# Patient Record
Sex: FEMALE | Race: BLACK OR AFRICAN AMERICAN | NOT HISPANIC OR LATINO | Employment: STUDENT | URBAN - METROPOLITAN AREA
[De-identification: names, ages, dates, MRNs, and addresses within clinical notes are randomized per-mention and may not be internally consistent; named-entity substitution may affect disease eponyms.]

---

## 2022-09-06 ENCOUNTER — OFFICE VISIT (OUTPATIENT)
Dept: OTOLARYNGOLOGY | Facility: CLINIC | Age: 27
End: 2022-09-06
Payer: COMMERCIAL

## 2022-09-06 ENCOUNTER — TELEPHONE (OUTPATIENT)
Dept: OTOLARYNGOLOGY | Facility: CLINIC | Age: 27
End: 2022-09-06
Payer: COMMERCIAL

## 2022-09-06 VITALS — TEMPERATURE: 98 F | BODY MASS INDEX: 51.76 KG/M2 | WEIGHT: 293 LBS

## 2022-09-06 DIAGNOSIS — R09.A2 SENSATION OF FOREIGN BODY IN THROAT: ICD-10-CM

## 2022-09-06 DIAGNOSIS — J02.9 SORE THROAT: Primary | ICD-10-CM

## 2022-09-06 PROCEDURE — 1159F MED LIST DOCD IN RCRD: CPT | Mod: CPTII,S$GLB,, | Performed by: PHYSICIAN ASSISTANT

## 2022-09-06 PROCEDURE — 99203 OFFICE O/P NEW LOW 30 MIN: CPT | Mod: S$GLB,,, | Performed by: PHYSICIAN ASSISTANT

## 2022-09-06 PROCEDURE — 99999 PR PBB SHADOW E&M-NEW PATIENT-LVL III: CPT | Mod: PBBFAC,,, | Performed by: PHYSICIAN ASSISTANT

## 2022-09-06 PROCEDURE — 99203 PR OFFICE/OUTPT VISIT, NEW, LEVL III, 30-44 MIN: ICD-10-PCS | Mod: S$GLB,,, | Performed by: PHYSICIAN ASSISTANT

## 2022-09-06 PROCEDURE — 3008F PR BODY MASS INDEX (BMI) DOCUMENTED: ICD-10-PCS | Mod: CPTII,S$GLB,, | Performed by: PHYSICIAN ASSISTANT

## 2022-09-06 PROCEDURE — 1159F PR MEDICATION LIST DOCUMENTED IN MEDICAL RECORD: ICD-10-PCS | Mod: CPTII,S$GLB,, | Performed by: PHYSICIAN ASSISTANT

## 2022-09-06 PROCEDURE — 3008F BODY MASS INDEX DOCD: CPT | Mod: CPTII,S$GLB,, | Performed by: PHYSICIAN ASSISTANT

## 2022-09-06 PROCEDURE — 99999 PR PBB SHADOW E&M-NEW PATIENT-LVL III: ICD-10-PCS | Mod: PBBFAC,,, | Performed by: PHYSICIAN ASSISTANT

## 2022-09-06 NOTE — TELEPHONE ENCOUNTER
Attempt to gather more information before today's appointment. Pt may need to see dr if needs scope.

## 2022-09-06 NOTE — PROGRESS NOTES
"Subjective:   Patient ID: Christina Leal is a 26 y.o. female.    Chief Complaint: Sore Throat (Patient complains of throat pain, onset 2 weeks. Patient states she went to urgent care yesterday, on 09/05/22. Patient states she believes she is just over using her vocal chords because she tested negative for strep at urgent care. Patient denies any fever, congestion and any other pertinent symptoms. )    Ms. Leal is a 27 yo female here to see me today with complaints of throat pain, onset 2 weeks. Patient states she went to urgent care yesterday, on 09/05/22. Patient states she believes she is just over using her vocal chords because she tested negative for strep at urgent care. Patient denies any fever, congestion and any other pertinent symptoms. She is worried that doing her daily job requirements is "hurting my throat." She states she "fells like something is back there." Denies h/o GERD.       Review of patient's allergies indicates:  No Known Allergies        Review of Systems   Constitutional:  Negative for chills, fatigue, fever and unexpected weight change.   HENT:  Positive for sore throat. Negative for congestion, dental problem, ear discharge, ear pain, facial swelling, hearing loss, nosebleeds, postnasal drip, rhinorrhea, sinus pressure, sneezing, tinnitus, trouble swallowing and voice change.    Eyes:  Negative for redness, itching and visual disturbance.   Respiratory:  Negative for cough, choking, shortness of breath and wheezing.    Cardiovascular:  Negative for chest pain and palpitations.   Gastrointestinal:  Negative for abdominal pain.        No reflux.   Musculoskeletal:  Negative for gait problem.   Skin:  Negative for rash.   Neurological:  Negative for dizziness, light-headedness and headaches.       Objective:   Temp 97.9 °F (36.6 °C) (Temporal)   Wt (!) 154.4 kg (340 lb 6.2 oz)   BMI 51.76 kg/m²     Physical Exam  Constitutional:       General: She is not in acute distress.     " Appearance: She is well-developed.   HENT:      Head: Normocephalic and atraumatic.      Right Ear: Tympanic membrane, ear canal and external ear normal.      Left Ear: Tympanic membrane, ear canal and external ear normal.      Nose: Nose normal. No nasal deformity, septal deviation, mucosal edema or rhinorrhea.      Right Sinus: No maxillary sinus tenderness or frontal sinus tenderness.      Left Sinus: No maxillary sinus tenderness or frontal sinus tenderness.      Mouth/Throat:      Mouth: Mucous membranes are not pale and not dry.      Dentition: No dental caries.      Pharynx: Uvula midline. No oropharyngeal exudate or posterior oropharyngeal erythema.   Eyes:      General: Lids are normal. No scleral icterus.     Extraocular Movements:      Right eye: Normal extraocular motion and no nystagmus.      Left eye: Normal extraocular motion and no nystagmus.      Conjunctiva/sclera: Conjunctivae normal.      Right eye: Right conjunctiva is not injected. No chemosis.     Left eye: Left conjunctiva is not injected. No chemosis.     Pupils: Pupils are equal, round, and reactive to light.   Neck:      Thyroid: No thyroid mass or thyromegaly.      Trachea: Trachea and phonation normal. No tracheal tenderness or tracheal deviation.   Pulmonary:      Effort: Pulmonary effort is normal. No respiratory distress.      Breath sounds: No stridor.   Abdominal:      General: There is no distension.   Lymphadenopathy:      Head:      Right side of head: No submental, submandibular, preauricular, posterior auricular or occipital adenopathy.      Left side of head: No submental, submandibular, preauricular, posterior auricular or occipital adenopathy.      Cervical: No cervical adenopathy.   Skin:     General: Skin is warm and dry.      Findings: No erythema or rash.   Neurological:      Mental Status: She is alert and oriented to person, place, and time.      Cranial Nerves: No cranial nerve deficit.   Psychiatric:          Behavior: Behavior normal.          Assessment:     1. Sore throat    2. Sensation of foreign body in throat        Plan:     Sore throat    Sensation of foreign body in throat    I have scheduled her a return visit with one of our Mds for a scope evaluation. Exam normal today.

## 2022-09-23 ENCOUNTER — OFFICE VISIT (OUTPATIENT)
Dept: OBSTETRICS AND GYNECOLOGY | Facility: CLINIC | Age: 27
End: 2022-09-23
Payer: COMMERCIAL

## 2022-09-23 VITALS — SYSTOLIC BLOOD PRESSURE: 130 MMHG | DIASTOLIC BLOOD PRESSURE: 84 MMHG | BODY MASS INDEX: 52.09 KG/M2 | WEIGHT: 293 LBS

## 2022-09-23 DIAGNOSIS — Z01.419 WELL WOMAN EXAM WITH ROUTINE GYNECOLOGICAL EXAM: Primary | ICD-10-CM

## 2022-09-23 DIAGNOSIS — Z11.3 SCREENING EXAMINATION FOR STD (SEXUALLY TRANSMITTED DISEASE): ICD-10-CM

## 2022-09-23 DIAGNOSIS — Z30.011 BCP (BIRTH CONTROL PILLS) INITIATION: ICD-10-CM

## 2022-09-23 PROCEDURE — 88141 CYTOPATH C/V INTERPRET: CPT | Mod: ,,, | Performed by: PATHOLOGY

## 2022-09-23 PROCEDURE — 3079F PR MOST RECENT DIASTOLIC BLOOD PRESSURE 80-89 MM HG: ICD-10-PCS | Mod: CPTII,S$GLB,,

## 2022-09-23 PROCEDURE — 3008F PR BODY MASS INDEX (BMI) DOCUMENTED: ICD-10-PCS | Mod: CPTII,S$GLB,,

## 2022-09-23 PROCEDURE — 3079F DIAST BP 80-89 MM HG: CPT | Mod: CPTII,S$GLB,,

## 2022-09-23 PROCEDURE — 99385 PREV VISIT NEW AGE 18-39: CPT | Mod: S$GLB,,,

## 2022-09-23 PROCEDURE — 88141 PR  CYTOPATH CERV/VAG INTERPRET: ICD-10-PCS | Mod: ,,, | Performed by: PATHOLOGY

## 2022-09-23 PROCEDURE — 87591 N.GONORRHOEAE DNA AMP PROB: CPT

## 2022-09-23 PROCEDURE — 99999 PR PBB SHADOW E&M-EST. PATIENT-LVL III: ICD-10-PCS | Mod: PBBFAC,,,

## 2022-09-23 PROCEDURE — 3008F BODY MASS INDEX DOCD: CPT | Mod: CPTII,S$GLB,,

## 2022-09-23 PROCEDURE — 1159F MED LIST DOCD IN RCRD: CPT | Mod: CPTII,S$GLB,,

## 2022-09-23 PROCEDURE — 87491 CHLMYD TRACH DNA AMP PROBE: CPT

## 2022-09-23 PROCEDURE — 88175 CYTOPATH C/V AUTO FLUID REDO: CPT | Performed by: PATHOLOGY

## 2022-09-23 PROCEDURE — 87624 HPV HI-RISK TYP POOLED RSLT: CPT

## 2022-09-23 PROCEDURE — 99999 PR PBB SHADOW E&M-EST. PATIENT-LVL III: CPT | Mod: PBBFAC,,,

## 2022-09-23 PROCEDURE — 1159F PR MEDICATION LIST DOCUMENTED IN MEDICAL RECORD: ICD-10-PCS | Mod: CPTII,S$GLB,,

## 2022-09-23 PROCEDURE — 3075F SYST BP GE 130 - 139MM HG: CPT | Mod: CPTII,S$GLB,,

## 2022-09-23 PROCEDURE — 3075F PR MOST RECENT SYSTOLIC BLOOD PRESS GE 130-139MM HG: ICD-10-PCS | Mod: CPTII,S$GLB,,

## 2022-09-23 PROCEDURE — 99385 PR PREVENTIVE VISIT,NEW,18-39: ICD-10-PCS | Mod: S$GLB,,,

## 2022-09-23 RX ORDER — NORGESTIMATE AND ETHINYL ESTRADIOL 7DAYSX3 28
1 KIT ORAL DAILY
Qty: 28 TABLET | Refills: 11 | Status: SHIPPED | OUTPATIENT
Start: 2022-09-23 | End: 2023-09-07

## 2022-09-23 NOTE — PROGRESS NOTES
Subjective:       Patient ID: Christina Leal is a 26 y.o. female.    Chief Complaint:  Annual Exam      History of Present Illness  Gynecologic Exam  The patient's pertinent negatives include no genital itching, genital lesions, genital odor, genital rash, missed menses, pelvic pain, vaginal bleeding or vaginal discharge. She is not pregnant. Pertinent negatives include no abdominal pain, anorexia, back pain, chills, constipation, diarrhea, discolored urine, dysuria, fever, flank pain, frequency, headaches, hematuria, nausea, painful intercourse, rash, urgency or vomiting. She has not been passing clots. She has not been passing tissue. She is sexually active. No, her partner does not have an STD. She uses condoms for contraception. Her menstrual history has been irregular. There is no history of an abdominal surgery, a  section, an ectopic pregnancy, endometriosis, a gynecological surgery, herpes simplex, menorrhagia, metrorrhagia, miscarriage, ovarian cysts, perineal abscess, PID, an STD, a terminated pregnancy or vaginosis.   Annual Exam-Premenopausal  Patient presents for annual exam. The patient has no complaints today. The patient is sexually active MM male, uses condoms sometimes. GYN screening history: no prior history of gyn screening tests. The patient wears seatbelts: yes. The patient participates in regular exercise: no. Has the patient ever been transfused or tattooed?: no. The patient reports that there is not domestic violence in her life.    Monthly menses skipping 1 month sometimes. Usually last 4-6 days, average flow. Heavier on months after a missed menses. Cramping ranging from moderate to light, tolerable without meds, uses heat to soothe.     Reports hair on chin and cheeks with occasional acne. Was told 10 or so years ago that she may have PCOS.         GYN & OB History  Patient's last menstrual period was 2022.   Date of Last Pap: No result found    OB History     Para Term  AB Living   0 0 0 0 0 0   SAB IAB Ectopic Multiple Live Births   0 0 0 0 0       Review of Systems  Review of Systems   Constitutional:  Negative for chills and fever.   Gastrointestinal:  Negative for abdominal pain, anorexia, constipation, diarrhea, nausea and vomiting.   Genitourinary:  Negative for dysuria, flank pain, frequency, hematuria, menorrhagia, missed menses, pelvic pain, urgency and vaginal discharge.   Musculoskeletal:  Negative for back pain.   Integumentary:  Negative for rash.   Neurological:  Negative for headaches.         Objective:    Physical Exam:   Constitutional: She is oriented to person, place, and time. She appears well-developed and well-nourished. No distress.    HENT:   Head: Normocephalic and atraumatic.   Nose: Nose normal.    Eyes: Pupils are equal, round, and reactive to light. Conjunctivae and EOM are normal. Right eye exhibits no discharge. Left eye exhibits no discharge.     Cardiovascular:  Normal rate, regular rhythm and normal heart sounds.      Exam reveals no clubbing, no cyanosis and no edema.        Pulmonary/Chest: Effort normal and breath sounds normal. No respiratory distress. She has no decreased breath sounds. She has no wheezes. She has no rhonchi. Right breast exhibits no inverted nipple, no mass, no nipple discharge, no skin change, no tenderness, no bleeding and no swelling. Left breast exhibits no inverted nipple, no mass, no nipple discharge, no skin change, no tenderness, no bleeding and no swelling. Breasts are symmetrical.        Abdominal: Soft. Bowel sounds are normal. She exhibits no distension. There is no abdominal tenderness. There is no rebound and no guarding. Hernia confirmed negative in the right inguinal area and confirmed negative in the left inguinal area.     Genitourinary:    Inguinal canal, vagina, uterus, right adnexa and left adnexa normal.   Rectum:      No external hemorrhoid.      Pelvic exam was performed with patient  supine.   The external female genitalia was normal.   No external genitalia lesions identified,Genitalia hair distrobution normal .   Labial bartholins normal.There is no rash, tenderness, lesion (small cyst) or injury on the right labia. There is no rash, tenderness, lesion or injury on the left labia. Cervix is normal. Right adnexum displays no mass, no tenderness and no fullness. Left adnexum displays no mass, no tenderness and no fullness. No erythema,  no vaginal discharge, tenderness or bleeding in the vagina.    No foreign body in the vagina.      No signs of injury in the vagina.   Vagina was moist.Cervix exhibits no motion tenderness, no lesion, no discharge, no friability, no lesion, no tenderness and no polyp.    pap smear completedUerus contour normal  Uterus is not enlarged and not tender. Normal urethral meatus.Urethral Meatus exhibits: urethral lesion and prolapsedUrethra findings: no urethral mass, no tenderness and no urethral scarringBladder findings: no bladder distention and no bladder tenderness          Musculoskeletal: Normal range of motion and moves all extremeties.      Lymphadenopathy: No inguinal adenopathy noted on the right or left side.    Neurological: She is alert and oriented to person, place, and time.    Skin: Skin is warm and dry. No rash noted. She is not diaphoretic. No cyanosis or erythema. No pallor. Nails show no clubbing.    Psychiatric: She has a normal mood and affect. Her speech is normal and behavior is normal. Judgment and thought content normal.        Assessment:        1. Well woman exam with routine gynecological exam    2. Screening examination for STD (sexually transmitted disease)    3. BCP (birth control pills) initiation               Plan:      Well woman exam with routine gynecological exam  -     Liquid-Based Pap Smear, Screening  -     HPV High Risk Genotypes, PCR    Screening examination for STD (sexually transmitted disease)  -     C. trachomatis/N.  gonorrhoeae by AMP DNA [facility]; Cervicovaginal    BCP (birth control pills) initiation  -     norgestimate-ethinyl estradioL (ORTHO TRI-CYCLEN,TRI-SPRINTEC) 0.18/0.215/0.25 mg-35 mcg (28) tablet; Take 1 tablet by mouth once daily.  Dispense: 28 tablet; Refill: 11  - Discussed contraception options with patient including pills, patch, ring, Depo Provera, Implanon, Mirena, and Paragard.  Discussed risks of DVT development with birth control use.  Pt denies history of blood clots, DVT, cardiac issues, HTN, CVA, gallbladder disease, liver disease, smoking, migraines with an aura, or adrenal disease.  Pt denies family hx of DVT.  Pt chose OCPs              Follow up in about 3 months (around 12/23/2022) for F/u on birth control.

## 2022-09-24 LAB
C TRACH DNA SPEC QL NAA+PROBE: NOT DETECTED
N GONORRHOEA DNA SPEC QL NAA+PROBE: NOT DETECTED

## 2022-09-28 LAB
HPV HR 12 DNA SPEC QL NAA+PROBE: NEGATIVE
HPV16 AG SPEC QL: NEGATIVE
HPV18 DNA SPEC QL NAA+PROBE: NEGATIVE

## 2022-09-30 ENCOUNTER — TELEPHONE (OUTPATIENT)
Dept: OBSTETRICS AND GYNECOLOGY | Facility: CLINIC | Age: 27
End: 2022-09-30
Payer: COMMERCIAL

## 2022-09-30 LAB
FINAL PATHOLOGIC DIAGNOSIS: ABNORMAL
Lab: ABNORMAL

## 2022-09-30 NOTE — PROGRESS NOTES
Attempted to contact patient to inform patient of results. Patient did not answer. LVM to rtc to clinic

## 2022-09-30 NOTE — TELEPHONE ENCOUNTER
----- Message from Doreen Garrison NP sent at 9/30/2022  1:19 PM CDT -----  Please relay to patient:    Your pap smear is slightly abnormal.  The pap smear detects abnormal cells on the surface of your cervix.  Just to be safe, we'll have you back in 1 year for a repeat pap to keep an eye on it.     Thanks!    Doreen

## 2022-10-03 ENCOUNTER — TELEPHONE (OUTPATIENT)
Dept: OBSTETRICS AND GYNECOLOGY | Facility: CLINIC | Age: 27
End: 2022-10-03
Payer: COMMERCIAL

## 2022-10-03 ENCOUNTER — PATIENT MESSAGE (OUTPATIENT)
Dept: OBSTETRICS AND GYNECOLOGY | Facility: CLINIC | Age: 27
End: 2022-10-03
Payer: COMMERCIAL

## 2022-10-18 ENCOUNTER — OFFICE VISIT (OUTPATIENT)
Dept: INTERNAL MEDICINE | Facility: CLINIC | Age: 27
End: 2022-10-18
Payer: COMMERCIAL

## 2022-10-18 VITALS
OXYGEN SATURATION: 95 % | WEIGHT: 293 LBS | SYSTOLIC BLOOD PRESSURE: 138 MMHG | HEART RATE: 128 BPM | BODY MASS INDEX: 44.41 KG/M2 | HEIGHT: 68 IN | DIASTOLIC BLOOD PRESSURE: 88 MMHG | TEMPERATURE: 101 F

## 2022-10-18 DIAGNOSIS — S29.012A STRAIN OF THORACIC PARASPINAL MUSCLES EXCLUDING T1 AND T2 LEVELS, INITIAL ENCOUNTER: Primary | ICD-10-CM

## 2022-10-18 PROCEDURE — 3075F SYST BP GE 130 - 139MM HG: CPT | Mod: CPTII,S$GLB,, | Performed by: NURSE PRACTITIONER

## 2022-10-18 PROCEDURE — 1159F PR MEDICATION LIST DOCUMENTED IN MEDICAL RECORD: ICD-10-PCS | Mod: CPTII,S$GLB,, | Performed by: NURSE PRACTITIONER

## 2022-10-18 PROCEDURE — 3079F DIAST BP 80-89 MM HG: CPT | Mod: CPTII,S$GLB,, | Performed by: NURSE PRACTITIONER

## 2022-10-18 PROCEDURE — 1159F MED LIST DOCD IN RCRD: CPT | Mod: CPTII,S$GLB,, | Performed by: NURSE PRACTITIONER

## 2022-10-18 PROCEDURE — 99214 OFFICE O/P EST MOD 30 MIN: CPT | Mod: S$GLB,,, | Performed by: NURSE PRACTITIONER

## 2022-10-18 PROCEDURE — 99214 PR OFFICE/OUTPT VISIT, EST, LEVL IV, 30-39 MIN: ICD-10-PCS | Mod: S$GLB,,, | Performed by: NURSE PRACTITIONER

## 2022-10-18 PROCEDURE — 3075F PR MOST RECENT SYSTOLIC BLOOD PRESS GE 130-139MM HG: ICD-10-PCS | Mod: CPTII,S$GLB,, | Performed by: NURSE PRACTITIONER

## 2022-10-18 PROCEDURE — 3079F PR MOST RECENT DIASTOLIC BLOOD PRESSURE 80-89 MM HG: ICD-10-PCS | Mod: CPTII,S$GLB,, | Performed by: NURSE PRACTITIONER

## 2022-10-18 PROCEDURE — 99999 PR PBB SHADOW E&M-EST. PATIENT-LVL IV: ICD-10-PCS | Mod: PBBFAC,,, | Performed by: NURSE PRACTITIONER

## 2022-10-18 PROCEDURE — 99999 PR PBB SHADOW E&M-EST. PATIENT-LVL IV: CPT | Mod: PBBFAC,,, | Performed by: NURSE PRACTITIONER

## 2022-10-18 RX ORDER — METHOCARBAMOL 500 MG/1
500 TABLET, FILM COATED ORAL 2 TIMES DAILY PRN
Qty: 40 TABLET | Refills: 0 | Status: SHIPPED | OUTPATIENT
Start: 2022-10-18 | End: 2022-10-28

## 2022-10-18 NOTE — PROGRESS NOTES
Chief Complaint  Chief Complaint   Patient presents with    Muscle Pain     Left side 5 days          HPI     HPI  Christina Leal is a 26 y.o. female with medical diagnoses as listed in the medical history and problem list that presents for Thoracic Muscle Strain. This patient is new to me.     Thoracic Muscle Strain: Pain waxes and wanes that is sharp in nature. Reports this pain started some weeks ago however, went away on its on. Pain returned 5 days ago. \She attributes pain to how she sleeps explaining she twist and turns in her sleep. She rates pain 6-7/10. Denies falls or traumas. LMP was 09/12-16/2022. LMP was 09/12-16/2022. Reports recently starting oral contraceptives. Use Ibuprofen and Tylenol and a heating pads with moderate relief. Reports recently being dehydrated due to decreased water intake.       History     PAST MEDICAL HISTORY:  History reviewed. No pertinent past medical history.    PAST SURGICAL HISTORY:  History reviewed. No pertinent surgical history.    SOCIAL HISTORY:  Social History     Socioeconomic History    Marital status: Single   Tobacco Use    Smoking status: Former     Types: Cigarettes    Smokeless tobacco: Never   Substance and Sexual Activity    Alcohol use: Yes     Comment: social    Drug use: No    Sexual activity: Yes     Partners: Male     Birth control/protection: Condom   Social History Narrative    She is a graduate of [employer] and will be attending [employer] in the fall. She is a non-smoker and doesn't drink alcohol. 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        FAMILY HISTORY:  Family History   Problem Relation Age of Onset    Esophageal cancer Father     Heart disease Neg Hx     Cancer Neg Hx        ALLERGIES AND MEDICATIONS: updated and reviewed.  Review of patient's allergies indicates:  No Known Allergies  Current Outpatient Medications   Medication Sig Dispense Refill    methocarbamoL (ROBAXIN) 500 MG Tab Take 1 tablet (500 mg total) by mouth 2 (two)  "times daily as needed. 40 tablet 0    norgestimate-ethinyl estradioL (ORTHO TRI-CYCLEN,TRI-SPRINTEC) 0.18/0.215/0.25 mg-35 mcg (28) tablet Take 1 tablet by mouth once daily. 28 tablet 11     No current facility-administered medications for this visit.           Exam     ROS  Review of Systems   Constitutional:  Negative for appetite change, chills, fatigue and fever.   HENT:  Negative for congestion, ear pain, postnasal drip, rhinorrhea, sinus pressure, sneezing and sore throat.    Respiratory:  Negative for shortness of breath.    Cardiovascular:  Negative for chest pain and palpitations.   Gastrointestinal:  Negative for abdominal pain, constipation, diarrhea, nausea and vomiting.   Genitourinary:  Negative for dysuria.   Musculoskeletal:  Positive for back pain and myalgias. Negative for arthralgias.   Neurological:  Negative for headaches.   Psychiatric/Behavioral:  Negative for sleep disturbance.          Physical Exam  Vitals:    10/18/22 1326   BP: 138/88   BP Location: Right arm   Patient Position: Sitting   BP Method: Large (Manual)   Pulse: (!) 128   Temp: (!) 100.6 °F (38.1 °C)   TempSrc: Oral   SpO2: 95%   Weight: (!) 154.2 kg (339 lb 15.2 oz)   Height: 5' 8" (1.727 m)    Body mass index is 51.69 kg/m².  Weight: (!) 154.2 kg (339 lb 15.2 oz)   Height: 5' 8" (172.7 cm)   Physical Exam  Constitutional:       General: She is not in acute distress.     Appearance: Normal appearance. She is obese.   HENT:      Head: Normocephalic and atraumatic.      Right Ear: External ear normal.      Left Ear: External ear normal.      Nose: Nose normal.   Eyes:      Pupils: Pupils are equal, round, and reactive to light.   Cardiovascular:      Rate and Rhythm: Normal rate and regular rhythm.      Pulses: Normal pulses.   Pulmonary:      Effort: Pulmonary effort is normal. No respiratory distress.      Breath sounds: Normal breath sounds. No wheezing.   Abdominal:      General: Bowel sounds are normal.      Palpations: " Abdomen is soft.   Musculoskeletal:      Cervical back: Neck supple.      Thoracic back: Spasms and tenderness present.   Lymphadenopathy:      Cervical: No cervical adenopathy.   Skin:     General: Skin is warm and dry.      Capillary Refill: Capillary refill takes less than 2 seconds.   Neurological:      General: No focal deficit present.      Mental Status: She is alert and oriented to person, place, and time.   Psychiatric:         Mood and Affect: Mood normal.           Health Maintenance         Date Due Completion Date    Hepatitis C Screening Never done ---    Lipid Panel Never done ---    HIV Screening Never done ---    TETANUS VACCINE 09/23/2019 9/23/2009    COVID-19 Vaccine (2 - Booster for Drake series) 05/08/2021 3/13/2021    Influenza Vaccine (1) 09/01/2022 11/22/2011    Pap Smear 09/23/2025 9/23/2022              Assessment & Plan     Assessment & Plan  Problem List Items Addressed This Visit    None  Visit Diagnoses       Strain of thoracic paraspinal muscles excluding T1 and T2 levels, initial encounter    -  Primary  - Labs to rule out renal involvement or Pregnancy  -We discussed effective administration of Robaxin, adverse effects and side effects with education given for reinforcement    Relevant Medications    methocarbamoL (ROBAXIN) 500 MG Tab, BID PRN    Other Relevant Orders    CBC Auto Differential (Completed)    Basic Metabolic Panel (Completed)    Urinalysis, Reflex to Urine Culture Urine, Clean Catch    Ambulatory referral/consult to Physical/Occupational Therapy    Pregnancy, urine rapid (Completed)              Health Maintenance reviewed: Deferred per patient    Follow-up: If symptoms worsen or fail to improve     30+ minutes of total time spent on the encounter, which includes face to face time and non-face to face time preparing to see the patient (eg, review of tests), Obtaining and/or reviewing separately obtained history, documenting clinical information in the electronic or  other health record, independently interpreting results (not separately reported) and communicating results to the patient/family/caregiver, or Care coordination (not separately reported).

## 2022-10-19 ENCOUNTER — HOSPITAL ENCOUNTER (OUTPATIENT)
Dept: RADIOLOGY | Facility: HOSPITAL | Age: 27
Discharge: HOME | DRG: 163 | End: 2022-10-19
Attending: NURSE PRACTITIONER
Payer: COMMERCIAL

## 2022-10-19 ENCOUNTER — PATIENT MESSAGE (OUTPATIENT)
Dept: INTERNAL MEDICINE | Facility: CLINIC | Age: 27
End: 2022-10-19
Payer: COMMERCIAL

## 2022-10-19 ENCOUNTER — HOSPITAL ENCOUNTER (INPATIENT)
Facility: HOSPITAL | Age: 27
LOS: 9 days | Discharge: HOME HEALTH | DRG: 163 | End: 2022-10-28
Attending: EMERGENCY MEDICINE | Admitting: INTERNAL MEDICINE
Payer: COMMERCIAL

## 2022-10-19 DIAGNOSIS — D72.829 LEUKOCYTOSIS, UNSPECIFIED TYPE: Primary | ICD-10-CM

## 2022-10-19 DIAGNOSIS — Z93.8 S/P CHEST TUBE PLACEMENT: ICD-10-CM

## 2022-10-19 DIAGNOSIS — J90 PLEURAL EFFUSION: ICD-10-CM

## 2022-10-19 DIAGNOSIS — J91.8 PARAPNEUMONIC EFFUSION: ICD-10-CM

## 2022-10-19 DIAGNOSIS — R10.9 ABDOMINAL PAIN, UNSPECIFIED ABDOMINAL LOCATION: ICD-10-CM

## 2022-10-19 DIAGNOSIS — B34.9 VIRAL SYNDROME: ICD-10-CM

## 2022-10-19 DIAGNOSIS — J18.9 PARAPNEUMONIC EFFUSION: ICD-10-CM

## 2022-10-19 DIAGNOSIS — R06.03 ACUTE RESPIRATORY DISTRESS: ICD-10-CM

## 2022-10-19 DIAGNOSIS — J86.9 EMPYEMA OF LEFT PLEURAL SPACE: Primary | ICD-10-CM

## 2022-10-19 DIAGNOSIS — J90 LOCULATED PLEURAL EFFUSION: ICD-10-CM

## 2022-10-19 LAB
ALBUMIN SERPL BCP-MCNC: 2.3 G/DL (ref 3.5–5.2)
ALP SERPL-CCNC: 85 U/L (ref 55–135)
ALT SERPL W/O P-5'-P-CCNC: 19 U/L (ref 10–44)
ANION GAP SERPL CALC-SCNC: 13 MMOL/L (ref 8–16)
ANISOCYTOSIS BLD QL SMEAR: SLIGHT
APPEARANCE FLD: NORMAL
AST SERPL-CCNC: 16 U/L (ref 10–40)
BASOPHILS # BLD AUTO: 0.11 K/UL (ref 0–0.2)
BASOPHILS # BLD AUTO: 0.13 K/UL (ref 0–0.2)
BASOPHILS NFR BLD: 0.4 % (ref 0–1.9)
BASOPHILS NFR BLD: 0.4 % (ref 0–1.9)
BILIRUB SERPL-MCNC: 2.1 MG/DL (ref 0.1–1)
BODY FLD TYPE: NORMAL
BUN SERPL-MCNC: 10 MG/DL (ref 6–20)
CALCIUM SERPL-MCNC: 9 MG/DL (ref 8.7–10.5)
CHLORIDE SERPL-SCNC: 97 MMOL/L (ref 95–110)
CO2 SERPL-SCNC: 22 MMOL/L (ref 23–29)
COLOR FLD: YELLOW
CREAT SERPL-MCNC: 0.8 MG/DL (ref 0.5–1.4)
CTP QC/QA: YES
CTP QC/QA: YES
DIFFERENTIAL METHOD: ABNORMAL
DIFFERENTIAL METHOD: ABNORMAL
EOSINOPHIL # BLD AUTO: 0 K/UL (ref 0–0.5)
EOSINOPHIL # BLD AUTO: 0 K/UL (ref 0–0.5)
EOSINOPHIL NFR BLD: 0 % (ref 0–8)
EOSINOPHIL NFR BLD: 0.1 % (ref 0–8)
ERYTHROCYTE [DISTWIDTH] IN BLOOD BY AUTOMATED COUNT: 13.8 % (ref 11.5–14.5)
ERYTHROCYTE [DISTWIDTH] IN BLOOD BY AUTOMATED COUNT: 13.9 % (ref 11.5–14.5)
EST. GFR  (NO RACE VARIABLE): >60 ML/MIN/1.73 M^2
GLUCOSE SERPL-MCNC: 119 MG/DL (ref 70–110)
HCT VFR BLD AUTO: 30.9 % (ref 37–48.5)
HCT VFR BLD AUTO: 32.9 % (ref 37–48.5)
HCV AB SERPL QL IA: NEGATIVE
HEP C VIRUS HOLD SPECIMEN: NORMAL
HGB BLD-MCNC: 10.7 G/DL (ref 12–16)
HGB BLD-MCNC: 9.9 G/DL (ref 12–16)
HIV 1+2 AB+HIV1 P24 AG SERPL QL IA: NEGATIVE
HYPOCHROMIA BLD QL SMEAR: ABNORMAL
IMM GRANULOCYTES # BLD AUTO: 0.9 K/UL (ref 0–0.04)
IMM GRANULOCYTES # BLD AUTO: 1.18 K/UL (ref 0–0.04)
IMM GRANULOCYTES NFR BLD AUTO: 3 % (ref 0–0.5)
IMM GRANULOCYTES NFR BLD AUTO: 4.3 % (ref 0–0.5)
LACTATE SERPL-SCNC: 1.4 MMOL/L (ref 0.5–2.2)
LDH SERPL L TO P-CCNC: 167 U/L (ref 110–260)
LYMPHOCYTES # BLD AUTO: 2.8 K/UL (ref 1–4.8)
LYMPHOCYTES # BLD AUTO: 2.9 K/UL (ref 1–4.8)
LYMPHOCYTES NFR BLD: 10.3 % (ref 18–48)
LYMPHOCYTES NFR BLD: 9.8 % (ref 18–48)
MCH RBC QN AUTO: 26.4 PG (ref 27–31)
MCH RBC QN AUTO: 26.6 PG (ref 27–31)
MCHC RBC AUTO-ENTMCNC: 32 G/DL (ref 32–36)
MCHC RBC AUTO-ENTMCNC: 32.5 G/DL (ref 32–36)
MCV RBC AUTO: 81 FL (ref 82–98)
MCV RBC AUTO: 83 FL (ref 82–98)
MONOCYTES # BLD AUTO: 3.5 K/UL (ref 0.3–1)
MONOCYTES # BLD AUTO: 3.6 K/UL (ref 0.3–1)
MONOCYTES NFR BLD: 12.3 % (ref 4–15)
MONOCYTES NFR BLD: 12.8 % (ref 4–15)
MONOS+MACROS NFR FLD MANUAL: 2 %
NEUTROPHILS # BLD AUTO: 19.8 K/UL (ref 1.8–7.7)
NEUTROPHILS # BLD AUTO: 22 K/UL (ref 1.8–7.7)
NEUTROPHILS NFR BLD: 72.1 % (ref 38–73)
NEUTROPHILS NFR BLD: 74.5 % (ref 38–73)
NEUTROPHILS NFR FLD MANUAL: 98 %
NRBC BLD-RTO: 0 /100 WBC
NRBC BLD-RTO: 0 /100 WBC
PLATELET # BLD AUTO: 394 K/UL (ref 150–450)
PLATELET # BLD AUTO: 410 K/UL (ref 150–450)
PLATELET BLD QL SMEAR: ABNORMAL
PMV BLD AUTO: 8.8 FL (ref 9.2–12.9)
PMV BLD AUTO: 9.4 FL (ref 9.2–12.9)
POC MOLECULAR INFLUENZA A AGN: NEGATIVE
POC MOLECULAR INFLUENZA B AGN: NEGATIVE
POLYCHROMASIA BLD QL SMEAR: ABNORMAL
POTASSIUM SERPL-SCNC: 4.2 MMOL/L (ref 3.5–5.1)
PROT SERPL-MCNC: 6.7 G/DL (ref 6–8.4)
PROT SERPL-MCNC: 7.6 G/DL (ref 6–8.4)
RBC # BLD AUTO: 3.72 M/UL (ref 4–5.4)
RBC # BLD AUTO: 4.06 M/UL (ref 4–5.4)
SARS-COV-2 RDRP RESP QL NAA+PROBE: NEGATIVE
SODIUM SERPL-SCNC: 132 MMOL/L (ref 136–145)
STOMATOCYTES BLD QL SMEAR: PRESENT
WBC # BLD AUTO: 27.39 K/UL (ref 3.9–12.7)
WBC # BLD AUTO: 29.56 K/UL (ref 3.9–12.7)
WBC # FLD: NORMAL /CU MM

## 2022-10-19 PROCEDURE — 99223 PR INITIAL HOSPITAL CARE,LEVL III: ICD-10-PCS | Mod: 25,,, | Performed by: INTERNAL MEDICINE

## 2022-10-19 PROCEDURE — 25000242 PHARM REV CODE 250 ALT 637 W/ HCPCS: Performed by: EMERGENCY MEDICINE

## 2022-10-19 PROCEDURE — 82150 ASSAY OF AMYLASE: CPT | Performed by: INTERNAL MEDICINE

## 2022-10-19 PROCEDURE — 25000003 PHARM REV CODE 250: Performed by: NURSE PRACTITIONER

## 2022-10-19 PROCEDURE — 87081 CULTURE SCREEN ONLY: CPT | Performed by: NURSE PRACTITIONER

## 2022-10-19 PROCEDURE — 63600175 PHARM REV CODE 636 W HCPCS: Performed by: INTERNAL MEDICINE

## 2022-10-19 PROCEDURE — 74176 CT ABD & PELVIS W/O CONTRAST: CPT | Mod: TC

## 2022-10-19 PROCEDURE — 63600175 PHARM REV CODE 636 W HCPCS: Performed by: EMERGENCY MEDICINE

## 2022-10-19 PROCEDURE — 11000001 HC ACUTE MED/SURG PRIVATE ROOM

## 2022-10-19 PROCEDURE — 99223 1ST HOSP IP/OBS HIGH 75: CPT | Mod: 25,,, | Performed by: INTERNAL MEDICINE

## 2022-10-19 PROCEDURE — 74176 CT RENAL STONE STUDY ABD PELVIS WO: ICD-10-PCS | Mod: 26,,, | Performed by: RADIOLOGY

## 2022-10-19 PROCEDURE — 82945 GLUCOSE OTHER FLUID: CPT | Performed by: INTERNAL MEDICINE

## 2022-10-19 PROCEDURE — 85025 COMPLETE CBC W/AUTO DIFF WBC: CPT | Performed by: EMERGENCY MEDICINE

## 2022-10-19 PROCEDURE — 83605 ASSAY OF LACTIC ACID: CPT | Performed by: EMERGENCY MEDICINE

## 2022-10-19 PROCEDURE — 82042 OTHER SOURCE ALBUMIN QUAN EA: CPT | Performed by: INTERNAL MEDICINE

## 2022-10-19 PROCEDURE — 25000003 PHARM REV CODE 250: Performed by: INTERNAL MEDICINE

## 2022-10-19 PROCEDURE — 84155 ASSAY OF PROTEIN SERUM: CPT | Performed by: INTERNAL MEDICINE

## 2022-10-19 PROCEDURE — 87389 HIV-1 AG W/HIV-1&-2 AB AG IA: CPT | Performed by: EMERGENCY MEDICINE

## 2022-10-19 PROCEDURE — 84157 ASSAY OF PROTEIN OTHER: CPT | Performed by: INTERNAL MEDICINE

## 2022-10-19 PROCEDURE — 83615 LACTATE (LD) (LDH) ENZYME: CPT | Performed by: INTERNAL MEDICINE

## 2022-10-19 PROCEDURE — 32555 ASPIRATE PLEURA W/ IMAGING: CPT | Mod: LT,,, | Performed by: INTERNAL MEDICINE

## 2022-10-19 PROCEDURE — 86803 HEPATITIS C AB TEST: CPT | Mod: 91 | Performed by: EMERGENCY MEDICINE

## 2022-10-19 PROCEDURE — 25000003 PHARM REV CODE 250: Performed by: EMERGENCY MEDICINE

## 2022-10-19 PROCEDURE — 84311 SPECTROPHOTOMETRY: CPT | Performed by: INTERNAL MEDICINE

## 2022-10-19 PROCEDURE — 96365 THER/PROPH/DIAG IV INF INIT: CPT

## 2022-10-19 PROCEDURE — 94640 AIRWAY INHALATION TREATMENT: CPT | Mod: XB

## 2022-10-19 PROCEDURE — 99900035 HC TECH TIME PER 15 MIN (STAT)

## 2022-10-19 PROCEDURE — 80053 COMPREHEN METABOLIC PANEL: CPT | Performed by: EMERGENCY MEDICINE

## 2022-10-19 PROCEDURE — 83615 LACTATE (LD) (LDH) ENZYME: CPT | Mod: 91 | Performed by: INTERNAL MEDICINE

## 2022-10-19 PROCEDURE — 94761 N-INVAS EAR/PLS OXIMETRY MLT: CPT

## 2022-10-19 PROCEDURE — 32555 PR THORACEN W/IMAG GUIDANCE: ICD-10-PCS | Mod: LT,,, | Performed by: INTERNAL MEDICINE

## 2022-10-19 PROCEDURE — 89051 BODY FLUID CELL COUNT: CPT | Performed by: INTERNAL MEDICINE

## 2022-10-19 PROCEDURE — 87040 BLOOD CULTURE FOR BACTERIA: CPT | Performed by: EMERGENCY MEDICINE

## 2022-10-19 PROCEDURE — 74176 CT ABD & PELVIS W/O CONTRAST: CPT | Mod: 26,,, | Performed by: RADIOLOGY

## 2022-10-19 PROCEDURE — 27000221 HC OXYGEN, UP TO 24 HOURS

## 2022-10-19 PROCEDURE — 99291 CRITICAL CARE FIRST HOUR: CPT | Mod: 25

## 2022-10-19 PROCEDURE — 63600175 PHARM REV CODE 636 W HCPCS: Performed by: NURSE PRACTITIONER

## 2022-10-19 PROCEDURE — 85025 COMPLETE CBC W/AUTO DIFF WBC: CPT | Mod: 91 | Performed by: NURSE PRACTITIONER

## 2022-10-19 PROCEDURE — 87635 SARS-COV-2 COVID-19 AMP PRB: CPT | Performed by: EMERGENCY MEDICINE

## 2022-10-19 RX ORDER — MORPHINE SULFATE 4 MG/ML
4 INJECTION, SOLUTION INTRAMUSCULAR; INTRAVENOUS EVERY 6 HOURS PRN
Status: DISCONTINUED | OUTPATIENT
Start: 2022-10-19 | End: 2022-10-20

## 2022-10-19 RX ORDER — ONDANSETRON 2 MG/ML
4 INJECTION INTRAMUSCULAR; INTRAVENOUS EVERY 8 HOURS PRN
Status: DISCONTINUED | OUTPATIENT
Start: 2022-10-19 | End: 2022-10-28 | Stop reason: HOSPADM

## 2022-10-19 RX ORDER — IPRATROPIUM BROMIDE AND ALBUTEROL SULFATE 2.5; .5 MG/3ML; MG/3ML
3 SOLUTION RESPIRATORY (INHALATION)
Status: DISPENSED | OUTPATIENT
Start: 2022-10-19 | End: 2022-10-19

## 2022-10-19 RX ORDER — ACETAMINOPHEN 500 MG
500-1000 TABLET ORAL EVERY 6 HOURS PRN
COMMUNITY
End: 2023-05-30

## 2022-10-19 RX ORDER — CEFEPIME HYDROCHLORIDE 1 G/50ML
1 INJECTION, SOLUTION INTRAVENOUS
Status: COMPLETED | OUTPATIENT
Start: 2022-10-19 | End: 2022-10-19

## 2022-10-19 RX ORDER — ACETAMINOPHEN 325 MG/1
650 TABLET ORAL EVERY 4 HOURS PRN
Status: DISCONTINUED | OUTPATIENT
Start: 2022-10-19 | End: 2022-10-21

## 2022-10-19 RX ORDER — IBUPROFEN 200 MG
200-400 TABLET ORAL EVERY 6 HOURS PRN
COMMUNITY
End: 2023-05-30

## 2022-10-19 RX ORDER — CEFEPIME HYDROCHLORIDE 1 G/50ML
2 INJECTION, SOLUTION INTRAVENOUS
Status: DISCONTINUED | OUTPATIENT
Start: 2022-10-19 | End: 2022-10-21

## 2022-10-19 RX ORDER — SODIUM CHLORIDE 0.9 % (FLUSH) 0.9 %
10 SYRINGE (ML) INJECTION
Status: DISCONTINUED | OUTPATIENT
Start: 2022-10-19 | End: 2022-10-28 | Stop reason: HOSPADM

## 2022-10-19 RX ORDER — FAMOTIDINE 10 MG/ML
20 INJECTION INTRAVENOUS EVERY 12 HOURS
Status: DISCONTINUED | OUTPATIENT
Start: 2022-10-19 | End: 2022-10-20

## 2022-10-19 RX ORDER — SODIUM CHLORIDE 9 MG/ML
30 INJECTION, SOLUTION INTRAVENOUS
Status: COMPLETED | OUTPATIENT
Start: 2022-10-19 | End: 2022-10-19

## 2022-10-19 RX ORDER — SODIUM CHLORIDE 0.9 % (FLUSH) 0.9 %
10 SYRINGE (ML) INJECTION
Status: DISCONTINUED | OUTPATIENT
Start: 2022-10-19 | End: 2022-10-21

## 2022-10-19 RX ORDER — TRAZODONE HYDROCHLORIDE 100 MG/1
100 TABLET ORAL NIGHTLY PRN
Status: DISCONTINUED | OUTPATIENT
Start: 2022-10-19 | End: 2022-10-28 | Stop reason: HOSPADM

## 2022-10-19 RX ADMIN — CEFEPIME HYDROCHLORIDE 1 G: 1 INJECTION, SOLUTION INTRAVENOUS at 03:10

## 2022-10-19 RX ADMIN — IPRATROPIUM BROMIDE AND ALBUTEROL SULFATE 3 ML: 2.5; .5 SOLUTION RESPIRATORY (INHALATION) at 04:10

## 2022-10-19 RX ADMIN — VANCOMYCIN HYDROCHLORIDE 2500 MG: 10 INJECTION, POWDER, LYOPHILIZED, FOR SOLUTION INTRAVENOUS at 06:10

## 2022-10-19 RX ADMIN — MORPHINE SULFATE 4 MG: 4 INJECTION INTRAVENOUS at 06:10

## 2022-10-19 RX ADMIN — CEFEPIME 2 G: 2 INJECTION, POWDER, FOR SOLUTION INTRAVENOUS at 10:10

## 2022-10-19 RX ADMIN — ACETAMINOPHEN 650 MG: 325 TABLET ORAL at 07:10

## 2022-10-19 RX ADMIN — SODIUM CHLORIDE 4500 ML: 0.9 INJECTION, SOLUTION INTRAVENOUS at 04:10

## 2022-10-19 RX ADMIN — FAMOTIDINE 20 MG: 10 INJECTION, SOLUTION INTRAVENOUS at 09:10

## 2022-10-19 RX ADMIN — TRAZODONE HYDROCHLORIDE 100 MG: 100 TABLET ORAL at 10:10

## 2022-10-19 RX ADMIN — IPRATROPIUM BROMIDE AND ALBUTEROL SULFATE 3 ML: 2.5; .5 SOLUTION RESPIRATORY (INHALATION) at 03:10

## 2022-10-19 NOTE — TELEPHONE ENCOUNTER
I spoke with patient and advised her to report to nearest ER for treatment given findings on CT of large multiloculated left sided effusion with atelectasis/consolidation. I explained that PO medication may not be aggressive enough for the extend of the effusion.   Patient's HR had been elevated, WBCs increased, temp elevated. Exertion SOB and while lying down.   Patient verbalized understanding of plan discussed during phone encounter.    Jimmy Castellanos NP

## 2022-10-19 NOTE — ASSESSMENT & PLAN NOTE
Continue empiric vancomycin and cefepime   Pulmonology following  Thoracentesis, follow fluid analysis   Continue supplemental O2  Check TTE  Follow blood cultures

## 2022-10-19 NOTE — HOSPITAL COURSE
Seen and examined:  Procedure Thoracentesis described    10/20: seen and examined: SP Tony, T max 100.7F, feels better, CXR reviewed    10/21: seen and examined: T max 99.5F, awaiting VATS,      10/22: seen and examined : T max 99.6F, Using IS, CXR reveiwed, pleurovac has 550 mls, tidaling, no air leak , on 2 LPM    10/23: seen and examined : T max 99.6F, Using IS on and off, CXR reveiwed, pleurovac has 900 mls, tidaling, no air leak , on 2 LPM PICC replaced, MARICHUY atelectasis    10/24; seen and examined T max: 99.9F Chest Ct reviewed: pleural loculation left apex. chest tube tidal, has 1050 mls total, using IS    10/25: seen and examined: T max 98.4F, Await IR procedure,Pleural chamber has 1110mls. , wbcc trending down, reduced CT output    10/26: seen and examined: T max 98.4F,  IR chest tube drained 50 mls  chamber has 1250mls. , wbcc trending down, reduced CT output    10/27: seen and examined: T max 99F,  IR chest tube drained 50 mls  . , wbcc trending down, reduced CT output     10/28 seen and examined.  Afebrile.  Chest tube removed.  WBC 14 K.  Afebrile.  On room air.

## 2022-10-19 NOTE — PHARMACY MED REC
"Admission Medication History     The home medication history was taken by David Jacobo.    You may go to "Admission" then "Reconcile Home Medications" tabs to review and/or act upon these items.     The home medication list has been updated by the Pharmacy department.   Please read ALL comments highlighted in yellow.   Please address this information as you see fit.    Feel free to contact us if you have any questions or require assistance.      Medications listed below were obtained from: Patient/family, [employer], and Patient's pharmacy  (Not in a hospital admission)      Potential issues to be addressed PRIOR TO DISCHARGE: NONE    David Jacobo, Rhys-Adv  Pharmacy Technician Specialist-Medication History  [facility] [phone]  Secure chat preferred     Current Outpatient Medications on File Prior to Encounter   Medication Sig Dispense Refill Last Dose    acetaminophen (TYLENOL) 500 MG tablet Take 500-1,000 mg by mouth every 6 (six) hours as needed for Pain.   10/18/2022    ibuprofen (ADVIL,MOTRIN) 200 MG tablet Take 200-400 mg by mouth every 6 (six) hours as needed for Pain.   10/18/2022    methocarbamoL (ROBAXIN) 500 MG Tab Take 1 tablet (500 mg total) by mouth 2 (two) times daily as needed. 40 tablet 0 10/18/2022    norgestimate-ethinyl estradioL (ORTHO TRI-CYCLEN,TRI-SPRINTEC) 0.18/0.215/0.25 mg-35 mcg (28) tablet Take 1 tablet by mouth once daily. 28 tablet 11 10/18/2022                           .        "

## 2022-10-19 NOTE — ED PROVIDER NOTES
SCRIBE #1 NOTE: I, Abad Valencia/Nancy Noe, am scribing for, and in the presence of, Henri Gill Jr., MD. I have scribed the HPI, ROS, and PEx.     SCRIBE #2 NOTE: I, Katie Villa, am scribing for, and in the presence of,  Nirali Hernandez MD. I have scribed the remaining portions of the note not scribed by Scribe #1.    History     Chief Complaint   Patient presents with    Shortness of Breath     Pt here with c/o SOB and L sided rib pain.  Pt was sent here from the [place] with possible pnuemonia and fluid in the lungs     Review of patient's allergies indicates:  No Known Allergies      History of Present Illness     HPI    10/19/2022, 2:52 PM  History obtained from the patient      History of Present Illness: Christina Leal is a 26 y.o. female patient who presents to the Emergency Department for evaluation of SOB which onset 6 days PTA. Symptoms are constant and moderate in severity. No mitigating or exacerbating factors reported. Associated sxs include fever (Tnow 100.6), cough, intermittent wheezing. Patient denies any CP, n/v/d, lightheadedness, weakness, numbness, and all other sxs at this time. No prior Tx reported. No further complaints or concerns at this time.       Arrival mode: Personal vehicle    PCP: Primary Doctor No        Past Medical History:  History reviewed. No pertinent past medical history.    Past Surgical History:  History reviewed. No pertinent surgical history.      Family History:  Family History   Problem Relation Age of Onset    Esophageal cancer Father     Heart disease Neg Hx     Cancer Neg Hx        Social History:  Social History     Tobacco Use    Smoking status: Former     Types: Cigarettes    Smokeless tobacco: Never   Substance and Sexual Activity    Alcohol use: Yes     Comment: social    Drug use: No    Sexual activity: Yes     Partners: Male     Birth control/protection: Condom        Review of Systems     Review of Systems   Constitutional:  Positive for fever  (100.6). Negative for chills.   HENT:  Negative for sore throat.    Respiratory:  Positive for cough, shortness of breath and wheezing (intermittent).    Cardiovascular:  Negative for chest pain.   Gastrointestinal:  Negative for diarrhea, nausea and vomiting.   Genitourinary:  Negative for dysuria.   Musculoskeletal:  Negative for back pain.   Skin:  Negative for rash.   Neurological:  Negative for weakness, light-headedness, numbness and headaches.   Hematological:  Does not bruise/bleed easily.   All other systems reviewed and are negative.     Physical Exam     Initial Vitals [10/19/22 1441]   BP Pulse Resp Temp SpO2   103/85 (!) 136 (!) 30 (!) 100.6 °F (38.1 °C) (!) 93 %      MAP       --          Physical Exam  Nursing Notes and Vital Signs Reviewed.  Constitutional: Patient is in no acute distress. Well-developed and well-nourished.  Head: Atraumatic. Normocephalic.  Eyes:  EOM intact.  No scleral icterus.  ENT: Mucous membranes are moist.  Nares clear   Neck:  Full ROM. No JVD.  Cardiovascular: Regular rate. Regular rhythm No murmurs, rubs, or gallops. Distal pulses are 2+ and symmetric  Pulmonary/Chest: No respiratory distress. Clear to auscultation bilaterally. No wheezing or rales.  Equal chest wall rise bilaterally  Abdominal: Soft and non-distended.  There is no tenderness.  No rebound, guarding, or rigidity. Good bowel sounds.  Genitourinary: No CVA tenderness.  No suprapubic tenderness  Musculoskeletal: Moves all extremities. No obvious deformities.  5 x 5 strength in all extremities   Skin: Warm and dry.  Neurological:  Alert, awake, and appropriate.  Normal speech.  No acute focal neurological deficits are appreciated.  Two through 12 intact bilaterally.  Psychiatric: Normal affect. Good eye contact. Appropriate in content.     ED Course   Critical Care    Date/Time: 10/19/2022 4:32 PM  Performed by: Nirali Hernandez MD  Authorized by: Nirali Hernandez MD   Direct patient critical care time: 12  minutes  Additional history critical care time: 7 minutes  Ordering / reviewing critical care time: 6 minutes  Documentation critical care time: 4 minutes  Consulting other physicians critical care time: 3 minutes  Total critical care time (exclusive of procedural time) : 32 minutes  Critical care time was exclusive of separately billable procedures and treating other patients and teaching time.  Critical care was necessary to treat or prevent imminent or life-threatening deterioration of the following conditions: pleural effusion.  Critical care was time spent personally by me on the following activities: blood draw for specimens, development of treatment plan with patient or surrogate, discussions with consultants, interpretation of cardiac output measurements, evaluation of patient's response to treatment, examination of patient, obtaining history from patient or surrogate, ordering and performing treatments and interventions, ordering and review of laboratory studies, ordering and review of radiographic studies, pulse oximetry, re-evaluation of patient's condition and review of old charts.      ED Vital Signs:  Vitals:    10/21/22 1155 10/21/22 1210 10/21/22 1215 10/21/22 1220   BP: 135/63 135/63     Pulse: 100 100     Resp: 20 (!) 29 (!) 30 (!) 33   Temp:       TempSrc:       SpO2: 98% 99%     Weight:       Height:        10/21/22 1225 10/21/22 1240 10/21/22 1250 10/21/22 1255   BP: 133/62 129/60  (!) 129/59   Pulse: 95 94  94   Resp: (!) 24 (!) 32 (!) 27 (!) 29   Temp:       TempSrc:       SpO2: 98% 99%  99%   Weight:       Height:        10/21/22 1300 10/21/22 1320 10/21/22 1325 10/21/22 1335   BP: 130/61  125/66 129/65   Pulse: 92 88 90 88   Resp: (!) 26 (!) 24 (!) 23 (!) 22   Temp: 98.4 °F (36.9 °C)      TempSrc: Axillary      SpO2: 99% 99% 97% 97%   Weight:       Height:        10/21/22 1355 10/21/22 1417 10/21/22 1609   BP: 126/71 128/70 119/71   Pulse: 90 90 94   Resp: 20 16 18   Temp:   97.6 °F (36.4  °C)   TempSrc:   Oral   SpO2: 96% 95% 98%   Weight:      Height:          Abnormal Lab Results:  Labs Reviewed   CBC W/ AUTO DIFFERENTIAL - Abnormal; Notable for the following components:       Result Value    WBC 29.56 (*)     Hemoglobin 10.7 (*)     Hematocrit 32.9 (*)     MCV 81 (*)     MCH 26.4 (*)     MPV 8.8 (*)     Immature Granulocytes 3.0 (*)     Gran # (ANC) 22.0 (*)     Immature Grans (Abs) 0.90 (*)     Mono # 3.6 (*)     Gran % 74.5 (*)     Lymph % 9.8 (*)     All other components within normal limits    Narrative:     Release to patient->Immediate   COMPREHENSIVE METABOLIC PANEL - Abnormal; Notable for the following components:    Sodium 132 (*)     CO2 22 (*)     Glucose 119 (*)     Albumin 2.3 (*)     Total Bilirubin 2.1 (*)     All other components within normal limits    Narrative:     Release to patient->Immediate   CBC W/ AUTO DIFFERENTIAL - Abnormal; Notable for the following components:    WBC 27.39 (*)     RBC 3.72 (*)     Hemoglobin 9.9 (*)     Hematocrit 30.9 (*)     MCH 26.6 (*)     Immature Granulocytes 4.3 (*)     Gran # (ANC) 19.8 (*)     Immature Grans (Abs) 1.18 (*)     Mono # 3.5 (*)     Lymph % 10.3 (*)     All other components within normal limits   GRAM STAIN   AFB CULTURE & SMEAR   CULTURE, BODY FLUID - BACTEC   HIV 1 / 2 ANTIBODY    Narrative:     Release to patient->Immediate   HEPATITIS C ANTIBODY    Narrative:     Release to patient->Immediate   HEP C VIRUS HOLD SPECIMEN    Narrative:     Release to patient->Immediate   LACTIC ACID, PLASMA    Narrative:     Release to patient->Immediate   PROTEIN, TOTAL   WBC & DIFF, BODY FLUID   SARS-COV-2 RDRP GENE    Narrative:     This test utilizes isothermal nucleic acid amplification   technology to detect the SARS-CoV-2 RdRp nucleic acid segment.   The analytical sensitivity (limit of detection) is 125 genome   equivalents/mL.   A POSITIVE result implies infection with the SARS-CoV-2 virus;   the patient is presumed to be contagious.      A NEGATIVE result means that SARS-CoV-2 nucleic acids are not   present above the limit of detection. A NEGATIVE result should be   treated as presumptive. It does not rule out the possibility of   COVID-19 and should not be the sole basis for treatment decisions.   If COVID-19 is strongly suspected based on clinical and exposure   history, re-testing using an alternate molecular assay should be   considered.   This test is only for use under the Food and Drug   Administration s Emergency Use Authorization (EUA).   Commercial kits are provided by [employer].   Performance characteristics of the EUA have been independently   verified by [facility] Department of   Pathology and Laboratory Medicine.   _________________________________________________________________   The authorized Fact Sheet for Healthcare Providers and the authorized Fact   Sheet for Patients of the ID NOW COVID-19 are available on the FDA   website:     https://www.fda.gov/media/300310/download  https://www.fda.gov/media/396562/download          POCT INFLUENZA A/B MOLECULAR   CYTOLOGY SPECIMEN- PULMONARY MEDICAL CYTOLOGY        All Lab Results:  Results for orders placed or performed during the hospital encounter of 10/19/22   Blood culture #1    Specimen: Peripheral, Antecubital, Right; Blood   Result Value Ref Range    Blood Culture, Routine No Growth to date     Blood Culture, Routine No Growth to date    Blood culture #2    Specimen: Peripheral, Antecubital, Left; Blood   Result Value Ref Range    Blood Culture, Routine No Growth to date     Blood Culture, Routine No Growth to date    Culture, MRSA    Specimen: Nares, Left; MRSA source   Result Value Ref Range    MRSA Surveillance Screen No MRSA isolated    HIV 1/2 Ag/Ab (4th Gen)   Result Value Ref Range    HIV 1/2 Ag/Ab Negative Negative   Hepatitis C Antibody   Result Value Ref Range    Hepatitis C Ab Negative Negative   HCV Virus Hold Specimen   Result Value Ref Range     HEP C Virus Hold Specimen Hold for HCV sendout    CBC auto differential   Result Value Ref Range    WBC 29.56 (H) 3.90 - 12.70 K/uL    RBC 4.06 4.00 - 5.40 M/uL    Hemoglobin 10.7 (L) 12.0 - 16.0 g/dL    Hematocrit 32.9 (L) 37.0 - 48.5 %    MCV 81 (L) 82 - 98 fL    MCH 26.4 (L) 27.0 - 31.0 pg    MCHC 32.5 32.0 - 36.0 g/dL    RDW 13.8 11.5 - 14.5 %    Platelets 410 150 - 450 K/uL    MPV 8.8 (L) 9.2 - 12.9 fL    Immature Granulocytes 3.0 (H) 0.0 - 0.5 %    Gran # (ANC) 22.0 (H) 1.8 - 7.7 K/uL    Immature Grans (Abs) 0.90 (H) 0.00 - 0.04 K/uL    Lymph # 2.9 1.0 - 4.8 K/uL    Mono # 3.6 (H) 0.3 - 1.0 K/uL    Eos # 0.0 0.0 - 0.5 K/uL    Baso # 0.13 0.00 - 0.20 K/uL    nRBC 0 0 /100 WBC    Gran % 74.5 (H) 38.0 - 73.0 %    Lymph % 9.8 (L) 18.0 - 48.0 %    Mono % 12.3 4.0 - 15.0 %    Eosinophil % 0.0 0.0 - 8.0 %    Basophil % 0.4 0.0 - 1.9 %    Platelet Estimate Appears normal     Aniso Slight     Poly Occasional     Hypo Occasional     Stomatocytes Present     Differential Method Automated    Comprehensive metabolic panel   Result Value Ref Range    Sodium 132 (L) 136 - 145 mmol/L    Potassium 4.2 3.5 - 5.1 mmol/L    Chloride 97 95 - 110 mmol/L    CO2 22 (L) 23 - 29 mmol/L    Glucose 119 (H) 70 - 110 mg/dL    BUN 10 6 - 20 mg/dL    Creatinine 0.8 0.5 - 1.4 mg/dL    Calcium 9.0 8.7 - 10.5 mg/dL    Total Protein 7.6 6.0 - 8.4 g/dL    Albumin 2.3 (L) 3.5 - 5.2 g/dL    Total Bilirubin 2.1 (H) 0.1 - 1.0 mg/dL    Alkaline Phosphatase 85 55 - 135 U/L    AST 16 10 - 40 U/L    ALT 19 10 - 44 U/L    Anion Gap 13 8 - 16 mmol/L    eGFR >60 >60 mL/min/1.73 m^2   Lactic acid, plasma   Result Value Ref Range    Lactate (Lactic Acid) 1.4 0.5 - 2.2 mmol/L   CBC auto differential   Result Value Ref Range    WBC 27.39 (H) 3.90 - 12.70 K/uL    RBC 3.72 (L) 4.00 - 5.40 M/uL    Hemoglobin 9.9 (L) 12.0 - 16.0 g/dL    Hematocrit 30.9 (L) 37.0 - 48.5 %    MCV 83 82 - 98 fL    MCH 26.6 (L) 27.0 - 31.0 pg    MCHC 32.0 32.0 - 36.0 g/dL    RDW 13.9  11.5 - 14.5 %    Platelets 394 150 - 450 K/uL    MPV 9.4 9.2 - 12.9 fL    Immature Granulocytes 4.3 (H) 0.0 - 0.5 %    Gran # (ANC) 19.8 (H) 1.8 - 7.7 K/uL    Immature Grans (Abs) 1.18 (H) 0.00 - 0.04 K/uL    Lymph # 2.8 1.0 - 4.8 K/uL    Mono # 3.5 (H) 0.3 - 1.0 K/uL    Eos # 0.0 0.0 - 0.5 K/uL    Baso # 0.11 0.00 - 0.20 K/uL    nRBC 0 0 /100 WBC    Gran % 72.1 38.0 - 73.0 %    Lymph % 10.3 (L) 18.0 - 48.0 %    Mono % 12.8 4.0 - 15.0 %    Eosinophil % 0.1 0.0 - 8.0 %    Basophil % 0.4 0.0 - 1.9 %    Differential Method Automated    Lactate dehydrogenase   Result Value Ref Range     110 - 260 U/L   Protein, total   Result Value Ref Range    Total Protein 6.7 6.0 - 8.4 g/dL   WBC & Diff,Body Fluid Thoracentesis Fluid   Result Value Ref Range    Body Fluid Type Thoracentesis Fluid     Fluid Appearance Cloudy     Fluid Color Yellow     WBC, Body Fluid 57235 /cu mm    Segs, Fluid 98 %    Monocytes/Macrophages, Fluid 2 %   Amylase, Peritoneal, Pleural Fluid or KALIE Drainage, In-House Thoracentesis Fluid   Result Value Ref Range    Body Fluid Source Amylase Thoracentesis Fluid     Amylase, Fluid 25 Not established U/L   Glucose, Peritoneal, Pleural Fluid or KALIE Drainage, In-House Thoracentesis Fluid   Result Value Ref Range    Body Fluid Source, Glucose Thoracentesis Fluid     Glucose, Fluid <5 Not established mg/dL   LDH, Peritoneal, Pleural Fluid or KALIE Drainage, In-House Thoracentesis Fluid   Result Value Ref Range    Body Fluid Source, LDH Thoracentesis Fluid     LD, Fluid 2399 Not established U/L   Albumin, Peritoneal, Pleural Fluid or KALIE Drainage, In-House Thoracentesis Fluid   Result Value Ref Range    Body Fluid Source, Albumin Thoracentesis Fluid     Body Fluid, Albumin 2.4 See text g/dL   Protein, Peritoneal, Pleural Fluid or KALIE Drainage, In-House Thoracentesis Fluid   Result Value Ref Range    Body Fluid Source, Total Protein Thoracentesis Fluid     Body Fluid, Protein 5.8 Not established g/dL   Cholesterol,  Body Fluid (Reference Lab) Pleural Fluid, Left   Result Value Ref Range    Cholesterol, Body Fluid 94 See Comment mg/dL   Comprehensive metabolic panel   Result Value Ref Range    Sodium 134 (L) 136 - 145 mmol/L    Potassium 4.3 3.5 - 5.1 mmol/L    Chloride 102 95 - 110 mmol/L    CO2 23 23 - 29 mmol/L    Glucose 119 (H) 70 - 110 mg/dL    BUN 7 6 - 20 mg/dL    Creatinine 0.7 0.5 - 1.4 mg/dL    Calcium 8.3 (L) 8.7 - 10.5 mg/dL    Total Protein 6.6 6.0 - 8.4 g/dL    Albumin 1.9 (L) 3.5 - 5.2 g/dL    Total Bilirubin 1.4 (H) 0.1 - 1.0 mg/dL    Alkaline Phosphatase 99 55 - 135 U/L    AST 16 10 - 40 U/L    ALT 15 10 - 44 U/L    Anion Gap 9 8 - 16 mmol/L    eGFR >60 >60 mL/min/1.73 m^2   Sedimentation rate   Result Value Ref Range    Sed Rate 113 (H) 0 - 20 mm/Hr   C-reactive protein   Result Value Ref Range    .6 (H) 0.0 - 8.2 mg/L   CBC auto differential   Result Value Ref Range    WBC 22.84 (H) 3.90 - 12.70 K/uL    RBC 3.83 (L) 4.00 - 5.40 M/uL    Hemoglobin 10.1 (L) 12.0 - 16.0 g/dL    Hematocrit 32.2 (L) 37.0 - 48.5 %    MCV 84 82 - 98 fL    MCH 26.4 (L) 27.0 - 31.0 pg    MCHC 31.4 (L) 32.0 - 36.0 g/dL    RDW 14.0 11.5 - 14.5 %    Platelets 411 150 - 450 K/uL    MPV 9.2 9.2 - 12.9 fL    Immature Granulocytes Test Not Performed 0.0 - 0.5 %    Immature Grans (Abs) Test Not Performed 0.00 - 0.04 K/uL    nRBC 0 0 /100 WBC    Gran % 76.0 (H) 38.0 - 73.0 %    Lymph % 17.0 (L) 18.0 - 48.0 %    Mono % 7.0 4.0 - 15.0 %    Eosinophil % 0.0 0.0 - 8.0 %    Basophil % 0.0 0.0 - 1.9 %    Differential Method Manual    Pathologist Review of Laboratory Test   Result Value Ref Range    Pathologist Review, Body Fluid REVIEWED    Brain natriuretic peptide   Result Value Ref Range    BNP 17 0 - 99 pg/mL   Urinalysis, Reflex to Urine Culture Urine, Clean Catch    Specimen: Urine   Result Value Ref Range    Specimen UA Urine, Clean Catch     Color, UA Yellow Yellow, Straw, Fariba    Appearance, UA Clear Clear    pH, UA 6.0 5.0 - 8.0     Specific Gravity, UA 1.010 1.005 - 1.030    Protein, UA Trace (A) Negative    Glucose, UA Negative Negative    Ketones, UA Negative Negative    Bilirubin (UA) Negative Negative    Occult Blood UA Negative Negative    Nitrite, UA Negative Negative    Urobilinogen, UA 2.0-3.0 (A) <2.0 EU/dL    Leukocytes, UA Negative Negative   Comprehensive metabolic panel   Result Value Ref Range    Sodium 135 (L) 136 - 145 mmol/L    Potassium 4.2 3.5 - 5.1 mmol/L    Chloride 100 95 - 110 mmol/L    CO2 23 23 - 29 mmol/L    Glucose 110 70 - 110 mg/dL    BUN 6 6 - 20 mg/dL    Creatinine 0.7 0.5 - 1.4 mg/dL    Calcium 8.7 8.7 - 10.5 mg/dL    Total Protein 7.4 6.0 - 8.4 g/dL    Albumin 2.2 (L) 3.5 - 5.2 g/dL    Total Bilirubin 1.1 (H) 0.1 - 1.0 mg/dL    Alkaline Phosphatase 83 55 - 135 U/L    AST 24 10 - 40 U/L    ALT 20 10 - 44 U/L    Anion Gap 12 8 - 16 mmol/L    eGFR >60 >60 mL/min/1.73 m^2   Prealbumin   Result Value Ref Range    Prealbumin 5 (L) 20 - 43 mg/dL   POCT COVID-19 Rapid Screening   Result Value Ref Range    POC Rapid COVID Negative Negative     Acceptable Yes    POCT Influenza A/B Molecular   Result Value Ref Range    POC Molecular Influenza A Ag Negative Negative, Not Reported    POC Molecular Influenza B Ag Negative Negative, Not Reported     Acceptable Yes    Echo   Result Value Ref Range    BSA 2.69 m2    TDI SEPTAL 0.13 m/s    LV LATERAL E/E' RATIO 6.06 m/s    LV SEPTAL E/E' RATIO 7.46 m/s    LA WIDTH 3.70 cm    IVC diameter 2.06 cm    Left Ventricular Outflow Tract Mean Velocity 1.16 cm/s    Left Ventricular Outflow Tract Mean Gradient 5.43 mmHg    TDI LATERAL 0.16 m/s    LVIDd 3.74 3.5 - 6.0 cm    IVS 1.31 (A) 0.6 - 1.1 cm    Posterior Wall 1.34 (A) 0.6 - 1.1 cm    Ao root annulus 3.24 cm    LVIDs 2.10 2.1 - 4.0 cm    FS 44 28 - 44 %    LA volume 72.96 cm3    STJ 2.98 cm    Ascending aorta 2.95 cm    LV mass 174.16 g    LA size 4.25 cm    Left Ventricle Relative Wall Thickness 0.72 cm     AV mean gradient 8 mmHg    AV valve area 3.25 cm2    AV Velocity Ratio 0.79     AV index (prosthetic) 1.05     MV valve area p 1/2 method 4.28 cm2    E/A ratio 1.39     Mean e' 0.15 m/s    E wave deceleration time 177.15 msec    IVRT 53.28 msec    LVOT diameter 1.99 cm    LVOT area 3.1 cm2    LVOT peak sekou 1.29 m/s    LVOT peak VTI 27.30 cm    Ao peak sekou 1.63 m/s    Ao VTI 26.1 cm    RVOT peak sekou 1.03 m/s    RVOT peak VTI 17.4 cm    LVOT stroke volume 84.87 cm3    AV peak gradient 11 mmHg    PV mean gradient 2.78 mmHg    E/E' ratio 6.69 m/s    MV Peak E Sekou 0.97 m/s    MV stenosis pressure 1/2 time 51.37 ms    MV Peak A Sekou 0.70 m/s    LV Systolic Volume 14.39 mL    LV Systolic Volume Index 5.7 mL/m2    LV Diastolic Volume 59.74 mL    LV Diastolic Volume Index 23.52 mL/m2    LA Volume Index 28.7 mL/m2    LV Mass Index 69 g/m2    RA Major Axis 4.56 cm    Left Atrium Minor Axis 5.56 cm    Left Atrium Major Axis 5.36 cm    RA Width 3.10 cm    Right Atrial Pressure (from IVC) 8 mmHg    EF 60 %   Type & Screen   Result Value Ref Range    Group & Rh A POS     Indirect George NEG    POCT glucose   Result Value Ref Range    POCT Glucose 134 (H) 70 - 110 mg/dL   ISTAT PROCEDURE   Result Value Ref Range    POC PH 7.377 7.35 - 7.45    POC PCO2 49.8 (H) 35 - 45 mmHg    POC PO2 124 (H) 80 - 100 mmHg    POC HCO3 29.3 (H) 24 - 28 mmol/L    POC BE 4 -2 to 2 mmol/L    POC SATURATED O2 99 95 - 100 %    Rate 18     Sample ARTERIAL     Site Sherlyn/UAC     Allens Test N/A     DelSys CPAP/BiPAP     Mode BiPAP     FiO2 45     IP 10     EP 5          Imaging Results:  Imaging Results              X-Ray Chest 1 View (Final result)  Result time 10/19/22 18:25:13      Final result by Emanuel Oden MD (10/19/22 18:25:13)                   Impression:      As above.      Electronically signed by: Emanuel Oden  Date:    10/19/2022  Time:    18:25               Narrative:    EXAMINATION:  XR CHEST 1 VIEW    CLINICAL HISTORY:  left  thora;    TECHNIQUE:  Single frontal view of the chest was performed.    COMPARISON:  None    FINDINGS:  Significantly improved aeration of the left lung.  Small residual pleural thickening along the superior lung possibly small loculated pleural effusion.    No definite pneumothorax.    Right lung is unchanged.                                       US Chest Mediastinum (Final result)  Result time 10/19/22 17:36:28      Final result by Emanuel Oden MD (10/19/22 17:36:28)                   Impression:      Large left pleural effusion.      Electronically signed by: Emanuel Oden  Date:    10/19/2022  Time:    17:36               Narrative:    EXAMINATION:  US CHEST MEDIASTINUM    CLINICAL HISTORY:  pleural effusion, preop thoracentesis;    TECHNIQUE:  Grayscale ultrasound of the chest and mediastinum.    COMPARISON:  None    FINDINGS:  Large left pleural effusion.                                       CT Chest Without Contrast (Final result)  Result time 10/19/22 16:00:59      Final result by Eric Wang MD (10/19/22 16:00:59)                   Impression:      Large loculated appearing left pleural effusion with associated lower lobe atelectasis as well as mild left upper lobe atelectasis.  Bandlike opacities with ground-glass attenuation in the posterior right lung base likely represent additional subsegmental atelectasis.    Mild rightward shift of the mediastinal contents.  No significant mediastinal or hilar lymphadenopathy.      Electronically signed by: Eric Wang  Date:    10/19/2022  Time:    16:00               Narrative:    EXAMINATION:  CT CHEST WITHOUT CONTRAST    CLINICAL HISTORY:  Respiratory illness, nondiagnostic xray;    TECHNIQUE:  Low dose axial images, sagittal and coronal reformations were obtained from the thoracic inlet to the lung bases. Contrast was not administered.    COMPARISON:  CT abdomen and pelvis and chest x-ray from the same date    FINDINGS:  There is a large  loculated left pleural effusion with associated atelectasis of the left lower lobe.  Additional mild atelectasis noted in the lingular segment of the left upper lobe.  Mild bandlike opacities with ground-glass attenuation noted in the right posterior lung base.  Right lung is otherwise clear.  No discrete nodule or mass seen.    There is mild rightward shift of the mediastinal contents.  No significant mediastinal or hilar lymphadenopathy seen.    Heart is normal in size.  No significant coronary calcification or pericardial effusion.    Soft tissues of the visualized lower neck, chest wall, and axilla are unremarkable.    Visualized upper abdominal contents are unremarkable.    No acute bony abnormality.  No aggressive lytic or blastic lesion seen.                                       X-Ray Chest AP Portable (SOB) (Final result)  Result time 10/19/22 15:27:15      Final result by Eric Wang MD (10/19/22 15:27:15)                   Impression:      Large left pleural effusion with associated lower lung atelectasis and mild rightward mediastinal shift.  Findings likely unchanged compared to CT abdomen pelvis from the same date.      Electronically signed by: Eric Wang  Date:    10/19/2022  Time:    15:27               Narrative:    EXAMINATION:  XR CHEST AP PORTABLE    CLINICAL HISTORY:  SOB;    TECHNIQUE:  Single frontal view of the chest was performed.    COMPARISON:  CT abdomen pelvis from the same date    FINDINGS:  Large left pleural effusion noted with associated lower lung atelectasis and mild rightward mediastinal shift.  Right lung is clear.  Left mediastinal border is obscured.  Right mediastinal border is unremarkable.  No acute bony abnormality seen.                                            The Emergency Provider reviewed the vital signs and test results, which are outlined above.     ED Discussion       4:00 PM: Dr. Gill transfers care of patient to Dr. Hernandez pending imaging  results.    4:33 PM: Discussed case with Carmen Recinos NP ([facility]). Dr. Sam agrees with current care and management of pt and accepts admission.   Admitting Service: Hospital Medicine  Admitting Physician: Dr. Sam  Admit to: ICU    4:34 PM: Re-evaluated pt. I have discussed test results, shared treatment plan, and the need for admission with patient and family at bedside. Pt and family express understanding at this time and agree with all information. All questions answered. Pt and family have no further questions or concerns at this time. Pt is ready for admit.           Medical Decision Making:   Clinical Tests:   Lab Tests: Ordered and Reviewed  Radiological Study: Ordered and Reviewed         ED Medication(s):  Medications   albuterol-ipratropium 2.5 mg-0.5 mg/3 mL nebulizer solution 3 mL (3 mLs Nebulization Given 10/19/22 1600)   sodium chloride 0.9% flush 10 mL (has no administration in time range)   ondansetron injection 4 mg (has no administration in time range)   traZODone tablet 100 mg (100 mg Oral Not Given 10/19/22 2222)   famotidine tablet 20 mg (20 mg Oral Not Given 10/21/22 0900)   enoxaparin injection 40 mg (40 mg Subcutaneous Not Given 10/21/22 0900)   albuterol nebulizer solution 2.5 mg (2.5 mg Nebulization Given 10/21/22 0815)   mupirocin 2 % ointment ( Nasal Not Given 10/21/22 0930)   dextrose 5 % and 0.45 % NaCl with KCl 20 mEq infusion ( Intravenous New Bag 10/21/22 1433)   acetaminophen tablet 650 mg (650 mg Oral Given 10/21/22 1425)   oxyCODONE immediate release tablet 5 mg (has no administration in time range)   morphine injection 3 mg (has no administration in time range)   metoclopramide HCl injection 5 mg (has no administration in time range)   docusate sodium capsule 100 mg (has no administration in time range)   polyethylene glycol packet 17 g (17 g Oral Given 10/21/22 1426)   albuterol-ipratropium 2.5 mg-0.5 mg/3 mL nebulizer solution 3 mL (3 mLs Nebulization  Not Given 10/21/22 1430)   ketorolac injection 30 mg (30 mg Intravenous Given 10/21/22 1427)   ampicillin-sulbactam 3 g in sodium chloride 0.9 % 100 mL IVPB (ready to mix system) (has no administration in time range)   cefepime in dextrose 5 % 1 gram/50 mL IVPB 1 g (0 g Intravenous Stopped 10/19/22 1626)   0.9%  NaCl infusion (4,500 mLs Intravenous New Bag 10/19/22 1623)   vancomycin (VANCOCIN) 2,500 mg in dextrose 5 % 500 mL IVPB (0 mg Intravenous Stopped 10/19/22 2032)   perflutren protein-A microsphr 0.22 mg/mL IV susp (0.11 mg Intravenous Given 10/20/22 1000)   HYDROmorphone (PF) injection 0.2 mg (0.2 mg Intravenous Given 10/21/22 1300)       Current Discharge Medication List                  Scribe Attestation:   Scribe #1: I performed the above scribed service and the documentation accurately describes the services I performed. I attest to the accuracy of the note.     Attending:   Physician Attestation Statement for Scribe #1: I, Henri Gill Jr., MD, personally performed the services described in this documentation, as scribed by Abad Valencia/Nancy Noe, in my presence, and it is both accurate and complete.       Scribe Attestation:   Scribe #2: I performed the above scribed service and the documentation accurately describes the services I performed. I attest to the accuracy of the note.    Attending Attestation:           Physician Attestation for Scribe:    Physician Attestation Statement for Scribe #2: I, Nirali Hernandez MD, reviewed documentation, as scribed by Katie Villa in my presence, and it is both accurate and complete. I also acknowledge and confirm the content of the note done by Sharoniblinda #1.         Clinical Impression       ICD-10-CM ICD-9-CM   1. Loculated pleural effusion  J90 511.9   2. Acute respiratory distress  R06.03 518.82   3. Pleural effusion  J90 511.9   4. Parapneumonic effusion  J18.9 511.89    J91.8        Disposition:   Disposition: Admitted  Condition: Serious        Nirali Hernandez MD  10/21/22 8429

## 2022-10-19 NOTE — H&P
[facility] - Emergency Dept.  [facility]  History & Physical    Patient Name: Christina Leal  MRN: [id number]  Patient Class: IP- Inpatient  Admission Date: 10/19/2022  Attending Physician: Krystian Alexander, *   Primary Care Provider: Primary Doctor No         Patient information was obtained from patient, past medical records and ER records.     Subjective:     Principal Problem:Parapneumonic effusion    Chief Complaint:   Chief Complaint   Patient presents with    Shortness of Breath     Pt here with c/o SOB and L sided rib pain.  Pt was sent here from the [facility] with possible pnuemonia and fluid in the lungs        HPI: Christina Leal is a 25 yo female with history of obesity, former tobacco abuse on oral contraception who was referred to ED by primary care for abnormal CXR. Patient reports 1 week history of left sided rib cage pain associated with chest tightness, SOB and PAIZ. She also reports upper abdominal cramping and diarrhea. She denies n/v. OP imaging remarkable for pna with large left pleural effusion. CT chest shows large loculated appearing left pleural effusion with associated lower lobe atelectasis as well as mild left upper lobe atelectasis.  Bandlike opacities with ground-glass attenuation in the posterior right lung base likely represent additional subsegmental atelectasis. Mild rightward shift of the mediastinal contents noted. Patient febrile (Tmax 100.6) tachycardic and tachypenic, O2 sats 98% 2 L O2. Influenza, COVID, HIV, Hep C negative. Labs remarkable for wbc 29 with left shift, Tbili 2.1. She received cefepime. Pulmonology consulted. Patient admitted to hospital medicine.             No past medical history on file.    No past surgical history on file.    Review of patient's allergies indicates:  No Known Allergies    No current facility-administered medications on file prior to encounter.     Current Outpatient Medications on File Prior to Encounter   Medication Sig    acetaminophen  (TYLENOL) 500 MG tablet Take 500-1,000 mg by mouth every 6 (six) hours as needed for Pain.    ibuprofen (ADVIL,MOTRIN) 200 MG tablet Take 200-400 mg by mouth every 6 (six) hours as needed for Pain.    methocarbamoL (ROBAXIN) 500 MG Tab Take 1 tablet (500 mg total) by mouth 2 (two) times daily as needed.    norgestimate-ethinyl estradioL (ORTHO TRI-CYCLEN,TRI-SPRINTEC) 0.18/0.215/0.25 mg-35 mcg (28) tablet Take 1 tablet by mouth once daily.     Family History       Problem Relation (Age of Onset)    Esophageal cancer Father          Tobacco Use    Smoking status: Former     Types: Cigarettes    Smokeless tobacco: Never   Substance and Sexual Activity    Alcohol use: Yes     Comment: social    Drug use: No    Sexual activity: Yes     Partners: Male     Birth control/protection: Condom     Review of Systems   Constitutional:  Positive for fatigue. Negative for chills, diaphoresis and fever.   Eyes:  Negative for photophobia.   Respiratory:  Positive for chest tightness and shortness of breath. Negative for cough and wheezing.    Cardiovascular:  Negative for chest pain, palpitations and leg swelling.   Gastrointestinal:  Positive for diarrhea and nausea. Negative for abdominal pain and vomiting.   Genitourinary:  Negative for dysuria, flank pain, frequency and hematuria.   Musculoskeletal:  Positive for arthralgias. Negative for back pain and myalgias.   Neurological:  Positive for weakness. Negative for dizziness, syncope, light-headedness and headaches.   Psychiatric/Behavioral:  Negative for confusion.    Objective:     Vital Signs (Most Recent):  Temp: (!) 100.6 °F (38.1 °C) (10/19/22 1441)  Pulse: (!) 138 (10/19/22 1706)  Resp: (!) 30 (10/19/22 1706)  BP: (!) 162/107 (10/19/22 1706)  SpO2: 98 % (10/19/22 1706)   Vital Signs (24h Range):  Temp:  [100.6 °F (38.1 °C)] 100.6 °F (38.1 °C)  Pulse:  [130-138] 138  Resp:  [20-37] 30  SpO2:  [91 %-100 %] 98 %  BP: (103-163)/() 162/107     Weight: (!) 152.9 kg  (337 lb 1.3 oz)  Body mass index is 51.25 kg/m².    Physical Exam  Constitutional:       Appearance: She is well-developed. She is obese.   HENT:      Head: Normocephalic and atraumatic.   Eyes:      Conjunctiva/sclera: Conjunctivae normal.      Pupils: Pupils are equal, round, and reactive to light.   Neck:      Vascular: No JVD.   Cardiovascular:      Rate and Rhythm: Regular rhythm. Tachycardia present.      Heart sounds: Normal heart sounds.   Pulmonary:      Effort: No respiratory distress.      Breath sounds: No wheezing.      Comments: Patient tachypneic, speaking in short sentences, breath sounds to left upper and lower lobe diminished   Abdominal:      General: Bowel sounds are normal. There is no distension.      Palpations: Abdomen is soft.      Tenderness: There is no abdominal tenderness. There is no guarding.   Musculoskeletal:         General: No tenderness. Normal range of motion.      Cervical back: Normal range of motion.   Skin:     General: Skin is warm and dry.      Capillary Refill: Capillary refill takes less than 2 seconds.   Neurological:      Mental Status: She is alert and oriented to person, place, and time.   Psychiatric:         Behavior: Behavior normal.         CRANIAL NERVES     CN III, IV, VI   Pupils are equal, round, and reactive to light.     Significant Labs: All pertinent labs within the past 24 hours have been reviewed.  CBC:   Recent Labs   Lab 10/18/22  1356 10/19/22  1508   WBC 28.45* 29.56*   HGB 11.3* 10.7*   HCT 37.1 32.9*   * 410     CMP:   Recent Labs   Lab 10/18/22  1356 10/19/22  1508   * 132*   K 4.4 4.2   CL 95 97   CO2 27 22*   * 119*   BUN 10 10   CREATININE 0.8 0.8   CALCIUM 9.3 9.0   PROT  --  7.6   ALBUMIN  --  2.3*   BILITOT  --  2.1*   ALKPHOS  --  85   AST  --  16   ALT  --  19   ANIONGAP 10 13     Lactic Acid:   Recent Labs   Lab 10/19/22  1508   LACTATE 1.4       Significant Imaging: I have reviewed all pertinent imaging  results/findings within the past 24 hours.    Assessment/Plan:     * Parapneumonic effusion  Continue empiric vancomycin and cefepime   Pulmonology following  Thoracentesis, follow fluid analysis   Continue supplemental O2  Check TTE  Follow blood cultures       VTE Risk Mitigation (From admission, onward)         Ordered     IP VTE HIGH RISK PATIENT  Once         10/19/22 1646     Place sequential compression device  Until discontinued         10/19/22 1646                   Carmen Recinos NP  Department of Hospital Medicine   [facility] - Emergency Dept.

## 2022-10-19 NOTE — CONSULTS
[facility] - Emergency Dept.  Pulmonology  Consult Note    Patient Name: Christina Leal  MRN: [id number]  Admission Date: 10/19/2022  Hospital Length of Stay: 0 days  Code Status: Full Code  Attending Physician: Krystian Alexander, *  Primary Care Provider: Primary Doctor No   Principal Problem: <principal problem not specified>    [unfilled]  Subjective:     HPI:  Christina Leal is 26 y.o.  Asked to see by ER Provider  Hung, SOB, large left pleural effusion with midline shift  Seen [place] yesterday  Labs wbcc elevated    Chest pain since last thursday      No past medical history on file.    No past surgical history on file.    Review of patient's allergies indicates:  No Known Allergies    Family History       Problem Relation (Age of Onset)    Esophageal cancer Father          Tobacco Use    Smoking status: Former     Types: Cigarettes    Smokeless tobacco: Never   Substance and Sexual Activity    Alcohol use: Yes     Comment: social    Drug use: No    Sexual activity: Yes     Partners: Male     Birth control/protection: Condom         Review of Systems   Constitutional:  Positive for fatigue and fever.   Respiratory:  Positive for cough, chest tightness and shortness of breath.    Objective:     Vital Signs (Most Recent):  Temp: (!) 100.6 °F (38.1 °C) (10/19/22 1441)  Pulse: (!) 138 (10/19/22 1706)  Resp: (!) 30 (10/19/22 1706)  BP: (!) 162/107 (10/19/22 1706)  SpO2: 98 % (10/19/22 1706)   Vital Signs (24h Range):  Temp:  [100.6 °F (38.1 °C)] 100.6 °F (38.1 °C)  Pulse:  [130-138] 138  Resp:  [20-37] 30  SpO2:  [91 %-100 %] 98 %  BP: (103-163)/() 162/107     Weight: (!) 152.9 kg (337 lb 1.3 oz)  Body mass index is 51.25 kg/m².      Intake/Output Summary (Last 24 hours) at 10/19/2022 1715  Last data filed at 10/19/2022 1626  Gross per 24 hour   Intake 50 ml   Output --   Net 50 ml       Physical Exam  Vitals and nursing note reviewed.   Constitutional:       Appearance: She is well-developed.    HENT:      Head: Normocephalic and atraumatic.      Nose: Nose normal.      Mouth/Throat:      Pharynx: No oropharyngeal exudate.   Eyes:      General: No scleral icterus.     Conjunctiva/sclera: Conjunctivae normal.      Pupils: Pupils are equal, round, and reactive to light.   Neck:      Thyroid: No thyromegaly.      Vascular: No JVD.      Trachea: No tracheal deviation.   Cardiovascular:      Rate and Rhythm: Normal rate and regular rhythm.      Heart sounds: Normal heart sounds, S1 normal and S2 normal. No murmur heard.  Pulmonary:      Effort: Pulmonary effort is normal. No tachypnea, accessory muscle usage or respiratory distress.      Breath sounds: Decreased air movement present. No stridor. Examination of the left-upper field reveals decreased breath sounds. Examination of the left-middle field reveals decreased breath sounds. Decreased breath sounds present.       Abdominal:      General: Bowel sounds are normal. There is no distension.      Palpations: Abdomen is soft. There is no hepatomegaly, splenomegaly or mass.      Tenderness: There is no abdominal tenderness. There is no guarding or rebound.   Musculoskeletal:         General: No tenderness. Normal range of motion.      Cervical back: Normal range of motion and neck supple.   Lymphadenopathy:      Upper Body:      Right upper body: No supraclavicular adenopathy.      Left upper body: No supraclavicular adenopathy.   Skin:     General: Skin is warm and dry.      Findings: No rash.      Nails: There is no clubbing.   Neurological:      General: No focal deficit present.      Mental Status: She is alert and oriented to person, place, and time.      Coordination: Coordination normal.      Gait: Gait normal.      Deep Tendon Reflexes: Reflexes are normal and symmetric.   Psychiatric:         Mood and Affect: Mood normal.       Vents:  Oxygen Concentration (%): 28 (10/19/22 1555)    Lines/Drains/Airways       Peripheral Intravenous Line  Duration                   Peripheral IV - Single Lumen 10/19/22 1536 18 G;1 3/4 in Anterior;Left;Proximal Upper Arm <1 day         Peripheral IV - Single Lumen 10/19/22 1536 20 G Right Antecubital <1 day                    Significant Labs:    CBC/Anemia Profile:  Recent Labs   Lab 10/18/22  1356 10/19/22  1508   WBC 28.45* 29.56*   HGB 11.3* 10.7*   HCT 37.1 32.9*   * 410   MCV 88 81*   RDW 14.0 13.8        Chemistries:  Recent Labs   Lab 10/18/22  1356 10/19/22  1508   * 132*   K 4.4 4.2   CL 95 97   CO2 27 22*   BUN 10 10   CREATININE 0.8 0.8   CALCIUM 9.3 9.0   ALBUMIN  --  2.3*   PROT  --  7.6   BILITOT  --  2.1*   ALKPHOS  --  85   ALT  --  19   AST  --  16       Recent Lab Results         10/19/22  1635   10/19/22  1635   10/19/22  1508        POC Molecular Influenza A Ag   Negative         POC Molecular Influenza B Ag   Negative         Albumin     2.3       Alkaline Phosphatase     85       ALT     19       Anion Gap     13       Aniso     Slight       AST     16       Baso #     0.13       Basophil %     0.4       BILIRUBIN TOTAL     2.1  Comment: For infants and newborns, interpretation of results should be based  on gestational age, weight and in agreement with clinical  observations.    Premature Infant recommended reference ranges:  Up to 24 hours.............<8.0 mg/dL  Up to 48 hours............<12.0 mg/dL  3-5 days..................<15.0 mg/dL  6-29 days.................<15.0 mg/dL         BUN     10       Calcium     9.0       Chloride     97       CO2     22       Creatinine     0.8       Differential Method     Automated       eGFR     >60       Eos #     0.0       Eosinophil %     0.0       Glucose     119       Gran # (ANC)     22.0       Gran %     74.5       Hematocrit     32.9       Hemoglobin     10.7       Hepatitis C Ab     Negative       HEP C Virus Hold Specimen     Hold for HCV sendout       HIV 1/2 Ag/Ab     Negative       Hypo     Occasional       Immature Grans (Abs)      0.90  Comment: Mild elevation in immature granulocytes is non specific and   can be seen in a variety of conditions including stress response,   acute inflammation, trauma and pregnancy. Correlation with other   laboratory and clinical findings is essential.         Immature Granulocytes     3.0       Lactate, Elias     1.4  Comment: Falsely low lactic acid results can be found in samples   containing >=13.0 mg/dL total bilirubin and/or >=3.5 mg/dL   direct bilirubin.         Lymph #     2.9       Lymph %     9.8       MCH     26.4       MCHC     32.5       MCV     81  Comment: Results confirmed, test repeated       Mono #     3.6       Mono %     12.3       MPV     8.8       nRBC     0       Platelet Estimate     Appears normal       Platelets     410       Poly     Occasional       Potassium     4.2       PROTEIN TOTAL     7.6        Acceptable Yes   Yes         RBC     4.06       RDW     13.8       SARS-CoV-2 RNA, Amplification, Qual Negative           Sodium     132       Stomatocytes     Present       WBC     29.56             All pertinent labs within the past 24 hours have been reviewed.    Significant Imaging:   I have reviewed all pertinent imaging results/findings within the past 24 hours.    CT Chest Without Contrast  Narrative: EXAMINATION:  CT CHEST WITHOUT CONTRAST    CLINICAL HISTORY:  Respiratory illness, nondiagnostic xray;    TECHNIQUE:  Low dose axial images, sagittal and coronal reformations were obtained from the thoracic inlet to the lung bases. Contrast was not administered.    COMPARISON:  CT abdomen and pelvis and chest x-ray from the same date    FINDINGS:  There is a large loculated left pleural effusion with associated atelectasis of the left lower lobe.  Additional mild atelectasis noted in the lingular segment of the left upper lobe.  Mild bandlike opacities with ground-glass attenuation noted in the right posterior lung base.  Right lung is otherwise clear.  No discrete  nodule or mass seen.    There is mild rightward shift of the mediastinal contents.  No significant mediastinal or hilar lymphadenopathy seen.    Heart is normal in size.  No significant coronary calcification or pericardial effusion.    Soft tissues of the visualized lower neck, chest wall, and axilla are unremarkable.    Visualized upper abdominal contents are unremarkable.    No acute bony abnormality.  No aggressive lytic or blastic lesion seen.  Impression: Large loculated appearing left pleural effusion with associated lower lobe atelectasis as well as mild left upper lobe atelectasis.  Bandlike opacities with ground-glass attenuation in the posterior right lung base likely represent additional subsegmental atelectasis.    Mild rightward shift of the mediastinal contents.  No significant mediastinal or hilar lymphadenopathy.    Electronically signed by: Eric Wang  Date:    10/19/2022  Time:    16:00  X-Ray Chest AP Portable (SOB)  Narrative: EXAMINATION:  XR CHEST AP PORTABLE    CLINICAL HISTORY:  SOB;    TECHNIQUE:  Single frontal view of the chest was performed.    COMPARISON:  CT abdomen pelvis from the same date    FINDINGS:  Large left pleural effusion noted with associated lower lung atelectasis and mild rightward mediastinal shift.  Right lung is clear.  Left mediastinal border is obscured.  Right mediastinal border is unremarkable.  No acute bony abnormality seen.  Impression: Large left pleural effusion with associated lower lung atelectasis and mild rightward mediastinal shift.  Findings likely unchanged compared to CT abdomen pelvis from the same date.    Electronically signed by: Eric Wang  Date:    10/19/2022  Time:    15:27  CT Renal Stone Study ABD Pelvis WO  Narrative: EXAMINATION:  CT RENAL STONE STUDY ABD PELVIS WO    CLINICAL HISTORY:  Flank pain, kidney stone suspected; Unspecified abdominal pain    TECHNIQUE:  Low dose axial images, sagittal and coronal reformations were  obtained from the lung bases to the pubic symphysis.  Contrast was not administered.    COMPARISON:  None    FINDINGS:  ABDOMEN    Lung bases: There is a large loculated left-sided pleural effusion with associated compressive atelectasis/consolidation of the left lower lobe.    Liver/gallbladder/biliary: The liver demonstrates a negative noncontrast appearance. The gallbladder is present and unremarkable.  No biliary ductal dilation.    Pancreas: The pancreas demonstrates a negative noncontrast appearance.    Spleen: The spleen is not enlarged.    Adrenals: Unremarkable    Kidneys: The kidneys are symmetric in size and demonstrate no evidence for nephrolithiasis or obstructive uropathy.    Bowel/Mesentery: There is no evidence of bowel obstruction.  Normal appendix is noted.  No mesenteric stranding or adenopathy.    Retroperitoneum: No adenopathy.  The aorta demonstrates a normal caliber.    PELVIS    Genitourinary/Reproductive organs: Unremarkable    Adenopathy: None    Free Fluid: No free fluid    [place] Structures/Soft tissues: Well corticated benign appearing lucent lesion within the iliac bone on the left measuring up to 4.8 cm in the transverse dimension by approximately 11 mm in thickness and up to 4 cm in the craniocaudal dimension.  Possibilities include solitary bone cyst  Impression: 1. No evidence for nephrolithiasis or obstructive uropathy.  2. Large multiloculated left-sided effusion with associated compressive atelectasis/consolidation of the left lower lobe.  3. Remaining findings as discussed above.    Electronically signed by: Morgan Sweeney DO  Date:    10/19/2022  Time:    09:34       ABG  No results for input(s): PH, PO2, PCO2, HCO3, BE in the last 168 hours.  Assessment/Plan:     Parapneumonic effusion  Empiric abx      Loculated pleural effusion  Likely empyema        Complications of the procedure discussed in detail with patient. Complications including but not limited to infection that may  require hospital admission, bleeding that may require blood transfusion and or hospital admission, perforation of the lung which may require surgery. Patient expressed and verbalized understanding. Alternate treatments and material risks associated with such alternatives were discussed with pateint. These include radiologic surveillance with minimal risk and sugery with an indeterminate risk. The material risks of refusing the procedure was discussed in detail. This includes no diagnosis or confirmation of diagnisis and rendering of appropriate treatment the risk of which depends on the nature of the diagnosed illness. Patient expressed and verbalized understanding. Pleural fluid will be sent for chenistry, microbiology and cytology.       Thank you for your consult. I will follow-up with patient. Please contact us if you have any additional questions.     Varun Wood MD  Pulmonology  [facility] - Emergency Dept.

## 2022-10-19 NOTE — PROCEDURES
"Christina Leal is a 26 y.o. female patient.    Temp: (!) 100.6 °F (38.1 °C) (10/19/22 1441)  Pulse: (!) 138 (10/19/22 1706)  Resp: (!) 30 (10/19/22 1706)  BP: (!) 162/107 (10/19/22 1706)  SpO2: 98 % (10/19/22 1706)  Weight: (!) 152.9 kg (337 lb 1.3 oz) (10/19/22 1441)  Height: 5' 8" (172.7 cm) (10/19/22 1441)       Thoracentesis    Date/Time: 10/19/2022 5:49 PM  Location procedure was performed: [facility] PULMONARY MEDICINE  Performed by: Varun Wood MD  Authorized by: Varun Wood MD   Pre-operative diagnosis: Left Pleural effusion  Post-operative diagnosis: same  Consent Done: Yes  Consent: Verbal consent obtained. Written consent obtained.  Risks and benefits: risks, benefits and alternatives were discussed  Consent given by: patient  Patient understanding: patient states understanding of the procedure being performed  Patient consent: the patient's understanding of the procedure matches consent given  Procedure consent: procedure consent matches procedure scheduled  Relevant documents: relevant documents present and verified  Test results: test results available and properly labeled  Site marked: the operative site was marked  Imaging studies: imaging studies available  Required items: required blood products, implants, devices, and special equipment available  Patient identity confirmed: MRN, , name, verbally with patient and provided demographic data  Time out: Immediately prior to procedure a "time out" was called to verify the correct patient, procedure, equipment, support staff and site/side marked as required.  Procedure purpose: diagnostic and therapeutic  Indications: pleural effusion  Preparation: Patient was prepped and draped in the usual sterile fashion.  Local anesthesia used: yes  Anesthesia: local infiltration    Anesthesia:  Local anesthesia used: yes  Local Anesthetic: lidocaine 1% without epinephrine  Anesthetic total: 10 mL    Patient sedated: no  Preparation: skin prepped " with ChloraPrep  Patient position: sitting  Ultrasound guidance: yes  Location: left posterior  Intercostal space: 6th  Puncture method: over-the-needle catheter  Needle size: 18  Catheter size: 8 Swiss  Number of attempts: 1  Drainage amount: 1500 ml  Drainage characteristics: purulent  Patient tolerance: Patient tolerated the procedure well with no immediate complications  Chest x-ray performed: yes  Chest x-ray interpreted by me.  Chest x-ray findings: pleural effusion  Complications: No  Estimated blood loss (mL): 0  Specimens: Yes  Implants: No  Comments: Tube #1: Gram stain, culture, KOH, AFB, Tube #2: Chemistry: Alb, Shannon, Chol, LDH, Prot, Tube #3: Cytology, Lavender for wbcc, Urine specimen cup for cytology     Drainage Tube: removed    Post procedure CXR:resolution off effusion    10/19/2022

## 2022-10-19 NOTE — Clinical Note
Diagnosis: Loculated pleural effusion [[id number]]   Admitting Provider:: NICHOL WYNNE [[id number]]   Future Attending Provider: NICHOL WYNNE [[id number]]   Reason for IP Medical Treatment  (Clinical interventions that can only be accomplished in the IP setting? ) :: loculated pleural effusion   Estimated Length of Stay:: 2 midnights   I certify that Inpatient services for greater than or equal to 2 midnights are medically necessary:: Yes   Plans for Post-Acute care--if anticipated (pick the single best option):: A. No post acute care anticipated at this time

## 2022-10-19 NOTE — HPI
Christina Leal is a 27 yo female with history of obesity, former tobacco abuse on oral contraception who was referred to ED by primary care for abnormal CXR. Patient reports 1 week history of left sided rib cage pain associated with chest tightness, SOB and PAIZ. She also reports upper abdominal cramping and diarrhea. She denies n/v. OP imaging remarkable for pna with large left pleural effusion. CT chest shows large loculated appearing left pleural effusion with associated lower lobe atelectasis as well as mild left upper lobe atelectasis.  Bandlike opacities with ground-glass attenuation in the posterior right lung base likely represent additional subsegmental atelectasis. Mild rightward shift of the mediastinal contents noted. Patient febrile (Tmax 100.6) tachycardic and tachypenic, O2 sats 98% 2 L O2. Influenza, COVID, HIV, Hep C negative. Labs remarkable for wbc 29 with left shift, Tbili 2.1. She received cefepime. Pulmonology consulted. Patient admitted to hospital medicine.

## 2022-10-19 NOTE — SUBJECTIVE & OBJECTIVE
No past medical history on file.    No past surgical history on file.    Review of patient's allergies indicates:  No Known Allergies    Family History       Problem Relation (Age of Onset)    Esophageal cancer Father          Tobacco Use    Smoking status: Former     Types: Cigarettes    Smokeless tobacco: Never   Substance and Sexual Activity    Alcohol use: Yes     Comment: social    Drug use: No    Sexual activity: Yes     Partners: Male     Birth control/protection: Condom         Review of Systems   Constitutional:  Positive for fatigue and fever.   Respiratory:  Positive for cough, chest tightness and shortness of breath.    Objective:     Vital Signs (Most Recent):  Temp: (!) 100.6 °F (38.1 °C) (10/19/22 1441)  Pulse: (!) 138 (10/19/22 1706)  Resp: (!) 30 (10/19/22 1706)  BP: (!) 162/107 (10/19/22 1706)  SpO2: 98 % (10/19/22 1706)   Vital Signs (24h Range):  Temp:  [100.6 °F (38.1 °C)] 100.6 °F (38.1 °C)  Pulse:  [130-138] 138  Resp:  [20-37] 30  SpO2:  [91 %-100 %] 98 %  BP: (103-163)/() 162/107     Weight: (!) 152.9 kg (337 lb 1.3 oz)  Body mass index is 51.25 kg/m².      Intake/Output Summary (Last 24 hours) at 10/19/2022 1715  Last data filed at 10/19/2022 1626  Gross per 24 hour   Intake 50 ml   Output --   Net 50 ml       Physical Exam  Vitals and nursing note reviewed.   Constitutional:       Appearance: She is well-developed.   HENT:      Head: Normocephalic and atraumatic.      Nose: Nose normal.      Mouth/Throat:      Pharynx: No oropharyngeal exudate.   Eyes:      General: No scleral icterus.     Conjunctiva/sclera: Conjunctivae normal.      Pupils: Pupils are equal, round, and reactive to light.   Neck:      Thyroid: No thyromegaly.      Vascular: No JVD.      Trachea: No tracheal deviation.   Cardiovascular:      Rate and Rhythm: Normal rate and regular rhythm.      Heart sounds: Normal heart sounds, S1 normal and S2 normal. No murmur heard.  Pulmonary:      Effort: Pulmonary effort is  normal. No tachypnea, accessory muscle usage or respiratory distress.      Breath sounds: Decreased air movement present. No stridor. Examination of the left-upper field reveals decreased breath sounds. Examination of the left-middle field reveals decreased breath sounds. Decreased breath sounds present.       Abdominal:      General: Bowel sounds are normal. There is no distension.      Palpations: Abdomen is soft. There is no hepatomegaly, splenomegaly or mass.      Tenderness: There is no abdominal tenderness. There is no guarding or rebound.   Musculoskeletal:         General: No tenderness. Normal range of motion.      Cervical back: Normal range of motion and neck supple.   Lymphadenopathy:      Upper Body:      Right upper body: No supraclavicular adenopathy.      Left upper body: No supraclavicular adenopathy.   Skin:     General: Skin is warm and dry.      Findings: No rash.      Nails: There is no clubbing.   Neurological:      General: No focal deficit present.      Mental Status: She is alert and oriented to person, place, and time.      Coordination: Coordination normal.      Gait: Gait normal.      Deep Tendon Reflexes: Reflexes are normal and symmetric.   Psychiatric:         Mood and Affect: Mood normal.       Vents:  Oxygen Concentration (%): 28 (10/19/22 1555)    Lines/Drains/Airways       Peripheral Intravenous Line  Duration                  Peripheral IV - Single Lumen 10/19/22 1536 18 G;1 3/4 in Anterior;Left;Proximal Upper Arm <1 day         Peripheral IV - Single Lumen 10/19/22 1536 20 G Right Antecubital <1 day                    Significant Labs:    CBC/Anemia Profile:  Recent Labs   Lab 10/18/22  1356 10/19/22  1508   WBC 28.45* 29.56*   HGB 11.3* 10.7*   HCT 37.1 32.9*   * 410   MCV 88 81*   RDW 14.0 13.8        Chemistries:  Recent Labs   Lab 10/18/22  1356 10/19/22  1508   * 132*   K 4.4 4.2   CL 95 97   CO2 27 22*   BUN 10 10   CREATININE 0.8 0.8   CALCIUM 9.3 9.0    ALBUMIN  --  2.3*   PROT  --  7.6   BILITOT  --  2.1*   ALKPHOS  --  85   ALT  --  19   AST  --  16       Recent Lab Results         10/19/22  1635   10/19/22  1635   10/19/22  1508        POC Molecular Influenza A Ag   Negative         POC Molecular Influenza B Ag   Negative         Albumin     2.3       Alkaline Phosphatase     85       ALT     19       Anion Gap     13       Aniso     Slight       AST     16       Baso #     0.13       Basophil %     0.4       BILIRUBIN TOTAL     2.1  Comment: For infants and newborns, interpretation of results should be based  on gestational age, weight and in agreement with clinical  observations.    Premature Infant recommended reference ranges:  Up to 24 hours.............<8.0 mg/dL  Up to 48 hours............<12.0 mg/dL  3-5 days..................<15.0 mg/dL  6-29 days.................<15.0 mg/dL         BUN     10       Calcium     9.0       Chloride     97       CO2     22       Creatinine     0.8       Differential Method     Automated       eGFR     >60       Eos #     0.0       Eosinophil %     0.0       Glucose     119       Gran # (ANC)     22.0       Gran %     74.5       Hematocrit     32.9       Hemoglobin     10.7       Hepatitis C Ab     Negative       HEP C Virus Hold Specimen     Hold for HCV sendout       HIV 1/2 Ag/Ab     Negative       Hypo     Occasional       Immature Grans (Abs)     0.90  Comment: Mild elevation in immature granulocytes is non specific and   can be seen in a variety of conditions including stress response,   acute inflammation, trauma and pregnancy. Correlation with other   laboratory and clinical findings is essential.         Immature Granulocytes     3.0       Lactate, Elias     1.4  Comment: Falsely low lactic acid results can be found in samples   containing >=13.0 mg/dL total bilirubin and/or >=3.5 mg/dL   direct bilirubin.         Lymph #     2.9       Lymph %     9.8       MCH     26.4       MCHC     32.5       MCV      81  Comment: Results confirmed, test repeated       Mono #     3.6       Mono %     12.3       MPV     8.8       nRBC     0       Platelet Estimate     Appears normal       Platelets     410       Poly     Occasional       Potassium     4.2       PROTEIN TOTAL     7.6        Acceptable Yes   Yes         RBC     4.06       RDW     13.8       SARS-CoV-2 RNA, Amplification, Qual Negative           Sodium     132       Stomatocytes     Present       WBC     29.56             All pertinent labs within the past 24 hours have been reviewed.    Significant Imaging:   I have reviewed all pertinent imaging results/findings within the past 24 hours.    CT Chest Without Contrast  Narrative: EXAMINATION:  CT CHEST WITHOUT CONTRAST    CLINICAL HISTORY:  Respiratory illness, nondiagnostic xray;    TECHNIQUE:  Low dose axial images, sagittal and coronal reformations were obtained from the thoracic inlet to the lung bases. Contrast was not administered.    COMPARISON:  CT abdomen and pelvis and chest x-ray from the same date    FINDINGS:  There is a large loculated left pleural effusion with associated atelectasis of the left lower lobe.  Additional mild atelectasis noted in the lingular segment of the left upper lobe.  Mild bandlike opacities with ground-glass attenuation noted in the right posterior lung base.  Right lung is otherwise clear.  No discrete nodule or mass seen.    There is mild rightward shift of the mediastinal contents.  No significant mediastinal or hilar lymphadenopathy seen.    Heart is normal in size.  No significant coronary calcification or pericardial effusion.    Soft tissues of the visualized lower neck, chest wall, and axilla are unremarkable.    Visualized upper abdominal contents are unremarkable.    No acute bony abnormality.  No aggressive lytic or blastic lesion seen.  Impression: Large loculated appearing left pleural effusion with associated lower lobe atelectasis as well as mild  left upper lobe atelectasis.  Bandlike opacities with ground-glass attenuation in the posterior right lung base likely represent additional subsegmental atelectasis.    Mild rightward shift of the mediastinal contents.  No significant mediastinal or hilar lymphadenopathy.    Electronically signed by: Eric Wang  Date:    10/19/2022  Time:    16:00  X-Ray Chest AP Portable (SOB)  Narrative: EXAMINATION:  XR CHEST AP PORTABLE    CLINICAL HISTORY:  SOB;    TECHNIQUE:  Single frontal view of the chest was performed.    COMPARISON:  CT abdomen pelvis from the same date    FINDINGS:  Large left pleural effusion noted with associated lower lung atelectasis and mild rightward mediastinal shift.  Right lung is clear.  Left mediastinal border is obscured.  Right mediastinal border is unremarkable.  No acute bony abnormality seen.  Impression: Large left pleural effusion with associated lower lung atelectasis and mild rightward mediastinal shift.  Findings likely unchanged compared to CT abdomen pelvis from the same date.    Electronically signed by: Eric Wang  Date:    10/19/2022  Time:    15:27  CT Renal Stone Study ABD Pelvis WO  Narrative: EXAMINATION:  CT RENAL STONE STUDY ABD PELVIS WO    CLINICAL HISTORY:  Flank pain, kidney stone suspected; Unspecified abdominal pain    TECHNIQUE:  Low dose axial images, sagittal and coronal reformations were obtained from the lung bases to the pubic symphysis.  Contrast was not administered.    COMPARISON:  None    FINDINGS:  ABDOMEN    Lung bases: There is a large loculated left-sided pleural effusion with associated compressive atelectasis/consolidation of the left lower lobe.    Liver/gallbladder/biliary: The liver demonstrates a negative noncontrast appearance. The gallbladder is present and unremarkable.  No biliary ductal dilation.    Pancreas: The pancreas demonstrates a negative noncontrast appearance.    Spleen: The spleen is not enlarged.    Adrenals:  Unremarkable    Kidneys: The kidneys are symmetric in size and demonstrate no evidence for nephrolithiasis or obstructive uropathy.    Bowel/Mesentery: There is no evidence of bowel obstruction.  Normal appendix is noted.  No mesenteric stranding or adenopathy.    Retroperitoneum: No adenopathy.  The aorta demonstrates a normal caliber.    PELVIS    Genitourinary/Reproductive organs: Unremarkable    Adenopathy: None    Free Fluid: No free fluid    [place] Structures/Soft tissues: Well corticated benign appearing lucent lesion within the iliac bone on the left measuring up to 4.8 cm in the transverse dimension by approximately 11 mm in thickness and up to 4 cm in the craniocaudal dimension.  Possibilities include solitary bone cyst  Impression: 1. No evidence for nephrolithiasis or obstructive uropathy.  2. Large multiloculated left-sided effusion with associated compressive atelectasis/consolidation of the left lower lobe.  3. Remaining findings as discussed above.    Electronically signed by: Morgan Sweeney DO  Date:    10/19/2022  Time:    09:34

## 2022-10-19 NOTE — SUBJECTIVE & OBJECTIVE
No past medical history on file.    No past surgical history on file.    Review of patient's allergies indicates:  No Known Allergies    No current facility-administered medications on file prior to encounter.     Current Outpatient Medications on File Prior to Encounter   Medication Sig    acetaminophen (TYLENOL) 500 MG tablet Take 500-1,000 mg by mouth every 6 (six) hours as needed for Pain.    ibuprofen (ADVIL,MOTRIN) 200 MG tablet Take 200-400 mg by mouth every 6 (six) hours as needed for Pain.    methocarbamoL (ROBAXIN) 500 MG Tab Take 1 tablet (500 mg total) by mouth 2 (two) times daily as needed.    norgestimate-ethinyl estradioL (ORTHO TRI-CYCLEN,TRI-SPRINTEC) 0.18/0.215/0.25 mg-35 mcg (28) tablet Take 1 tablet by mouth once daily.     Family History       Problem Relation (Age of Onset)    Esophageal cancer Father          Tobacco Use    Smoking status: Former     Types: Cigarettes    Smokeless tobacco: Never   Substance and Sexual Activity    Alcohol use: Yes     Comment: social    Drug use: No    Sexual activity: Yes     Partners: Male     Birth control/protection: Condom     Review of Systems   Constitutional:  Positive for fatigue. Negative for chills, diaphoresis and fever.   Eyes:  Negative for photophobia.   Respiratory:  Positive for chest tightness and shortness of breath. Negative for cough and wheezing.    Cardiovascular:  Negative for chest pain, palpitations and leg swelling.   Gastrointestinal:  Positive for diarrhea and nausea. Negative for abdominal pain and vomiting.   Genitourinary:  Negative for dysuria, flank pain, frequency and hematuria.   Musculoskeletal:  Positive for arthralgias. Negative for back pain and myalgias.   Neurological:  Positive for weakness. Negative for dizziness, syncope, light-headedness and headaches.   Psychiatric/Behavioral:  Negative for confusion.    Objective:     Vital Signs (Most Recent):  Temp: (!) 100.6 °F (38.1 °C) (10/19/22 1441)  Pulse: (!) 138  (10/19/22 1706)  Resp: (!) 30 (10/19/22 1706)  BP: (!) 162/107 (10/19/22 1706)  SpO2: 98 % (10/19/22 1706)   Vital Signs (24h Range):  Temp:  [100.6 °F (38.1 °C)] 100.6 °F (38.1 °C)  Pulse:  [130-138] 138  Resp:  [20-37] 30  SpO2:  [91 %-100 %] 98 %  BP: (103-163)/() 162/107     Weight: (!) 152.9 kg (337 lb 1.3 oz)  Body mass index is 51.25 kg/m².    Physical Exam  Constitutional:       Appearance: She is well-developed. She is obese.   HENT:      Head: Normocephalic and atraumatic.   Eyes:      Conjunctiva/sclera: Conjunctivae normal.      Pupils: Pupils are equal, round, and reactive to light.   Neck:      Vascular: No JVD.   Cardiovascular:      Rate and Rhythm: Regular rhythm. Tachycardia present.      Heart sounds: Normal heart sounds.   Pulmonary:      Effort: No respiratory distress.      Breath sounds: No wheezing.      Comments: Patient tachypneic, speaking in short sentences, breath sounds to left upper and lower lobe diminished   Abdominal:      General: Bowel sounds are normal. There is no distension.      Palpations: Abdomen is soft.      Tenderness: There is no abdominal tenderness. There is no guarding.   Musculoskeletal:         General: No tenderness. Normal range of motion.      Cervical back: Normal range of motion.   Skin:     General: Skin is warm and dry.      Capillary Refill: Capillary refill takes less than 2 seconds.   Neurological:      Mental Status: She is alert and oriented to person, place, and time.   Psychiatric:         Behavior: Behavior normal.         CRANIAL NERVES     CN III, IV, VI   Pupils are equal, round, and reactive to light.     Significant Labs: All pertinent labs within the past 24 hours have been reviewed.  CBC:   Recent Labs   Lab 10/18/22  1356 10/19/22  1508   WBC 28.45* 29.56*   HGB 11.3* 10.7*   HCT 37.1 32.9*   * 410     CMP:   Recent Labs   Lab 10/18/22  1356 10/19/22  1508   * 132*   K 4.4 4.2   CL 95 97   CO2 27 22*   * 119*   BUN 10  10   CREATININE 0.8 0.8   CALCIUM 9.3 9.0   PROT  --  7.6   ALBUMIN  --  2.3*   BILITOT  --  2.1*   ALKPHOS  --  85   AST  --  16   ALT  --  19   ANIONGAP 10 13     Lactic Acid:   Recent Labs   Lab 10/19/22  1508   LACTATE 1.4       Significant Imaging: I have reviewed all pertinent imaging results/findings within the past 24 hours.

## 2022-10-19 NOTE — TELEPHONE ENCOUNTER
I spoke with patient regarding plan  follow-up labs and imagine to screen for possible causes of bacterial or viral infection.   Patient verbalized understanding of plan discussed during phone conversation.    Jimmy Castellanos NP

## 2022-10-20 ENCOUNTER — ANESTHESIA EVENT (OUTPATIENT)
Dept: SURGERY | Facility: HOSPITAL | Age: 27
DRG: 163 | End: 2022-10-20
Payer: COMMERCIAL

## 2022-10-20 PROBLEM — J86.9 EMPYEMA OF LEFT PLEURAL SPACE: Status: ACTIVE | Noted: 2022-10-20

## 2022-10-20 LAB
ALBUMIN FLD-MCNC: 2.4 G/DL
ALBUMIN SERPL BCP-MCNC: 1.9 G/DL (ref 3.5–5.2)
ALBUMIN SERPL BCP-MCNC: 2.2 G/DL (ref 3.5–5.2)
ALP SERPL-CCNC: 83 U/L (ref 55–135)
ALP SERPL-CCNC: 99 U/L (ref 55–135)
ALT SERPL W/O P-5'-P-CCNC: 15 U/L (ref 10–44)
ALT SERPL W/O P-5'-P-CCNC: 20 U/L (ref 10–44)
AMYLASE, BODY FLUID: 25 U/L
ANION GAP SERPL CALC-SCNC: 12 MMOL/L (ref 8–16)
ANION GAP SERPL CALC-SCNC: 9 MMOL/L (ref 8–16)
AORTIC ROOT ANNULUS: 3.24 CM
ASCENDING AORTA: 2.95 CM
AST SERPL-CCNC: 16 U/L (ref 10–40)
AST SERPL-CCNC: 24 U/L (ref 10–40)
AV INDEX (PROSTH): 1.05
AV MEAN GRADIENT: 8 MMHG
AV PEAK GRADIENT: 11 MMHG
AV VALVE AREA: 3.25 CM2
AV VELOCITY RATIO: 0.79
BASOPHILS NFR BLD: 0 % (ref 0–1.9)
BILIRUB SERPL-MCNC: 1.1 MG/DL (ref 0.1–1)
BILIRUB SERPL-MCNC: 1.4 MG/DL (ref 0.1–1)
BILIRUB UR QL STRIP: NEGATIVE
BNP SERPL-MCNC: 17 PG/ML (ref 0–99)
BODY FLUID SOURCE AMYLASE: NORMAL
BODY FLUID SOURCE, LDH: NORMAL
BSA FOR ECHO PROCEDURE: 2.69 M2
BUN SERPL-MCNC: 6 MG/DL (ref 6–20)
BUN SERPL-MCNC: 7 MG/DL (ref 6–20)
CALCIUM SERPL-MCNC: 8.3 MG/DL (ref 8.7–10.5)
CALCIUM SERPL-MCNC: 8.7 MG/DL (ref 8.7–10.5)
CHLORIDE SERPL-SCNC: 100 MMOL/L (ref 95–110)
CHLORIDE SERPL-SCNC: 102 MMOL/L (ref 95–110)
CLARITY UR: CLEAR
CO2 SERPL-SCNC: 23 MMOL/L (ref 23–29)
CO2 SERPL-SCNC: 23 MMOL/L (ref 23–29)
COLOR UR: YELLOW
CREAT SERPL-MCNC: 0.7 MG/DL (ref 0.5–1.4)
CREAT SERPL-MCNC: 0.7 MG/DL (ref 0.5–1.4)
CRP SERPL-MCNC: 229.6 MG/L (ref 0–8.2)
CV ECHO LV RWT: 0.72 CM
DIFFERENTIAL METHOD: ABNORMAL
DOP CALC AO PEAK VEL: 1.63 M/S
DOP CALC AO VTI: 26.1 CM
DOP CALC LVOT AREA: 3.1 CM2
DOP CALC LVOT DIAMETER: 1.99 CM
DOP CALC LVOT PEAK VEL: 1.29 M/S
DOP CALC LVOT STROKE VOLUME: 84.87 CM3
DOP CALC RVOT PEAK VEL: 1.03 M/S
DOP CALC RVOT VTI: 17.4 CM
DOP CALCLVOT PEAK VEL VTI: 27.3 CM
E WAVE DECELERATION TIME: 177.15 MSEC
E/A RATIO: 1.39
E/E' RATIO: 6.69 M/S
ECHO LV POSTERIOR WALL: 1.34 CM (ref 0.6–1.1)
EJECTION FRACTION: 60 %
EOSINOPHIL NFR BLD: 0 % (ref 0–8)
ERYTHROCYTE [DISTWIDTH] IN BLOOD BY AUTOMATED COUNT: 14 % (ref 11.5–14.5)
ERYTHROCYTE [SEDIMENTATION RATE] IN BLOOD BY WESTERGREN METHOD: 113 MM/HR (ref 0–20)
EST. GFR  (NO RACE VARIABLE): >60 ML/MIN/1.73 M^2
EST. GFR  (NO RACE VARIABLE): >60 ML/MIN/1.73 M^2
FRACTIONAL SHORTENING: 44 % (ref 28–44)
GLUCOSE FLD-MCNC: <5 MG/DL
GLUCOSE SERPL-MCNC: 110 MG/DL (ref 70–110)
GLUCOSE SERPL-MCNC: 119 MG/DL (ref 70–110)
GLUCOSE UR QL STRIP: NEGATIVE
HCT VFR BLD AUTO: 32.2 % (ref 37–48.5)
HGB BLD-MCNC: 10.1 G/DL (ref 12–16)
HGB UR QL STRIP: NEGATIVE
IMM GRANULOCYTES # BLD AUTO: ABNORMAL K/UL (ref 0–0.04)
IMM GRANULOCYTES NFR BLD AUTO: ABNORMAL % (ref 0–0.5)
INTERVENTRICULAR SEPTUM: 1.31 CM (ref 0.6–1.1)
IVC DIAMETER: 2.06 CM
IVRT: 53.28 MSEC
KETONES UR QL STRIP: NEGATIVE
LA MAJOR: 5.36 CM
LA MINOR: 5.56 CM
LA WIDTH: 3.7 CM
LDH FLD L TO P-CCNC: 2399 U/L
LEFT ATRIUM SIZE: 4.25 CM
LEFT ATRIUM VOLUME INDEX: 28.7 ML/M2
LEFT ATRIUM VOLUME: 72.96 CM3
LEFT INTERNAL DIMENSION IN SYSTOLE: 2.1 CM (ref 2.1–4)
LEFT VENTRICLE DIASTOLIC VOLUME INDEX: 23.52 ML/M2
LEFT VENTRICLE DIASTOLIC VOLUME: 59.74 ML
LEFT VENTRICLE MASS INDEX: 69 G/M2
LEFT VENTRICLE SYSTOLIC VOLUME INDEX: 5.7 ML/M2
LEFT VENTRICLE SYSTOLIC VOLUME: 14.39 ML
LEFT VENTRICULAR INTERNAL DIMENSION IN DIASTOLE: 3.74 CM (ref 3.5–6)
LEFT VENTRICULAR MASS: 174.16 G
LEUKOCYTE ESTERASE UR QL STRIP: NEGATIVE
LV LATERAL E/E' RATIO: 6.06 M/S
LV SEPTAL E/E' RATIO: 7.46 M/S
LVOT MG: 5.43 MMHG
LVOT MV: 1.16 CM/S
LYMPHOCYTES NFR BLD: 17 % (ref 18–48)
MCH RBC QN AUTO: 26.4 PG (ref 27–31)
MCHC RBC AUTO-ENTMCNC: 31.4 G/DL (ref 32–36)
MCV RBC AUTO: 84 FL (ref 82–98)
MONOCYTES NFR BLD: 7 % (ref 4–15)
MV PEAK A VEL: 0.7 M/S
MV PEAK E VEL: 0.97 M/S
MV STENOSIS PRESSURE HALF TIME: 51.37 MS
MV VALVE AREA P 1/2 METHOD: 4.28 CM2
NEUTROPHILS NFR BLD: 76 % (ref 38–73)
NITRITE UR QL STRIP: NEGATIVE
NRBC BLD-RTO: 0 /100 WBC
PATH INTERP FLD-IMP: NORMAL
PH UR STRIP: 6 [PH] (ref 5–8)
PLATELET # BLD AUTO: 411 K/UL (ref 150–450)
PMV BLD AUTO: 9.2 FL (ref 9.2–12.9)
POTASSIUM SERPL-SCNC: 4.2 MMOL/L (ref 3.5–5.1)
POTASSIUM SERPL-SCNC: 4.3 MMOL/L (ref 3.5–5.1)
PROT FLD-MCNC: 5.8 G/DL
PROT SERPL-MCNC: 6.6 G/DL (ref 6–8.4)
PROT SERPL-MCNC: 7.4 G/DL (ref 6–8.4)
PROT UR QL STRIP: ABNORMAL
PV MEAN GRADIENT: 2.78 MMHG
RA MAJOR: 4.56 CM
RA PRESSURE: 8 MMHG
RA WIDTH: 3.1 CM
RBC # BLD AUTO: 3.83 M/UL (ref 4–5.4)
SODIUM SERPL-SCNC: 134 MMOL/L (ref 136–145)
SODIUM SERPL-SCNC: 135 MMOL/L (ref 136–145)
SP GR UR STRIP: 1.01 (ref 1–1.03)
SPECIMEN SOURCE: NORMAL
STJ: 2.98 CM
TDI LATERAL: 0.16 M/S
TDI SEPTAL: 0.13 M/S
TDI: 0.15 M/S
URN SPEC COLLECT METH UR: ABNORMAL
UROBILINOGEN UR STRIP-ACNC: ABNORMAL EU/DL
WBC # BLD AUTO: 22.84 K/UL (ref 3.9–12.7)

## 2022-10-20 PROCEDURE — 99232 SBSQ HOSP IP/OBS MODERATE 35: CPT | Mod: ,,, | Performed by: INTERNAL MEDICINE

## 2022-10-20 PROCEDURE — 63600175 PHARM REV CODE 636 W HCPCS: Performed by: INTERNAL MEDICINE

## 2022-10-20 PROCEDURE — 86901 BLOOD TYPING SEROLOGIC RH(D): CPT | Performed by: THORACIC SURGERY (CARDIOTHORACIC VASCULAR SURGERY)

## 2022-10-20 PROCEDURE — 80053 COMPREHEN METABOLIC PANEL: CPT | Mod: 91 | Performed by: THORACIC SURGERY (CARDIOTHORACIC VASCULAR SURGERY)

## 2022-10-20 PROCEDURE — 25000003 PHARM REV CODE 250: Performed by: NURSE PRACTITIONER

## 2022-10-20 PROCEDURE — 80053 COMPREHEN METABOLIC PANEL: CPT | Performed by: INTERNAL MEDICINE

## 2022-10-20 PROCEDURE — 85651 RBC SED RATE NONAUTOMATED: CPT | Performed by: INTERNAL MEDICINE

## 2022-10-20 PROCEDURE — 25000003 PHARM REV CODE 250: Performed by: INTERNAL MEDICINE

## 2022-10-20 PROCEDURE — 36415 COLL VENOUS BLD VENIPUNCTURE: CPT | Performed by: INTERNAL MEDICINE

## 2022-10-20 PROCEDURE — 21400001 HC TELEMETRY ROOM

## 2022-10-20 PROCEDURE — S0030 INJECTION, METRONIDAZOLE: HCPCS | Performed by: INTERNAL MEDICINE

## 2022-10-20 PROCEDURE — 25000242 PHARM REV CODE 250 ALT 637 W/ HCPCS: Performed by: INTERNAL MEDICINE

## 2022-10-20 PROCEDURE — 85027 COMPLETE CBC AUTOMATED: CPT | Performed by: NURSE PRACTITIONER

## 2022-10-20 PROCEDURE — 36415 COLL VENOUS BLD VENIPUNCTURE: CPT | Performed by: NURSE PRACTITIONER

## 2022-10-20 PROCEDURE — 81003 URINALYSIS AUTO W/O SCOPE: CPT | Performed by: THORACIC SURGERY (CARDIOTHORACIC VASCULAR SURGERY)

## 2022-10-20 PROCEDURE — 99232 PR SUBSEQUENT HOSPITAL CARE,LEVL II: ICD-10-PCS | Mod: ,,, | Performed by: INTERNAL MEDICINE

## 2022-10-20 PROCEDURE — 94640 AIRWAY INHALATION TREATMENT: CPT

## 2022-10-20 PROCEDURE — 86140 C-REACTIVE PROTEIN: CPT | Performed by: INTERNAL MEDICINE

## 2022-10-20 PROCEDURE — 85007 BL SMEAR W/DIFF WBC COUNT: CPT | Performed by: NURSE PRACTITIONER

## 2022-10-20 PROCEDURE — 84134 ASSAY OF PREALBUMIN: CPT | Performed by: THORACIC SURGERY (CARDIOTHORACIC VASCULAR SURGERY)

## 2022-10-20 PROCEDURE — 36415 COLL VENOUS BLD VENIPUNCTURE: CPT | Performed by: THORACIC SURGERY (CARDIOTHORACIC VASCULAR SURGERY)

## 2022-10-20 PROCEDURE — 63600175 PHARM REV CODE 636 W HCPCS: Performed by: NURSE PRACTITIONER

## 2022-10-20 PROCEDURE — 25500020 PHARM REV CODE 255: Performed by: INTERNAL MEDICINE

## 2022-10-20 PROCEDURE — 83880 ASSAY OF NATRIURETIC PEPTIDE: CPT | Performed by: NURSE PRACTITIONER

## 2022-10-20 PROCEDURE — 27000221 HC OXYGEN, UP TO 24 HOURS

## 2022-10-20 PROCEDURE — 99900035 HC TECH TIME PER 15 MIN (STAT)

## 2022-10-20 PROCEDURE — 94761 N-INVAS EAR/PLS OXIMETRY MLT: CPT

## 2022-10-20 PROCEDURE — 11000001 HC ACUTE MED/SURG PRIVATE ROOM

## 2022-10-20 RX ORDER — CHLORHEXIDINE GLUCONATE ORAL RINSE 1.2 MG/ML
10 SOLUTION DENTAL
Status: DISCONTINUED | OUTPATIENT
Start: 2022-10-21 | End: 2022-10-21 | Stop reason: HOSPADM

## 2022-10-20 RX ORDER — ENOXAPARIN SODIUM 100 MG/ML
40 INJECTION SUBCUTANEOUS 2 TIMES DAILY
Status: DISPENSED | OUTPATIENT
Start: 2022-10-20 | End: 2022-10-24

## 2022-10-20 RX ORDER — FAMOTIDINE 20 MG/1
20 TABLET, FILM COATED ORAL 2 TIMES DAILY
Status: DISCONTINUED | OUTPATIENT
Start: 2022-10-20 | End: 2022-10-28 | Stop reason: HOSPADM

## 2022-10-20 RX ORDER — ALBUTEROL SULFATE 0.83 MG/ML
2.5 SOLUTION RESPIRATORY (INHALATION)
Status: DISCONTINUED | OUTPATIENT
Start: 2022-10-20 | End: 2022-10-21

## 2022-10-20 RX ORDER — METRONIDAZOLE 500 MG/100ML
500 INJECTION, SOLUTION INTRAVENOUS
Status: DISCONTINUED | OUTPATIENT
Start: 2022-10-20 | End: 2022-10-21

## 2022-10-20 RX ORDER — HYDROCODONE BITARTRATE AND ACETAMINOPHEN 5; 325 MG/1; MG/1
1 TABLET ORAL EVERY 6 HOURS PRN
Status: DISCONTINUED | OUTPATIENT
Start: 2022-10-20 | End: 2022-10-21

## 2022-10-20 RX ADMIN — HYDROCODONE BITARTRATE AND ACETAMINOPHEN 1 TABLET: 5; 325 TABLET ORAL at 12:10

## 2022-10-20 RX ADMIN — METRONIDAZOLE 500 MG: 5 INJECTION, SOLUTION INTRAVENOUS at 05:10

## 2022-10-20 RX ADMIN — CEFEPIME 2 G: 2 INJECTION, POWDER, FOR SOLUTION INTRAVENOUS at 09:10

## 2022-10-20 RX ADMIN — ENOXAPARIN SODIUM 40 MG: 40 INJECTION SUBCUTANEOUS at 09:10

## 2022-10-20 RX ADMIN — MORPHINE SULFATE 4 MG: 4 INJECTION INTRAVENOUS at 12:10

## 2022-10-20 RX ADMIN — VANCOMYCIN HYDROCHLORIDE 2000 MG: 10 INJECTION, POWDER, LYOPHILIZED, FOR SOLUTION INTRAVENOUS at 06:10

## 2022-10-20 RX ADMIN — ALBUTEROL SULFATE 2.5 MG: 2.5 SOLUTION RESPIRATORY (INHALATION) at 01:10

## 2022-10-20 RX ADMIN — ALBUTEROL SULFATE 2.5 MG: 2.5 SOLUTION RESPIRATORY (INHALATION) at 07:10

## 2022-10-20 RX ADMIN — CEFEPIME 2 G: 2 INJECTION, POWDER, FOR SOLUTION INTRAVENOUS at 06:10

## 2022-10-20 RX ADMIN — ALBUTEROL SULFATE 2.5 MG: 2.5 SOLUTION RESPIRATORY (INHALATION) at 08:10

## 2022-10-20 RX ADMIN — FAMOTIDINE 20 MG: 20 TABLET ORAL at 09:10

## 2022-10-20 RX ADMIN — ENOXAPARIN SODIUM 40 MG: 40 INJECTION SUBCUTANEOUS at 02:10

## 2022-10-20 RX ADMIN — MORPHINE SULFATE 4 MG: 4 INJECTION INTRAVENOUS at 08:10

## 2022-10-20 RX ADMIN — HUMAN ALBUMIN MICROSPHERES AND PERFLUTREN 0.11 MG: 10; .22 INJECTION, SOLUTION INTRAVENOUS at 10:10

## 2022-10-20 RX ADMIN — VANCOMYCIN HYDROCHLORIDE 2000 MG: 10 INJECTION, POWDER, LYOPHILIZED, FOR SOLUTION INTRAVENOUS at 07:10

## 2022-10-20 RX ADMIN — HYDROCODONE BITARTRATE AND ACETAMINOPHEN 1 TABLET: 5; 325 TABLET ORAL at 10:10

## 2022-10-20 NOTE — H&P (VIEW-ONLY)
[facility]  Cardiothoracic Surgery  Consult Note    Patient Name: Christina Leal  MRN: [id number]  Admission Date: 10/19/2022  Attending Physician: Krystian Alexander, *  Referring Provider: Jimmy Castellanos NP    Patient information was obtained from patient, past medical records and ER records.     Consults  Subjective:     Principal Problem: Parapneumonic effusion    History of Present Illness: No notes on file    No current facility-administered medications on file prior to encounter.     Current Outpatient Medications on File Prior to Encounter   Medication Sig    acetaminophen (TYLENOL) 500 MG tablet Take 500-1,000 mg by mouth every 6 (six) hours as needed for Pain.    ibuprofen (ADVIL,MOTRIN) 200 MG tablet Take 200-400 mg by mouth every 6 (six) hours as needed for Pain.    methocarbamoL (ROBAXIN) 500 MG Tab Take 1 tablet (500 mg total) by mouth 2 (two) times daily as needed.    norgestimate-ethinyl estradioL (ORTHO TRI-CYCLEN,TRI-SPRINTEC) 0.18/0.215/0.25 mg-35 mcg (28) tablet Take 1 tablet by mouth once daily.       Review of patient's allergies indicates:  No Known Allergies    No past medical history on file.  No past surgical history on file.  Family History       Problem Relation (Age of Onset)    Esophageal cancer Father          Tobacco Use    Smoking status: Former     Types: Cigarettes    Smokeless tobacco: Never   Substance and Sexual Activity    Alcohol use: Yes     Comment: social    Drug use: No    Sexual activity: Yes     Partners: Male     Birth control/protection: Condom     Review of Systems   Constitutional:  Positive for activity change and fatigue. Negative for fever.   HENT:  Negative for congestion and sinus pain.    Eyes: Negative.    Respiratory:  Positive for cough and shortness of breath.    Cardiovascular:  Positive for chest pain.   Gastrointestinal:  Positive for abdominal distention.   Endocrine: Negative.    Genitourinary: Negative.    Musculoskeletal:   Positive for back pain.   Allergic/Immunologic: Negative.    Neurological: Negative.    Psychiatric/Behavioral:  The patient is nervous/anxious.    Objective:     Vital Signs (Most Recent):  Temp: 97.5 °F (36.4 °C) (10/20/22 0725)  Pulse: 107 (10/20/22 1121)  Resp: 19 (10/20/22 1217)  BP: 135/85 (10/20/22 1121)  SpO2: 98 % (10/20/22 1121) Vital Signs (24h Range):  Temp:  [97.5 °F (36.4 °C)-100.7 °F (38.2 °C)] 97.5 °F (36.4 °C)  Pulse:  [104-138] 107  Resp:  [16-37] 19  SpO2:  [91 %-100 %] 98 %  BP: (103-169)/() 135/85     Weight: (!) 150.6 kg (332 lb)  Body mass index is 50.48 kg/m².    SpO2: 98 %  O2 Device (Oxygen Therapy): nasal cannula     Intake/Output - Last 3 Shifts         10/18 0700  10/19 0659 10/19 0700  10/20 0659 10/20 0700  10/21 0659    P.O.  720     IV Piggyback  50     Total Intake(mL/kg)  770 (5.1)     Net  +770            Urine Occurrence  3 x              Lines/Drains/Airways       Peripheral Intravenous Line  Duration                  Peripheral IV - Single Lumen 10/19/22 1536 18 G;1 3/4 in Anterior;Left;Proximal Upper Arm <1 day         Peripheral IV - Single Lumen 10/19/22 1536 20 G Right Antecubital <1 day                     STS Risk Score: n/a    Physical Exam  Constitutional:       Appearance: She is obese.   HENT:      Head: Normocephalic and atraumatic.      Nose: Nose normal.      Mouth/Throat:      Mouth: Mucous membranes are moist.   Eyes:      Extraocular Movements: Extraocular movements intact.      Conjunctiva/sclera: Conjunctivae normal.      Pupils: Pupils are equal, round, and reactive to light.   Cardiovascular:      Rate and Rhythm: Regular rhythm. Tachycardia present.      Pulses: Normal pulses.      Heart sounds: Normal heart sounds.   Pulmonary:      Effort: Pulmonary effort is normal.      Breath sounds: Normal breath sounds.   Abdominal:      General: Abdomen is flat. Bowel sounds are normal.      Palpations: Abdomen is soft.   Skin:     General: Skin is warm and  dry.   Neurological:      General: No focal deficit present.      Mental Status: She is alert and oriented to person, place, and time.   Psychiatric:         Behavior: Behavior normal.       Significant Labs:  All pertinent labs from the last 24 hours have been reviewed.    Significant Diagnostics:  I have reviewed all pertinent imaging results/findings within the past 24 hours.      Assessment/Plan:     NYHA Score: NYHA III: marked limitation of physical activity, comfortable at rest    Empyema of left pleural space  The patient is a 26-year-old female with a history of severe obesity (BMI 50.48) who was admitted with complaints of left-sided chest pain over the past week.  Patient also noticed some shortness of breath and dyspnea on exertion in addition the patient has a cough.  The patient had a chest x-ray done which showed a left pleural effusion CT scan shows a loculated left pleural effusion.  Thoracentesis was performed with fluid analysis indicating that left pleural effusion is compatible with a diagnosis of an empyema.    The patient's left empyema requires drainage.  Patient will likely require a large bore chest tube for adequate drainage of empyema.  Bedside placement of chest tube in the setting of severe obesity will be difficult.  Will plan for placement of chest tube with VATS procedure in the operating room.  The risks and benefits of a VATS procedure were explained to the patient.  The patient understand the risks and benefits and agreed to proceed with VATS assisted drainage of empyema        Thank you for your consult. I will follow-up with patient. Please contact us if you have any additional questions.    Dusty Moyer MD  Cardiothoracic Surgery  [facility]

## 2022-10-20 NOTE — SUBJECTIVE & OBJECTIVE
Interval History:   10/20: seen and examined: DARIEN Tony, T max 100.7F, feels better, CXR reviewed    Respiratory ROS: positive for - shortness of breath and wheezing  negative for - cough, hemoptysis, pleuritic pain, or sputum changes     Oxygen Concentration (%):  [28] 28     Objective:     Vital Signs (Most Recent):  Temp: 99 °F (37.2 °C) (10/20/22 0439)  Pulse: (!) 117 (10/20/22 0439)  Resp: 20 (10/20/22 0439)  BP: 136/85 (10/20/22 0439)  SpO2: 95 % (10/20/22 0439)   Vital Signs (24h Range):  Temp:  [98.7 °F (37.1 °C)-100.7 °F (38.2 °C)] 99 °F (37.2 °C)  Pulse:  [104-138] 117  Resp:  [18-37] 20  SpO2:  [91 %-100 %] 95 %  BP: (103-169)/() 136/85     Weight: (!) 150.7 kg (332 lb 3.7 oz)  Body mass index is 50.52 kg/m².      Intake/Output Summary (Last 24 hours) at 10/20/2022 0642  Last data filed at 10/20/2022 0351  Gross per 24 hour   Intake 770 ml   Output --   Net 770 ml       Physical Exam  Vitals and nursing note reviewed.   Constitutional:       Appearance: She is well-developed.      Interventions: Nasal cannula in place.   HENT:      Head: Normocephalic and atraumatic.      Nose: Nose normal.      Mouth/Throat:      Pharynx: No oropharyngeal exudate.   Eyes:      General: No scleral icterus.     Conjunctiva/sclera: Conjunctivae normal.      Pupils: Pupils are equal, round, and reactive to light.   Neck:      Thyroid: No thyromegaly.      Vascular: No JVD.      Trachea: No tracheal deviation.   Cardiovascular:      Rate and Rhythm: Normal rate and regular rhythm.      Pulses: Normal pulses.      Heart sounds: Normal heart sounds, S1 normal and S2 normal. No murmur heard.  Pulmonary:      Effort: Pulmonary effort is normal. No tachypnea, accessory muscle usage or respiratory distress.      Breath sounds: Decreased air movement present. No stridor. Examination of the left-upper field reveals decreased breath sounds. Decreased breath sounds present.       Abdominal:      General: Bowel sounds are normal.  There is no distension.      Palpations: Abdomen is soft. There is no hepatomegaly, splenomegaly or mass.      Tenderness: There is no abdominal tenderness. There is no guarding or rebound.   Musculoskeletal:         General: No tenderness. Normal range of motion.      Cervical back: Normal range of motion and neck supple.   Lymphadenopathy:      Upper Body:      Right upper body: No supraclavicular adenopathy.      Left upper body: No supraclavicular adenopathy.   Skin:     General: Skin is warm and dry.      Findings: No rash.      Nails: There is no clubbing.   Neurological:      General: No focal deficit present.      Mental Status: She is alert and oriented to person, place, and time.      Coordination: Coordination normal.      Gait: Gait normal.      Deep Tendon Reflexes: Reflexes are normal and symmetric.   Psychiatric:         Mood and Affect: Mood normal.       Vents:  Oxygen Concentration (%): 28 (10/19/22 1555)    Lines/Drains/Airways       Peripheral Intravenous Line  Duration                  Peripheral IV - Single Lumen 10/19/22 1536 18 G;1 3/4 in Anterior;Left;Proximal Upper Arm <1 day         Peripheral IV - Single Lumen 10/19/22 1536 20 G Right Antecubital <1 day                    Significant Labs:    CBC/Anemia Profile:  Recent Labs   Lab 10/18/22  1356 10/19/22  1508 10/19/22  1828   WBC 28.45* 29.56* 27.39*   HGB 11.3* 10.7* 9.9*   HCT 37.1 32.9* 30.9*   * 410 394   MCV 88 81* 83   RDW 14.0 13.8 13.9        Chemistries:  Recent Labs   Lab 10/18/22  1356 10/19/22  0916 10/19/22  1508 10/19/22  1828   * 129* 132*  --    K 4.4 4.2 4.2  --    CL 95 91* 97  --    CO2 27 26 22*  --    BUN 10 10 10  --    CREATININE 0.8 0.8 0.8  --    CALCIUM 9.3 8.6* 9.0  --    ALBUMIN  --  2.5* 2.3*  --    PROT  --  7.0 7.6 6.7   BILITOT  --  2.2* 2.1*  --    ALKPHOS  --  87 85  --    ALT  --  15 19  --    AST  --  14 16  --        Blood Culture: No results for input(s): LABBLOO in the last 48  hours.  Lactic Acid:   Recent Labs   Lab 10/19/22  1508   LACTATE 1.4     Respiratory Culture: No results for input(s): GSRESP, RESPIRATORYC in the last 48 hours.  All pertinent labs within the past 24 hours have been reviewed.    Significant Imaging:  I have reviewed all pertinent imaging results/findings within the past 24 hours.    X-Ray Chest 1 View  Narrative: EXAMINATION:  XR CHEST 1 VIEW    CLINICAL HISTORY:  left thora;    TECHNIQUE:  Single frontal view of the chest was performed.    COMPARISON:  None    FINDINGS:  Significantly improved aeration of the left lung.  Small residual pleural thickening along the superior lung possibly small loculated pleural effusion.    No definite pneumothorax.    Right lung is unchanged.  Impression: As above.    Electronically signed by: Emanuel Oden  Date:    10/19/2022  Time:    18:25  US Chest Mediastinum  Narrative: EXAMINATION:  US CHEST MEDIASTINUM    CLINICAL HISTORY:  pleural effusion, preop thoracentesis;    TECHNIQUE:  Grayscale ultrasound of the chest and mediastinum.    COMPARISON:  None    FINDINGS:  Large left pleural effusion.  Impression: Large left pleural effusion.    Electronically signed by: Emanuel Oden  Date:    10/19/2022  Time:    17:36  CT Chest Without Contrast  Narrative: EXAMINATION:  CT CHEST WITHOUT CONTRAST    CLINICAL HISTORY:  Respiratory illness, nondiagnostic xray;    TECHNIQUE:  Low dose axial images, sagittal and coronal reformations were obtained from the thoracic inlet to the lung bases. Contrast was not administered.    COMPARISON:  CT abdomen and pelvis and chest x-ray from the same date    FINDINGS:  There is a large loculated left pleural effusion with associated atelectasis of the left lower lobe.  Additional mild atelectasis noted in the lingular segment of the left upper lobe.  Mild bandlike opacities with ground-glass attenuation noted in the right posterior lung base.  Right lung is otherwise clear.  No discrete nodule or  mass seen.    There is mild rightward shift of the mediastinal contents.  No significant mediastinal or hilar lymphadenopathy seen.    Heart is normal in size.  No significant coronary calcification or pericardial effusion.    Soft tissues of the visualized lower neck, chest wall, and axilla are unremarkable.    Visualized upper abdominal contents are unremarkable.    No acute bony abnormality.  No aggressive lytic or blastic lesion seen.  Impression: Large loculated appearing left pleural effusion with associated lower lobe atelectasis as well as mild left upper lobe atelectasis.  Bandlike opacities with ground-glass attenuation in the posterior right lung base likely represent additional subsegmental atelectasis.    Mild rightward shift of the mediastinal contents.  No significant mediastinal or hilar lymphadenopathy.    Electronically signed by: Eric Wang  Date:    10/19/2022  Time:    16:00  X-Ray Chest AP Portable (SOB)  Narrative: EXAMINATION:  XR CHEST AP PORTABLE    CLINICAL HISTORY:  SOB;    TECHNIQUE:  Single frontal view of the chest was performed.    COMPARISON:  CT abdomen pelvis from the same date    FINDINGS:  Large left pleural effusion noted with associated lower lung atelectasis and mild rightward mediastinal shift.  Right lung is clear.  Left mediastinal border is obscured.  Right mediastinal border is unremarkable.  No acute bony abnormality seen.  Impression: Large left pleural effusion with associated lower lung atelectasis and mild rightward mediastinal shift.  Findings likely unchanged compared to CT abdomen pelvis from the same date.    Electronically signed by: Eric Wang  Date:    10/19/2022  Time:    15:27  CT Renal Stone Study ABD Pelvis WO  Narrative: EXAMINATION:  CT RENAL STONE STUDY ABD PELVIS WO    CLINICAL HISTORY:  Flank pain, kidney stone suspected; Unspecified abdominal pain    TECHNIQUE:  Low dose axial images, sagittal and coronal reformations were obtained from  the lung bases to the pubic symphysis.  Contrast was not administered.    COMPARISON:  None    FINDINGS:  ABDOMEN    Lung bases: There is a large loculated left-sided pleural effusion with associated compressive atelectasis/consolidation of the left lower lobe.    Liver/gallbladder/biliary: The liver demonstrates a negative noncontrast appearance. The gallbladder is present and unremarkable.  No biliary ductal dilation.    Pancreas: The pancreas demonstrates a negative noncontrast appearance.    Spleen: The spleen is not enlarged.    Adrenals: Unremarkable    Kidneys: The kidneys are symmetric in size and demonstrate no evidence for nephrolithiasis or obstructive uropathy.    Bowel/Mesentery: There is no evidence of bowel obstruction.  Normal appendix is noted.  No mesenteric stranding or adenopathy.    Retroperitoneum: No adenopathy.  The aorta demonstrates a normal caliber.    PELVIS    Genitourinary/Reproductive organs: Unremarkable    Adenopathy: None    Free Fluid: No free fluid    [place] Structures/Soft tissues: Well corticated benign appearing lucent lesion within the iliac bone on the left measuring up to 4.8 cm in the transverse dimension by approximately 11 mm in thickness and up to 4 cm in the craniocaudal dimension.  Possibilities include solitary bone cyst  Impression: 1. No evidence for nephrolithiasis or obstructive uropathy.  2. Large multiloculated left-sided effusion with associated compressive atelectasis/consolidation of the left lower lobe.  3. Remaining findings as discussed above.    Electronically signed by: Morgan Sweeney DO  Date:    10/19/2022  Time:    09:34     Component      Latest Ref Rng & Units 10/19/2022   Body Fluid Type       Thoracentesis Fluid   Fluid Appearance       Cloudy   Fluid Color       Yellow   WBC, Body Fluid      /cu mm 53542   Segs, Fluid      % 98   Monocytes/Macrophages, Fluid      % 2   Body Fluid Source Amylase       Thoracentesis Fluid   Amylase, Fluid      Not  established U/L 25   Body Fluid Source, Glucose       Thoracentesis Fluid   Glucose, Fluid      Not established mg/dL <5   Body Fluid Source, LDH       Thoracentesis Fluid   LD, Fluid      Not established U/L 2399   Body Fluid Source, Albumin       Thoracentesis Fluid   Body Fluid, Albumin      See text g/dL 2.4   Body Fluid Source, Total Protein       Thoracentesis Fluid   Body Fluid, Protein      Not established g/dL 5.8   LD      110 - 260 U/L 167   PROTEIN TOTAL      6.0 - 8.4 g/dL 6.7

## 2022-10-20 NOTE — SUBJECTIVE & OBJECTIVE
Interval History: see hospital course     Review of Systems   Constitutional:  Positive for fever.   Respiratory:  Positive for shortness of breath (improved).    Cardiovascular:  Negative for chest pain.   Gastrointestinal:  Negative for abdominal pain, diarrhea, nausea and vomiting.   Objective:     Vital Signs (Most Recent):  Temp: 98 °F (36.7 °C) (10/20/22 1503)  Pulse: (!) 111 (10/20/22 1503)  Resp: 20 (10/20/22 1503)  BP: (!) 162/98 (10/20/22 1503)  SpO2: 95 % (10/20/22 1503)   Vital Signs (24h Range):  Temp:  [97.5 °F (36.4 °C)-100.7 °F (38.2 °C)] 98 °F (36.7 °C)  Pulse:  [104-138] 111  Resp:  [16-30] 20  SpO2:  [93 %-98 %] 95 %  BP: (126-169)/() 162/98     Weight: (!) 150.6 kg (332 lb)  Body mass index is 50.48 kg/m².    Intake/Output Summary (Last 24 hours) at 10/20/2022 1623  Last data filed at 10/20/2022 0351  Gross per 24 hour   Intake 770 ml   Output --   Net 770 ml      Physical Exam  Constitutional:       Appearance: She is obese.   Cardiovascular:      Rate and Rhythm: Tachycardia present.   Pulmonary:      Comments: Breath sounds diminished   Abdominal:      General: Bowel sounds are normal.      Palpations: Abdomen is soft.   Musculoskeletal:      Cervical back: Normal range of motion.   Skin:     General: Skin is dry.   Neurological:      General: No focal deficit present.      Mental Status: She is oriented to person, place, and time.   Psychiatric:         Mood and Affect: Mood normal.       Significant Labs: All pertinent labs within the past 24 hours have been reviewed.  CBC:   Recent Labs   Lab 10/19/22  1508 10/19/22  1828 10/20/22  0853   WBC 29.56* 27.39* 22.84*   HGB 10.7* 9.9* 10.1*   HCT 32.9* 30.9* 32.2*    394 411     CMP:   Recent Labs   Lab 10/19/22  0916 10/19/22  1508 10/19/22  1828 10/20/22  0624   * 132*  --  134*   K 4.2 4.2  --  4.3   CL 91* 97  --  102   CO2 26 22*  --  23   * 119*  --  119*   BUN 10 10  --  7   CREATININE 0.8 0.8  --  0.7   CALCIUM  8.6* 9.0  --  8.3*   PROT 7.0 7.6 6.7 6.6   ALBUMIN 2.5* 2.3*  --  1.9*   BILITOT 2.2* 2.1*  --  1.4*   ALKPHOS 87 85  --  99   AST 14 16  --  16   ALT 15 19  --  15   ANIONGAP 12 13  --  9         Significant Imaging: I have reviewed all pertinent imaging results/findings within the past 24 hours.

## 2022-10-20 NOTE — PROGRESS NOTES
Pharmacist Renal Dose Adjustment Note    Christina Leal is a 26 y.o. female being treated with the medication enoxaparin.     Patient Data:    Vital Signs (Most Recent):  Temp: 97.5 °F (36.4 °C) (10/20/22 0725)  Pulse: 110 (10/20/22 1311)  Resp: 18 (10/20/22 1311)  BP: 135/85 (10/20/22 1121)  SpO2: 96 % (10/20/22 1311) Vital Signs (72h Range):  Temp:  [97.5 °F (36.4 °C)-100.7 °F (38.2 °C)]   Pulse:  [104-138]   Resp:  [16-37]   BP: (103-169)/()   SpO2:  [91 %-100 %]      Recent Labs   Lab 10/19/22  0916 10/19/22  1508 10/20/22  0624   CREATININE 0.8 0.8 0.7     Serum creatinine: 0.7 mg/dL 10/20/22 0624  Estimated creatinine clearance: 187.9 mL/min  Patient overweight, BMI: 50.48    Enoxaparin 40 mg subq every 24 hours will be changed to enoxaparin 40 mg subq every 12 hours per renal dose protocol for BMI > 40.     Thank you,  Pharmacist's Name: Brian Amaro

## 2022-10-20 NOTE — PROGRESS NOTES
[facility]  Progress Note    Patient Name: Christina Leal  MRN: [id number]  Patient Class: IP- Inpatient   Admission Date: 10/19/2022  Length of Stay: 1 days  Attending Physician: Krystian Alexander, *  Primary Care Provider: Primary Doctor No        Subjective:     Principal Problem:Parapneumonic effusion        HPI:  Christina Leal is a 25 yo female with history of obesity, former tobacco abuse on oral contraception who was referred to ED by primary care for abnormal CXR. Patient reports 1 week history of left sided rib cage pain associated with chest tightness, SOB and PAIZ. She also reports upper abdominal cramping and diarrhea. She denies n/v. OP imaging remarkable for pna with large left pleural effusion. CT chest shows large loculated appearing left pleural effusion with associated lower lobe atelectasis as well as mild left upper lobe atelectasis.  Bandlike opacities with ground-glass attenuation in the posterior right lung base likely represent additional subsegmental atelectasis. Mild rightward shift of the mediastinal contents noted. Patient febrile (Tmax 100.6) tachycardic and tachypenic, O2 sats 98% 2 L O2. Influenza, COVID, HIV, Hep C negative. Labs remarkable for wbc 29 with left shift, Tbili 2.1. She received cefepime. Pulmonology consulted. Patient admitted to hospital medicine.             Overview/Hospital Course:  10/20/22 patient s/p thoracentesis 1500 cc removed. Fluid analysis indicating left pleural effusion compatible with a diagnosis of an empyema   Pt reports feeling better. Repeat CXR: fluid reaccumulation. CVT consulted, plan for chest tube placement with VATs procedure.       Interval History: see hospital course     Review of Systems   Constitutional:  Positive for fever.   Respiratory:  Positive for shortness of breath (improved).    Cardiovascular:  Negative for chest pain.   Gastrointestinal:  Negative for abdominal pain, diarrhea, nausea and vomiting.    Objective:     Vital Signs (Most Recent):  Temp: 98 °F (36.7 °C) (10/20/22 1503)  Pulse: (!) 111 (10/20/22 1503)  Resp: 20 (10/20/22 1503)  BP: (!) 162/98 (10/20/22 1503)  SpO2: 95 % (10/20/22 1503)   Vital Signs (24h Range):  Temp:  [97.5 °F (36.4 °C)-100.7 °F (38.2 °C)] 98 °F (36.7 °C)  Pulse:  [104-138] 111  Resp:  [16-30] 20  SpO2:  [93 %-98 %] 95 %  BP: (126-169)/() 162/98     Weight: (!) 150.6 kg (332 lb)  Body mass index is 50.48 kg/m².    Intake/Output Summary (Last 24 hours) at 10/20/2022 1623  Last data filed at 10/20/2022 0351  Gross per 24 hour   Intake 770 ml   Output --   Net 770 ml      Physical Exam  Constitutional:       Appearance: She is obese.   Cardiovascular:      Rate and Rhythm: Tachycardia present.   Pulmonary:      Comments: Breath sounds diminished   Abdominal:      General: Bowel sounds are normal.      Palpations: Abdomen is soft.   Musculoskeletal:      Cervical back: Normal range of motion.   Skin:     General: Skin is dry.   Neurological:      General: No focal deficit present.      Mental Status: She is oriented to person, place, and time.   Psychiatric:         Mood and Affect: Mood normal.       Significant Labs: All pertinent labs within the past 24 hours have been reviewed.  CBC:   Recent Labs   Lab 10/19/22  1508 10/19/22  1828 10/20/22  0853   WBC 29.56* 27.39* 22.84*   HGB 10.7* 9.9* 10.1*   HCT 32.9* 30.9* 32.2*    394 411     CMP:   Recent Labs   Lab 10/19/22  0916 10/19/22  1508 10/19/22  1828 10/20/22  0624   * 132*  --  134*   K 4.2 4.2  --  4.3   CL 91* 97  --  102   CO2 26 22*  --  23   * 119*  --  119*   BUN 10 10  --  7   CREATININE 0.8 0.8  --  0.7   CALCIUM 8.6* 9.0  --  8.3*   PROT 7.0 7.6 6.7 6.6   ALBUMIN 2.5* 2.3*  --  1.9*   BILITOT 2.2* 2.1*  --  1.4*   ALKPHOS 87 85  --  99   AST 14 16  --  16   ALT 15 19  --  15   ANIONGAP 12 13  --  9         Significant Imaging: I have reviewed all pertinent imaging results/findings within the  past 24 hours.      Assessment/Plan:      * Parapneumonic effusion  Continue empiric vancomycin and cefepime   Pulmonology following  Thoracentesis, follow fluid analysis   Continue supplemental O2  Check TTE  Follow blood cultures     10/20  S/p Thora 1500 cc removed   CVT following, plan for placement of chest tube per VATs   TTE reviewed   Continue empiric vanc, cefepime, add flagyl   dvt ppx       VTE Risk Mitigation (From admission, onward)         Ordered     enoxaparin injection 40 mg  2 times daily         10/20/22 1323     IP VTE HIGH RISK PATIENT  Once         10/19/22 1646     Place sequential compression device  Until discontinued         10/19/22 1646                Discharge Planning   DANNY:  10/27/22    Code Status: Full Code   Is the patient medically ready for discharge?:     Reason for patient still in hospital (select all that apply): Patient trending condition and Consult recommendations  Discharge Plan A: Home                  Carmen Recinos NP  Department of Hospital Medicine   [facility]

## 2022-10-20 NOTE — HOSPITAL COURSE
10/20/22 patient s/p thoracentesis 1500 cc removed. Fluid analysis indicating left pleural effusion compatible with a diagnosis of an empyema   Pt reports feeling better. Repeat CXR: fluid reaccumulation. CVT consulted, plan for chest tube placement with VATs procedure.     10/21- Pt is currently undergoing  VATS procedure with chest tube placement and drainage of empyema with CT surgery. AM labs are not available. Blood cultures - NGTD. Nasal MRSA - neg. Pleural Fluid culture - pending. Antibiotic de escalated to Unasyn.     10/22- S/P VATS, chest tube insertion x 2 and decortication. CT output 600 ml since surgery yesterday. Pleural fluid gram stain showed mod WBCs and rare GPC. Fluid cultures are in progress. WBC remains elevated 22K, CRP trending down. PCT is normal. Current antibiotic - Unasyn and Oral Zyvox.     10/23- Chest tube to suction continues. Output reported 150 ml in the last 24h. Follow up CXR showed persistent loculated left pleural effusion in the apex. CT Surgery will get CT chest for further eval. WBC trended up 28K today. Will consult ID to guide antibiotic selection. Hgb dropped to 8.6 from initial 11.3 on admit. No signs of active bleeding reported. Will monitor Hgb. Culture in progress.    10/24- CT chest with new fluid collection in the left lung apex. Pt will have IR chest tube placed in am. WBC trended down to 22K. Hgb stable at 8.2. pleural fluid aerobic culture - Normal respiratory berta.Anaerobic culture in progress . Current antibiotic - Unasyn and oral Zyvox.     10/25- Additional chest tube by IR today. WBC down to 19K, 3% bands . Antibiotic changed to Cefepime , Flagyl and Vancomycin per ID. Cultures are so far unrevealing.     10/26- S/P pigtail catheter placement into apical loculated collection anterior chest yesterday. Y chest tube in place in the post chest. WBC down to 16K.     10/27- POD #6  VATS procedure with drainage of empyema and decortication and Y chest tube  placement. POD #2  pigtail catheter placement to apical loculated collection left lung Ant chest. Chest tube OP is decreasing. Further chest tube management per CT surgery. All cultures are so far neg. AM labs are pending. WBC started to trend down. Last ID recs from 10/24-- Empiric out patient regime will be Vancomycin  ,Rocephin and Po Flagyl for 2 to 3 weeks or until radiologic resolution.     10/28/22  Chest tube removed today. Doing well on room air. Will plan for vancomycin, Rocephin, and PO Flagyl x 4 weeks. She was asked to follow up with Pulmonology and ID in 2 weeks. She has been educated on IV used. HH will assist with IV picc line care and labs. She is stable for discharge.

## 2022-10-20 NOTE — ASSESSMENT & PLAN NOTE
The patient is a 26-year-old female with a history of severe obesity (BMI 50.48) who was admitted with complaints of left-sided chest pain over the past week.  Patient also noticed some shortness of breath and dyspnea on exertion in addition the patient has a cough.  The patient had a chest x-ray done which showed a left pleural effusion CT scan shows a loculated left pleural effusion.  Thoracentesis was performed with fluid analysis indicating that left pleural effusion is compatible with a diagnosis of an empyema.    The patient's left empyema requires drainage.  Patient will likely require a large bore chest tube for adequate drainage of empyema.  Bedside placement of chest tube in the setting of severe obesity will be difficult.  Will plan for placement of chest tube with VATS procedure in the operating room.  The risks and benefits of a VATS procedure were explained to the patient.  The patient understand the risks and benefits and agreed to proceed with VATS assisted drainage of empyema

## 2022-10-20 NOTE — SUBJECTIVE & OBJECTIVE
No current facility-administered medications on file prior to encounter.     Current Outpatient Medications on File Prior to Encounter   Medication Sig    acetaminophen (TYLENOL) 500 MG tablet Take 500-1,000 mg by mouth every 6 (six) hours as needed for Pain.    ibuprofen (ADVIL,MOTRIN) 200 MG tablet Take 200-400 mg by mouth every 6 (six) hours as needed for Pain.    methocarbamoL (ROBAXIN) 500 MG Tab Take 1 tablet (500 mg total) by mouth 2 (two) times daily as needed.    norgestimate-ethinyl estradioL (ORTHO TRI-CYCLEN,TRI-SPRINTEC) 0.18/0.215/0.25 mg-35 mcg (28) tablet Take 1 tablet by mouth once daily.       Review of patient's allergies indicates:  No Known Allergies    No past medical history on file.  No past surgical history on file.  Family History       Problem Relation (Age of Onset)    Esophageal cancer Father          Tobacco Use    Smoking status: Former     Types: Cigarettes    Smokeless tobacco: Never   Substance and Sexual Activity    Alcohol use: Yes     Comment: social    Drug use: No    Sexual activity: Yes     Partners: Male     Birth control/protection: Condom     Review of Systems   Constitutional:  Positive for activity change and fatigue. Negative for fever.   HENT:  Negative for congestion and sinus pain.    Eyes: Negative.    Respiratory:  Positive for cough and shortness of breath.    Cardiovascular:  Positive for chest pain.   Gastrointestinal:  Positive for abdominal distention.   Endocrine: Negative.    Genitourinary: Negative.    Musculoskeletal:  Positive for back pain.   Allergic/Immunologic: Negative.    Neurological: Negative.    Psychiatric/Behavioral:  The patient is nervous/anxious.    Objective:     Vital Signs (Most Recent):  Temp: 97.5 °F (36.4 °C) (10/20/22 0725)  Pulse: 107 (10/20/22 1121)  Resp: 19 (10/20/22 1217)  BP: 135/85 (10/20/22 1121)  SpO2: 98 % (10/20/22 1121) Vital Signs (24h Range):  Temp:  [97.5 °F (36.4 °C)-100.7 °F (38.2 °C)] 97.5 °F (36.4 °C)  Pulse:   [104-138] 107  Resp:  [16-37] 19  SpO2:  [91 %-100 %] 98 %  BP: (103-169)/() 135/85     Weight: (!) 150.6 kg (332 lb)  Body mass index is 50.48 kg/m².    SpO2: 98 %  O2 Device (Oxygen Therapy): nasal cannula     Intake/Output - Last 3 Shifts         10/18 0700  10/19 0659 10/19 0700  10/20 0659 10/20 0700  10/21 0659    P.O.  720     IV Piggyback  50     Total Intake(mL/kg)  770 (5.1)     Net  +770            Urine Occurrence  3 x              Lines/Drains/Airways       Peripheral Intravenous Line  Duration                  Peripheral IV - Single Lumen 10/19/22 1536 18 G;1 3/4 in Anterior;Left;Proximal Upper Arm <1 day         Peripheral IV - Single Lumen 10/19/22 1536 20 G Right Antecubital <1 day                     STS Risk Score: n/a    Physical Exam  Constitutional:       Appearance: She is obese.   HENT:      Head: Normocephalic and atraumatic.      Nose: Nose normal.      Mouth/Throat:      Mouth: Mucous membranes are moist.   Eyes:      Extraocular Movements: Extraocular movements intact.      Conjunctiva/sclera: Conjunctivae normal.      Pupils: Pupils are equal, round, and reactive to light.   Cardiovascular:      Rate and Rhythm: Regular rhythm. Tachycardia present.      Pulses: Normal pulses.      Heart sounds: Normal heart sounds.   Pulmonary:      Effort: Pulmonary effort is normal.      Breath sounds: Normal breath sounds.   Abdominal:      General: Abdomen is flat. Bowel sounds are normal.      Palpations: Abdomen is soft.   Skin:     General: Skin is warm and dry.   Neurological:      General: No focal deficit present.      Mental Status: She is alert and oriented to person, place, and time.   Psychiatric:         Behavior: Behavior normal.       Significant Labs:  All pertinent labs from the last 24 hours have been reviewed.    Significant Diagnostics:  I have reviewed all pertinent imaging results/findings within the past 24 hours.

## 2022-10-20 NOTE — ASSESSMENT & PLAN NOTE
Continue empiric vancomycin and cefepime   Pulmonology following  Thoracentesis, follow fluid analysis   Continue supplemental O2  Check TTE  Follow blood cultures     10/20  S/p Thora 1500 cc removed   CVT following, plan for placement of chest tube per VATs   TTE reviewed   Continue empiric vanc, cefepime, add flagyl   dvt ppx

## 2022-10-20 NOTE — CONSULTS
[facility]  Cardiothoracic Surgery  Consult Note    Patient Name: Christina Leal  MRN: [id number]  Admission Date: 10/19/2022  Attending Physician: Krystian Alexander, *  Referring Provider: Jimmy Castellanos NP    Patient information was obtained from patient, past medical records and ER records.     Inpatient consult to Cardiothoracic Surgery  Consult performed by: Dusty Moyer MD  Consult ordered by: Carmen Recinos NP      Subjective:     Principal Problem: Parapneumonic effusion    History of Present Illness: No notes on fileEmpyema of left pleural space  The patient is a 26-year-old female with a history of severe obesity (BMI 50.48) who was admitted with complaints of left-sided chest pain over the past week.  Patient also noticed some shortness of breath and dyspnea on exertion in addition the patient has a cough.  The patient had a chest x-ray done which showed a left pleural effusion CT scan shows a loculated left pleural effusion.  Thoracentesis was performed with fluid analysis indicating that left pleural effusion is compatible with a diagnosis of an empyema.      No current facility-administered medications on file prior to encounter.     Current Outpatient Medications on File Prior to Encounter   Medication Sig    acetaminophen (TYLENOL) 500 MG tablet Take 500-1,000 mg by mouth every 6 (six) hours as needed for Pain.    ibuprofen (ADVIL,MOTRIN) 200 MG tablet Take 200-400 mg by mouth every 6 (six) hours as needed for Pain.    methocarbamoL (ROBAXIN) 500 MG Tab Take 1 tablet (500 mg total) by mouth 2 (two) times daily as needed.    norgestimate-ethinyl estradioL (ORTHO TRI-CYCLEN,TRI-SPRINTEC) 0.18/0.215/0.25 mg-35 mcg (28) tablet Take 1 tablet by mouth once daily.       Review of patient's allergies indicates:  No Known Allergies    No past medical history on file.  No past surgical history on file.  Family History       Problem Relation (Age of Onset)    Esophageal cancer Father           Tobacco Use    Smoking status: Former     Types: Cigarettes    Smokeless tobacco: Never   Substance and Sexual Activity    Alcohol use: Yes     Comment: social    Drug use: No    Sexual activity: Yes     Partners: Male     Birth control/protection: Condom     Review of Systems   Constitutional:  Positive for activity change and fatigue. Negative for fever.   HENT:  Negative for congestion and sinus pain.    Eyes: Negative.    Respiratory:  Positive for cough and shortness of breath.    Cardiovascular:  Positive for chest pain.   Gastrointestinal:  Positive for abdominal distention.   Endocrine: Negative.    Genitourinary: Negative.    Musculoskeletal:  Positive for back pain.   Allergic/Immunologic: Negative.    Neurological: Negative.    Psychiatric/Behavioral:  The patient is nervous/anxious.    Objective:     Vital Signs (Most Recent):  Temp: 97.5 °F (36.4 °C) (10/20/22 0725)  Pulse: 107 (10/20/22 1121)  Resp: 19 (10/20/22 1217)  BP: 135/85 (10/20/22 1121)  SpO2: 98 % (10/20/22 1121) Vital Signs (24h Range):  Temp:  [97.5 °F (36.4 °C)-100.7 °F (38.2 °C)] 97.5 °F (36.4 °C)  Pulse:  [104-138] 107  Resp:  [16-37] 19  SpO2:  [91 %-100 %] 98 %  BP: (103-169)/() 135/85     Weight: (!) 150.6 kg (332 lb)  Body mass index is 50.48 kg/m².    SpO2: 98 %  O2 Device (Oxygen Therapy): nasal cannula     Intake/Output - Last 3 Shifts         10/18 0700  10/19 0659 10/19 0700  10/20 0659 10/20 0700  10/21 0659    P.O.  720     IV Piggyback  50     Total Intake(mL/kg)  770 (5.1)     Net  +770            Urine Occurrence  3 x              Lines/Drains/Airways       Peripheral Intravenous Line  Duration                  Peripheral IV - Single Lumen 10/19/22 1536 18 G;1 3/4 in Anterior;Left;Proximal Upper Arm <1 day         Peripheral IV - Single Lumen 10/19/22 1536 20 G Right Antecubital <1 day                     STS Risk Score: n/a    Physical Exam  Constitutional:       Appearance: She is obese.   HENT:      Head:  Normocephalic and atraumatic.      Nose: Nose normal.      Mouth/Throat:      Mouth: Mucous membranes are moist.   Eyes:      Extraocular Movements: Extraocular movements intact.      Conjunctiva/sclera: Conjunctivae normal.      Pupils: Pupils are equal, round, and reactive to light.   Cardiovascular:      Rate and Rhythm: Regular rhythm. Tachycardia present.      Pulses: Normal pulses.      Heart sounds: Normal heart sounds.   Pulmonary:      Effort: Pulmonary effort is normal.      Breath sounds: Normal breath sounds.   Abdominal:      General: Abdomen is flat. Bowel sounds are normal.      Palpations: Abdomen is soft.   Skin:     General: Skin is warm and dry.   Neurological:      General: No focal deficit present.      Mental Status: She is alert and oriented to person, place, and time.   Psychiatric:         Behavior: Behavior normal.       Significant Labs:  All pertinent labs from the last 24 hours have been reviewed.    Significant Diagnostics:  I have reviewed all pertinent imaging results/findings within the past 24 hours.      Assessment/Plan:     NYHA Score: NYHA III: marked limitation of physical activity, comfortable at rest    Empyema of left pleural space  The patient is a 26-year-old female with a history of severe obesity (BMI 50.48) who was admitted with complaints of left-sided chest pain over the past week.  Patient also noticed some shortness of breath and dyspnea on exertion in addition the patient has a cough.  The patient had a chest x-ray done which showed a left pleural effusion CT scan shows a loculated left pleural effusion.  Thoracentesis was performed with fluid analysis indicating that left pleural effusion is compatible with a diagnosis of an empyema.    The patient's left empyema requires drainage.  Patient will likely require a large bore chest tube for adequate drainage of empyema.  Bedside placement of chest tube in the setting of severe obesity will be difficult.  Will plan for  placement of chest tube with VATS procedure in the operating room.  The risks and benefits of a VATS procedure were explained to the patient.  The patient understand the risks and benefits and agreed to proceed with VATS assisted drainage of empyema        Thank you for your consult. I will follow-up with patient. Please contact us if you have any additional questions.    Dusty Moyer MD  Cardiothoracic Surgery  [facility]

## 2022-10-20 NOTE — H&P (VIEW-ONLY)
[facility] Surg  Cardiothoracic Surgery  Consult Note    Patient Name: Christina Leal  MRN: [id number]  Admission Date: 10/19/2022  Attending Physician: Krystian Alexander, *  Referring Provider: Jimmy Castellanos NP    Patient information was obtained from patient, past medical records and ER records.     Inpatient consult to Cardiothoracic Surgery  Consult performed by: Dusty Moyer MD  Consult ordered by: Carmen Recinos NP      Subjective:     Principal Problem: Parapneumonic effusion    History of Present Illness: No notes on fileEmpyema of left pleural space  The patient is a 26-year-old female with a history of severe obesity (BMI 50.48) who was admitted with complaints of left-sided chest pain over the past week.  Patient also noticed some shortness of breath and dyspnea on exertion in addition the patient has a cough.  The patient had a chest x-ray done which showed a left pleural effusion CT scan shows a loculated left pleural effusion.  Thoracentesis was performed with fluid analysis indicating that left pleural effusion is compatible with a diagnosis of an empyema.      No current facility-administered medications on file prior to encounter.     Current Outpatient Medications on File Prior to Encounter   Medication Sig    acetaminophen (TYLENOL) 500 MG tablet Take 500-1,000 mg by mouth every 6 (six) hours as needed for Pain.    ibuprofen (ADVIL,MOTRIN) 200 MG tablet Take 200-400 mg by mouth every 6 (six) hours as needed for Pain.    methocarbamoL (ROBAXIN) 500 MG Tab Take 1 tablet (500 mg total) by mouth 2 (two) times daily as needed.    norgestimate-ethinyl estradioL (ORTHO TRI-CYCLEN,TRI-SPRINTEC) 0.18/0.215/0.25 mg-35 mcg (28) tablet Take 1 tablet by mouth once daily.       Review of patient's allergies indicates:  No Known Allergies    No past medical history on file.  No past surgical history on file.  Family History       Problem Relation (Age of Onset)    Esophageal cancer Father           Tobacco Use    Smoking status: Former     Types: Cigarettes    Smokeless tobacco: Never   Substance and Sexual Activity    Alcohol use: Yes     Comment: social    Drug use: No    Sexual activity: Yes     Partners: Male     Birth control/protection: Condom     Review of Systems   Constitutional:  Positive for activity change and fatigue. Negative for fever.   HENT:  Negative for congestion and sinus pain.    Eyes: Negative.    Respiratory:  Positive for cough and shortness of breath.    Cardiovascular:  Positive for chest pain.   Gastrointestinal:  Positive for abdominal distention.   Endocrine: Negative.    Genitourinary: Negative.    Musculoskeletal:  Positive for back pain.   Allergic/Immunologic: Negative.    Neurological: Negative.    Psychiatric/Behavioral:  The patient is nervous/anxious.    Objective:     Vital Signs (Most Recent):  Temp: 97.5 °F (36.4 °C) (10/20/22 0725)  Pulse: 107 (10/20/22 1121)  Resp: 19 (10/20/22 1217)  BP: 135/85 (10/20/22 1121)  SpO2: 98 % (10/20/22 1121) Vital Signs (24h Range):  Temp:  [97.5 °F (36.4 °C)-100.7 °F (38.2 °C)] 97.5 °F (36.4 °C)  Pulse:  [104-138] 107  Resp:  [16-37] 19  SpO2:  [91 %-100 %] 98 %  BP: (103-169)/() 135/85     Weight: (!) 150.6 kg (332 lb)  Body mass index is 50.48 kg/m².    SpO2: 98 %  O2 Device (Oxygen Therapy): nasal cannula     Intake/Output - Last 3 Shifts         10/18 0700  10/19 0659 10/19 0700  10/20 0659 10/20 0700  10/21 0659    P.O.  720     IV Piggyback  50     Total Intake(mL/kg)  770 (5.1)     Net  +770            Urine Occurrence  3 x              Lines/Drains/Airways       Peripheral Intravenous Line  Duration                  Peripheral IV - Single Lumen 10/19/22 1536 18 G;1 3/4 in Anterior;Left;Proximal Upper Arm <1 day         Peripheral IV - Single Lumen 10/19/22 1536 20 G Right Antecubital <1 day                     STS Risk Score: n/a    Physical Exam  Constitutional:       Appearance: She is obese.   HENT:      Head:  Normocephalic and atraumatic.      Nose: Nose normal.      Mouth/Throat:      Mouth: Mucous membranes are moist.   Eyes:      Extraocular Movements: Extraocular movements intact.      Conjunctiva/sclera: Conjunctivae normal.      Pupils: Pupils are equal, round, and reactive to light.   Cardiovascular:      Rate and Rhythm: Regular rhythm. Tachycardia present.      Pulses: Normal pulses.      Heart sounds: Normal heart sounds.   Pulmonary:      Effort: Pulmonary effort is normal.      Breath sounds: Normal breath sounds.   Abdominal:      General: Abdomen is flat. Bowel sounds are normal.      Palpations: Abdomen is soft.   Skin:     General: Skin is warm and dry.   Neurological:      General: No focal deficit present.      Mental Status: She is alert and oriented to person, place, and time.   Psychiatric:         Behavior: Behavior normal.       Significant Labs:  All pertinent labs from the last 24 hours have been reviewed.    Significant Diagnostics:  I have reviewed all pertinent imaging results/findings within the past 24 hours.      Assessment/Plan:     NYHA Score: NYHA III: marked limitation of physical activity, comfortable at rest    Empyema of left pleural space  The patient is a 26-year-old female with a history of severe obesity (BMI 50.48) who was admitted with complaints of left-sided chest pain over the past week.  Patient also noticed some shortness of breath and dyspnea on exertion in addition the patient has a cough.  The patient had a chest x-ray done which showed a left pleural effusion CT scan shows a loculated left pleural effusion.  Thoracentesis was performed with fluid analysis indicating that left pleural effusion is compatible with a diagnosis of an empyema.    The patient's left empyema requires drainage.  Patient will likely require a large bore chest tube for adequate drainage of empyema.  Bedside placement of chest tube in the setting of severe obesity will be difficult.  Will plan for  placement of chest tube with VATS procedure in the operating room.  The risks and benefits of a VATS procedure were explained to the patient.  The patient understand the risks and benefits and agreed to proceed with VATS assisted drainage of empyema        Thank you for your consult. I will follow-up with patient. Please contact us if you have any additional questions.    Dusty Moeyr MD  Cardiothoracic Surgery  [facility]

## 2022-10-20 NOTE — PROGRESS NOTES
Pharmacokinetic Initial Assessment: IV Vancomycin    Assessment/Plan:    Initiate intravenous vancomycin with loading dose of 2500 mg once followed by a maintenance dose of vancomycin 2000mg IV every 12 hours  Desired empiric serum trough concentration is 15 to 20 mcg/mL  Draw vancomycin trough level 60 min prior to fourth dose on 10/21 at approximately 0500  Pharmacy will continue to follow and monitor vancomycin.      Please contact pharmacy at extension [phone] with any questions regarding this assessment.     Thank you for the consult,   Juliana Rosa       Patient brief summary:  Christina Leal is a 26 y.o. female initiated on antimicrobial therapy with IV Vancomycin for treatment of suspected lower respiratory infection    Drug Allergies:   Review of patient's allergies indicates:  No Known Allergies    Actual Body Weight:   152.9kg    Renal Function:   Estimated Creatinine Clearance: 165.9 mL/min (based on SCr of 0.8 mg/dL).,     Dialysis Method (if applicable):  N/A    CBC (last 72 hours):  Recent Labs   Lab Result Units 10/18/22  1356 10/19/22  1508   WBC K/uL 28.45* 29.56*   Hemoglobin g/dL 11.3* 10.7*   Hematocrit % 37.1 32.9*   Platelets K/uL 553* 410   Gran % % 80.0* 74.5*   Lymph % % 8.0* 9.8*   Mono % % 9.0 12.3   Eosinophil % % 0.0 0.0   Basophil % % 0.0 0.4   Differential Method  Manual Automated       Metabolic Panel (last 72 hours):  Recent Labs   Lab Result Units 10/18/22  1356 10/19/22  0916 10/19/22  1508   Sodium mmol/L 132* 129* 132*   Potassium mmol/L 4.4 4.2 4.2   Chloride mmol/L 95 91* 97   CO2 mmol/L 27 26 22*   Glucose mg/dL 120* 123* 119*   BUN mg/dL 10 10 10   Creatinine mg/dL 0.8 0.8 0.8   Albumin g/dL  --  2.5* 2.3*   Total Bilirubin mg/dL  --  2.2* 2.1*   Alkaline Phosphatase U/L  --  87 85   AST U/L  --  14 16   ALT U/L  --  15 19       Drug levels (last 3 results):  No results for input(s): VANCOMYCINRA, VANCORANDOM, VANCOMYCINPE, VANCOPEAK, VANCOMYCINTR, VANCOTROUGH in  the last 72 hours.    Microbiologic Results:  Microbiology Results (last 7 days)       Procedure Component Value Units Date/Time    Culture, Body Fluid - Bactec [[id number]]     Order Status: No result Specimen: Body Fluid from Pleural Fluid     Gram stain [[id number]]     Order Status: No result Specimen: Body Fluid from Pleural Fluid     AFB culture (includes stain) [[id number]]     Order Status: No result Specimen: Body Fluid from Pleural Fluid     Culture, MRSA [[id number]] Collected: 10/19/22 1657    Order Status: Sent Specimen: MRSA source from Nares, Left Updated: 10/19/22 1701    Blood culture #2 [[id number]] Collected: 10/19/22 1538    Order Status: Sent Specimen: Blood from Peripheral, Antecubital, Left Updated: 10/19/22 1538    Blood culture #1 [[id number]] Collected: 10/19/22 1508    Order Status: Sent Specimen: Blood from Peripheral, Antecubital, Right Updated: 10/19/22 1508

## 2022-10-20 NOTE — PLAN OF CARE
Pt AAOx4. IV ABT therapy remains in progress. No adverse reactions noted, remains afebrile. 2L/NC. SOB @ times. Heart monitor 8560. Remains free from incident/injury. Call light in reach.

## 2022-10-20 NOTE — ANESTHESIA PREPROCEDURE EVALUATION
10/20/2022  Christina Leal is a 26 y.o., female with a history of severe obesity (BMI 50.48) who was admitted with complaints of left-sided chest pain over the past week.  Patient also noticed some shortness of breath and dyspnea on exertion in addition the patient has a cough.  The patient had a chest x-ray done which showed a left pleural effusion CT scan shows a loculated left pleural effusion. Thoracentesis was performed with fluid analysis indicating that left pleural effusion is compatible with a diagnosis of an empyema.    The patient's left empyema requires drainage.  Patient will likely require a large bore chest tube for adequate drainage of empyema.  Considering the patient's severe obesity, will plan for placement of chest tube with VATS procedure in the operating room.      Patient Active Problem List   Diagnosis    Pleural effusion    Parapneumonic effusion    Acute respiratory distress    Empyema of left pleural space     No past medical history on file.    No past surgical history on file.      Chemistry        Component Value Date/Time     (L) 10/20/2022 2150    K 4.2 10/20/2022 2150     10/20/2022 2150    CO2 23 10/20/2022 2150    BUN 6 10/20/2022 2150    CREATININE 0.7 10/20/2022 2150     10/20/2022 2150        Component Value Date/Time    CALCIUM 8.7 10/20/2022 2150    ALKPHOS 83 10/20/2022 2150    AST 24 10/20/2022 2150    ALT 20 10/20/2022 2150    BILITOT 1.1 (H) 10/20/2022 2150        Lab Results   Component Value Date    WBC 22.84 (H) 10/20/2022    HGB 10.1 (L) 10/20/2022    HCT 32.2 (L) 10/20/2022    MCV 84 10/20/2022     10/20/2022     Vitals:    10/21/22 0540   BP:    Pulse: 109   Resp:    Temp:          Pre-op Assessment    I have reviewed the Patient Summary Reports.     I have reviewed the Nursing Notes. I have reviewed the NPO Status.   I have  reviewed the Medications.     Review of Systems  Anesthesia Hx:  No problems with previous Anesthesia  Neg history of prior surgery. Denies Family Hx of Anesthesia complications.   Denies Personal Hx of Anesthesia complications.   Social:  Former Smoker    Hematology/Oncology:     Oncology Normal    -- Anemia:   Cardiovascular:  Cardiovascular Normal  ECHO (10/19/22):  ? The left ventricle is normal in size with concentric remodeling and normal systolic function.  ? The estimated ejection fraction is 60%.  ? Normal left ventricular diastolic function.  ? Normal right ventricular size with normal right ventricular systolic function.  ? Intermediate central venous pressure (8 mmHg).   Pulmonary:   Pneumonia Shortness of breath Empyema; on 2L NC O2 currently    Renal/:  Renal/ Normal     Musculoskeletal:  Musculoskeletal Normal    Neurological:  Neurology Normal    Endocrine:  Endocrine Normal        Physical Exam  General: Cooperative, Alert and Oriented  Morbid Obesity  Airway:  Mallampati: II   Mouth Opening: Normal  TM Distance: Normal  Tongue: Large  Neck ROM: Normal ROM    Dental:  Intact  Patient denies any currently loose or chipped teeth; Patient denies any removable dental appliances  Chest/Lungs:  Clear to auscultation, Normal Respiratory Rate    Heart:  Rate: Tachycardia        Anesthesia Plan  Type of Anesthesia, risks & benefits discussed:    Anesthesia Type: Gen ETT  Intra-op Monitoring Plan: Standard ASA Monitors and Art Line  Post Op Pain Control Plan: multimodal analgesia and IV/PO Opioids PRN  Induction:  IV  Airway Plan: Direct and Fiberoptic  Informed Consent: Informed consent signed with the Patient and all parties understand the risks and agree with anesthesia plan.  All questions answered.   ASA Score: 3    Ready For Surgery From Anesthesia Perspective.     .

## 2022-10-20 NOTE — PLAN OF CARE
[facility]  Initial Discharge Assessment       Primary Care Provider: Primary Doctor No    Admission Diagnosis: Acute respiratory distress [R06.03]  Parapneumonic effusion [J18.9, J91.8]  Pleural effusion [J90]  Loculated pleural effusion [J90]    Admission Date: 10/19/2022  Expected Discharge Date:     Discharge Barriers Identified: None    Payor: BLUE CROSS BLUE SHIELD / Plan: BCBS OF [employer] [place] / Product Type: Commercial /     Extended Emergency Contact Information  Primary Emergency Contact: David Leal  Mobile Phone: [phone]  Relation: Brother    Discharge Plan A: Home  Discharge Plan B: Home      [facility] [place] [address]  [address]  [address]  [place] [address]  Phone: [phone] Fax: [phone]    [id number]  [place] - [address]  [address] [place] [address]  Phone: [phone] Fax: [phone]      Initial Assessment (most recent)       Adult Discharge Assessment - 10/20/22 0941          Discharge Assessment    Assessment Type Discharge Planning Assessment     Confirmed/corrected address, phone number and insurance Yes     Confirmed Demographics Correct on Facesheet     Source of Information patient     When was your last doctors appointment? 10/18/22     Communicated DANNY with patient/caregiver Yes     Reason For Admission Effusion     Lives With other (see comments)   Room mate    Facility Arrived From: Home     Do you expect to return to your current living situation? Yes     Do you have help at home or someone to help you manage your care at home? No     Prior to hospitilization cognitive status: Alert/Oriented     Current cognitive status: Alert/Oriented     Walking or Climbing Stairs Difficulty none     Dressing/Bathing Difficulty none     Equipment Currently Used at Home none     Readmission within 30 days? No     Patient currently being followed by outpatient case management? No      Do you currently have service(s) that help you manage your care at home? No     Do you take prescription medications? Yes     Do you have prescription coverage? Yes     Coverage BC     Do you have any problems affording any of your prescribed medications? No     Is the patient taking medications as prescribed? yes     Who is going to help you get home at discharge? Patient is independent     How do you get to doctors appointments? car, drives self     Are you on dialysis? No     Do you take coumadin? No     Discharge Plan A Home     Discharge Plan B Home     DME Needed Upon Discharge  none     Discharge Plan discussed with: Patient     Discharge Barriers Identified None        Physical Activity    On average, how many days per week do you engage in moderate to strenuous exercise (like a brisk walk)? 2 days     On average, how many minutes do you engage in exercise at this level? 20 min        Financial Resource Strain    How hard is it for you to pay for the very basics like food, housing, medical care, and heating? Not hard at all        Housing Stability    In the last 12 months, was there a time when you were not able to pay the mortgage or rent on time? No     In the last 12 months, how many places have you lived? 1     In the last 12 months, was there a time when you did not have a steady place to sleep or slept in a shelter (including now)? No        Transportation Needs    In the past 12 months, has lack of transportation kept you from medical appointments or from getting medications? No     In the past 12 months, has lack of transportation kept you from meetings, work, or from getting things needed for daily living? No        Food Insecurity    Within the past 12 months, you worried that your food would run out before you got the money to buy more. Never true     Within the past 12 months, the food you bought just didn't last and you didn't have money to get more. Never true        Stress    Do you feel  stress - tense, restless, nervous, or anxious, or unable to sleep at night because your mind is troubled all the time - these days? Not at all        Social Connections    In a typical week, how many times do you talk on the phone with family, friends, or neighbors? More than three times a week     How often do you get together with friends or relatives? --   Monthly    How often do you attend Baptism or Orthodoxy services? Never     Do you belong to any clubs or organizations such as Baptism groups, unions, fraternal or athletic groups, or school groups? No     How often do you attend meetings of the clubs or organizations you belong to? Never     Are you , , , , never , or living with a partner? Never         Alcohol Use    Q1: How often do you have a drink containing alcohol? Monthly or less     Q2: How many drinks containing alcohol do you have on a typical day when you are drinking? 1 or 2     Q3: How often do you have six or more drinks on one occasion? Never                   CM met with patient at the bedside to assess for discharge needs.  Patient is independent with all needs and does not have any anticipated discharge needs at this time.  CM provided a transitional care folder, information on advanced directives, information on pharmacy bedside delivery, and discharge planning begins on admission with contact information for any needs/questions.

## 2022-10-20 NOTE — PROGRESS NOTES
[facility] S[facility]  Pulmonology  Progress Note    Patient Name: Christina Leal  MRN: [id number]  Admission Date: 10/19/2022  Hospital Length of Stay: 1 days  Code Status: Full Code  Attending Provider: Krystian Alexander, *  Primary Care Provider: Primary Doctor No   Principal Problem: Parapneumonic effusion    Subjective:     Interval History:   10/20: seen and examined: DARIEN Jimenez, T max 100.7F, feels better, CXR reviewed    Respiratory ROS: positive for - shortness of breath and wheezing  negative for - cough, hemoptysis, pleuritic pain, or sputum changes     Oxygen Concentration (%):  [28] 28     Objective:     Vital Signs (Most Recent):  Temp: 99 °F (37.2 °C) (10/20/22 0439)  Pulse: (!) 117 (10/20/22 0439)  Resp: 20 (10/20/22 0439)  BP: 136/85 (10/20/22 0439)  SpO2: 95 % (10/20/22 0439)   Vital Signs (24h Range):  Temp:  [98.7 °F (37.1 °C)-100.7 °F (38.2 °C)] 99 °F (37.2 °C)  Pulse:  [104-138] 117  Resp:  [18-37] 20  SpO2:  [91 %-100 %] 95 %  BP: (103-169)/() 136/85     Weight: (!) 150.7 kg (332 lb 3.7 oz)  Body mass index is 50.52 kg/m².      Intake/Output Summary (Last 24 hours) at 10/20/2022 0642  Last data filed at 10/20/2022 0351  Gross per 24 hour   Intake 770 ml   Output --   Net 770 ml       Physical Exam  Vitals and nursing note reviewed.   Constitutional:       Appearance: She is well-developed.      Interventions: Nasal cannula in place.   HENT:      Head: Normocephalic and atraumatic.      Nose: Nose normal.      Mouth/Throat:      Pharynx: No oropharyngeal exudate.   Eyes:      General: No scleral icterus.     Conjunctiva/sclera: Conjunctivae normal.      Pupils: Pupils are equal, round, and reactive to light.   Neck:      Thyroid: No thyromegaly.      Vascular: No JVD.      Trachea: No tracheal deviation.   Cardiovascular:      Rate and Rhythm: Normal rate and regular rhythm.      Pulses: Normal pulses.      Heart sounds: Normal heart sounds, S1 normal and S2 normal. No murmur  heard.  Pulmonary:      Effort: Pulmonary effort is normal. No tachypnea, accessory muscle usage or respiratory distress.      Breath sounds: Decreased air movement present. No stridor. Examination of the left-upper field reveals decreased breath sounds. Decreased breath sounds present.       Abdominal:      General: Bowel sounds are normal. There is no distension.      Palpations: Abdomen is soft. There is no hepatomegaly, splenomegaly or mass.      Tenderness: There is no abdominal tenderness. There is no guarding or rebound.   Musculoskeletal:         General: No tenderness. Normal range of motion.      Cervical back: Normal range of motion and neck supple.   Lymphadenopathy:      Upper Body:      Right upper body: No supraclavicular adenopathy.      Left upper body: No supraclavicular adenopathy.   Skin:     General: Skin is warm and dry.      Findings: No rash.      Nails: There is no clubbing.   Neurological:      General: No focal deficit present.      Mental Status: She is alert and oriented to person, place, and time.      Coordination: Coordination normal.      Gait: Gait normal.      Deep Tendon Reflexes: Reflexes are normal and symmetric.   Psychiatric:         Mood and Affect: Mood normal.       Vents:  Oxygen Concentration (%): 28 (10/19/22 1555)    Lines/Drains/Airways       Peripheral Intravenous Line  Duration                  Peripheral IV - Single Lumen 10/19/22 1536 18 G;1 3/4 in Anterior;Left;Proximal Upper Arm <1 day         Peripheral IV - Single Lumen 10/19/22 1536 20 G Right Antecubital <1 day                    Significant Labs:    CBC/Anemia Profile:  Recent Labs   Lab 10/18/22  1356 10/19/22  1508 10/19/22  1828   WBC 28.45* 29.56* 27.39*   HGB 11.3* 10.7* 9.9*   HCT 37.1 32.9* 30.9*   * 410 394   MCV 88 81* 83   RDW 14.0 13.8 13.9        Chemistries:  Recent Labs   Lab 10/18/22  1356 10/19/22  0916 10/19/22  1508 10/19/22  1828   * 129* 132*  --    K 4.4 4.2 4.2  --    CL 95  91* 97  --    CO2 27 26 22*  --    BUN 10 10 10  --    CREATININE 0.8 0.8 0.8  --    CALCIUM 9.3 8.6* 9.0  --    ALBUMIN  --  2.5* 2.3*  --    PROT  --  7.0 7.6 6.7   BILITOT  --  2.2* 2.1*  --    ALKPHOS  --  87 85  --    ALT  --  15 19  --    AST  --  14 16  --        Blood Culture: No results for input(s): LABBLOO in the last 48 hours.  Lactic Acid:   Recent Labs   Lab 10/19/22  1508   LACTATE 1.4     Respiratory Culture: No results for input(s): GSRESP, RESPIRATORYC in the last 48 hours.  All pertinent labs within the past 24 hours have been reviewed.    Significant Imaging:  I have reviewed all pertinent imaging results/findings within the past 24 hours.    X-Ray Chest 1 View  Narrative: EXAMINATION:  XR CHEST 1 VIEW    CLINICAL HISTORY:  left thora;    TECHNIQUE:  Single frontal view of the chest was performed.    COMPARISON:  None    FINDINGS:  Significantly improved aeration of the left lung.  Small residual pleural thickening along the superior lung possibly small loculated pleural effusion.    No definite pneumothorax.    Right lung is unchanged.  Impression: As above.    Electronically signed by: Emanuel Oden  Date:    10/19/2022  Time:    18:25  US Chest Mediastinum  Narrative: EXAMINATION:  US CHEST MEDIASTINUM    CLINICAL HISTORY:  pleural effusion, preop thoracentesis;    TECHNIQUE:  Grayscale ultrasound of the chest and mediastinum.    COMPARISON:  None    FINDINGS:  Large left pleural effusion.  Impression: Large left pleural effusion.    Electronically signed by: Emanuel Oden  Date:    10/19/2022  Time:    17:36  CT Chest Without Contrast  Narrative: EXAMINATION:  CT CHEST WITHOUT CONTRAST    CLINICAL HISTORY:  Respiratory illness, nondiagnostic xray;    TECHNIQUE:  Low dose axial images, sagittal and coronal reformations were obtained from the thoracic inlet to the lung bases. Contrast was not administered.    COMPARISON:  CT abdomen and pelvis and chest x-ray from the same  date    FINDINGS:  There is a large loculated left pleural effusion with associated atelectasis of the left lower lobe.  Additional mild atelectasis noted in the lingular segment of the left upper lobe.  Mild bandlike opacities with ground-glass attenuation noted in the right posterior lung base.  Right lung is otherwise clear.  No discrete nodule or mass seen.    There is mild rightward shift of the mediastinal contents.  No significant mediastinal or hilar lymphadenopathy seen.    Heart is normal in size.  No significant coronary calcification or pericardial effusion.    Soft tissues of the visualized lower neck, chest wall, and axilla are unremarkable.    Visualized upper abdominal contents are unremarkable.    No acute bony abnormality.  No aggressive lytic or blastic lesion seen.  Impression: Large loculated appearing left pleural effusion with associated lower lobe atelectasis as well as mild left upper lobe atelectasis.  Bandlike opacities with ground-glass attenuation in the posterior right lung base likely represent additional subsegmental atelectasis.    Mild rightward shift of the mediastinal contents.  No significant mediastinal or hilar lymphadenopathy.    Electronically signed by: Eric Wang  Date:    10/19/2022  Time:    16:00  X-Ray Chest AP Portable (SOB)  Narrative: EXAMINATION:  XR CHEST AP PORTABLE    CLINICAL HISTORY:  SOB;    TECHNIQUE:  Single frontal view of the chest was performed.    COMPARISON:  CT abdomen pelvis from the same date    FINDINGS:  Large left pleural effusion noted with associated lower lung atelectasis and mild rightward mediastinal shift.  Right lung is clear.  Left mediastinal border is obscured.  Right mediastinal border is unremarkable.  No acute bony abnormality seen.  Impression: Large left pleural effusion with associated lower lung atelectasis and mild rightward mediastinal shift.  Findings likely unchanged compared to CT abdomen pelvis from the same  date.    Electronically signed by: Eric Wang  Date:    10/19/2022  Time:    15:27  CT Renal Stone Study ABD Pelvis WO  Narrative: EXAMINATION:  CT RENAL STONE STUDY ABD PELVIS WO    CLINICAL HISTORY:  Flank pain, kidney stone suspected; Unspecified abdominal pain    TECHNIQUE:  Low dose axial images, sagittal and coronal reformations were obtained from the lung bases to the pubic symphysis.  Contrast was not administered.    COMPARISON:  None    FINDINGS:  ABDOMEN    Lung bases: There is a large loculated left-sided pleural effusion with associated compressive atelectasis/consolidation of the left lower lobe.    Liver/gallbladder/biliary: The liver demonstrates a negative noncontrast appearance. The gallbladder is present and unremarkable.  No biliary ductal dilation.    Pancreas: The pancreas demonstrates a negative noncontrast appearance.    Spleen: The spleen is not enlarged.    Adrenals: Unremarkable    Kidneys: The kidneys are symmetric in size and demonstrate no evidence for nephrolithiasis or obstructive uropathy.    Bowel/Mesentery: There is no evidence of bowel obstruction.  Normal appendix is noted.  No mesenteric stranding or adenopathy.    Retroperitoneum: No adenopathy.  The aorta demonstrates a normal caliber.    PELVIS    Genitourinary/Reproductive organs: Unremarkable    Adenopathy: None    Free Fluid: No free fluid    [place] Structures/Soft tissues: Well corticated benign appearing lucent lesion within the iliac bone on the left measuring up to 4.8 cm in the transverse dimension by approximately 11 mm in thickness and up to 4 cm in the craniocaudal dimension.  Possibilities include solitary bone cyst  Impression: 1. No evidence for nephrolithiasis or obstructive uropathy.  2. Large multiloculated left-sided effusion with associated compressive atelectasis/consolidation of the left lower lobe.  3. Remaining findings as discussed above.    Electronically signed by: Morgan Sweeney  DO  Date:    10/19/2022  Time:    09:34     Component      Latest Ref Rng & Units 10/19/2022   Body Fluid Type       Thoracentesis Fluid   Fluid Appearance       Cloudy   Fluid Color       Yellow   WBC, Body Fluid      /cu mm 63421   Segs, Fluid      % 98   Monocytes/Macrophages, Fluid      % 2   Body Fluid Source Amylase       Thoracentesis Fluid   Amylase, Fluid      Not established U/L 25   Body Fluid Source, Glucose       Thoracentesis Fluid   Glucose, Fluid      Not established mg/dL <5   Body Fluid Source, LDH       Thoracentesis Fluid   LD, Fluid      Not established U/L 2399   Body Fluid Source, Albumin       Thoracentesis Fluid   Body Fluid, Albumin      See text g/dL 2.4   Body Fluid Source, Total Protein       Thoracentesis Fluid   Body Fluid, Protein      Not established g/dL 5.8   LD      110 - 260 U/L 167   PROTEIN TOTAL      6.0 - 8.4 g/dL 6.7       ABG  No results for input(s): PH, PO2, PCO2, HCO3, BE in the last 168 hours.  Assessment/Plan:     * Parapneumonic effusion  Empiric abx VANC, CEFEPIME, FLAGYL      Empyema of left pleural space  For VATS chest tube, decortication    Pleural effusion  Likely empyema  High Wbcc, Low glucose  High inflamatory marks  Repeat CXR: fluid reaccumulation       I have reviewed all labs and imaging studies and compared to previous results. I have also discussed labs with all the teams in the medical care of the patient and my plan is outlined below       Varun Wood MD  Pulmonology  [facility] [facility] S[facility]

## 2022-10-21 ENCOUNTER — ANESTHESIA (OUTPATIENT)
Dept: SURGERY | Facility: HOSPITAL | Age: 27
DRG: 163 | End: 2022-10-21
Payer: COMMERCIAL

## 2022-10-21 VITALS — OXYGEN SATURATION: 96 % | RESPIRATION RATE: 28 BRPM | HEART RATE: 122 BPM

## 2022-10-21 PROBLEM — J90 PLEURAL EFFUSION: Status: RESOLVED | Noted: 2022-10-19 | Resolved: 2022-10-21

## 2022-10-21 LAB
ABO + RH BLD: NORMAL
ALLENS TEST: ABNORMAL
APTT BLDCRRT: 26.1 SEC (ref 21–32)
BLD GP AB SCN CELLS X3 SERPL QL: NORMAL
CHOLEST FLD-MCNC: 94 MG/DL
DELSYS: ABNORMAL
EP: 5
ERYTHROCYTE [SEDIMENTATION RATE] IN BLOOD BY WESTERGREN METHOD: 18 MM/H
FIO2: 45
HCO3 UR-SCNC: 29.3 MMOL/L (ref 24–28)
IP: 10
MODE: ABNORMAL
MRSA SPEC QL CULT: NORMAL
PCO2 BLDA: 49.8 MMHG (ref 35–45)
PH SMN: 7.38 [PH] (ref 7.35–7.45)
PO2 BLDA: 124 MMHG (ref 80–100)
POC BE: 4 MMOL/L
POC SATURATED O2: 99 % (ref 95–100)
POCT GLUCOSE: 134 MG/DL (ref 70–110)
PREALB SERPL-MCNC: 5 MG/DL (ref 20–43)
SAMPLE: ABNORMAL
SITE: ABNORMAL

## 2022-10-21 PROCEDURE — 71000039 HC RECOVERY, EACH ADD'L HOUR: Performed by: THORACIC SURGERY (CARDIOTHORACIC VASCULAR SURGERY)

## 2022-10-21 PROCEDURE — 37799 UNLISTED PX VASCULAR SURGERY: CPT

## 2022-10-21 PROCEDURE — 25000003 PHARM REV CODE 250: Performed by: INTERNAL MEDICINE

## 2022-10-21 PROCEDURE — 25000242 PHARM REV CODE 250 ALT 637 W/ HCPCS: Performed by: ANESTHESIOLOGY

## 2022-10-21 PROCEDURE — 63600175 PHARM REV CODE 636 W HCPCS: Performed by: THORACIC SURGERY (CARDIOTHORACIC VASCULAR SURGERY)

## 2022-10-21 PROCEDURE — 88305 TISSUE EXAM BY PATHOLOGIST: CPT | Performed by: PATHOLOGY

## 2022-10-21 PROCEDURE — 87205 SMEAR GRAM STAIN: CPT | Performed by: INTERNAL MEDICINE

## 2022-10-21 PROCEDURE — 25000003 PHARM REV CODE 250: Performed by: NURSE PRACTITIONER

## 2022-10-21 PROCEDURE — 82803 BLOOD GASES ANY COMBINATION: CPT

## 2022-10-21 PROCEDURE — 37000008 HC ANESTHESIA 1ST 15 MINUTES: Performed by: THORACIC SURGERY (CARDIOTHORACIC VASCULAR SURGERY)

## 2022-10-21 PROCEDURE — 36573 INSJ PICC RS&I 5 YR+: CPT

## 2022-10-21 PROCEDURE — 87070 CULTURE OTHR SPECIMN AEROBIC: CPT | Performed by: INTERNAL MEDICINE

## 2022-10-21 PROCEDURE — 32651 THORACOSCOPY REMOVE CORTEX: CPT | Mod: LT,,, | Performed by: THORACIC SURGERY (CARDIOTHORACIC VASCULAR SURGERY)

## 2022-10-21 PROCEDURE — 25000003 PHARM REV CODE 250: Performed by: NURSE ANESTHETIST, CERTIFIED REGISTERED

## 2022-10-21 PROCEDURE — 27000190 HC CPAP FULL FACE MASK W/VALVE

## 2022-10-21 PROCEDURE — 36000710: Performed by: THORACIC SURGERY (CARDIOTHORACIC VASCULAR SURGERY)

## 2022-10-21 PROCEDURE — 87116 MYCOBACTERIA CULTURE: CPT | Mod: 59 | Performed by: INTERNAL MEDICINE

## 2022-10-21 PROCEDURE — 37000009 HC ANESTHESIA EA ADD 15 MINS: Performed by: THORACIC SURGERY (CARDIOTHORACIC VASCULAR SURGERY)

## 2022-10-21 PROCEDURE — 87015 SPECIMEN INFECT AGNT CONCNTJ: CPT | Mod: 59 | Performed by: INTERNAL MEDICINE

## 2022-10-21 PROCEDURE — 27000221 HC OXYGEN, UP TO 24 HOURS

## 2022-10-21 PROCEDURE — 32651 PR THORACOSCOPY SURG PART PULM DECORT: ICD-10-PCS | Mod: LT,,, | Performed by: THORACIC SURGERY (CARDIOTHORACIC VASCULAR SURGERY)

## 2022-10-21 PROCEDURE — C1729 CATH, DRAINAGE: HCPCS | Performed by: THORACIC SURGERY (CARDIOTHORACIC VASCULAR SURGERY)

## 2022-10-21 PROCEDURE — S0030 INJECTION, METRONIDAZOLE: HCPCS | Performed by: INTERNAL MEDICINE

## 2022-10-21 PROCEDURE — 94640 AIRWAY INHALATION TREATMENT: CPT

## 2022-10-21 PROCEDURE — 87070 CULTURE OTHR SPECIMN AEROBIC: CPT | Mod: 59 | Performed by: INTERNAL MEDICINE

## 2022-10-21 PROCEDURE — 94799 UNLISTED PULMONARY SVC/PX: CPT

## 2022-10-21 PROCEDURE — 88305 TISSUE EXAM BY PATHOLOGIST: ICD-10-PCS | Mod: 26,,, | Performed by: PATHOLOGY

## 2022-10-21 PROCEDURE — 85730 THROMBOPLASTIN TIME PARTIAL: CPT | Performed by: THORACIC SURGERY (CARDIOTHORACIC VASCULAR SURGERY)

## 2022-10-21 PROCEDURE — 88305 TISSUE EXAM BY PATHOLOGIST: CPT | Mod: 26,,, | Performed by: PATHOLOGY

## 2022-10-21 PROCEDURE — 99900035 HC TECH TIME PER 15 MIN (STAT)

## 2022-10-21 PROCEDURE — 25000003 PHARM REV CODE 250: Performed by: THORACIC SURGERY (CARDIOTHORACIC VASCULAR SURGERY)

## 2022-10-21 PROCEDURE — 88112 PR  CYTOPATH, CELL ENHANCE TECH: ICD-10-PCS | Mod: 26,,, | Performed by: PATHOLOGY

## 2022-10-21 PROCEDURE — 87102 FUNGUS ISOLATION CULTURE: CPT | Performed by: INTERNAL MEDICINE

## 2022-10-21 PROCEDURE — 87075 CULTR BACTERIA EXCEPT BLOOD: CPT | Performed by: INTERNAL MEDICINE

## 2022-10-21 PROCEDURE — 88112 CYTOPATH CELL ENHANCE TECH: CPT | Performed by: PATHOLOGY

## 2022-10-21 PROCEDURE — 36000711: Performed by: THORACIC SURGERY (CARDIOTHORACIC VASCULAR SURGERY)

## 2022-10-21 PROCEDURE — 71000033 HC RECOVERY, INTIAL HOUR: Performed by: THORACIC SURGERY (CARDIOTHORACIC VASCULAR SURGERY)

## 2022-10-21 PROCEDURE — C1751 CATH, INF, PER/CENT/MIDLINE: HCPCS

## 2022-10-21 PROCEDURE — 87206 SMEAR FLUORESCENT/ACID STAI: CPT | Performed by: INTERNAL MEDICINE

## 2022-10-21 PROCEDURE — 99232 PR SUBSEQUENT HOSPITAL CARE,LEVL II: ICD-10-PCS | Mod: ,,, | Performed by: INTERNAL MEDICINE

## 2022-10-21 PROCEDURE — 36415 COLL VENOUS BLD VENIPUNCTURE: CPT | Performed by: INTERNAL MEDICINE

## 2022-10-21 PROCEDURE — 21400001 HC TELEMETRY ROOM

## 2022-10-21 PROCEDURE — 94660 CPAP INITIATION&MGMT: CPT

## 2022-10-21 PROCEDURE — 88305 TISSUE EXAM BY PATHOLOGIST: CPT | Mod: 59 | Performed by: PATHOLOGY

## 2022-10-21 PROCEDURE — 63600175 PHARM REV CODE 636 W HCPCS: Performed by: STUDENT IN AN ORGANIZED HEALTH CARE EDUCATION/TRAINING PROGRAM

## 2022-10-21 PROCEDURE — 87205 SMEAR GRAM STAIN: CPT | Mod: 59 | Performed by: INTERNAL MEDICINE

## 2022-10-21 PROCEDURE — 25000242 PHARM REV CODE 250 ALT 637 W/ HCPCS: Performed by: THORACIC SURGERY (CARDIOTHORACIC VASCULAR SURGERY)

## 2022-10-21 PROCEDURE — 63600175 PHARM REV CODE 636 W HCPCS: Performed by: INTERNAL MEDICINE

## 2022-10-21 PROCEDURE — 99232 SBSQ HOSP IP/OBS MODERATE 35: CPT | Mod: ,,, | Performed by: INTERNAL MEDICINE

## 2022-10-21 PROCEDURE — C9290 INJ, BUPIVACAINE LIPOSOME: HCPCS | Performed by: THORACIC SURGERY (CARDIOTHORACIC VASCULAR SURGERY)

## 2022-10-21 PROCEDURE — 63600175 PHARM REV CODE 636 W HCPCS: Performed by: NURSE PRACTITIONER

## 2022-10-21 PROCEDURE — 27201423 OPTIME MED/SURG SUP & DEVICES STERILE SUPPLY: Performed by: THORACIC SURGERY (CARDIOTHORACIC VASCULAR SURGERY)

## 2022-10-21 PROCEDURE — 63600175 PHARM REV CODE 636 W HCPCS: Performed by: NURSE ANESTHETIST, CERTIFIED REGISTERED

## 2022-10-21 PROCEDURE — 11000001 HC ACUTE MED/SURG PRIVATE ROOM

## 2022-10-21 PROCEDURE — 88112 CYTOPATH CELL ENHANCE TECH: CPT | Mod: 26,,, | Performed by: PATHOLOGY

## 2022-10-21 RX ORDER — FENTANYL CITRATE 50 UG/ML
INJECTION, SOLUTION INTRAMUSCULAR; INTRAVENOUS
Status: DISCONTINUED | OUTPATIENT
Start: 2022-10-21 | End: 2022-10-21

## 2022-10-21 RX ORDER — ALBUTEROL SULFATE 0.83 MG/ML
2.5 SOLUTION RESPIRATORY (INHALATION) EVERY 4 HOURS PRN
Status: DISCONTINUED | OUTPATIENT
Start: 2022-10-21 | End: 2022-10-28 | Stop reason: HOSPADM

## 2022-10-21 RX ORDER — ACETAMINOPHEN 325 MG/1
650 TABLET ORAL EVERY 6 HOURS
Status: DISCONTINUED | OUTPATIENT
Start: 2022-10-21 | End: 2022-10-21 | Stop reason: SDUPTHER

## 2022-10-21 RX ORDER — POLYETHYLENE GLYCOL 3350 17 G/17G
17 POWDER, FOR SOLUTION ORAL DAILY
Status: DISCONTINUED | OUTPATIENT
Start: 2022-10-21 | End: 2022-10-28 | Stop reason: HOSPADM

## 2022-10-21 RX ORDER — ROCURONIUM BROMIDE 10 MG/ML
INJECTION, SOLUTION INTRAVENOUS
Status: DISCONTINUED | OUTPATIENT
Start: 2022-10-21 | End: 2022-10-21

## 2022-10-21 RX ORDER — PROPOFOL 10 MG/ML
VIAL (ML) INTRAVENOUS
Status: DISCONTINUED | OUTPATIENT
Start: 2022-10-21 | End: 2022-10-21

## 2022-10-21 RX ORDER — ONDANSETRON 2 MG/ML
4 INJECTION INTRAMUSCULAR; INTRAVENOUS DAILY PRN
Status: DISCONTINUED | OUTPATIENT
Start: 2022-10-21 | End: 2022-10-21 | Stop reason: HOSPADM

## 2022-10-21 RX ORDER — MORPHINE SULFATE 4 MG/ML
3 INJECTION, SOLUTION INTRAMUSCULAR; INTRAVENOUS
Status: DISCONTINUED | OUTPATIENT
Start: 2022-10-21 | End: 2022-10-28 | Stop reason: HOSPADM

## 2022-10-21 RX ORDER — DEXMEDETOMIDINE HYDROCHLORIDE 100 UG/ML
INJECTION, SOLUTION INTRAVENOUS
Status: DISCONTINUED | OUTPATIENT
Start: 2022-10-21 | End: 2022-10-21

## 2022-10-21 RX ORDER — DEXTROSE MONOHYDRATE, SODIUM CHLORIDE, AND POTASSIUM CHLORIDE 50; 1.49; 4.5 G/1000ML; G/1000ML; G/1000ML
INJECTION, SOLUTION INTRAVENOUS CONTINUOUS
Status: DISCONTINUED | OUTPATIENT
Start: 2022-10-21 | End: 2022-10-26

## 2022-10-21 RX ORDER — DEXAMETHASONE SODIUM PHOSPHATE 4 MG/ML
INJECTION, SOLUTION INTRA-ARTICULAR; INTRALESIONAL; INTRAMUSCULAR; INTRAVENOUS; SOFT TISSUE
Status: DISCONTINUED | OUTPATIENT
Start: 2022-10-21 | End: 2022-10-21

## 2022-10-21 RX ORDER — HYDROMORPHONE HYDROCHLORIDE 2 MG/ML
0.2 INJECTION, SOLUTION INTRAMUSCULAR; INTRAVENOUS; SUBCUTANEOUS EVERY 5 MIN PRN
Status: COMPLETED | OUTPATIENT
Start: 2022-10-21 | End: 2022-10-21

## 2022-10-21 RX ORDER — KETOROLAC TROMETHAMINE 30 MG/ML
15 INJECTION, SOLUTION INTRAMUSCULAR; INTRAVENOUS EVERY 8 HOURS PRN
Status: DISCONTINUED | OUTPATIENT
Start: 2022-10-21 | End: 2022-10-21 | Stop reason: HOSPADM

## 2022-10-21 RX ORDER — OXYCODONE HYDROCHLORIDE 5 MG/1
5 TABLET ORAL EVERY 4 HOURS PRN
Status: DISCONTINUED | OUTPATIENT
Start: 2022-10-21 | End: 2022-10-28 | Stop reason: HOSPADM

## 2022-10-21 RX ORDER — IPRATROPIUM BROMIDE AND ALBUTEROL SULFATE 2.5; .5 MG/3ML; MG/3ML
3 SOLUTION RESPIRATORY (INHALATION)
Status: DISCONTINUED | OUTPATIENT
Start: 2022-10-21 | End: 2022-10-28 | Stop reason: HOSPADM

## 2022-10-21 RX ORDER — MIDAZOLAM HYDROCHLORIDE 1 MG/ML
INJECTION, SOLUTION INTRAMUSCULAR; INTRAVENOUS
Status: DISCONTINUED | OUTPATIENT
Start: 2022-10-21 | End: 2022-10-21

## 2022-10-21 RX ORDER — BUPIVACAINE HYDROCHLORIDE 2.5 MG/ML
INJECTION, SOLUTION EPIDURAL; INFILTRATION; INTRACAUDAL
Status: DISCONTINUED | OUTPATIENT
Start: 2022-10-21 | End: 2022-10-21 | Stop reason: HOSPADM

## 2022-10-21 RX ORDER — ACETAMINOPHEN 10 MG/ML
INJECTION, SOLUTION INTRAVENOUS
Status: DISCONTINUED | OUTPATIENT
Start: 2022-10-21 | End: 2022-10-21

## 2022-10-21 RX ORDER — LIDOCAINE HYDROCHLORIDE 20 MG/ML
INJECTION, SOLUTION EPIDURAL; INFILTRATION; INTRACAUDAL; PERINEURAL
Status: DISCONTINUED | OUTPATIENT
Start: 2022-10-21 | End: 2022-10-21

## 2022-10-21 RX ORDER — ACETAMINOPHEN 325 MG/1
650 TABLET ORAL
Status: DISPENSED | OUTPATIENT
Start: 2022-10-21 | End: 2022-10-24

## 2022-10-21 RX ORDER — ACETAMINOPHEN 10 MG/ML
1000 INJECTION, SOLUTION INTRAVENOUS ONCE
Status: DISCONTINUED | OUTPATIENT
Start: 2022-10-21 | End: 2022-10-21 | Stop reason: ALTCHOICE

## 2022-10-21 RX ORDER — KETOROLAC TROMETHAMINE 30 MG/ML
30 INJECTION, SOLUTION INTRAMUSCULAR; INTRAVENOUS EVERY 8 HOURS
Status: DISPENSED | OUTPATIENT
Start: 2022-10-21 | End: 2022-10-24

## 2022-10-21 RX ORDER — MUPIROCIN 20 MG/G
OINTMENT TOPICAL 2 TIMES DAILY
Status: DISPENSED | OUTPATIENT
Start: 2022-10-21 | End: 2022-10-26

## 2022-10-21 RX ORDER — DOCUSATE SODIUM 100 MG/1
100 CAPSULE, LIQUID FILLED ORAL 2 TIMES DAILY
Status: DISCONTINUED | OUTPATIENT
Start: 2022-10-21 | End: 2022-10-28 | Stop reason: HOSPADM

## 2022-10-21 RX ORDER — METOCLOPRAMIDE HYDROCHLORIDE 5 MG/ML
5 INJECTION INTRAMUSCULAR; INTRAVENOUS EVERY 6 HOURS PRN
Status: DISCONTINUED | OUTPATIENT
Start: 2022-10-21 | End: 2022-10-28 | Stop reason: HOSPADM

## 2022-10-21 RX ORDER — ONDANSETRON 2 MG/ML
INJECTION INTRAMUSCULAR; INTRAVENOUS
Status: DISCONTINUED | OUTPATIENT
Start: 2022-10-21 | End: 2022-10-21

## 2022-10-21 RX ORDER — MAGNESIUM SULFATE HEPTAHYDRATE 500 MG/ML
INJECTION, SOLUTION INTRAMUSCULAR; INTRAVENOUS
Status: DISCONTINUED | OUTPATIENT
Start: 2022-10-21 | End: 2022-10-21

## 2022-10-21 RX ADMIN — HYDROMORPHONE HYDROCHLORIDE 0.2 MG: 2 INJECTION INTRAMUSCULAR; INTRAVENOUS; SUBCUTANEOUS at 12:10

## 2022-10-21 RX ADMIN — ROCURONIUM BROMIDE 60 MG: 10 INJECTION, SOLUTION INTRAVENOUS at 07:10

## 2022-10-21 RX ADMIN — ONDANSETRON 4 MG: 2 INJECTION, SOLUTION INTRAMUSCULAR; INTRAVENOUS at 10:10

## 2022-10-21 RX ADMIN — FAMOTIDINE 20 MG: 20 TABLET ORAL at 09:10

## 2022-10-21 RX ADMIN — GLYCOPYRROLATE 0.2 MG: 0.2 INJECTION, SOLUTION INTRAMUSCULAR; INTRAVENOUS at 08:10

## 2022-10-21 RX ADMIN — DEXAMETHASONE SODIUM PHOSPHATE 4 MG: 4 INJECTION, SOLUTION INTRAMUSCULAR; INTRAVENOUS at 08:10

## 2022-10-21 RX ADMIN — ROCURONIUM BROMIDE 40 MG: 10 INJECTION, SOLUTION INTRAVENOUS at 08:10

## 2022-10-21 RX ADMIN — DEXMEDETOMIDINE HYDROCHLORIDE 50 MCG: 100 INJECTION, SOLUTION, CONCENTRATE INTRAVENOUS at 08:10

## 2022-10-21 RX ADMIN — PROPOFOL 50 MG: 10 INJECTION, EMULSION INTRAVENOUS at 07:10

## 2022-10-21 RX ADMIN — FENTANYL CITRATE 50 MCG: 50 INJECTION, SOLUTION INTRAMUSCULAR; INTRAVENOUS at 07:10

## 2022-10-21 RX ADMIN — ACETAMINOPHEN 650 MG: 325 TABLET ORAL at 09:10

## 2022-10-21 RX ADMIN — DEXTROSE, SODIUM CHLORIDE, AND POTASSIUM CHLORIDE: 5; .45; .15 INJECTION INTRAVENOUS at 02:10

## 2022-10-21 RX ADMIN — METRONIDAZOLE 500 MG: 5 INJECTION, SOLUTION INTRAVENOUS at 09:10

## 2022-10-21 RX ADMIN — CEFEPIME 2 G: 2 INJECTION, POWDER, FOR SOLUTION INTRAVENOUS at 10:10

## 2022-10-21 RX ADMIN — PROPOFOL 50 MG: 10 INJECTION, EMULSION INTRAVENOUS at 08:10

## 2022-10-21 RX ADMIN — OXYCODONE HYDROCHLORIDE 5 MG: 5 TABLET ORAL at 07:10

## 2022-10-21 RX ADMIN — SUGAMMADEX 400 MG: 100 INJECTION, SOLUTION INTRAVENOUS at 10:10

## 2022-10-21 RX ADMIN — TRAZODONE HYDROCHLORIDE 100 MG: 100 TABLET ORAL at 10:10

## 2022-10-21 RX ADMIN — ROCURONIUM BROMIDE 30 MG: 10 INJECTION, SOLUTION INTRAVENOUS at 09:10

## 2022-10-21 RX ADMIN — MAGNESIUM SULFATE HEPTAHYDRATE 1 G: 500 INJECTION, SOLUTION INTRAMUSCULAR; INTRAVENOUS at 08:10

## 2022-10-21 RX ADMIN — ALBUTEROL SULFATE 2.5 MG: 2.5 SOLUTION RESPIRATORY (INHALATION) at 08:10

## 2022-10-21 RX ADMIN — LIDOCAINE HYDROCHLORIDE 100 MG: 20 INJECTION, SOLUTION EPIDURAL; INFILTRATION; INTRACAUDAL; PERINEURAL at 07:10

## 2022-10-21 RX ADMIN — VANCOMYCIN HYDROCHLORIDE 2000 MG: 1 INJECTION, POWDER, LYOPHILIZED, FOR SOLUTION INTRAVENOUS at 07:10

## 2022-10-21 RX ADMIN — AMPICILLIN SODIUM AND SULBACTAM SODIUM 3 G: 2; 1 INJECTION, POWDER, FOR SOLUTION INTRAMUSCULAR; INTRAVENOUS at 05:10

## 2022-10-21 RX ADMIN — MUPIROCIN: 20 OINTMENT TOPICAL at 09:10

## 2022-10-21 RX ADMIN — ROCURONIUM BROMIDE 50 MG: 10 INJECTION, SOLUTION INTRAVENOUS at 08:10

## 2022-10-21 RX ADMIN — ACETAMINOPHEN 650 MG: 325 TABLET ORAL at 02:10

## 2022-10-21 RX ADMIN — SODIUM CHLORIDE, SODIUM LACTATE, POTASSIUM CHLORIDE, AND CALCIUM CHLORIDE: .6; .31; .03; .02 INJECTION, SOLUTION INTRAVENOUS at 07:10

## 2022-10-21 RX ADMIN — KETOROLAC TROMETHAMINE 30 MG: 30 INJECTION, SOLUTION INTRAMUSCULAR; INTRAVENOUS at 02:10

## 2022-10-21 RX ADMIN — IPRATROPIUM BROMIDE AND ALBUTEROL SULFATE 3 ML: 2.5; .5 SOLUTION RESPIRATORY (INHALATION) at 07:10

## 2022-10-21 RX ADMIN — PROPOFOL 150 MG: 10 INJECTION, EMULSION INTRAVENOUS at 07:10

## 2022-10-21 RX ADMIN — SODIUM CHLORIDE, SODIUM LACTATE, POTASSIUM CHLORIDE, AND CALCIUM CHLORIDE: .6; .31; .03; .02 INJECTION, SOLUTION INTRAVENOUS at 10:10

## 2022-10-21 RX ADMIN — POLYETHYLENE GLYCOL 3350 17 G: 17 POWDER, FOR SOLUTION ORAL at 02:10

## 2022-10-21 RX ADMIN — KETOROLAC TROMETHAMINE 30 MG: 30 INJECTION, SOLUTION INTRAMUSCULAR; INTRAVENOUS at 09:10

## 2022-10-21 RX ADMIN — DEXMEDETOMIDINE HYDROCHLORIDE 20 MCG: 100 INJECTION, SOLUTION, CONCENTRATE INTRAVENOUS at 09:10

## 2022-10-21 RX ADMIN — HYDROMORPHONE HYDROCHLORIDE 0.2 MG: 2 INJECTION INTRAMUSCULAR; INTRAVENOUS; SUBCUTANEOUS at 01:10

## 2022-10-21 RX ADMIN — ENOXAPARIN SODIUM 40 MG: 40 INJECTION SUBCUTANEOUS at 09:10

## 2022-10-21 RX ADMIN — KETOROLAC TROMETHAMINE 15 MG: 30 INJECTION, SOLUTION INTRAMUSCULAR; INTRAVENOUS at 12:10

## 2022-10-21 RX ADMIN — MIDAZOLAM 2 MG: 1 INJECTION INTRAMUSCULAR; INTRAVENOUS at 07:10

## 2022-10-21 RX ADMIN — ACETAMINOPHEN 1000 MG: 10 INJECTION, SOLUTION INTRAVENOUS at 08:10

## 2022-10-21 NOTE — ANESTHESIA PROCEDURE NOTES
Intubation    Date/Time: 10/21/2022 7:40 AM  Performed by: Colin Chapa CRNA  Authorized by: Brian Ridley MD     Intubation:     Induction:  Intravenous    Intubated:  Postinduction    Mask Ventilation:  Easy mask    Attempts:  1    Attempted By:  Student and CRNA    Method of Intubation:  Direct    Blade:  Dane 3    Laryngeal View Grade: Grade I - full view of cords      Difficult Airway Encountered?: No      Complications:  None    Airway Device:  Double lumen tube left    Airway Device Size:  37F    Style/Cuff Inflation:  Cuffed (inflated to minimal occlusive pressure)    Placement Verified By:  Capnometry    Complicating Factors:  None    Findings Post-Intubation:  BS equal bilateral and atraumatic/condition of teeth unchanged

## 2022-10-21 NOTE — OP NOTE
[facility] - Surgery ([facility])  Cardiothoracic Surgery  Operative Note    SUMMARY     Date of Procedure: 10/21/2022     Procedure: Procedure(s) (LRB):  VATS, WITH CHEST TUBE INSERTION FOR DRAINAGE OF PLEURAL EFFUSION (Left)  BLOCK, NERVE, INTERCOSTAL, 2 OR MORE (Left)  VATS, WITH DECORTICATION, LUNG (Left)    Left video-assisted thoracoscopic surgery with evacuation of empyema and decortication     Surgeon(s) and Role:     * Dusty Moyer MD - Primary    Assisting Surgeon: None    Pre-Operative Diagnosis: Empyema of left pleural space [J86.9]    Post-Operative Diagnosis: Post-Op Diagnosis Codes:     * Empyema of left pleural space [J86.9]    Anesthesia: General    Operative Findings (including complications, if any):  Well developed fibrinous purulent exudate over most of the left lung with a well-developed peel.  Multiple loculated pockets of purulent fluid in the pleural space.  About 300 mL of purulent fluid was aspirated.    Description of Technical Procedures:  The patient was placed in a left lateral decubitus position.  The left lung was then deflated.  The patient was then prepped and draped sterilely.  An incision about 2 cm length was then made in the 5th intercostal space anterior axillary line.  There was carried down sharply till the pleural cavity was entered.  A thoracoscope was then inserted into the pleural cavity.  Multiple pockets of purulent material was then encountered this was then suctioned out.  A 2nd incision was then made in the 5th intercostal space posterior axillary line.  This was carried down until the pleural cavity was entered.  Through this port instruments were then introduced.  CO2 insufflation was then performed to enhance exposure.  The multiple adhesions in the pleural space was then taken down.  Decortication over the superior and inferior lobes of the left lung were then performed.  An isolated pocket of pus was identified between the dome of the diaphragm and the lower lobe  of the left lung.  This was entered and then aspirated.  Decortication was then performed to the dome of the diaphragm as well as the inferior surface of the lower lobe of the left lung.  The pleural cavity was then irrigated out copiously with warm saline solution.  The lung was then inflated under direct vision.  Two 36 Tongan chest tubes were then inserted and secured with 0 silk sutures    Assistant Surgical Tasks Conducted by the Assistant(s), if Applicable: n/a    Estimated Blood Loss (EBL): 50 ml           Implants: * No implants in log *    Specimens:   Specimen (24h ago, onward)      None                    Condition: Good    Disposition: PACU - hemodynamically stable.    Attestation: I was present and scrubbed for the entire procedure.

## 2022-10-21 NOTE — PROGRESS NOTES
[place] [facility] ([facility])  [facility] Medicine  Progress Note    Patient Name: Christina Leal  MRN: [id number]  Patient Class: IP- Inpatient   Admission Date: 10/19/2022  Length of Stay: 2 days  Attending Physician: Cynthia Wallis MD  Primary Care Provider: Primary Doctor No        Subjective:     Principal Problem:Empyema of left pleural space        HPI:  Christina Leal is a 27 yo female with history of obesity, former tobacco abuse on oral contraception who was referred to ED by primary care for abnormal CXR. Patient reports 1 week history of left sided rib cage pain associated with chest tightness, SOB and PAIZ. She also reports upper abdominal cramping and diarrhea. She denies n/v. OP imaging remarkable for pna with large left pleural effusion. CT chest shows large loculated appearing left pleural effusion with associated lower lobe atelectasis as well as mild left upper lobe atelectasis.  Bandlike opacities with ground-glass attenuation in the posterior right lung base likely represent additional subsegmental atelectasis. Mild rightward shift of the mediastinal contents noted. Patient febrile (Tmax 100.6) tachycardic and tachypenic, O2 sats 98% 2 L O2. Influenza, COVID, HIV, Hep C negative. Labs remarkable for wbc 29 with left shift, Tbili 2.1. She received cefepime. Pulmonology consulted. Patient admitted to hospital medicine.             Overview/Hospital Course:  10/20/22 patient s/p thoracentesis 1500 cc removed. Fluid analysis indicating left pleural effusion compatible with a diagnosis of an empyema   Pt reports feeling better. Repeat CXR: fluid reaccumulation. CVT consulted, plan for chest tube placement with VATs procedure.     10/21- Pt is currently undergoing  VATS procedure with chest tube placement and drainage of empyema with CT surgery. AM labs are not available. Blood cultures - NGTD. Nasal MRSA - neg. Pleural Fluid culture - pending. Antibiotic de escalated to Unasyn.       Interval History:   VATS  procedure with chest tube placement and drainage of empyema with CT surgery today. Blood cultures - NGTD. Nasal MRSA - neg. Pleural Fluid culture - pending.       Review of Systems   Constitutional:  Positive for activity change and fever. Negative for appetite change.   HENT:  Negative for sore throat.    Eyes:  Negative for visual disturbance.   Respiratory:  Positive for shortness of breath (improved). Negative for cough and chest tightness.    Cardiovascular:  Negative for chest pain, palpitations and leg swelling.   Gastrointestinal:  Negative for abdominal distention, abdominal pain, constipation, diarrhea, nausea and vomiting.   Endocrine: Negative for polyuria.   Genitourinary:  Negative for decreased urine volume, dysuria, flank pain, frequency and hematuria.   Musculoskeletal:  Negative for back pain and gait problem.   Skin:  Negative for rash.   Neurological:  Negative for syncope, speech difficulty, weakness, light-headedness and headaches.   Psychiatric/Behavioral:  Negative for confusion, hallucinations and sleep disturbance.    Objective:     Vital Signs (Most Recent):  Temp: 99.2 °F (37.3 °C) (10/21/22 0423)  Pulse: 109 (10/21/22 0540)  Resp: 16 (10/21/22 0423)  BP: 135/81 (10/21/22 0423)  SpO2: 97 % (10/21/22 0423) Vital Signs (24h Range):  Temp:  [98 °F (36.7 °C)-99.5 °F (37.5 °C)] 99.2 °F (37.3 °C)  Pulse:  [] 109  Resp:  [16-20] 16  SpO2:  [95 %-98 %] 97 %  BP: (135-162)/(76-98) 135/81     Weight: (!) 150.6 kg (332 lb)  Body mass index is 50.48 kg/m².    Intake/Output Summary (Last 24 hours) at 10/21/2022 1044  Last data filed at 10/21/2022 1005  Gross per 24 hour   Intake 1637.41 ml   Output --   Net 1637.41 ml      Physical Exam  Constitutional:       General: She is not in acute distress.     Appearance: She is well-developed. She is morbidly obese. She is not diaphoretic.   HENT:      Head: Normocephalic and atraumatic.      Mouth/Throat:      Pharynx: No oropharyngeal exudate.   Eyes:       Conjunctiva/sclera: Conjunctivae normal.      Pupils: Pupils are equal, round, and reactive to light.   Neck:      Thyroid: No thyromegaly.      Vascular: No JVD.   Cardiovascular:      Rate and Rhythm: Regular rhythm. Tachycardia present.      Heart sounds: Normal heart sounds. No murmur heard.  Pulmonary:      Effort: Pulmonary effort is normal. No respiratory distress.      Breath sounds: Examination of the left-lower field reveals decreased breath sounds. Decreased breath sounds present. No wheezing or rales.      Comments: Breath sounds diminished   Chest:      Chest wall: No tenderness.   Abdominal:      General: Bowel sounds are normal. There is no distension.      Palpations: Abdomen is soft.      Tenderness: There is no abdominal tenderness. There is no guarding or rebound.   Musculoskeletal:         General: Normal range of motion.      Cervical back: Normal range of motion and neck supple.      Right lower leg: No edema.      Left lower leg: No edema.   Lymphadenopathy:      Cervical: No cervical adenopathy.   Skin:     General: Skin is warm and dry.      Findings: No rash.   Neurological:      General: No focal deficit present.      Mental Status: She is alert and oriented to person, place, and time.      Cranial Nerves: No cranial nerve deficit.      Sensory: No sensory deficit.      Deep Tendon Reflexes: Reflexes normal.   Psychiatric:         Mood and Affect: Mood normal.       Significant Labs: All pertinent labs within the past 24 hours have been reviewed.  BMP:   Recent Labs   Lab 10/20/22  2150      *   K 4.2      CO2 23   BUN 6   CREATININE 0.7   CALCIUM 8.7     CBC:   Recent Labs   Lab 10/19/22  1508 10/19/22  1828 10/20/22  0853   WBC 29.56* 27.39* 22.84*   HGB 10.7* 9.9* 10.1*   HCT 32.9* 30.9* 32.2*    394 411     CMP:   Recent Labs   Lab 10/19/22  1508 10/19/22  1828 10/20/22  0624 10/20/22  2150   *  --  134* 135*   K 4.2  --  4.3 4.2   CL 97  --  102  100   CO2 22*  --  23 23   *  --  119* 110   BUN 10  --  7 6   CREATININE 0.8  --  0.7 0.7   CALCIUM 9.0  --  8.3* 8.7   PROT 7.6 6.7 6.6 7.4   ALBUMIN 2.3*  --  1.9* 2.2*   BILITOT 2.1*  --  1.4* 1.1*   ALKPHOS 85  --  99 83   AST 16  --  16 24   ALT 19  --  15 20   ANIONGAP 13  --  9 12       Significant Imaging:       Assessment/Plan:      * Empyema of left pleural space  10/21- VATS procedure with chest tube placement and drainage of empyema with CT surgery today. Antibiotic de escalated to Unasyn.       Parapneumonic effusion  Continue empiric vancomycin and cefepime   Pulmonology following  Thoracentesis, follow fluid analysis   Continue supplemental O2  Check TTE  Follow blood cultures     10/20  S/p Thora 1500 cc removed   CVT following, plan for placement of chest tube per VATs   TTE reviewed   Continue empiric vanc, cefepime, add flagyl   dvt ppx   10/21-   VATS procedure with chest tube placement and drainage of empyema with CT surgery. Antibiotic de escalated to Unasyn. Blood cultures - NGTD. Nasal MRSA - neg. Pleural Fluid culture - pending.       VTE Risk Mitigation (From admission, onward)         Ordered     IP VTE LOW RISK PATIENT  Once         10/20/22 2109     Place JASON hose  Until discontinued         10/20/22 2109     Place sequential compression device  Until discontinued         10/20/22 2109     enoxaparin injection 40 mg  2 times daily         10/20/22 1323     Place sequential compression device  Until discontinued         10/19/22 1646                Discharge Planning   DANNY:      Code Status: Full Code   Is the patient medically ready for discharge?:     Reason for patient still in hospital (select all that apply): Patient trending condition  Discharge Plan A: Home                  Cynthia Wallis MD  Department of Hospital Medicine   [facility] - Surgery ([facility])

## 2022-10-21 NOTE — INTERVAL H&P NOTE
The patient has been examined and the H&P has been reviewed:    I concur with the findings and no changes have occurred since H&P was written.    Surgery risks, benefits and alternative options discussed and understood by patient/family.          Active Hospital Problems    Diagnosis  POA    *Parapneumonic effusion [J18.9, J91.8]  Unknown    Empyema of left pleural space [J86.9]  Unknown    Pleural effusion [J90]  Yes    Acute respiratory distress [R06.03]  Unknown      Resolved Hospital Problems   No resolved problems to display.

## 2022-10-21 NOTE — PLAN OF CARE
Pt rtn from PACU. AAOx4. 3L/NC. No resp distress noted. Chest tube x2 to left side connected to suction. No sign of air leaks. Carreon cath in place. IV fluids infusing as ordered to RUE PICC. IV ABT therapy remains in progress. No adverse reactions noted, remains afebrile. Heart monitor 8560.

## 2022-10-21 NOTE — ASSESSMENT & PLAN NOTE
Continue empiric vancomycin and cefepime   Pulmonology following  Thoracentesis, follow fluid analysis   Continue supplemental O2  Check TTE  Follow blood cultures     10/20  S/p Thora 1500 cc removed   CVT following, plan for placement of chest tube per VATs   TTE reviewed   Continue empiric vanc, cefepime, add flagyl   dvt ppx   10/21-   VATS procedure with chest tube placement and drainage of empyema with CT surgery. Antibiotic de escalated to Unasyn. Blood cultures - NGTD. Nasal MRSA - neg. Pleural Fluid culture - pending.

## 2022-10-21 NOTE — SUBJECTIVE & OBJECTIVE
Interval History:   10/21: seen and examined: T max 99.5F, awaiting VATS,     Oxygen Concentration (%):  [28] 28     Respiratory ROS: no cough, shortness of breath, or wheezing      Objective:     Vital Signs (Most Recent):  Temp: 99.2 °F (37.3 °C) (10/21/22 0423)  Pulse: 109 (10/21/22 0540)  Resp: 16 (10/21/22 0423)  BP: 135/81 (10/21/22 0423)  SpO2: 97 % (10/21/22 0423) Vital Signs (24h Range):  Temp:  [97.5 °F (36.4 °C)-99.5 °F (37.5 °C)] 99.2 °F (37.3 °C)  Pulse:  [] 109  Resp:  [16-20] 16  SpO2:  [95 %-98 %] 97 %  BP: (126-162)/(66-98) 135/81     Weight: (!) 150.6 kg (332 lb)  Body mass index is 50.48 kg/m².      Intake/Output Summary (Last 24 hours) at 10/21/2022 0701  Last data filed at 10/20/2022 1745  Gross per 24 hour   Intake 637.41 ml   Output --   Net 637.41 ml       Physical Exam  Vitals and nursing note reviewed.   Constitutional:       Appearance: She is well-developed.      Interventions: Nasal cannula in place.   HENT:      Head: Normocephalic and atraumatic.      Nose: Nose normal.      Mouth/Throat:      Pharynx: No oropharyngeal exudate.   Eyes:      General: No scleral icterus.     Conjunctiva/sclera: Conjunctivae normal.      Pupils: Pupils are equal, round, and reactive to light.   Neck:      Thyroid: No thyromegaly.      Vascular: No JVD.      Trachea: No tracheal deviation.   Cardiovascular:      Rate and Rhythm: Normal rate and regular rhythm.      Pulses: Normal pulses.      Heart sounds: Normal heart sounds, S1 normal and S2 normal. No murmur heard.  Pulmonary:      Effort: Pulmonary effort is normal. No tachypnea, accessory muscle usage or respiratory distress.      Breath sounds: Decreased air movement present. No stridor. Examination of the left-upper field reveals decreased breath sounds. Decreased breath sounds present.       Abdominal:      General: Bowel sounds are normal. There is no distension.      Palpations: Abdomen is soft. There is no hepatomegaly, splenomegaly or  mass.      Tenderness: There is no abdominal tenderness. There is no guarding or rebound.   Musculoskeletal:         General: No tenderness. Normal range of motion.      Cervical back: Normal range of motion and neck supple.   Lymphadenopathy:      Upper Body:      Right upper body: No supraclavicular adenopathy.      Left upper body: No supraclavicular adenopathy.   Skin:     General: Skin is warm and dry.      Findings: No rash.      Nails: There is no clubbing.   Neurological:      General: No focal deficit present.      Mental Status: She is alert and oriented to person, place, and time.      Coordination: Coordination normal.      Gait: Gait normal.      Deep Tendon Reflexes: Reflexes are normal and symmetric.   Psychiatric:         Mood and Affect: Mood normal.       Vents:  Oxygen Concentration (%): 28 (10/20/22 1311)    Lines/Drains/Airways       Peripherally Inserted Central Catheter Line  Duration             PICC Double Lumen 10/21/22 0455 right basilic <1 day                    Significant Labs:    CBC/Anemia Profile:  Recent Labs   Lab 10/19/22  1508 10/19/22  1828 10/20/22  0853   WBC 29.56* 27.39* 22.84*   HGB 10.7* 9.9* 10.1*   HCT 32.9* 30.9* 32.2*    394 411   MCV 81* 83 84   RDW 13.8 13.9 14.0        Chemistries:  Recent Labs   Lab 10/19/22  1508 10/19/22  1828 10/20/22  0624 10/20/22  2150   *  --  134* 135*   K 4.2  --  4.3 4.2   CL 97  --  102 100   CO2 22*  --  23 23   BUN 10  --  7 6   CREATININE 0.8  --  0.7 0.7   CALCIUM 9.0  --  8.3* 8.7   ALBUMIN 2.3*  --  1.9* 2.2*   PROT 7.6 6.7 6.6 7.4   BILITOT 2.1*  --  1.4* 1.1*   ALKPHOS 85  --  99 83   ALT 19  --  15 20   AST 16  --  16 24       Recent Lab Results  (Last 5 results in the past 24 hours)        10/20/22  2234   10/20/22  2151   10/20/22  2150   10/20/22  1047   10/20/22  0853        Albumin     2.2           Alkaline Phosphatase     83           ALT     20           Anion Gap     12           Ao root annulus        3.24         Ascending aorta       2.95         Ao peak klaudia       1.63         Ao VTI       26.1         Appearance, UA Clear               AST     24           AV valve area       3.25         AV mean gradient       8         AV index (prosthetic)       1.05         AV peak gradient       11         AV Velocity Ratio       0.79         Basophil %         0.0  Comment: CORRECTED RESULT; previously reported as 0.7 on 10/20/2022 at 09:45.  [C]       Bilirubin (UA) Negative               BILIRUBIN TOTAL     1.1  Comment: For infants and newborns, interpretation of results should be based  on gestational age, weight and in agreement with clinical  observations.    Premature Infant recommended reference ranges:  Up to 24 hours.............<8.0 mg/dL  Up to 48 hours............<12.0 mg/dL  3-5 days..................<15.0 mg/dL  6-29 days.................<15.0 mg/dL             BSA       2.69         BUN     6           Calcium     8.7           Chloride     100           CO2     23           Color, UA Yellow               Creatinine     0.7           Left Ventricle Relative Wall Thickness       0.72         Differential Method         Manual  Comment: CORRECTED RESULT; previously reported as Automated on 10/20/2022 at   09:45.    [C]       E/A ratio       1.39         E/E' ratio       6.69         eGFR     >60           EF       60         Eosinophil %         0.0  Comment: CORRECTED RESULT; previously reported as 0.2 on 10/20/2022 at 09:45.  [C]       E wave deceleration time       177.15         FS       44         Glucose     110           Glucose, UA Negative               Gran %         76.0  Comment: CORRECTED RESULT; previously reported as 70.4 on 10/20/2022 at 09:45.  [C]       Group & Rh   A POS             Hematocrit         32.2       Hemoglobin         10.1       Immature Grans (Abs)         Test Not Performed  Comment: Mild elevation in immature granulocytes is non specific and   can be seen in a variety of  conditions including stress response,   acute inflammation, trauma and pregnancy. Correlation with other   laboratory and clinical findings is essential.  CORRECTED RESULT; previously reported as 1.30 on 10/20/2022 at 09:45.    [C]       Immature Granulocytes         Test Not Performed  Comment: CORRECTED RESULT; previously reported as 5.7 on 10/20/2022 at 09:45.  [C]       INDIRECT JAYE   NEG             IVC diameter       2.06         IVRT       53.28         IVSd       1.31         Ketones, UA Negative               LA WIDTH       3.70         Left Atrium Major Axis       5.36         Left Atrium Minor Axis       5.56         LA size       4.25         LA volume       72.96         LA vol index       28.7         LVOT area       3.1         Leukocytes, UA Negative               LV LATERAL E/E' RATIO       6.06         LV SEPTAL E/E' RATIO       7.46         LV EDV BP       59.74         LV Diastolic Volume Index       23.52         LVIDd       3.74         LVIDs       2.10         LV mass       174.16         LV Mass Index       69         Left Ventricular Outflow Tract Mean Gradient       5.43         Left Ventricular Outflow Tract Mean Velocity       1.16         LVOT diameter       1.99         LVOT peak sekou       1.29         LVOT stroke volume       84.87         LVOT peak VTI       27.30         LV ESV BP       14.39         LV Systolic Volume Index       5.7         Lymph %         17.0  Comment: CORRECTED RESULT; previously reported as 11.9 on 10/20/2022 at 09:45.  [C]       MCH         26.4       MCHC         31.4       MCV         84       Mean e'       0.15         Mono %         7.0  Comment: CORRECTED RESULT; previously reported as 11.1 on 10/20/2022 at 09:45.  [C]       MPV         9.2       MV valve area p 1/2 method       4.28         MV Peak A Sekou       0.70         MV Peak E Sekou       0.97         MV stenosis pressure 1/2 time       51.37         NITRITE UA Negative               nRBC          0       Occult Blood UA Negative               pH, UA 6.0               Platelets         411       Potassium     4.2           Prealbumin     5           PROTEIN TOTAL     7.4           Protein, UA Trace  Comment: Recommend a 24 hour urine protein or a urine   protein/creatinine ratio if globulin induced proteinuria is  clinically suspected.                 PV mean gradient       2.78         Posterior Wall       1.34         Right Atrial Pressure (from IVC)       8         RA Major Axis       4.56         RA Width       3.10         RBC         3.83       RDW         14.0       RVOT peak klaudia       1.03         RVOT peak VTI       17.4         Sodium     135           Specific [place], UA 1.010               Specimen UA Urine, Clean Catch               STJ       2.98         TDI SEPTAL       0.13         TDI LATERAL       0.16         UROBILINOGEN UA 2.0-3.0               WBC         22.84                               [C] - Corrected Result               Significant Imaging:  I have reviewed all pertinent imaging results/findings within the past 24 hours.    Echo  · The left ventricle is normal in size with concentric remodeling and   normal systolic function.  · The estimated ejection fraction is 60%.  · Normal left ventricular diastolic function.  · Normal right ventricular size with normal right ventricular systolic   function.  · Intermediate central venous pressure (8 mmHg).     X-Ray Chest 1 View  Narrative: EXAMINATION:  XR CHEST 1 VIEW    CLINICAL HISTORY:  follw up;    TECHNIQUE:  Single frontal view of the chest was performed.    COMPARISON:  Chest radiograph 10/19/2022; chest CT 10/19/2022    FINDINGS:  Cardiac leads project over the chest.  Cardiomediastinal silhouette is unchanged in size.  Similar appearance of a left pleural effusion.  Persistent left lung atelectasis or infiltrate.  Low right lung volume without new infiltrate.  No pneumothorax.  Impression: No detrimental interval change as  above.    Electronically signed by: Brian Zapata  Date:    10/20/2022  Time:    07:26

## 2022-10-21 NOTE — PROGRESS NOTES
The patient is a 26-year-old female with severe obesity who was admitted with a left empyema.  Patient is now in the operating room for VATS procedure with chest tube placement and drainage of empyema.  The risks and benefits of surgery were explained to the patient.  The patient understand the risks and benefits of surgery and has agreed to proceed with VATS and drainage of empyema

## 2022-10-21 NOTE — PLAN OF CARE
Problem: Adult Inpatient Plan of Care  Goal: Plan of Care Review  Outcome: Ongoing, Progressing     Problem: Bariatric Environmental Safety  Goal: Safety Maintained with Care  Outcome: Ongoing, Progressing     Problem: Fluid Imbalance (Pneumonia)  Goal: Fluid Balance  Outcome: Ongoing, Progressing     Problem: Infection (Pneumonia)  Goal: Resolution of Infection Signs and Symptoms  Outcome: Ongoing, Progressing     Problem: Respiratory Compromise (Pneumonia)  Goal: Effective Oxygenation and Ventilation  Outcome: Ongoing, Progressing     Problem: Pain Acute  Goal: Acceptable Pain Control and Functional Ability  Outcome: Ongoing, Progressing     Problem: Fall Injury Risk  Goal: Absence of Fall and Fall-Related Injury  Outcome: Ongoing, Progressing      Patient remains free from injury. Safety precautions maintained. No s/s of acute distress. No IV access- awaiting for PICC placement. Cardiac monitoring in place. Pt education about care completed.

## 2022-10-21 NOTE — SUBJECTIVE & OBJECTIVE
Interval History:   VATS procedure with chest tube placement and drainage of empyema with CT surgery today. Blood cultures - NGTD. Nasal MRSA - neg. Pleural Fluid culture - pending.       Review of Systems   Constitutional:  Positive for activity change and fever. Negative for appetite change.   HENT:  Negative for sore throat.    Eyes:  Negative for visual disturbance.   Respiratory:  Positive for shortness of breath (improved). Negative for cough and chest tightness.    Cardiovascular:  Negative for chest pain, palpitations and leg swelling.   Gastrointestinal:  Negative for abdominal distention, abdominal pain, constipation, diarrhea, nausea and vomiting.   Endocrine: Negative for polyuria.   Genitourinary:  Negative for decreased urine volume, dysuria, flank pain, frequency and hematuria.   Musculoskeletal:  Negative for back pain and gait problem.   Skin:  Negative for rash.   Neurological:  Negative for syncope, speech difficulty, weakness, light-headedness and headaches.   Psychiatric/Behavioral:  Negative for confusion, hallucinations and sleep disturbance.    Objective:     Vital Signs (Most Recent):  Temp: 99.2 °F (37.3 °C) (10/21/22 0423)  Pulse: 109 (10/21/22 0540)  Resp: 16 (10/21/22 0423)  BP: 135/81 (10/21/22 0423)  SpO2: 97 % (10/21/22 0423) Vital Signs (24h Range):  Temp:  [98 °F (36.7 °C)-99.5 °F (37.5 °C)] 99.2 °F (37.3 °C)  Pulse:  [] 109  Resp:  [16-20] 16  SpO2:  [95 %-98 %] 97 %  BP: (135-162)/(76-98) 135/81     Weight: (!) 150.6 kg (332 lb)  Body mass index is 50.48 kg/m².    Intake/Output Summary (Last 24 hours) at 10/21/2022 1044  Last data filed at 10/21/2022 1005  Gross per 24 hour   Intake 1637.41 ml   Output --   Net 1637.41 ml      Physical Exam  Constitutional:       General: She is not in acute distress.     Appearance: She is well-developed. She is morbidly obese. She is not diaphoretic.   HENT:      Head: Normocephalic and atraumatic.      Mouth/Throat:      Pharynx: No  oropharyngeal exudate.   Eyes:      Conjunctiva/sclera: Conjunctivae normal.      Pupils: Pupils are equal, round, and reactive to light.   Neck:      Thyroid: No thyromegaly.      Vascular: No JVD.   Cardiovascular:      Rate and Rhythm: Regular rhythm. Tachycardia present.      Heart sounds: Normal heart sounds. No murmur heard.  Pulmonary:      Effort: Pulmonary effort is normal. No respiratory distress.      Breath sounds: Examination of the left-lower field reveals decreased breath sounds. Decreased breath sounds present. No wheezing or rales.      Comments: Breath sounds diminished   Chest:      Chest wall: No tenderness.   Abdominal:      General: Bowel sounds are normal. There is no distension.      Palpations: Abdomen is soft.      Tenderness: There is no abdominal tenderness. There is no guarding or rebound.   Musculoskeletal:         General: Normal range of motion.      Cervical back: Normal range of motion and neck supple.      Right lower leg: No edema.      Left lower leg: No edema.   Lymphadenopathy:      Cervical: No cervical adenopathy.   Skin:     General: Skin is warm and dry.      Findings: No rash.   Neurological:      General: No focal deficit present.      Mental Status: She is alert and oriented to person, place, and time.      Cranial Nerves: No cranial nerve deficit.      Sensory: No sensory deficit.      Deep Tendon Reflexes: Reflexes normal.   Psychiatric:         Mood and Affect: Mood normal.       Significant Labs: All pertinent labs within the past 24 hours have been reviewed.  BMP:   Recent Labs   Lab 10/20/22  2150      *   K 4.2      CO2 23   BUN 6   CREATININE 0.7   CALCIUM 8.7     CBC:   Recent Labs   Lab 10/19/22  1508 10/19/22  1828 10/20/22  0853   WBC 29.56* 27.39* 22.84*   HGB 10.7* 9.9* 10.1*   HCT 32.9* 30.9* 32.2*    394 411     CMP:   Recent Labs   Lab 10/19/22  1508 10/19/22  1828 10/20/22  0624 10/20/22  2150   *  --  134* 135*   K 4.2   --  4.3 4.2   CL 97  --  102 100   CO2 22*  --  23 23   *  --  119* 110   BUN 10  --  7 6   CREATININE 0.8  --  0.7 0.7   CALCIUM 9.0  --  8.3* 8.7   PROT 7.6 6.7 6.6 7.4   ALBUMIN 2.3*  --  1.9* 2.2*   BILITOT 2.1*  --  1.4* 1.1*   ALKPHOS 85  --  99 83   AST 16  --  16 24   ALT 19  --  15 20   ANIONGAP 13  --  9 12       Significant Imaging:

## 2022-10-21 NOTE — TREATMENT PLAN
Patient is much more awake. Her breathing has slowed down a bit. Status is improving since arriving to PACU. ABG was completed and respiratory therapist agreed that it was appropriate to take bipap off and see how she does. Patient is now on 3L nasal cannula. Dr. Posadas was called and updated. I asked him to come take a look at the patient shortly and see if an ICU bed is still needed.

## 2022-10-21 NOTE — ASSESSMENT & PLAN NOTE
10/21- VATS procedure with chest tube placement and drainage of empyema with CT surgery today. Antibiotic de escalated to Unasyn.

## 2022-10-21 NOTE — PROGRESS NOTES
Therapy with vancomycin complete and/or consult discontinued by provider.      Pharmacy will sign off, please re-consult as needed./Rosa De La Paz [facility] 10/21/2022 8:39 AM

## 2022-10-21 NOTE — TRANSFER OF CARE
"Anesthesia Transfer of Care Note    Patient: Christina Leal    Procedure(s) Performed: Procedure(s) (LRB):  VATS, WITH CHEST TUBE INSERTION FOR DRAINAGE OF PLEURAL EFFUSION (Left)  BLOCK, NERVE, INTERCOSTAL, 2 OR MORE (Left)  VATS, WITH DECORTICATION, LUNG (Left)  EVACUATION, EMPYEMA (Left)    Patient location: PACU    Anesthesia Type: general    Transport from OR: Transported from OR on 100% O2 by closed face mask with adequate spontaneous ventilation    Post pain: adequate analgesia    Post assessment: no apparent anesthetic complications and tolerated procedure well    Post vital signs: stable    Level of consciousness: awake    Nausea/Vomiting: no nausea/vomiting    Complications: none    Transfer of care protocol was followed      Last vitals:   Visit Vitals  /81 (BP Location: Right arm, Patient Position: Lying)   Pulse (!) 122   Temp 37.6 °C (99.6 °F) (Temporal)   Resp (!) 25   Ht 5' 8" (1.727 m)   Wt (!) 150.6 kg (332 lb)   LMP 09/12/2022   SpO2 (!) 94%   Breastfeeding No   BMI 50.48 kg/m²     "

## 2022-10-21 NOTE — PROGRESS NOTES
[facility])  Pulmonology  Progress Note    Patient Name: Christina Leal  MRN: [id number]  Admission Date: 10/19/2022  Hospital Length of Stay: 2 days  Code Status: Full Code  Attending Provider: Cynthia Wallis MD  Primary Care Provider: Primary Doctor No   Principal Problem: Empyema of left pleural space    Subjective:     Interval History:   10/21: seen and examined: T max 99.5F, awaiting VATS,     Oxygen Concentration (%):  [28] 28     Respiratory ROS: no cough, shortness of breath, or wheezing      Objective:     Vital Signs (Most Recent):  Temp: 99.2 °F (37.3 °C) (10/21/22 0423)  Pulse: 109 (10/21/22 0540)  Resp: 16 (10/21/22 0423)  BP: 135/81 (10/21/22 0423)  SpO2: 97 % (10/21/22 0423) Vital Signs (24h Range):  Temp:  [97.5 °F (36.4 °C)-99.5 °F (37.5 °C)] 99.2 °F (37.3 °C)  Pulse:  [] 109  Resp:  [16-20] 16  SpO2:  [95 %-98 %] 97 %  BP: (126-162)/(66-98) 135/81     Weight: (!) 150.6 kg (332 lb)  Body mass index is 50.48 kg/m².      Intake/Output Summary (Last 24 hours) at 10/21/2022 0701  Last data filed at 10/20/2022 1745  Gross per 24 hour   Intake 637.41 ml   Output --   Net 637.41 ml       Physical Exam  Vitals and nursing note reviewed.   Constitutional:       Appearance: She is well-developed.      Interventions: Nasal cannula in place.   HENT:      Head: Normocephalic and atraumatic.      Nose: Nose normal.      Mouth/Throat:      Pharynx: No oropharyngeal exudate.   Eyes:      General: No scleral icterus.     Conjunctiva/sclera: Conjunctivae normal.      Pupils: Pupils are equal, round, and reactive to light.   Neck:      Thyroid: No thyromegaly.      Vascular: No JVD.      Trachea: No tracheal deviation.   Cardiovascular:      Rate and Rhythm: Normal rate and regular rhythm.      Pulses: Normal pulses.      Heart sounds: Normal heart sounds, S1 normal and S2 normal. No murmur heard.  Pulmonary:      Effort: Pulmonary effort is normal. No tachypnea, accessory muscle usage or  respiratory distress.      Breath sounds: Decreased air movement present. No stridor. Examination of the left-upper field reveals decreased breath sounds. Decreased breath sounds present.       Abdominal:      General: Bowel sounds are normal. There is no distension.      Palpations: Abdomen is soft. There is no hepatomegaly, splenomegaly or mass.      Tenderness: There is no abdominal tenderness. There is no guarding or rebound.   Musculoskeletal:         General: No tenderness. Normal range of motion.      Cervical back: Normal range of motion and neck supple.   Lymphadenopathy:      Upper Body:      Right upper body: No supraclavicular adenopathy.      Left upper body: No supraclavicular adenopathy.   Skin:     General: Skin is warm and dry.      Findings: No rash.      Nails: There is no clubbing.   Neurological:      General: No focal deficit present.      Mental Status: She is alert and oriented to person, place, and time.      Coordination: Coordination normal.      Gait: Gait normal.      Deep Tendon Reflexes: Reflexes are normal and symmetric.   Psychiatric:         Mood and Affect: Mood normal.       Vents:  Oxygen Concentration (%): 28 (10/20/22 1311)    Lines/Drains/Airways       Peripherally Inserted Central Catheter Line  Duration             PICC Double Lumen 10/21/22 0455 right basilic <1 day                    Significant Labs:    CBC/Anemia Profile:  Recent Labs   Lab 10/19/22  1508 10/19/22  1828 10/20/22  0853   WBC 29.56* 27.39* 22.84*   HGB 10.7* 9.9* 10.1*   HCT 32.9* 30.9* 32.2*    394 411   MCV 81* 83 84   RDW 13.8 13.9 14.0        Chemistries:  Recent Labs   Lab 10/19/22  1508 10/19/22  1828 10/20/22  0624 10/20/22  2150   *  --  134* 135*   K 4.2  --  4.3 4.2   CL 97  --  102 100   CO2 22*  --  23 23   BUN 10  --  7 6   CREATININE 0.8  --  0.7 0.7   CALCIUM 9.0  --  8.3* 8.7   ALBUMIN 2.3*  --  1.9* 2.2*   PROT 7.6 6.7 6.6 7.4   BILITOT 2.1*  --  1.4* 1.1*   ALKPHOS 85  --  99  83   ALT 19  --  15 20   AST 16  --  16 24       Recent Lab Results  (Last 5 results in the past 24 hours)        10/20/22  2234   10/20/22  2151   10/20/22  2150   10/20/22  1047   10/20/22  0853        Albumin     2.2           Alkaline Phosphatase     83           ALT     20           Anion Gap     12           Ao root annulus       3.24         Ascending aorta       2.95         Ao peak klaudia       1.63         Ao VTI       26.1         Appearance, UA Clear               AST     24           AV valve area       3.25         AV mean gradient       8         AV index (prosthetic)       1.05         AV peak gradient       11         AV Velocity Ratio       0.79         Basophil %         0.0  Comment: CORRECTED RESULT; previously reported as 0.7 on 10/20/2022 at 09:45.  [C]       Bilirubin (UA) Negative               BILIRUBIN TOTAL     1.1  Comment: For infants and newborns, interpretation of results should be based  on gestational age, weight and in agreement with clinical  observations.    Premature Infant recommended reference ranges:  Up to 24 hours.............<8.0 mg/dL  Up to 48 hours............<12.0 mg/dL  3-5 days..................<15.0 mg/dL  6-29 days.................<15.0 mg/dL             BSA       2.69         BUN     6           Calcium     8.7           Chloride     100           CO2     23           Color, UA Yellow               Creatinine     0.7           Left Ventricle Relative Wall Thickness       0.72         Differential Method         Manual  Comment: CORRECTED RESULT; previously reported as Automated on 10/20/2022 at   09:45.    [C]       E/A ratio       1.39         E/E' ratio       6.69         eGFR     >60           EF       60         Eosinophil %         0.0  Comment: CORRECTED RESULT; previously reported as 0.2 on 10/20/2022 at 09:45.  [C]       E wave deceleration time       177.15         FS       44         Glucose     110           Glucose, UA Negative               Gran %          76.0  Comment: CORRECTED RESULT; previously reported as 70.4 on 10/20/2022 at 09:45.  [C]       Group & Rh   A POS             Hematocrit         32.2       Hemoglobin         10.1       Immature Grans (Abs)         Test Not Performed  Comment: Mild elevation in immature granulocytes is non specific and   can be seen in a variety of conditions including stress response,   acute inflammation, trauma and pregnancy. Correlation with other   laboratory and clinical findings is essential.  CORRECTED RESULT; previously reported as 1.30 on 10/20/2022 at 09:45.    [C]       Immature Granulocytes         Test Not Performed  Comment: CORRECTED RESULT; previously reported as 5.7 on 10/20/2022 at 09:45.  [C]       INDIRECT JAYE   NEG             IVC diameter       2.06         IVRT       53.28         IVSd       1.31         Ketones, UA Negative               LA WIDTH       3.70         Left Atrium Major Axis       5.36         Left Atrium Minor Axis       5.56         LA size       4.25         LA volume       72.96         LA vol index       28.7         LVOT area       3.1         Leukocytes, UA Negative               LV LATERAL E/E' RATIO       6.06         LV SEPTAL E/E' RATIO       7.46         LV EDV BP       59.74         LV Diastolic Volume Index       23.52         LVIDd       3.74         LVIDs       2.10         LV mass       174.16         LV Mass Index       69         Left Ventricular Outflow Tract Mean Gradient       5.43         Left Ventricular Outflow Tract Mean Velocity       1.16         LVOT diameter       1.99         LVOT peak klaudia       1.29         LVOT stroke volume       84.87         LVOT peak VTI       27.30         LV ESV BP       14.39         LV Systolic Volume Index       5.7         Lymph %         17.0  Comment: CORRECTED RESULT; previously reported as 11.9 on 10/20/2022 at 09:45.  [C]       MCH         26.4       MCHC         31.4       MCV         84       Mean e'       0.15         Mono  %         7.0  Comment: CORRECTED RESULT; previously reported as 11.1 on 10/20/2022 at 09:45.  [C]       MPV         9.2       MV valve area p 1/2 method       4.28         MV Peak A Sekou       0.70         MV Peak E Sekou       0.97         MV stenosis pressure 1/2 time       51.37         NITRITE UA Negative               nRBC         0       Occult Blood UA Negative               pH, UA 6.0               Platelets         411       Potassium     4.2           Prealbumin     5           PROTEIN TOTAL     7.4           Protein, UA Trace  Comment: Recommend a 24 hour urine protein or a urine   protein/creatinine ratio if globulin induced proteinuria is  clinically suspected.                 PV mean gradient       2.78         Posterior Wall       1.34         Right Atrial Pressure (from IVC)       8         RA Major Axis       4.56         RA Width       3.10         RBC         3.83       RDW         14.0       RVOT peak sekou       1.03         RVOT peak VTI       17.4         Sodium     135           Specific Torrey, UA 1.010               Specimen UA Urine, Clean Catch               STJ       2.98         TDI SEPTAL       0.13         TDI LATERAL       0.16         UROBILINOGEN UA 2.0-3.0               WBC         22.84                               [C] - Corrected Result               Significant Imaging:  I have reviewed all pertinent imaging results/findings within the past 24 hours.    Echo  · The left ventricle is normal in size with concentric remodeling and   normal systolic function.  · The estimated ejection fraction is 60%.  · Normal left ventricular diastolic function.  · Normal right ventricular size with normal right ventricular systolic   function.  · Intermediate central venous pressure (8 mmHg).     X-Ray Chest 1 View  Narrative: EXAMINATION:  XR CHEST 1 VIEW    CLINICAL HISTORY:  follw up;    TECHNIQUE:  Single frontal view of the chest was performed.    COMPARISON:  Chest radiograph 10/19/2022;  chest CT 10/19/2022    FINDINGS:  Cardiac leads project over the chest.  Cardiomediastinal silhouette is unchanged in size.  Similar appearance of a left pleural effusion.  Persistent left lung atelectasis or infiltrate.  Low right lung volume without new infiltrate.  No pneumothorax.  Impression: No detrimental interval change as above.    Electronically signed by: Brian Zapata  Date:    10/20/2022  Time:    07:26       ABG  No results for input(s): PH, PO2, PCO2, HCO3, BE in the last 168 hours.  Assessment/Plan:     * Empyema of left pleural space  SP VATS chest tube, decortication    Parapneumonic effusion, Left   Empiric abx VANC, CEFEPIME, FLAGYL  Deescalate based on cultures           Varun Wood MD  Pulmonology  [facility] ([facility])

## 2022-10-21 NOTE — PLAN OF CARE
POC discussed w/ pt and SO.   PICC line placement still not verified, radiologist will arrive around 0700. Dr. Moyer aware.

## 2022-10-21 NOTE — PROGRESS NOTES
Pt removed from BIPAP and placed on NC . Pt awake and alert and states breathing feels better. Nurse at bedside.

## 2022-10-21 NOTE — PROCEDURES
"Christina Leal is a 26 y.o. female patient.    Temp: 99.2 °F (37.3 °C) (10/21/22 0423)  Pulse: 106 (10/21/22 0423)  Resp: 16 (10/21/22 0423)  BP: 135/81 (10/21/22 0423)  SpO2: 97 % (10/21/22 0423)  Weight: (!) 150.6 kg (332 lb) (10/20/22 0817)  Height: 5' 8" (172.7 cm) (10/20/22 0817)    PICC  Date/Time: 10/21/2022 4:55 AM  Performed by: Brijesh Small RN  Consent Done: Yes  Time out: Immediately prior to procedure a time out was called to verify the correct patient, procedure, equipment, support staff and site/side marked as required  Indications: vascular access and med administration  Anesthesia: local infiltration  Local anesthetic: lidocaine 1% without epinephrine  Anesthetic Total (mL): 2  Preparation: skin prepped with chlorhexidine (without alcohol)  Skin prep agent dried: skin prep agent completely dried prior to procedure  Sterile barriers: all five maximum sterile barriers used - cap, mask, sterile gown, sterile gloves, and large sterile sheet  Hand hygiene: hand hygiene performed prior to central venous catheter insertion  Location details: right basilic  Catheter type: double lumen  Catheter size: 4 Fr  Catheter Length: 40cm    Ultrasound guidance: yes  Vessel Caliber: large and patent, compressibility normal  Needle advanced into vessel with real time Ultrasound guidance.  Guidewire confirmed in vessel.  Sterile sheath used.  Number of attempts: 1  Post-procedure: blood return through all ports, chlorhexidine patch and sterile dressing applied  Estimated blood loss (mL): 1  Specimens: No  Implants: No  Assessment: placement verified by x-ray, free fluid flow, no pneumothorax on x-ray and successful placement        Brijesh Small RN  10/21/2022    "

## 2022-10-22 PROBLEM — E66.01 CLASS 3 SEVERE OBESITY IN ADULT: Status: ACTIVE | Noted: 2022-10-22

## 2022-10-22 PROBLEM — E66.813 CLASS 3 SEVERE OBESITY IN ADULT: Status: ACTIVE | Noted: 2022-10-22

## 2022-10-22 PROBLEM — R06.03 ACUTE RESPIRATORY DISTRESS: Status: RESOLVED | Noted: 2022-10-19 | Resolved: 2022-10-22

## 2022-10-22 LAB
ALBUMIN SERPL BCP-MCNC: 1.8 G/DL (ref 3.5–5.2)
ALP SERPL-CCNC: 61 U/L (ref 55–135)
ALT SERPL W/O P-5'-P-CCNC: 14 U/L (ref 10–44)
ANION GAP SERPL CALC-SCNC: 8 MMOL/L (ref 8–16)
AST SERPL-CCNC: 7 U/L (ref 10–40)
BASOPHILS NFR BLD: 0 % (ref 0–1.9)
BILIRUB SERPL-MCNC: 0.6 MG/DL (ref 0.1–1)
BUN SERPL-MCNC: 7 MG/DL (ref 6–20)
CALCIUM SERPL-MCNC: 7.7 MG/DL (ref 8.7–10.5)
CHLORIDE SERPL-SCNC: 100 MMOL/L (ref 95–110)
CO2 SERPL-SCNC: 26 MMOL/L (ref 23–29)
CREAT SERPL-MCNC: 0.7 MG/DL (ref 0.5–1.4)
CRP SERPL-MCNC: 175.7 MG/L (ref 0–8.2)
DIFFERENTIAL METHOD: ABNORMAL
EOSINOPHIL NFR BLD: 1 % (ref 0–8)
ERYTHROCYTE [DISTWIDTH] IN BLOOD BY AUTOMATED COUNT: 13.6 % (ref 11.5–14.5)
EST. GFR  (NO RACE VARIABLE): >60 ML/MIN/1.73 M^2
GLUCOSE SERPL-MCNC: 130 MG/DL (ref 70–110)
GRAM STN SPEC: NORMAL
GRAM STN SPEC: NORMAL
HCT VFR BLD AUTO: 28.4 % (ref 37–48.5)
HGB BLD-MCNC: 8.9 G/DL (ref 12–16)
IMM GRANULOCYTES # BLD AUTO: ABNORMAL K/UL (ref 0–0.04)
IMM GRANULOCYTES NFR BLD AUTO: ABNORMAL % (ref 0–0.5)
LYMPHOCYTES NFR BLD: 10 % (ref 18–48)
MAGNESIUM SERPL-MCNC: 2.4 MG/DL (ref 1.6–2.6)
MCH RBC QN AUTO: 26.1 PG (ref 27–31)
MCHC RBC AUTO-ENTMCNC: 31.3 G/DL (ref 32–36)
MCV RBC AUTO: 83 FL (ref 82–98)
MONOCYTES NFR BLD: 13 % (ref 4–15)
MYELOCYTES NFR BLD MANUAL: 2 %
NEUTROPHILS NFR BLD: 74 % (ref 38–73)
NRBC BLD-RTO: 0 /100 WBC
PHOSPHATE SERPL-MCNC: 4 MG/DL (ref 2.7–4.5)
PLATELET # BLD AUTO: 414 K/UL (ref 150–450)
PMV BLD AUTO: 9.3 FL (ref 9.2–12.9)
POTASSIUM SERPL-SCNC: 4.4 MMOL/L (ref 3.5–5.1)
PROCALCITONIN SERPL IA-MCNC: 0.16 NG/ML
PROT SERPL-MCNC: 5.5 G/DL (ref 6–8.4)
RBC # BLD AUTO: 3.41 M/UL (ref 4–5.4)
SODIUM SERPL-SCNC: 134 MMOL/L (ref 136–145)
WBC # BLD AUTO: 22.64 K/UL (ref 3.9–12.7)

## 2022-10-22 PROCEDURE — 63600175 PHARM REV CODE 636 W HCPCS: Performed by: THORACIC SURGERY (CARDIOTHORACIC VASCULAR SURGERY)

## 2022-10-22 PROCEDURE — 85027 COMPLETE CBC AUTOMATED: CPT | Performed by: THORACIC SURGERY (CARDIOTHORACIC VASCULAR SURGERY)

## 2022-10-22 PROCEDURE — 25000003 PHARM REV CODE 250: Performed by: INTERNAL MEDICINE

## 2022-10-22 PROCEDURE — 94761 N-INVAS EAR/PLS OXIMETRY MLT: CPT

## 2022-10-22 PROCEDURE — 84145 PROCALCITONIN (PCT): CPT | Performed by: INTERNAL MEDICINE

## 2022-10-22 PROCEDURE — 21400001 HC TELEMETRY ROOM

## 2022-10-22 PROCEDURE — 85007 BL SMEAR W/DIFF WBC COUNT: CPT | Performed by: THORACIC SURGERY (CARDIOTHORACIC VASCULAR SURGERY)

## 2022-10-22 PROCEDURE — 84100 ASSAY OF PHOSPHORUS: CPT | Performed by: INTERNAL MEDICINE

## 2022-10-22 PROCEDURE — 94799 UNLISTED PULMONARY SVC/PX: CPT

## 2022-10-22 PROCEDURE — C1751 CATH, INF, PER/CENT/MIDLINE: HCPCS

## 2022-10-22 PROCEDURE — 63600175 PHARM REV CODE 636 W HCPCS: Performed by: NURSE PRACTITIONER

## 2022-10-22 PROCEDURE — 11000001 HC ACUTE MED/SURG PRIVATE ROOM

## 2022-10-22 PROCEDURE — 25000242 PHARM REV CODE 250 ALT 637 W/ HCPCS: Performed by: THORACIC SURGERY (CARDIOTHORACIC VASCULAR SURGERY)

## 2022-10-22 PROCEDURE — 36569 INSJ PICC 5 YR+ W/O IMAGING: CPT

## 2022-10-22 PROCEDURE — 80053 COMPREHEN METABOLIC PANEL: CPT | Performed by: INTERNAL MEDICINE

## 2022-10-22 PROCEDURE — 83735 ASSAY OF MAGNESIUM: CPT | Performed by: THORACIC SURGERY (CARDIOTHORACIC VASCULAR SURGERY)

## 2022-10-22 PROCEDURE — 99900035 HC TECH TIME PER 15 MIN (STAT)

## 2022-10-22 PROCEDURE — 94640 AIRWAY INHALATION TREATMENT: CPT

## 2022-10-22 PROCEDURE — 86140 C-REACTIVE PROTEIN: CPT | Performed by: INTERNAL MEDICINE

## 2022-10-22 PROCEDURE — 63600175 PHARM REV CODE 636 W HCPCS: Performed by: INTERNAL MEDICINE

## 2022-10-22 PROCEDURE — 99232 PR SUBSEQUENT HOSPITAL CARE,LEVL II: ICD-10-PCS | Mod: ,,, | Performed by: INTERNAL MEDICINE

## 2022-10-22 PROCEDURE — 25000003 PHARM REV CODE 250: Performed by: THORACIC SURGERY (CARDIOTHORACIC VASCULAR SURGERY)

## 2022-10-22 PROCEDURE — 27000221 HC OXYGEN, UP TO 24 HOURS

## 2022-10-22 PROCEDURE — 99232 SBSQ HOSP IP/OBS MODERATE 35: CPT | Mod: ,,, | Performed by: INTERNAL MEDICINE

## 2022-10-22 PROCEDURE — 25000003 PHARM REV CODE 250: Performed by: NURSE PRACTITIONER

## 2022-10-22 RX ORDER — LINEZOLID 600 MG/1
600 TABLET, FILM COATED ORAL EVERY 12 HOURS
Status: DISCONTINUED | OUTPATIENT
Start: 2022-10-22 | End: 2022-10-25

## 2022-10-22 RX ADMIN — IPRATROPIUM BROMIDE AND ALBUTEROL SULFATE 3 ML: 2.5; .5 SOLUTION RESPIRATORY (INHALATION) at 07:10

## 2022-10-22 RX ADMIN — DOCUSATE SODIUM 100 MG: 100 CAPSULE, LIQUID FILLED ORAL at 09:10

## 2022-10-22 RX ADMIN — KETOROLAC TROMETHAMINE 30 MG: 30 INJECTION, SOLUTION INTRAMUSCULAR; INTRAVENOUS at 09:10

## 2022-10-22 RX ADMIN — MUPIROCIN: 20 OINTMENT TOPICAL at 09:10

## 2022-10-22 RX ADMIN — ACETAMINOPHEN 650 MG: 325 TABLET ORAL at 03:10

## 2022-10-22 RX ADMIN — LINEZOLID 600 MG: 600 TABLET, FILM COATED ORAL at 09:10

## 2022-10-22 RX ADMIN — IPRATROPIUM BROMIDE AND ALBUTEROL SULFATE 3 ML: 2.5; .5 SOLUTION RESPIRATORY (INHALATION) at 06:10

## 2022-10-22 RX ADMIN — ENOXAPARIN SODIUM 40 MG: 40 INJECTION SUBCUTANEOUS at 09:10

## 2022-10-22 RX ADMIN — OXYCODONE HYDROCHLORIDE 5 MG: 5 TABLET ORAL at 05:10

## 2022-10-22 RX ADMIN — AMPICILLIN SODIUM AND SULBACTAM SODIUM 3 G: 2; 1 INJECTION, POWDER, FOR SOLUTION INTRAMUSCULAR; INTRAVENOUS at 05:10

## 2022-10-22 RX ADMIN — OXYCODONE HYDROCHLORIDE 5 MG: 5 TABLET ORAL at 08:10

## 2022-10-22 RX ADMIN — ACETAMINOPHEN 650 MG: 325 TABLET ORAL at 09:10

## 2022-10-22 RX ADMIN — OXYCODONE HYDROCHLORIDE 5 MG: 5 TABLET ORAL at 01:10

## 2022-10-22 RX ADMIN — OXYCODONE HYDROCHLORIDE 5 MG: 5 TABLET ORAL at 10:10

## 2022-10-22 RX ADMIN — AMPICILLIN SODIUM AND SULBACTAM SODIUM 3 G: 2; 1 INJECTION, POWDER, FOR SOLUTION INTRAMUSCULAR; INTRAVENOUS at 09:10

## 2022-10-22 RX ADMIN — POLYETHYLENE GLYCOL 3350 17 G: 17 POWDER, FOR SOLUTION ORAL at 09:10

## 2022-10-22 RX ADMIN — FAMOTIDINE 20 MG: 20 TABLET ORAL at 09:10

## 2022-10-22 RX ADMIN — OXYCODONE HYDROCHLORIDE 5 MG: 5 TABLET ORAL at 04:10

## 2022-10-22 RX ADMIN — ACETAMINOPHEN 650 MG: 325 TABLET ORAL at 05:10

## 2022-10-22 RX ADMIN — ALTEPLASE 2 MG: 2.2 INJECTION, POWDER, LYOPHILIZED, FOR SOLUTION INTRAVENOUS at 11:10

## 2022-10-22 RX ADMIN — AMPICILLIN SODIUM AND SULBACTAM SODIUM 3 G: 2; 1 INJECTION, POWDER, FOR SOLUTION INTRAMUSCULAR; INTRAVENOUS at 12:10

## 2022-10-22 RX ADMIN — KETOROLAC TROMETHAMINE 30 MG: 30 INJECTION, SOLUTION INTRAMUSCULAR; INTRAVENOUS at 05:10

## 2022-10-22 NOTE — PLAN OF CARE
Problem: Adult Inpatient Plan of Care  Goal: Plan of Care Review  Outcome: Ongoing, Progressing  Goal: Patient-Specific Goal (Individualized)  Outcome: Ongoing, Progressing  Goal: Absence of Hospital-Acquired Illness or Injury  Outcome: Ongoing, Progressing  Goal: Optimal Comfort and Wellbeing  Outcome: Ongoing, Progressing  Goal: Readiness for Transition of Care  Outcome: Ongoing, Progressing     Problem: Bariatric Environmental Safety  Goal: Safety Maintained with Care  Outcome: Ongoing, Progressing     Problem: Fluid Imbalance (Pneumonia)  Goal: Fluid Balance  Outcome: Ongoing, Progressing     Problem: Infection (Pneumonia)  Goal: Resolution of Infection Signs and Symptoms  Outcome: Ongoing, Progressing     Problem: Respiratory Compromise (Pneumonia)  Goal: Effective Oxygenation and Ventilation  Outcome: Ongoing, Progressing     Problem: Pain Acute  Goal: Acceptable Pain Control and Functional Ability  Outcome: Ongoing, Progressing     Problem: Fall Injury Risk  Goal: Absence of Fall and Fall-Related Injury  Outcome: Ongoing, Progressing     Problem: Infection  Goal: Absence of Infection Signs and Symptoms  Outcome: Ongoing, Progressing

## 2022-10-22 NOTE — ASSESSMENT & PLAN NOTE
10/21- VATS procedure with chest tube placement and drainage of empyema with CT surgery today. Antibiotic de escalated to Unasyn.   10/22- POD #1. S/P VATS, chest tube insertion x 2 and decortication. Fluid cultures are in progress. WBC remains elevated 22K, CRP trending down. PCT is normal. Current antibiotic - Unasyn and Oral Zyvox.

## 2022-10-22 NOTE — PLAN OF CARE
AAOx4. 1L/NC. No resp distress noted. Chest tube x2 to left side connected to suction. No sign of air leaks. IV ABT therapy remains in progress. No adverse reactions noted, remains afebrile. SOB upon exertion. Heart monitor 8560.

## 2022-10-22 NOTE — HOSPITAL COURSE
10/22/2022   The patient is postop day 1 status post left VATS procedure with drainage of empyema and decortication o    10/23/2022   The patient is postop day 2 status post left VATS procedure with drainage of empyema and decortication.    10/24/2022   The patient is postop day 3 status post VATS procedure with drainage of empyema and decortication.    10/26/2022   The patient is postop day 5 status post VATS procedure for drainage of empyema and decortication.  The patient is postop day 1 status post placement of pigtail catheter in loculated apical collection.    10/27/2022   The patient is postop day 6 status post VATS procedure for drainage of empyema and decortication.  The patient is day 2 status post placement of pigtail catheter in apical collection.    10/28/2022  The patient is postop day 7 status post VATS procedure for drainage of empyema and decortication.  The patient is day 3 status post placement of pigtail catheter in apical pleural collection.

## 2022-10-22 NOTE — PROGRESS NOTES
[facility]  Pulmonology  Progress Note    Patient Name: Christina Leal  MRN: [id number]  Admission Date: 10/19/2022  Hospital Length of Stay: 3 days  Code Status: Full Code  Attending Provider: Cynthia Wallis MD  Primary Care Provider: Primary Doctor No   Principal Problem: Empyema of left pleural space    Subjective:     Interval History:   10/22: seen and examined : T max 99.6F, Using IS, CXR reveiwed, pleurovac has 550 mls, tidaling, no air leak , on 2 LPM    Respiratory ROS: no cough, shortness of breath, or wheezing      Objective:     Vital Signs (Most Recent):  Temp: 99.1 °F (37.3 °C) (10/22/22 0402)  Pulse: 92 (10/22/22 0715)  Resp: 20 (10/22/22 0715)  BP: 136/84 (10/22/22 0402)  SpO2: 97 % (10/22/22 0715) Vital Signs (24h Range):  Temp:  [97.6 °F (36.4 °C)-99.6 °F (37.6 °C)] 99.1 °F (37.3 °C)  Pulse:  [] 92  Resp:  [16-33] 20  SpO2:  [94 %-99 %] 97 %  BP: (119-155)/(59-84) 136/84  Arterial Line BP: ()/(56-75) 125/75     Weight: (!) 150.5 kg (331 lb 12.7 oz)  Body mass index is 50.45 kg/m².      Intake/Output Summary (Last 24 hours) at 10/22/2022 0721  Last data filed at 10/22/2022 0706  Gross per 24 hour   Intake 1912.04 ml   Output 1345 ml   Net 567.04 ml       Physical Exam  Vitals and nursing note reviewed.   Constitutional:       Appearance: She is well-developed. She is morbidly obese.      Interventions: Nasal cannula in place.       HENT:      Head: Normocephalic and atraumatic.      Nose: Nose normal.      Mouth/Throat:      Pharynx: No oropharyngeal exudate.   Eyes:      General: No scleral icterus.     Conjunctiva/sclera: Conjunctivae normal.      Pupils: Pupils are equal, round, and reactive to light.   Neck:      Thyroid: No thyromegaly.      Vascular: No JVD.      Trachea: No tracheal deviation.   Cardiovascular:      Rate and Rhythm: Normal rate and regular rhythm.      Pulses: Normal pulses.      Heart sounds: Normal heart sounds, S1 normal and S2 normal. No murmur  heard.  Pulmonary:      Effort: Pulmonary effort is normal. No tachypnea, accessory muscle usage or respiratory distress.      Breath sounds: Decreased air movement present. No stridor. Examination of the left-upper field reveals decreased breath sounds. Decreased breath sounds present.       Abdominal:      General: Bowel sounds are normal. There is no distension.      Palpations: Abdomen is soft. There is no hepatomegaly, splenomegaly or mass.      Tenderness: There is no abdominal tenderness. There is no guarding or rebound.   Musculoskeletal:         General: No tenderness. Normal range of motion.      Cervical back: Normal range of motion and neck supple.   Lymphadenopathy:      Upper Body:      Right upper body: No supraclavicular adenopathy.      Left upper body: No supraclavicular adenopathy.   Skin:     General: Skin is warm and dry.      Findings: No rash.      Nails: There is no clubbing.   Neurological:      General: No focal deficit present.      Mental Status: She is alert and oriented to person, place, and time.      Coordination: Coordination normal.      Gait: Gait normal.      Deep Tendon Reflexes: Reflexes are normal and symmetric.   Psychiatric:         Mood and Affect: Mood normal.       Vents:  Oxygen Concentration (%): 24 (10/22/22 0715)    Lines/Drains/Airways       Peripherally Inserted Central Catheter Line  Duration             PICC Double Lumen 10/21/22 0455 right basilic 1 day              Drain  Duration                  Y Chest Tube 1 and 2 10/21/22 0958 1 Left Pleural 36 Fr. 2 Left Other (Comment) 36 Fr. <1 day                    Significant Labs:    CBC/Anemia Profile:  Recent Labs   Lab 10/20/22  0853 10/22/22  0555   WBC 22.84* 22.64*   HGB 10.1* 8.9*   HCT 32.2* 28.4*    414   MCV 84 83   RDW 14.0 13.6        Chemistries:  Recent Labs   Lab 10/20/22  2150 10/22/22  0555   * 134*   K 4.2 4.4    100   CO2 23 26   BUN 6 7   CREATININE 0.7 0.7   CALCIUM 8.7 7.7*    ALBUMIN 2.2* 1.8*   PROT 7.4 5.5*   BILITOT 1.1* 0.6   ALKPHOS 83 61   ALT 20 14   AST 24 7*   MG  --  2.4   PHOS  --  4.0       ABGs:   Recent Labs   Lab 10/21/22  1321   PH 7.377   PCO2 49.8*   HCO3 29.3*   POCSATURATED 99   BE 4     Blood Culture: No results for input(s): LABBLOO in the last 48 hours.  Respiratory Culture: No results for input(s): GSRESP, RESPIRATORYC in the last 48 hours.  All pertinent labs within the past 24 hours have been reviewed.    Significant Imaging:  I have reviewed all pertinent imaging results/findings within the past 24 hours.    X-Ray Chest AP Portable  Narrative: EXAMINATION:  XR CHEST AP PORTABLE    CLINICAL HISTORY:  s/p vats evacuation of empyema and decortication;    TECHNIQUE:  Single frontal view of the chest was performed.    COMPARISON:  10/21/2022    FINDINGS:  Cardiac silhouette is enlarged.  Pulmonary vascular congestion and basilar interstitial edema, slightly asymmetric to the left.  Small left pleural effusion suspected.  Suspect left-sided pleural drain status post VATS.  No pneumothorax however evaluation limited by poor inspiration.  Bones appear intact.  Mild gastric distension.  In comparison to the prior study, there is no adverse interval changes.  Impression: In comparison to the prior study, there is no adverse interval changes    Electronically signed by: Francisco Muhammad MD  Date:    10/21/2022  Time:    11:37  X-Ray Chest AP Portable  Narrative: EXAMINATION:  XR CHEST AP PORTABLE    CLINICAL HISTORY:  PICC line placement;    FINDINGS:  Single view of the chest.  Comparison 10/20/2022.  Right-sided PICC line tip overlies the SVC in good position.    Cardiac silhouette is enlarged.  Pulmonary vascular congestion and basilar interstitial edema, slightly asymmetric to the left.  Small left pleural effusion suspected..  No evidence of pleural effusion or pneumothorax.  Bones appear intact.  Impression: Stable findings compared to prior.  Satisfactory PICC line  placement.    Electronically signed by: Francisco Muhammad MD  Date:    10/21/2022  Time:    07:11       ABG  Recent Labs   Lab 10/21/22  1321   PH 7.377   PO2 124*   PCO2 49.8*   HCO3 29.3*   BE 4     Assessment/Plan:     * Empyema of left pleural space  SP VATS chest tube, decortication  CT management per CTS team    Parapneumonic effusion, Left   Empiric abx VANC, CEFEPIME, FLAGYL  Deescalate based on cultures  UNASYN           Varun Wood MD  Pulmonology  [facility] Med S[facility]

## 2022-10-22 NOTE — NURSING
Cathflo adm via PICC d/t no blood return from PICC and unable to flush line with NS. MD notified, new order for PICC placement received.

## 2022-10-22 NOTE — ASSESSMENT & PLAN NOTE
Continue empiric vancomycin and cefepime   Pulmonology following  Thoracentesis, follow fluid analysis   Continue supplemental O2  Check TTE  Follow blood cultures     10/20  S/p Thora 1500 cc removed   CVT following, plan for placement of chest tube per VATs   TTE reviewed   Continue empiric vanc, cefepime, add flagyl   dvt ppx   10/21-   VATS procedure with chest tube placement and drainage of empyema with CT surgery. Antibiotic de escalated to Unasyn. Blood cultures - NGTD. Nasal MRSA - neg. Pleural Fluid culture - pending.   10/22- See plan under Empyema of left pleural space

## 2022-10-22 NOTE — SUBJECTIVE & OBJECTIVE
Interval History:    S/P VATS, chest tube insertion x 2 and decortication      Review of Systems   Constitutional:  Positive for activity change. Negative for appetite change and fever.   HENT:  Negative for sore throat.    Eyes:  Negative for visual disturbance.   Respiratory:  Positive for shortness of breath (improved). Negative for cough and chest tightness.    Cardiovascular:  Positive for chest pain (left post chest wall pain). Negative for palpitations and leg swelling.   Gastrointestinal:  Negative for abdominal distention, abdominal pain, constipation, diarrhea, nausea and vomiting.   Endocrine: Negative for polyuria.   Genitourinary:  Negative for decreased urine volume, dysuria, flank pain, frequency and hematuria.   Musculoskeletal:  Negative for back pain and gait problem.   Skin:  Negative for rash.   Neurological:  Negative for syncope, speech difficulty, weakness, light-headedness and headaches.   Psychiatric/Behavioral:  Negative for confusion, hallucinations and sleep disturbance.    Objective:     Vital Signs (Most Recent):  Temp: 98.5 °F (36.9 °C) (10/22/22 0805)  Pulse: 108 (10/22/22 0805)  Resp: 19 (10/22/22 0823)  BP: 128/73 (10/22/22 0805)  SpO2: (!) 94 % (10/22/22 0805)   Vital Signs (24h Range):  Temp:  [97.6 °F (36.4 °C)-99.1 °F (37.3 °C)] 98.5 °F (36.9 °C)  Pulse:  [] 108  Resp:  [16-33] 19  SpO2:  [94 %-99 %] 94 %  BP: (119-155)/(59-84) 128/73  Arterial Line BP: ()/(56-75) 125/75     Weight: (!) 150.5 kg (331 lb 12.7 oz)  Body mass index is 50.45 kg/m².    Intake/Output Summary (Last 24 hours) at 10/22/2022 1129  Last data filed at 10/22/2022 0706  Gross per 24 hour   Intake 912.04 ml   Output 955 ml   Net -42.96 ml      Physical Exam  Constitutional:       General: She is not in acute distress.     Appearance: She is well-developed. She is morbidly obese. She is not diaphoretic.   HENT:      Head: Normocephalic and atraumatic.      Mouth/Throat:      Pharynx: No  oropharyngeal exudate.   Eyes:      Conjunctiva/sclera: Conjunctivae normal.      Pupils: Pupils are equal, round, and reactive to light.   Neck:      Thyroid: No thyromegaly.      Vascular: No JVD.   Cardiovascular:      Rate and Rhythm: Regular rhythm. Tachycardia present.      Heart sounds: Normal heart sounds. No murmur heard.  Pulmonary:      Effort: Pulmonary effort is normal. No respiratory distress.      Breath sounds: Examination of the left-lower field reveals decreased breath sounds. Decreased breath sounds present. No wheezing or rales.      Comments: Breath sounds diminished left lower lung   Chest:      Chest wall: No tenderness.   Abdominal:      General: Bowel sounds are normal. There is no distension.      Palpations: Abdomen is soft.      Tenderness: There is no abdominal tenderness. There is no guarding or rebound.   Musculoskeletal:         General: Normal range of motion.      Cervical back: Normal range of motion and neck supple.      Right lower leg: No edema.      Left lower leg: No edema.   Lymphadenopathy:      Cervical: No cervical adenopathy.   Skin:     General: Skin is warm and dry.      Findings: No rash.   Neurological:      General: No focal deficit present.      Mental Status: She is alert and oriented to person, place, and time.      Cranial Nerves: No cranial nerve deficit.      Sensory: No sensory deficit.      Deep Tendon Reflexes: Reflexes normal.   Psychiatric:         Mood and Affect: Mood normal.       Significant Labs: All pertinent labs within the past 24 hours have been reviewed.  BMP:   Recent Labs   Lab 10/22/22  0555   *   *   K 4.4      CO2 26   BUN 7   CREATININE 0.7   CALCIUM 7.7*   MG 2.4     CBC:   Recent Labs   Lab 10/22/22  0555   WBC 22.64*   HGB 8.9*   HCT 28.4*        CMP:   Recent Labs   Lab 10/20/22  2150 10/22/22  0555   * 134*   K 4.2 4.4    100   CO2 23 26    130*   BUN 6 7   CREATININE 0.7 0.7   CALCIUM 8.7  7.7*   PROT 7.4 5.5*   ALBUMIN 2.2* 1.8*   BILITOT 1.1* 0.6   ALKPHOS 83 61   AST 24 7*   ALT 20 14   ANIONGAP 12 8       Significant Imaging:

## 2022-10-22 NOTE — PROGRESS NOTES
[facility]  Cardiothoracic Surgery  Progress Note    Patient Name: Christina Leal  MRN: [id number]  Admission Date: 10/19/2022  Hospital Length of Stay: 3 days  Code Status: Full Code   Attending Physician: Cynthia Wallis MD   Referring Provider: Jimmy Castellanos NP  Principal Problem:Empyema of left pleural space            Subjective:     Post-Op Info:  Procedure(s) (LRB):  VATS, WITH CHEST TUBE INSERTION FOR DRAINAGE OF PLEURAL EFFUSION (Left)  BLOCK, NERVE, INTERCOSTAL, 2 OR MORE (Left)  VATS, WITH DECORTICATION, LUNG (Left)  EVACUATION, EMPYEMA (Left)   1 Day Post-Op     Interval History:  The patient is postop day 1 status post left VATS procedure with drainage of empyema and decortication    ROS  Medications:  Continuous Infusions:   dextrose 5 % and 0.45 % NaCl with KCl 20 mEq 25 mL/hr at 10/21/22 1816     Scheduled Meds:   acetaminophen  650 mg Oral Q6H    albuterol-ipratropium  3 mL Nebulization Q6H WAKE    ampicillin-sulbactim (UNASYN) IVPB  3 g Intravenous Q6H    docusate sodium  100 mg Oral BID    enoxaparin  40 mg Subcutaneous BID    famotidine  20 mg Oral BID    ketorolac  30 mg Intravenous Q8H    linezolid  600 mg Oral Q12H    mupirocin   Nasal BID    polyethylene glycol  17 g Oral Daily     PRN Meds:albuterol sulfate, metoclopramide HCl, morphine, ondansetron, oxyCODONE, sodium chloride 0.9%, traZODone     Objective:     Vital Signs (Most Recent):  Temp: 98.5 °F (36.9 °C) (10/22/22 0805)  Pulse: 108 (10/22/22 0805)  Resp: 19 (10/22/22 0823)  BP: 128/73 (10/22/22 0805)  SpO2: (!) 94 % (10/22/22 0805)   Vital Signs (24h Range):  Temp:  [97.6 °F (36.4 °C)-99.6 °F (37.6 °C)] 98.5 °F (36.9 °C)  Pulse:  [] 108  Resp:  [16-33] 19  SpO2:  [94 %-99 %] 94 %  BP: (119-155)/(59-84) 128/73  Arterial Line BP: ()/(56-75) 125/75     Weight: (!) 150.5 kg (331 lb 12.7 oz)  Body mass index is 50.45 kg/m².    SpO2: (!) 94 %  O2 Device (Oxygen Therapy): nasal cannula    Intake/Output - Last  3 Shifts         10/20 0700  10/21 0659 10/21 0700  10/22 0659 10/22 0700  10/23 0659    P.O.       I.V. (mL/kg)  63.9 (0.4)     IV Piggyback 637.4 1848.2     Total Intake(mL/kg) 637.4 (4.2) 1912 (12.7)     Urine (mL/kg/hr)  675 (0.2)     Blood  50     Chest Tube  345 275    Total Output  1070 275    Net +637.4 +842 -275                   Lines/Drains/Airways       Peripherally Inserted Central Catheter Line  Duration             PICC Double Lumen 10/21/22 0455 right basilic 1 day              Drain  Duration                  Y Chest Tube 1 and 2 10/21/22 0958 1 Left Pleural 36 Fr. 2 Left Other (Comment) 36 Fr. <1 day                    Physical Exam  Constitutional:       Appearance: Normal appearance.   HENT:      Head: Normocephalic and atraumatic.   Cardiovascular:      Rate and Rhythm: Normal rate and regular rhythm.      Heart sounds: Normal heart sounds.   Pulmonary:      Effort: Pulmonary effort is normal.      Breath sounds: Normal breath sounds.   Abdominal:      General: Abdomen is flat.      Palpations: Abdomen is soft.   Musculoskeletal:      Right lower leg: No edema.      Left lower leg: No edema.   Skin:     General: Skin is warm and dry.   Neurological:      General: No focal deficit present.      Mental Status: She is alert and oriented to person, place, and time.   Psychiatric:         Behavior: Behavior normal.       Significant Labs:  All pertinent labs from the last 24 hours have been reviewed.    Significant Diagnostics:  I have reviewed all pertinent imaging results/findings within the past 24 hours.    Assessment/Plan:     * Empyema of left pleural space  The patient is a 26-year-old female with a history of severe obesity (BMI 50.48) who was admitted with complaints of left-sided chest pain over the past week.  Patient also noticed some shortness of breath and dyspnea on exertion in addition the patient has a cough.  The patient had a chest x-ray done which showed a left pleural effusion CT  scan shows a loculated left pleural effusion.  Thoracentesis was performed with fluid analysis indicating that left pleural effusion is compatible with a diagnosis of an empyema.    The patient's left empyema requires drainage.  Patient will likely require a large bore chest tube for adequate drainage of empyema.  Bedside placement of chest tube in the setting of severe obesity will be difficult.  Will plan for placement of chest tube with VATS procedure in the operating room.  The risks and benefits of a VATS procedure were explained to the patient.  The patient understand the risks and benefits and agreed to proceed with VATS assisted drainage of empyema    10/22/2022   The patient is postop day 1 status post left VATS procedure with drainage of empyema and decortication.  Chest tube output has been about 600 mL since surgery yesterday.  Continue chest tube to suction.  Continue incentive spirometer and pulmonary toileting.  Continue broad-spectrum antibiotics.          Dusty Moyer MD  Cardiothoracic Surgery  [facility]

## 2022-10-22 NOTE — PROGRESS NOTES
[facility]  Progress Note    Patient Name: Christina Leal  MRN: [id number]  Patient Class: IP- Inpatient   Admission Date: 10/19/2022  Length of Stay: 3 days  Attending Physician: Cynthia Wallis MD  Primary Care Provider: Primary Doctor No        Subjective:     Principal Problem:Empyema of left pleural space        HPI:  Christina Leal is a 27 yo female with history of obesity, former tobacco abuse on oral contraception who was referred to ED by primary care for abnormal CXR. Patient reports 1 week history of left sided rib cage pain associated with chest tightness, SOB and PAIZ. She also reports upper abdominal cramping and diarrhea. She denies n/v. OP imaging remarkable for pna with large left pleural effusion. CT chest shows large loculated appearing left pleural effusion with associated lower lobe atelectasis as well as mild left upper lobe atelectasis.  Bandlike opacities with ground-glass attenuation in the posterior right lung base likely represent additional subsegmental atelectasis. Mild rightward shift of the mediastinal contents noted. Patient febrile (Tmax 100.6) tachycardic and tachypenic, O2 sats 98% 2 L O2. Influenza, COVID, HIV, Hep C negative. Labs remarkable for wbc 29 with left shift, Tbili 2.1. She received cefepime. Pulmonology consulted. Patient admitted to hospital medicine.             Overview/Hospital Course:  10/20/22 patient s/p thoracentesis 1500 cc removed. Fluid analysis indicating left pleural effusion compatible with a diagnosis of an empyema   Pt reports feeling better. Repeat CXR: fluid reaccumulation. CVT consulted, plan for chest tube placement with VATs procedure.     10/21- Pt is currently undergoing  VATS procedure with chest tube placement and drainage of empyema with CT surgery. AM labs are not available. Blood cultures - NGTD. Nasal MRSA - neg. Pleural Fluid culture - pending. Antibiotic de escalated to Unasyn.     10/22- S/P VATS, chest tube  insertion x 2 and decortication. CT output 600 ml since surgery yesterday. Pleural fluid gram stain showed mod WBCs and rare GPC. Fluid cultures are in progress. WBC remains elevated 22K, CRP trending down. PCT is normal. Current antibiotic - Unasyn and Oral Zyvox.         Interval History:    S/P VATS, chest tube insertion x 2 and decortication      Review of Systems   Constitutional:  Positive for activity change. Negative for appetite change and fever.   HENT:  Negative for sore throat.    Eyes:  Negative for visual disturbance.   Respiratory:  Positive for shortness of breath (improved). Negative for cough and chest tightness.    Cardiovascular:  Positive for chest pain (left post chest wall pain). Negative for palpitations and leg swelling.   Gastrointestinal:  Negative for abdominal distention, abdominal pain, constipation, diarrhea, nausea and vomiting.   Endocrine: Negative for polyuria.   Genitourinary:  Negative for decreased urine volume, dysuria, flank pain, frequency and hematuria.   Musculoskeletal:  Negative for back pain and gait problem.   Skin:  Negative for rash.   Neurological:  Negative for syncope, speech difficulty, weakness, light-headedness and headaches.   Psychiatric/Behavioral:  Negative for confusion, hallucinations and sleep disturbance.    Objective:     Vital Signs (Most Recent):  Temp: 98.5 °F (36.9 °C) (10/22/22 0805)  Pulse: 108 (10/22/22 0805)  Resp: 19 (10/22/22 0823)  BP: 128/73 (10/22/22 0805)  SpO2: (!) 94 % (10/22/22 0805)   Vital Signs (24h Range):  Temp:  [97.6 °F (36.4 °C)-99.1 °F (37.3 °C)] 98.5 °F (36.9 °C)  Pulse:  [] 108  Resp:  [16-33] 19  SpO2:  [94 %-99 %] 94 %  BP: (119-155)/(59-84) 128/73  Arterial Line BP: ()/(56-75) 125/75     Weight: (!) 150.5 kg (331 lb 12.7 oz)  Body mass index is 50.45 kg/m².    Intake/Output Summary (Last 24 hours) at 10/22/2022 1129  Last data filed at 10/22/2022 0706  Gross per 24 hour   Intake 912.04 ml   Output 955 ml   Net  -42.96 ml      Physical Exam  Constitutional:       General: She is not in acute distress.     Appearance: She is well-developed. She is morbidly obese. She is not diaphoretic.   HENT:      Head: Normocephalic and atraumatic.      Mouth/Throat:      Pharynx: No oropharyngeal exudate.   Eyes:      Conjunctiva/sclera: Conjunctivae normal.      Pupils: Pupils are equal, round, and reactive to light.   Neck:      Thyroid: No thyromegaly.      Vascular: No JVD.   Cardiovascular:      Rate and Rhythm: Regular rhythm. Tachycardia present.      Heart sounds: Normal heart sounds. No murmur heard.  Pulmonary:      Effort: Pulmonary effort is normal. No respiratory distress.      Breath sounds: Examination of the left-lower field reveals decreased breath sounds. Decreased breath sounds present. No wheezing or rales.      Comments: Breath sounds diminished left lower lung   Chest:      Chest wall: No tenderness.   Abdominal:      General: Bowel sounds are normal. There is no distension.      Palpations: Abdomen is soft.      Tenderness: There is no abdominal tenderness. There is no guarding or rebound.   Musculoskeletal:         General: Normal range of motion.      Cervical back: Normal range of motion and neck supple.      Right lower leg: No edema.      Left lower leg: No edema.   Lymphadenopathy:      Cervical: No cervical adenopathy.   Skin:     General: Skin is warm and dry.      Findings: No rash.   Neurological:      General: No focal deficit present.      Mental Status: She is alert and oriented to person, place, and time.      Cranial Nerves: No cranial nerve deficit.      Sensory: No sensory deficit.      Deep Tendon Reflexes: Reflexes normal.   Psychiatric:         Mood and Affect: Mood normal.       Significant Labs: All pertinent labs within the past 24 hours have been reviewed.  BMP:   Recent Labs   Lab 10/22/22  0555   *   *   K 4.4      CO2 26   BUN 7   CREATININE 0.7   CALCIUM 7.7*   MG 2.4      CBC:   Recent Labs   Lab 10/22/22  0555   WBC 22.64*   HGB 8.9*   HCT 28.4*        CMP:   Recent Labs   Lab 10/20/22  2150 10/22/22  0555   * 134*   K 4.2 4.4    100   CO2 23 26    130*   BUN 6 7   CREATININE 0.7 0.7   CALCIUM 8.7 7.7*   PROT 7.4 5.5*   ALBUMIN 2.2* 1.8*   BILITOT 1.1* 0.6   ALKPHOS 83 61   AST 24 7*   ALT 20 14   ANIONGAP 12 8       Significant Imaging:       Assessment/Plan:      * Empyema of left pleural space  10/21- VATS procedure with chest tube placement and drainage of empyema with CT surgery today. Antibiotic de escalated to Unasyn.   10/22- POD #1. S/P VATS, chest tube insertion x 2 and decortication. Fluid cultures are in progress. WBC remains elevated 22K, CRP trending down. PCT is normal. Current antibiotic - Unasyn and Oral Zyvox.       Parapneumonic effusion, Left   Continue empiric vancomycin and cefepime   Pulmonology following  Thoracentesis, follow fluid analysis   Continue supplemental O2  Check TTE  Follow blood cultures     10/20  S/p Thora 1500 cc removed   CVT following, plan for placement of chest tube per VATs   TTE reviewed   Continue empiric vanc, cefepime, add flagyl   dvt ppx   10/21-   VATS procedure with chest tube placement and drainage of empyema with CT surgery. Antibiotic de escalated to Unasyn. Blood cultures - NGTD. Nasal MRSA - neg. Pleural Fluid culture - pending.   10/22- See plan under Empyema of left pleural space     Class 3 severe obesity in adult  Body mass index is 50.45 kg/m². Morbid obesity complicates all aspects of disease management from diagnostic modalities to treatment. Weight loss encouraged and health benefits explained to patient.           VTE Risk Mitigation (From admission, onward)         Ordered     Place JASON hose  Until discontinued         10/20/22 2109     Place sequential compression device  Until discontinued         10/20/22 2109     enoxaparin injection 40 mg  2 times daily         10/20/22 1323      Place sequential compression device  Until discontinued         10/19/22 6474                Discharge Planning   DANNY:      Code Status: Full Code   Is the patient medically ready for discharge?:     Reason for patient still in hospital (select all that apply): Patient trending condition  Discharge Plan A: Home                  Cynthia Wallis MD  Department of Hospital Medicine   [facility]

## 2022-10-22 NOTE — ASSESSMENT & PLAN NOTE
Body mass index is 50.45 kg/m². Morbid obesity complicates all aspects of disease management from diagnostic modalities to treatment. Weight loss encouraged and health benefits explained to patient.

## 2022-10-22 NOTE — SUBJECTIVE & OBJECTIVE
Interval History:   10/22: seen and examined : T max 99.6F, Using IS, CXR reveiwed, pleurovac has 550 mls, tidaling, no air leak , on 2 LPM    Respiratory ROS: no cough, shortness of breath, or wheezing      Objective:     Vital Signs (Most Recent):  Temp: 99.1 °F (37.3 °C) (10/22/22 0402)  Pulse: 92 (10/22/22 0715)  Resp: 20 (10/22/22 0715)  BP: 136/84 (10/22/22 0402)  SpO2: 97 % (10/22/22 0715) Vital Signs (24h Range):  Temp:  [97.6 °F (36.4 °C)-99.6 °F (37.6 °C)] 99.1 °F (37.3 °C)  Pulse:  [] 92  Resp:  [16-33] 20  SpO2:  [94 %-99 %] 97 %  BP: (119-155)/(59-84) 136/84  Arterial Line BP: ()/(56-75) 125/75     Weight: (!) 150.5 kg (331 lb 12.7 oz)  Body mass index is 50.45 kg/m².      Intake/Output Summary (Last 24 hours) at 10/22/2022 0721  Last data filed at 10/22/2022 0706  Gross per 24 hour   Intake 1912.04 ml   Output 1345 ml   Net 567.04 ml       Physical Exam  Vitals and nursing note reviewed.   Constitutional:       Appearance: She is well-developed. She is morbidly obese.      Interventions: Nasal cannula in place.       HENT:      Head: Normocephalic and atraumatic.      Nose: Nose normal.      Mouth/Throat:      Pharynx: No oropharyngeal exudate.   Eyes:      General: No scleral icterus.     Conjunctiva/sclera: Conjunctivae normal.      Pupils: Pupils are equal, round, and reactive to light.   Neck:      Thyroid: No thyromegaly.      Vascular: No JVD.      Trachea: No tracheal deviation.   Cardiovascular:      Rate and Rhythm: Normal rate and regular rhythm.      Pulses: Normal pulses.      Heart sounds: Normal heart sounds, S1 normal and S2 normal. No murmur heard.  Pulmonary:      Effort: Pulmonary effort is normal. No tachypnea, accessory muscle usage or respiratory distress.      Breath sounds: Decreased air movement present. No stridor. Examination of the left-upper field reveals decreased breath sounds. Decreased breath sounds present.       Abdominal:      General: Bowel sounds are  normal. There is no distension.      Palpations: Abdomen is soft. There is no hepatomegaly, splenomegaly or mass.      Tenderness: There is no abdominal tenderness. There is no guarding or rebound.   Musculoskeletal:         General: No tenderness. Normal range of motion.      Cervical back: Normal range of motion and neck supple.   Lymphadenopathy:      Upper Body:      Right upper body: No supraclavicular adenopathy.      Left upper body: No supraclavicular adenopathy.   Skin:     General: Skin is warm and dry.      Findings: No rash.      Nails: There is no clubbing.   Neurological:      General: No focal deficit present.      Mental Status: She is alert and oriented to person, place, and time.      Coordination: Coordination normal.      Gait: Gait normal.      Deep Tendon Reflexes: Reflexes are normal and symmetric.   Psychiatric:         Mood and Affect: Mood normal.       Vents:  Oxygen Concentration (%): 24 (10/22/22 0715)    Lines/Drains/Airways       Peripherally Inserted Central Catheter Line  Duration             PICC Double Lumen 10/21/22 0455 right basilic 1 day              Drain  Duration                  Y Chest Tube 1 and 2 10/21/22 0958 1 Left Pleural 36 Fr. 2 Left Other (Comment) 36 Fr. <1 day                    Significant Labs:    CBC/Anemia Profile:  Recent Labs   Lab 10/20/22  0853 10/22/22  0555   WBC 22.84* 22.64*   HGB 10.1* 8.9*   HCT 32.2* 28.4*    414   MCV 84 83   RDW 14.0 13.6        Chemistries:  Recent Labs   Lab 10/20/22  2150 10/22/22  0555   * 134*   K 4.2 4.4    100   CO2 23 26   BUN 6 7   CREATININE 0.7 0.7   CALCIUM 8.7 7.7*   ALBUMIN 2.2* 1.8*   PROT 7.4 5.5*   BILITOT 1.1* 0.6   ALKPHOS 83 61   ALT 20 14   AST 24 7*   MG  --  2.4   PHOS  --  4.0       ABGs:   Recent Labs   Lab 10/21/22  1321   PH 7.377   PCO2 49.8*   HCO3 29.3*   POCSATURATED 99   BE 4     Blood Culture: No results for input(s): LABBLOO in the last 48 hours.  Respiratory Culture: No  results for input(s): GSRESP, RESPIRATORYC in the last 48 hours.  All pertinent labs within the past 24 hours have been reviewed.    Significant Imaging:  I have reviewed all pertinent imaging results/findings within the past 24 hours.    X-Ray Chest AP Portable  Narrative: EXAMINATION:  XR CHEST AP PORTABLE    CLINICAL HISTORY:  s/p vats evacuation of empyema and decortication;    TECHNIQUE:  Single frontal view of the chest was performed.    COMPARISON:  10/21/2022    FINDINGS:  Cardiac silhouette is enlarged.  Pulmonary vascular congestion and basilar interstitial edema, slightly asymmetric to the left.  Small left pleural effusion suspected.  Suspect left-sided pleural drain status post VATS.  No pneumothorax however evaluation limited by poor inspiration.  Bones appear intact.  Mild gastric distension.  In comparison to the prior study, there is no adverse interval changes.  Impression: In comparison to the prior study, there is no adverse interval changes    Electronically signed by: Francisco Muhammad MD  Date:    10/21/2022  Time:    11:37  X-Ray Chest AP Portable  Narrative: EXAMINATION:  XR CHEST AP PORTABLE    CLINICAL HISTORY:  PICC line placement;    FINDINGS:  Single view of the chest.  Comparison 10/20/2022.  Right-sided PICC line tip overlies the SVC in good position.    Cardiac silhouette is enlarged.  Pulmonary vascular congestion and basilar interstitial edema, slightly asymmetric to the left.  Small left pleural effusion suspected..  No evidence of pleural effusion or pneumothorax.  Bones appear intact.  Impression: Stable findings compared to prior.  Satisfactory PICC line placement.    Electronically signed by: Francisco Muhammad MD  Date:    10/21/2022  Time:    07:11

## 2022-10-22 NOTE — SUBJECTIVE & OBJECTIVE
Interval History:  The patient is postop day 1 status post left VATS procedure with drainage of empyema and decortication    ROS  Medications:  Continuous Infusions:   dextrose 5 % and 0.45 % NaCl with KCl 20 mEq 25 mL/hr at 10/21/22 1816     Scheduled Meds:   acetaminophen  650 mg Oral Q6H    albuterol-ipratropium  3 mL Nebulization Q6H WAKE    ampicillin-sulbactim (UNASYN) IVPB  3 g Intravenous Q6H    docusate sodium  100 mg Oral BID    enoxaparin  40 mg Subcutaneous BID    famotidine  20 mg Oral BID    ketorolac  30 mg Intravenous Q8H    linezolid  600 mg Oral Q12H    mupirocin   Nasal BID    polyethylene glycol  17 g Oral Daily     PRN Meds:albuterol sulfate, metoclopramide HCl, morphine, ondansetron, oxyCODONE, sodium chloride 0.9%, traZODone     Objective:     Vital Signs (Most Recent):  Temp: 98.5 °F (36.9 °C) (10/22/22 0805)  Pulse: 108 (10/22/22 0805)  Resp: 19 (10/22/22 0823)  BP: 128/73 (10/22/22 0805)  SpO2: (!) 94 % (10/22/22 0805)   Vital Signs (24h Range):  Temp:  [97.6 °F (36.4 °C)-99.6 °F (37.6 °C)] 98.5 °F (36.9 °C)  Pulse:  [] 108  Resp:  [16-33] 19  SpO2:  [94 %-99 %] 94 %  BP: (119-155)/(59-84) 128/73  Arterial Line BP: ()/(56-75) 125/75     Weight: (!) 150.5 kg (331 lb 12.7 oz)  Body mass index is 50.45 kg/m².    SpO2: (!) 94 %  O2 Device (Oxygen Therapy): nasal cannula    Intake/Output - Last 3 Shifts         10/20 0700  10/21 0659 10/21 0700  10/22 0659 10/22 0700  10/23 0659    P.O.       I.V. (mL/kg)  63.9 (0.4)     IV Piggyback 637.4 1848.2     Total Intake(mL/kg) 637.4 (4.2) 1912 (12.7)     Urine (mL/kg/hr)  675 (0.2)     Blood  50     Chest Tube  345 275    Total Output  1070 275    Net +637.4 +842 -275                   Lines/Drains/Airways       Peripherally Inserted Central Catheter Line  Duration             PICC Double Lumen 10/21/22 0455 right basilic 1 day              Drain  Duration                  Y Chest Tube 1 and 2 10/21/22 0958 1 Left Pleural 36 Fr. 2 Left  Other (Comment) 36 Fr. <1 day                    Physical Exam  Constitutional:       Appearance: Normal appearance.   HENT:      Head: Normocephalic and atraumatic.   Cardiovascular:      Rate and Rhythm: Normal rate and regular rhythm.      Heart sounds: Normal heart sounds.   Pulmonary:      Effort: Pulmonary effort is normal.      Breath sounds: Normal breath sounds.   Abdominal:      General: Abdomen is flat.      Palpations: Abdomen is soft.   Musculoskeletal:      Right lower leg: No edema.      Left lower leg: No edema.   Skin:     General: Skin is warm and dry.   Neurological:      General: No focal deficit present.      Mental Status: She is alert and oriented to person, place, and time.   Psychiatric:         Behavior: Behavior normal.       Significant Labs:  All pertinent labs from the last 24 hours have been reviewed.    Significant Diagnostics:  I have reviewed all pertinent imaging results/findings within the past 24 hours.

## 2022-10-22 NOTE — ASSESSMENT & PLAN NOTE
The patient is a 26-year-old female with a history of severe obesity (BMI 50.48) who was admitted with complaints of left-sided chest pain over the past week.  Patient also noticed some shortness of breath and dyspnea on exertion in addition the patient has a cough.  The patient had a chest x-ray done which showed a left pleural effusion CT scan shows a loculated left pleural effusion.  Thoracentesis was performed with fluid analysis indicating that left pleural effusion is compatible with a diagnosis of an empyema.    The patient's left empyema requires drainage.  Patient will likely require a large bore chest tube for adequate drainage of empyema.  Bedside placement of chest tube in the setting of severe obesity will be difficult.  Will plan for placement of chest tube with VATS procedure in the operating room.  The risks and benefits of a VATS procedure were explained to the patient.  The patient understand the risks and benefits and agreed to proceed with VATS assisted drainage of empyema    10/22/2022   The patient is postop day 1 status post left VATS procedure with drainage of empyema and decortication.  Chest tube output has been about 600 mL since surgery yesterday.  Continue chest tube to suction.  Continue incentive spirometer and pulmonary toileting.  Continue broad-spectrum antibiotics.

## 2022-10-23 PROBLEM — Z93.8 S/P CHEST TUBE PLACEMENT: Status: ACTIVE | Noted: 2022-10-23

## 2022-10-23 PROBLEM — D64.9 ANEMIA: Status: ACTIVE | Noted: 2022-10-23

## 2022-10-23 LAB
ALBUMIN SERPL BCP-MCNC: 1.7 G/DL (ref 3.5–5.2)
ALP SERPL-CCNC: 63 U/L (ref 55–135)
ALT SERPL W/O P-5'-P-CCNC: 12 U/L (ref 10–44)
ANION GAP SERPL CALC-SCNC: 10 MMOL/L (ref 8–16)
AST SERPL-CCNC: 8 U/L (ref 10–40)
BASOPHILS # BLD AUTO: ABNORMAL K/UL (ref 0–0.2)
BASOPHILS NFR BLD: 0 % (ref 0–1.9)
BILIRUB SERPL-MCNC: 0.6 MG/DL (ref 0.1–1)
BUN SERPL-MCNC: 6 MG/DL (ref 6–20)
CALCIUM SERPL-MCNC: 7.8 MG/DL (ref 8.7–10.5)
CHLORIDE SERPL-SCNC: 100 MMOL/L (ref 95–110)
CO2 SERPL-SCNC: 25 MMOL/L (ref 23–29)
CREAT SERPL-MCNC: 0.6 MG/DL (ref 0.5–1.4)
DIFFERENTIAL METHOD: ABNORMAL
EOSINOPHIL # BLD AUTO: ABNORMAL K/UL (ref 0–0.5)
EOSINOPHIL NFR BLD: 0 % (ref 0–8)
ERYTHROCYTE [DISTWIDTH] IN BLOOD BY AUTOMATED COUNT: 13.9 % (ref 11.5–14.5)
EST. GFR  (NO RACE VARIABLE): >60 ML/MIN/1.73 M^2
FERRITIN SERPL-MCNC: 307 NG/ML (ref 20–300)
FOLATE SERPL-MCNC: 3.1 NG/ML (ref 4–24)
GLUCOSE SERPL-MCNC: 97 MG/DL (ref 70–110)
HCT VFR BLD AUTO: 27.5 % (ref 37–48.5)
HGB BLD-MCNC: 8.6 G/DL (ref 12–16)
IMM GRANULOCYTES # BLD AUTO: ABNORMAL K/UL (ref 0–0.04)
IMM GRANULOCYTES NFR BLD AUTO: ABNORMAL % (ref 0–0.5)
IRON SERPL-MCNC: 13 UG/DL (ref 30–160)
LYMPHOCYTES # BLD AUTO: ABNORMAL K/UL (ref 1–4.8)
LYMPHOCYTES NFR BLD: 8 % (ref 18–48)
MAGNESIUM SERPL-MCNC: 2.1 MG/DL (ref 1.6–2.6)
MCH RBC QN AUTO: 26.4 PG (ref 27–31)
MCHC RBC AUTO-ENTMCNC: 31.3 G/DL (ref 32–36)
MCV RBC AUTO: 84 FL (ref 82–98)
MONOCYTES # BLD AUTO: ABNORMAL K/UL (ref 0.3–1)
MONOCYTES NFR BLD: 28 % (ref 4–15)
NEUTROPHILS # BLD AUTO: ABNORMAL K/UL (ref 1.8–7.7)
NEUTROPHILS NFR BLD: 64 % (ref 38–73)
NRBC BLD-RTO: 0 /100 WBC
PLATELET # BLD AUTO: 442 K/UL (ref 150–450)
PMV BLD AUTO: 9 FL (ref 9.2–12.9)
POTASSIUM SERPL-SCNC: 4.7 MMOL/L (ref 3.5–5.1)
PROT SERPL-MCNC: 5.5 G/DL (ref 6–8.4)
RBC # BLD AUTO: 3.26 M/UL (ref 4–5.4)
SATURATED IRON: 6 % (ref 20–50)
SODIUM SERPL-SCNC: 135 MMOL/L (ref 136–145)
TOTAL IRON BINDING CAPACITY: 204 UG/DL (ref 250–450)
TRANSFERRIN SERPL-MCNC: 138 MG/DL (ref 200–375)
VIT B12 SERPL-MCNC: 315 PG/ML (ref 210–950)
WBC # BLD AUTO: 28.66 K/UL (ref 3.9–12.7)

## 2022-10-23 PROCEDURE — 27000221 HC OXYGEN, UP TO 24 HOURS

## 2022-10-23 PROCEDURE — 94799 UNLISTED PULMONARY SVC/PX: CPT

## 2022-10-23 PROCEDURE — 85027 COMPLETE CBC AUTOMATED: CPT | Performed by: INTERNAL MEDICINE

## 2022-10-23 PROCEDURE — 94761 N-INVAS EAR/PLS OXIMETRY MLT: CPT

## 2022-10-23 PROCEDURE — 97161 PT EVAL LOW COMPLEX 20 MIN: CPT

## 2022-10-23 PROCEDURE — 97116 GAIT TRAINING THERAPY: CPT

## 2022-10-23 PROCEDURE — 99232 PR SUBSEQUENT HOSPITAL CARE,LEVL II: ICD-10-PCS | Mod: ,,, | Performed by: INTERNAL MEDICINE

## 2022-10-23 PROCEDURE — 63600175 PHARM REV CODE 636 W HCPCS: Performed by: THORACIC SURGERY (CARDIOTHORACIC VASCULAR SURGERY)

## 2022-10-23 PROCEDURE — 63600175 PHARM REV CODE 636 W HCPCS: Performed by: INTERNAL MEDICINE

## 2022-10-23 PROCEDURE — 25000003 PHARM REV CODE 250: Performed by: INTERNAL MEDICINE

## 2022-10-23 PROCEDURE — 94640 AIRWAY INHALATION TREATMENT: CPT

## 2022-10-23 PROCEDURE — 99900035 HC TECH TIME PER 15 MIN (STAT)

## 2022-10-23 PROCEDURE — 25000242 PHARM REV CODE 250 ALT 637 W/ HCPCS: Performed by: THORACIC SURGERY (CARDIOTHORACIC VASCULAR SURGERY)

## 2022-10-23 PROCEDURE — 99232 SBSQ HOSP IP/OBS MODERATE 35: CPT | Mod: ,,, | Performed by: INTERNAL MEDICINE

## 2022-10-23 PROCEDURE — 85007 BL SMEAR W/DIFF WBC COUNT: CPT | Performed by: INTERNAL MEDICINE

## 2022-10-23 PROCEDURE — 25000003 PHARM REV CODE 250: Performed by: THORACIC SURGERY (CARDIOTHORACIC VASCULAR SURGERY)

## 2022-10-23 PROCEDURE — 21400001 HC TELEMETRY ROOM

## 2022-10-23 PROCEDURE — 63600175 PHARM REV CODE 636 W HCPCS: Performed by: NURSE PRACTITIONER

## 2022-10-23 PROCEDURE — 11000001 HC ACUTE MED/SURG PRIVATE ROOM

## 2022-10-23 PROCEDURE — 84466 ASSAY OF TRANSFERRIN: CPT | Performed by: INTERNAL MEDICINE

## 2022-10-23 PROCEDURE — 82728 ASSAY OF FERRITIN: CPT | Performed by: INTERNAL MEDICINE

## 2022-10-23 PROCEDURE — 82607 VITAMIN B-12: CPT | Performed by: INTERNAL MEDICINE

## 2022-10-23 PROCEDURE — 80053 COMPREHEN METABOLIC PANEL: CPT | Performed by: INTERNAL MEDICINE

## 2022-10-23 PROCEDURE — 25000003 PHARM REV CODE 250: Performed by: NURSE PRACTITIONER

## 2022-10-23 PROCEDURE — 82746 ASSAY OF FOLIC ACID SERUM: CPT | Performed by: INTERNAL MEDICINE

## 2022-10-23 PROCEDURE — 83735 ASSAY OF MAGNESIUM: CPT | Performed by: THORACIC SURGERY (CARDIOTHORACIC VASCULAR SURGERY)

## 2022-10-23 RX ORDER — FERROUS GLUCONATE 324(37.5)
324 TABLET ORAL 2 TIMES DAILY WITH MEALS
Status: DISCONTINUED | OUTPATIENT
Start: 2022-10-23 | End: 2022-10-28 | Stop reason: HOSPADM

## 2022-10-23 RX ADMIN — OXYCODONE HYDROCHLORIDE 5 MG: 5 TABLET ORAL at 03:10

## 2022-10-23 RX ADMIN — LINEZOLID 600 MG: 600 TABLET, FILM COATED ORAL at 10:10

## 2022-10-23 RX ADMIN — MUPIROCIN: 20 OINTMENT TOPICAL at 09:10

## 2022-10-23 RX ADMIN — ACETAMINOPHEN 650 MG: 325 TABLET ORAL at 03:10

## 2022-10-23 RX ADMIN — KETOROLAC TROMETHAMINE 30 MG: 30 INJECTION, SOLUTION INTRAMUSCULAR; INTRAVENOUS at 06:10

## 2022-10-23 RX ADMIN — Medication 324 MG: at 06:10

## 2022-10-23 RX ADMIN — ENOXAPARIN SODIUM 40 MG: 40 INJECTION SUBCUTANEOUS at 10:10

## 2022-10-23 RX ADMIN — IPRATROPIUM BROMIDE AND ALBUTEROL SULFATE 3 ML: 2.5; .5 SOLUTION RESPIRATORY (INHALATION) at 08:10

## 2022-10-23 RX ADMIN — AMPICILLIN SODIUM AND SULBACTAM SODIUM 3 G: 2; 1 INJECTION, POWDER, FOR SOLUTION INTRAMUSCULAR; INTRAVENOUS at 09:10

## 2022-10-23 RX ADMIN — FAMOTIDINE 20 MG: 20 TABLET ORAL at 10:10

## 2022-10-23 RX ADMIN — AMPICILLIN SODIUM AND SULBACTAM SODIUM 3 G: 2; 1 INJECTION, POWDER, FOR SOLUTION INTRAMUSCULAR; INTRAVENOUS at 03:10

## 2022-10-23 RX ADMIN — FAMOTIDINE 20 MG: 20 TABLET ORAL at 09:10

## 2022-10-23 RX ADMIN — MUPIROCIN: 20 OINTMENT TOPICAL at 10:10

## 2022-10-23 RX ADMIN — Medication 324 MG: at 10:10

## 2022-10-23 RX ADMIN — KETOROLAC TROMETHAMINE 30 MG: 30 INJECTION, SOLUTION INTRAMUSCULAR; INTRAVENOUS at 03:10

## 2022-10-23 RX ADMIN — ACETAMINOPHEN 650 MG: 325 TABLET ORAL at 09:10

## 2022-10-23 RX ADMIN — DOCUSATE SODIUM 100 MG: 100 CAPSULE, LIQUID FILLED ORAL at 10:10

## 2022-10-23 RX ADMIN — DEXTROSE, SODIUM CHLORIDE, AND POTASSIUM CHLORIDE: 5; .45; .15 INJECTION INTRAVENOUS at 09:10

## 2022-10-23 RX ADMIN — AMPICILLIN SODIUM AND SULBACTAM SODIUM 3 G: 2; 1 INJECTION, POWDER, FOR SOLUTION INTRAMUSCULAR; INTRAVENOUS at 10:10

## 2022-10-23 RX ADMIN — KETOROLAC TROMETHAMINE 30 MG: 30 INJECTION, SOLUTION INTRAMUSCULAR; INTRAVENOUS at 09:10

## 2022-10-23 RX ADMIN — IPRATROPIUM BROMIDE AND ALBUTEROL SULFATE 3 ML: 2.5; .5 SOLUTION RESPIRATORY (INHALATION) at 07:10

## 2022-10-23 RX ADMIN — LINEZOLID 600 MG: 600 TABLET, FILM COATED ORAL at 09:10

## 2022-10-23 RX ADMIN — IPRATROPIUM BROMIDE AND ALBUTEROL SULFATE 3 ML: 2.5; .5 SOLUTION RESPIRATORY (INHALATION) at 01:10

## 2022-10-23 RX ADMIN — ENOXAPARIN SODIUM 40 MG: 40 INJECTION SUBCUTANEOUS at 09:10

## 2022-10-23 NOTE — PT/OT/SLP EVAL
Physical Therapy Evaluation    Patient Name:  Christina Leal   MRN:  [id number]    Recommendations:     Discharge Recommendations:  home   Discharge Equipment Recommendations: none   Barriers to discharge: None    Assessment:     Christina Leal is a 26 y.o. female admitted with a medical diagnosis of Empyema of left pleural space.  She presents with the following impairments/functional limitations:  impaired cardiopulmonary response to activity, impaired functional mobility.     Rehab Prognosis: Good; patient would benefit from acute skilled PT services to address these deficits and reach maximum level of function.    Recent Surgery: Procedure(s) (LRB):  VATS, WITH CHEST TUBE INSERTION FOR DRAINAGE OF PLEURAL EFFUSION (Left)  BLOCK, NERVE, INTERCOSTAL, 2 OR MORE (Left)  VATS, WITH DECORTICATION, LUNG (Left)  EVACUATION, EMPYEMA (Left) 2 Days Post-Op    Plan:     During this hospitalization, patient to be seen 3 x/week to address the identified rehab impairments via gait training and progress toward the following goals:    Plan of Care Expires:  11/06/22    Subjective     Chief Complaint: none stated  Patient/Family Comments/goals: return to PLOF  Pain/Comfort:  Pain Rating 1: 0/10    Patients cultural, spiritual, Roman Catholic conflicts given the current situation: no    Living Environment:  Pt reported that she lives with a roommate in a single story home without stairs. Pt does currently drive and works as an .   Prior to admission, patients level of function was IND within home and community.  Equipment used at home: none.  DME owned (not currently used): none.      Objective:     Communicated with nurse Hanna and epic chart review prior to session.  Patient found up in chair with PICC line, chest tube, telemetry  upon PT entry to room.    General Precautions: Standard, fall   Orthopedic Precautions:N/A   Braces: N/A  Respiratory Status: Nasal cannula, flow 1 L/min    Exams:  Cognitive Exam:   "Patient is oriented to Person, Place, Time, and Situation  Gross Motor Coordination:  WFL  Postural Exam:  Patient presented with the following abnormalities:    -       Rounded shoulders  -       Forward head  Skin Integrity/Edema:      -       chest tube insertion at L mid-back  RLE ROM: WNL  RLE Strength: WNL  LLE ROM: WNL  LLE Strength: WNL    Functional Mobility:  Transfers:     Sit to Stand:  supervision without an AD  Gait: 2x20' with supervision without an AD. Pt on chest tube suction; therefore, was limited to room ambulation.  Balance: Good.       AM-PAC 6 CLICK MOBILITY  Total Score:21       Treatment & Education:  Gait belt and  socks donned prior to session.   Pt GT at slow pace within room. Pt completed 10x standing MIP, TKE, and AP using RW for balance as needed. Pt required about 1-2 minute breaks in between exercises secondary to SOB.   Pt educated on the following: Pt verbally agreed in understanding.   Continue therapuetic exercises in chair, bed or standing, ambulate with nurse, and sit in chair throughought the day to increase functional strength and activity tolerance and decrease risk of blood clots and secondary infection.   "Call, don't fall" procedure of pressing red button on call bell for all transfers.       Patient left up in chair with all lines intact, call button in reach, friends present, and all needs met . No call bell present in the room.     GOALS:   Multidisciplinary Problems       Physical Therapy Goals          Problem: Physical Therapy    Goal Priority Disciplines Outcome Goal Variances Interventions   Physical Therapy Goal     PT, PT/OT      Description: LTGs:   POC expires 11/6/22  Pt will perform 3x10 sit to stands IND  in order improve endurance.   Pt will ' IND while maintaining chest tube in order to participate in household ambulation.    Pt will ascend and descend 1 flight of stairs IND in order to return to work.                       History:     History " reviewed. No pertinent past medical history.    History reviewed. No pertinent surgical history.    Time Tracking:     PT Received On: 10/23/22  PT Start Time: 1241     PT Stop Time: 1304  PT Total Time (min): 23 min     Billable Minutes: Evaluation 11 and Gait Training 12      10/23/2022

## 2022-10-23 NOTE — NURSING
Pt taken down for CT scan. Received verbal order from MD that it is okay to disconnect chest tube from continuous wall suction for travel.

## 2022-10-23 NOTE — PROGRESS NOTES
[facility] S[facility]  Pulmonology  Progress Note    Patient Name: Christina Leal  MRN: [id number]  Admission Date: 10/19/2022  Hospital Length of Stay: 4 days  Code Status: Full Code  Attending Provider: Cynthia Wallis MD  Primary Care Provider: Primary Doctor No   Principal Problem: Empyema of left pleural space    Subjective:     Interval History:     10/23: seen and examined : T max 99.6F, Using IS on and off, CXR reveiwed, pleurovac has 900 mls, tidaling, no air leak , on 2 LPM, PICC replaced, MARICHUY atelectasis    Respiratory ROS: positive for - shortness of breath  negative for - hemoptysis, sputum changes, tachypnea, or wheezing     Objective:     Vital Signs (Most Recent):  Temp: 98 °F (36.7 °C) (10/23/22 0755)  Pulse: 110 (10/23/22 0755)  Resp: (!) 24 (10/23/22 0755)  BP: 133/61 (10/23/22 0755)  SpO2: 96 % (10/23/22 0755)   Vital Signs (24h Range):  Temp:  [98 °F (36.7 °C)-99.6 °F (37.6 °C)] 98 °F (36.7 °C)  Pulse:  [] 110  Resp:  [16-24] 24  SpO2:  [91 %-99 %] 96 %  BP: (117-143)/(61-92) 133/61     Weight: (!) 150.5 kg (331 lb 12.7 oz)  Body mass index is 50.45 kg/m².      Intake/Output Summary (Last 24 hours) at 10/23/2022 0808  Last data filed at 10/22/2022 1814  Gross per 24 hour   Intake 513.22 ml   Output --   Net 513.22 ml       Physical Exam  Vitals and nursing note reviewed.   Constitutional:       Appearance: She is well-developed. She is morbidly obese.      Interventions: Nasal cannula in place.       HENT:      Head: Normocephalic and atraumatic.      Nose: Nose normal.      Mouth/Throat:      Pharynx: No oropharyngeal exudate.   Eyes:      General: No scleral icterus.     Conjunctiva/sclera: Conjunctivae normal.      Pupils: Pupils are equal, round, and reactive to light.   Neck:      Thyroid: No thyromegaly.      Vascular: No JVD.      Trachea: No tracheal deviation.   Cardiovascular:      Rate and Rhythm: Normal rate and regular rhythm.      Pulses: Normal pulses.      Heart sounds: Normal  heart sounds, S1 normal and S2 normal. No murmur heard.  Pulmonary:      Effort: Pulmonary effort is normal. No tachypnea, accessory muscle usage or respiratory distress.      Breath sounds: Decreased air movement present. No stridor. Examination of the left-upper field reveals decreased breath sounds. Decreased breath sounds present.       Abdominal:      General: Bowel sounds are normal. There is no distension.      Palpations: Abdomen is soft. There is no hepatomegaly, splenomegaly or mass.      Tenderness: There is no abdominal tenderness. There is no guarding or rebound.   Musculoskeletal:         General: No tenderness. Normal range of motion.      Cervical back: Normal range of motion and neck supple.   Lymphadenopathy:      Upper Body:      Right upper body: No supraclavicular adenopathy.      Left upper body: No supraclavicular adenopathy.   Skin:     General: Skin is warm and dry.      Findings: No rash.      Nails: There is no clubbing.   Neurological:      General: No focal deficit present.      Mental Status: She is alert and oriented to person, place, and time.      Coordination: Coordination normal.      Gait: Gait normal.      Deep Tendon Reflexes: Reflexes are normal and symmetric.   Psychiatric:         Mood and Affect: Mood normal.       Vents:  Oxygen Concentration (%): 24 (10/23/22 0721)    Lines/Drains/Airways       Peripherally Inserted Central Catheter Line  Duration             PICC Double Lumen 10/22/22 1919 right basilic <1 day              Drain  Duration                  Y Chest Tube 1 and 2 10/21/22 0958 1 Left Pleural 36 Fr. 2 Left Other (Comment) 36 Fr. 1 day                    Significant Labs:    CBC/Anemia Profile:  Recent Labs   Lab 10/22/22  0555 10/23/22  0635   WBC 22.64* 28.66*   HGB 8.9* 8.6*   HCT 28.4* 27.5*    442   MCV 83 84   RDW 13.6 13.9        Chemistries:  Recent Labs   Lab 10/22/22  0555 10/23/22  0635   * 135*   K 4.4 4.7    100   CO2 26 25   BUN  7 6   CREATININE 0.7 0.6   CALCIUM 7.7* 7.8*   ALBUMIN 1.8* 1.7*   PROT 5.5* 5.5*   BILITOT 0.6 0.6   ALKPHOS 61 63   ALT 14 12   AST 7* 8*   MG 2.4 2.1   PHOS 4.0  --        Blood Culture: No results for input(s): LABBLOO in the last 48 hours.  POCT Glucose:   Recent Labs   Lab 10/21/22  1310   POCTGLUCOSE 134*     Respiratory Culture: No results for input(s): GSRESP, RESPIRATORYC in the last 48 hours.  All pertinent labs within the past 24 hours have been reviewed.    Significant Imaging:  I have reviewed all pertinent imaging results/findings within the past 24 hours.    X-Ray Chest 1 View for Line/Tube Placement  Narrative: EXAMINATION:  XR CHEST 1 VIEW FOR LINE/TUBE PLACEMENT    CLINICAL HISTORY:  Picc line placement;.    TECHNIQUE:  Single frontal portable view of the chest was performed.    COMPARISON:  Multiple priors.    FINDINGS:  Support devices: Right PICC line tip over the distal SVC in good position.    Interval development of loculated left apical pleural fluid not seen on the prior exam.  Left basilar opacity possibly atelectasis or pneumonia.    Left chest tube in stable position.    The cardiac silhouette is enlarged.  The hilar and mediastinal contours are unremarkable.    Bones are intact.  Impression: PICC line in good position.    Interval development of loculated left apical pleural fluid.    Left basilar opacity possibly atelectasis pneumonia or aspiration.    This report was flagged in Epic as abnormal.    Electronically signed by: Emanuel Oden  Date:    10/22/2022  Time:    20:01  X-Ray Chest AP Single View  Narrative: EXAMINATION:  XR CHEST 1 VIEW    CLINICAL HISTORY:  post operative;    FINDINGS:  Comparison is made with the most recent prior chest x-ray.  The cardiomediastinal silhouette is within normal limits for AP technique and shallow inspiration.  Left chest tubes remain satisfactory in position.  Stable small left pleural effusion and adjacent left basilar airspace  consolidation/compressive atelectasis.  The remainder of the lungs appear clear of active disease.  No new infiltrate or pneumothorax identified.  Right PICC line remains well position.  Impression: See above.    Electronically signed by: Delano Anderson MD  Date:    10/22/2022  Time:    08:57           ABG  Recent Labs   Lab 10/21/22  1321   PH 7.377   PO2 124*   PCO2 49.8*   HCO3 29.3*   BE 4     Assessment/Plan:     * Empyema of left pleural space  SP VATS chest tube, decortication  CT management per CTS team    S/P chest tube placement  Follow CXR  pleurovac has 900mls  Incentive spirometry to prevent aletectasis  Pain control  CTS following    Class 3 severe obesity in adult  Weight loss    Parapneumonic effusion, Left   Empiric abx VANC, CEFEPIME, FLAGYL  Deescalate based on cultures  OJ Wood MD  Pulmonology  [facility]

## 2022-10-23 NOTE — PROCEDURES
"Christina Leal is a 26 y.o. female patient.    Temp: 98.6 °F (37 °C) (10/22/22 1528)  Pulse: 91 (10/22/22 1806)  Resp: 20 (10/22/22 1806)  BP: (!) 134/92 (10/22/22 1528)  SpO2: (!) 91 % (10/22/22 1806)  Weight: (!) 150.5 kg (331 lb 12.7 oz) (10/22/22 0011)  Height: 5' 8" (172.7 cm) (10/20/22 0817)    PICC  Date/Time: 10/22/2022 7:19 PM  Performed by: Fidel Stone RN  Supervising provider: Mildred Bee RN  Consent Done: Yes  Time out: Immediately prior to procedure a time out was called to verify the correct patient, procedure, equipment, support staff and site/side marked as required  Indications: med administration and vascular access  Anesthesia: local infiltration  Local anesthetic: lidocaine 1% without epinephrine  Anesthetic Total (mL): 3  Preparation: skin prepped with ChloraPrep  Skin prep agent dried: skin prep agent completely dried prior to procedure  Sterile barriers: all five maximum sterile barriers used - cap, mask, sterile gown, sterile gloves, and large sterile sheet  Hand hygiene: hand hygiene performed prior to central venous catheter insertion  Location details: right basilic  Catheter type: double lumen  Catheter size: 5 Fr  Catheter Length: 40cm    Ultrasound guidance: yes  Vessel Caliber: medium and patent, compressibility normal  Vascular Doppler: not done  Needle advanced into vessel with real time Ultrasound guidance.  Image recorded and saved.  Sterile sheath used.  no esophageal manometryNumber of attempts: 1  Post-procedure: blood return through all ports, chlorhexidine patch and sterile dressing applied  Estimated blood loss (mL): 3  Implants: No  Assessment: placement verified by x-ray        Name Fidel Stone RN  10/22/2022    "

## 2022-10-23 NOTE — PROGRESS NOTES
[facility] Surg  Cardiothoracic Surgery  Progress Note    Patient Name: Christina Leal  MRN: [id number]  Admission Date: 10/19/2022  Hospital Length of Stay: 4 days  Code Status: Full Code   Attending Physician: Cynthia Wallis MD   Referring Provider: Jimmy Castellanos NP  Principal Problem:Empyema of left pleural space            Subjective:     Post-Op Info:  Procedure(s) (LRB):  VATS, WITH CHEST TUBE INSERTION FOR DRAINAGE OF PLEURAL EFFUSION (Left)  BLOCK, NERVE, INTERCOSTAL, 2 OR MORE (Left)  VATS, WITH DECORTICATION, LUNG (Left)  EVACUATION, EMPYEMA (Left)   2 Days Post-Op     Interval History:  The patient is postop day 2 status post left VATS procedure with drainage of empyema and decortication    ROS  Medications:  Continuous Infusions:   dextrose 5 % and 0.45 % NaCl with KCl 20 mEq 25 mL/hr at 10/22/22 2115     Scheduled Meds:   acetaminophen  650 mg Oral Q6H    albuterol-ipratropium  3 mL Nebulization Q6H WAKE    ampicillin-sulbactim (UNASYN) IVPB  3 g Intravenous Q6H    docusate sodium  100 mg Oral BID    enoxaparin  40 mg Subcutaneous BID    famotidine  20 mg Oral BID    ferrous gluconate  324 mg Oral BID WM    ketorolac  30 mg Intravenous Q8H    linezolid  600 mg Oral Q12H    mupirocin   Nasal BID    polyethylene glycol  17 g Oral Daily     PRN Meds:albuterol sulfate, metoclopramide HCl, morphine, ondansetron, oxyCODONE, sodium chloride 0.9%, traZODone     Objective:     Vital Signs (Most Recent):  Temp: 98 °F (36.7 °C) (10/23/22 0755)  Pulse: 110 (10/23/22 0755)  Resp: (!) 24 (10/23/22 0755)  BP: 133/61 (10/23/22 0755)  SpO2: 96 % (10/23/22 0755)   Vital Signs (24h Range):  Temp:  [98 °F (36.7 °C)-99.6 °F (37.6 °C)] 98 °F (36.7 °C)  Pulse:  [] 110  Resp:  [16-24] 24  SpO2:  [91 %-99 %] 96 %  BP: (117-143)/(61-92) 133/61     Weight: (!) 150.5 kg (331 lb 12.7 oz)  Body mass index is 50.45 kg/m².    SpO2: 96 %  O2 Device (Oxygen Therapy): nasal cannula    Intake/Output - Last 3 Shifts          10/21 0700  10/22 0659 10/22 0700  10/23 0659 10/23 0700  10/24 0659    I.V. (mL/kg) 63.9 (0.4) 326.2 (2.2)     IV Piggyback 1848.2 187     Total Intake(mL/kg) 1912 (12.7) 513.2 (3.4)     Urine (mL/kg/hr) 675 (0.2) 0 (0)     Blood 50      Chest Tube 345 275 150    Total Output 1070 275 150    Net +842 +238.2 -150           Urine Occurrence  3 x             Lines/Drains/Airways       Peripherally Inserted Central Catheter Line  Duration             PICC Double Lumen 10/22/22 1919 right basilic <1 day              Drain  Duration                  Y Chest Tube 1 and 2 10/21/22 0958 1 Left Pleural 36 Fr. 2 Left Other (Comment) 36 Fr. 1 day                    Physical Exam  Constitutional:       Appearance: Normal appearance.   HENT:      Head: Normocephalic and atraumatic.   Cardiovascular:      Rate and Rhythm: Normal rate and regular rhythm.      Heart sounds: Normal heart sounds.   Pulmonary:      Effort: Pulmonary effort is normal.      Breath sounds: Normal breath sounds.   Abdominal:      General: Abdomen is flat.      Palpations: Abdomen is soft.   Skin:     General: Skin is warm and dry.   Neurological:      General: No focal deficit present.      Mental Status: She is alert and oriented to person, place, and time.   Psychiatric:         Mood and Affect: Mood normal.         Behavior: Behavior normal.       Significant Labs:  All pertinent labs from the last 24 hours have been reviewed.    Significant Diagnostics:  I have reviewed all pertinent imaging results/findings within the past 24 hours.    Assessment/Plan:     * Empyema of left pleural space  The patient is a 26-year-old female with a history of severe obesity (BMI 50.48) who was admitted with complaints of left-sided chest pain over the past week.  Patient also noticed some shortness of breath and dyspnea on exertion in addition the patient has a cough.  The patient had a chest x-ray done which showed a left pleural effusion CT scan shows a  loculated left pleural effusion.  Thoracentesis was performed with fluid analysis indicating that left pleural effusion is compatible with a diagnosis of an empyema.    The patient's left empyema requires drainage.  Patient will likely require a large bore chest tube for adequate drainage of empyema.  Bedside placement of chest tube in the setting of severe obesity will be difficult.  Will plan for placement of chest tube with VATS procedure in the operating room.  The risks and benefits of a VATS procedure were explained to the patient.  The patient understand the risks and benefits and agreed to proceed with VATS assisted drainage of empyema    10/22/2022   The patient is postop day 1 status post left VATS procedure with drainage of empyema and decortication.  Chest tube output has been about 600 mL since surgery yesterday.  Continue chest tube to suction.  Continue incentive spirometer and pulmonary toileting.  Continue broad-spectrum antibiotics.    10/23/2022   The patient is postop day 2 status post left VATS procedure with drainage of empyema and decortication.  Chest tube output over the past 24 hours is 150 mL.  Continue chest tube to suction.  Chest x-ray shows new opacity in the apical region of the left lung.  Will obtain CT scan of the chest          Dusty Moyer MD  Cardiothoracic Surgery  [facility]

## 2022-10-23 NOTE — SUBJECTIVE & OBJECTIVE
Interval History:   Chest tube to suction continues. WBC trended up 28K today. Will consult ID to guide antibiotic selection.      Review of Systems   Constitutional:  Positive for activity change. Negative for appetite change and fever.   HENT:  Negative for sore throat.    Eyes:  Negative for visual disturbance.   Respiratory:  Positive for shortness of breath (improved). Negative for cough and chest tightness.    Cardiovascular:  Positive for chest pain (left post chest wall pain). Negative for palpitations and leg swelling.   Gastrointestinal:  Negative for abdominal distention, abdominal pain, constipation, diarrhea, nausea and vomiting.   Endocrine: Negative for polyuria.   Genitourinary:  Negative for decreased urine volume, dysuria, flank pain, frequency and hematuria.   Musculoskeletal:  Negative for back pain and gait problem.   Skin:  Negative for rash.   Neurological:  Negative for syncope, speech difficulty, weakness, light-headedness and headaches.   Psychiatric/Behavioral:  Negative for confusion, hallucinations and sleep disturbance.    Objective:     Vital Signs (Most Recent):  Temp: 99.8 °F (37.7 °C) (10/23/22 1205)  Pulse: 110 (10/23/22 1205)  Resp: 20 (10/23/22 1205)  BP: 134/63 (10/23/22 1205)  SpO2: 96 % (10/23/22 1205) Vital Signs (24h Range):  Temp:  [98 °F (36.7 °C)-99.8 °F (37.7 °C)] 99.8 °F (37.7 °C)  Pulse:  [] 110  Resp:  [18-24] 20  SpO2:  [91 %-99 %] 96 %  BP: (117-143)/(61-92) 134/63     Weight: (!) 150.5 kg (331 lb 12.7 oz)  Body mass index is 50.45 kg/m².    Intake/Output Summary (Last 24 hours) at 10/23/2022 1229  Last data filed at 10/23/2022 0955  Gross per 24 hour   Intake 513.22 ml   Output 152 ml   Net 361.22 ml      Physical Exam  Constitutional:       General: She is not in acute distress.     Appearance: She is well-developed. She is morbidly obese. She is not diaphoretic.   HENT:      Head: Normocephalic and atraumatic.      Mouth/Throat:      Pharynx: No  oropharyngeal exudate.   Eyes:      Conjunctiva/sclera: Conjunctivae normal.      Pupils: Pupils are equal, round, and reactive to light.   Neck:      Thyroid: No thyromegaly.      Vascular: No JVD.   Cardiovascular:      Rate and Rhythm: Regular rhythm. Tachycardia present.      Heart sounds: Normal heart sounds. No murmur heard.  Pulmonary:      Effort: Pulmonary effort is normal. No respiratory distress.      Breath sounds: Examination of the left-lower field reveals decreased breath sounds. Decreased breath sounds present. No wheezing or rales.      Comments: Breath sounds diminished left lower lung   Chest:      Chest wall: No tenderness.   Abdominal:      General: Bowel sounds are normal. There is no distension.      Palpations: Abdomen is soft.      Tenderness: There is no abdominal tenderness. There is no guarding or rebound.   Musculoskeletal:         General: Normal range of motion.      Cervical back: Normal range of motion and neck supple.      Right lower leg: No edema.      Left lower leg: No edema.   Lymphadenopathy:      Cervical: No cervical adenopathy.   Skin:     General: Skin is warm and dry.      Findings: No rash.   Neurological:      General: No focal deficit present.      Mental Status: She is alert and oriented to person, place, and time.      Cranial Nerves: No cranial nerve deficit.      Sensory: No sensory deficit.      Deep Tendon Reflexes: Reflexes normal.   Psychiatric:         Mood and Affect: Mood normal.       Significant Labs: All pertinent labs within the past 24 hours have been reviewed.  BMP:   Recent Labs   Lab 10/23/22  0635   GLU 97   *   K 4.7      CO2 25   BUN 6   CREATININE 0.6   CALCIUM 7.8*   MG 2.1     CBC:   Recent Labs   Lab 10/22/22  0555 10/23/22  0635   WBC 22.64* 28.66*   HGB 8.9* 8.6*   HCT 28.4* 27.5*    442     CMP:   Recent Labs   Lab 10/22/22  0555 10/23/22  0635   * 135*   K 4.4 4.7    100   CO2 26 25   * 97   BUN 7 6    CREATININE 0.7 0.6   CALCIUM 7.7* 7.8*   PROT 5.5* 5.5*   ALBUMIN 1.8* 1.7*   BILITOT 0.6 0.6   ALKPHOS 61 63   AST 7* 8*   ALT 14 12   ANIONGAP 8 10       Significant Imaging: I have reviewed all pertinent imaging results/findings within the past 24 hours.

## 2022-10-23 NOTE — ASSESSMENT & PLAN NOTE
Follow CXR  pleurovac has 900mls  Incentive spirometry to prevent aletectasis  Pain control  CTS following

## 2022-10-23 NOTE — ASSESSMENT & PLAN NOTE
The patient is a 26-year-old female with a history of severe obesity (BMI 50.48) who was admitted with complaints of left-sided chest pain over the past week.  Patient also noticed some shortness of breath and dyspnea on exertion in addition the patient has a cough.  The patient had a chest x-ray done which showed a left pleural effusion CT scan shows a loculated left pleural effusion.  Thoracentesis was performed with fluid analysis indicating that left pleural effusion is compatible with a diagnosis of an empyema.    The patient's left empyema requires drainage.  Patient will likely require a large bore chest tube for adequate drainage of empyema.  Bedside placement of chest tube in the setting of severe obesity will be difficult.  Will plan for placement of chest tube with VATS procedure in the operating room.  The risks and benefits of a VATS procedure were explained to the patient.  The patient understand the risks and benefits and agreed to proceed with VATS assisted drainage of empyema    10/22/2022   The patient is postop day 1 status post left VATS procedure with drainage of empyema and decortication.  Chest tube output has been about 600 mL since surgery yesterday.  Continue chest tube to suction.  Continue incentive spirometer and pulmonary toileting.  Continue broad-spectrum antibiotics.    10/23/2022   The patient is postop day 2 status post left VATS procedure with drainage of empyema and decortication.  Chest tube output over the past 24 hours is 150 mL.  Continue chest tube to suction.  Chest x-ray shows new opacity in the apical region of the left lung.  Will obtain CT scan of the chest

## 2022-10-23 NOTE — SUBJECTIVE & OBJECTIVE
Interval History:     10/23: seen and examined : T max 99.6F, Using IS on and off, CXR reveiwed, pleurovac has 900 mls, tidaling, no air leak , on 2 LPM, PICC replaced, MARICHUY atelectasis    Respiratory ROS: positive for - shortness of breath  negative for - hemoptysis, sputum changes, tachypnea, or wheezing     Objective:     Vital Signs (Most Recent):  Temp: 98 °F (36.7 °C) (10/23/22 0755)  Pulse: 110 (10/23/22 0755)  Resp: (!) 24 (10/23/22 0755)  BP: 133/61 (10/23/22 0755)  SpO2: 96 % (10/23/22 0755)   Vital Signs (24h Range):  Temp:  [98 °F (36.7 °C)-99.6 °F (37.6 °C)] 98 °F (36.7 °C)  Pulse:  [] 110  Resp:  [16-24] 24  SpO2:  [91 %-99 %] 96 %  BP: (117-143)/(61-92) 133/61     Weight: (!) 150.5 kg (331 lb 12.7 oz)  Body mass index is 50.45 kg/m².      Intake/Output Summary (Last 24 hours) at 10/23/2022 0808  Last data filed at 10/22/2022 1814  Gross per 24 hour   Intake 513.22 ml   Output --   Net 513.22 ml       Physical Exam  Vitals and nursing note reviewed.   Constitutional:       Appearance: She is well-developed. She is morbidly obese.      Interventions: Nasal cannula in place.       HENT:      Head: Normocephalic and atraumatic.      Nose: Nose normal.      Mouth/Throat:      Pharynx: No oropharyngeal exudate.   Eyes:      General: No scleral icterus.     Conjunctiva/sclera: Conjunctivae normal.      Pupils: Pupils are equal, round, and reactive to light.   Neck:      Thyroid: No thyromegaly.      Vascular: No JVD.      Trachea: No tracheal deviation.   Cardiovascular:      Rate and Rhythm: Normal rate and regular rhythm.      Pulses: Normal pulses.      Heart sounds: Normal heart sounds, S1 normal and S2 normal. No murmur heard.  Pulmonary:      Effort: Pulmonary effort is normal. No tachypnea, accessory muscle usage or respiratory distress.      Breath sounds: Decreased air movement present. No stridor. Examination of the left-upper field reveals decreased breath sounds. Decreased breath sounds  present.       Abdominal:      General: Bowel sounds are normal. There is no distension.      Palpations: Abdomen is soft. There is no hepatomegaly, splenomegaly or mass.      Tenderness: There is no abdominal tenderness. There is no guarding or rebound.   Musculoskeletal:         General: No tenderness. Normal range of motion.      Cervical back: Normal range of motion and neck supple.   Lymphadenopathy:      Upper Body:      Right upper body: No supraclavicular adenopathy.      Left upper body: No supraclavicular adenopathy.   Skin:     General: Skin is warm and dry.      Findings: No rash.      Nails: There is no clubbing.   Neurological:      General: No focal deficit present.      Mental Status: She is alert and oriented to person, place, and time.      Coordination: Coordination normal.      Gait: Gait normal.      Deep Tendon Reflexes: Reflexes are normal and symmetric.   Psychiatric:         Mood and Affect: Mood normal.       Vents:  Oxygen Concentration (%): 24 (10/23/22 0721)    Lines/Drains/Airways       Peripherally Inserted Central Catheter Line  Duration             PICC Double Lumen 10/22/22 1919 right basilic <1 day              Drain  Duration                  Y Chest Tube 1 and 2 10/21/22 0958 1 Left Pleural 36 Fr. 2 Left Other (Comment) 36 Fr. 1 day                    Significant Labs:    CBC/Anemia Profile:  Recent Labs   Lab 10/22/22  0555 10/23/22  0635   WBC 22.64* 28.66*   HGB 8.9* 8.6*   HCT 28.4* 27.5*    442   MCV 83 84   RDW 13.6 13.9        Chemistries:  Recent Labs   Lab 10/22/22  0555 10/23/22  0635   * 135*   K 4.4 4.7    100   CO2 26 25   BUN 7 6   CREATININE 0.7 0.6   CALCIUM 7.7* 7.8*   ALBUMIN 1.8* 1.7*   PROT 5.5* 5.5*   BILITOT 0.6 0.6   ALKPHOS 61 63   ALT 14 12   AST 7* 8*   MG 2.4 2.1   PHOS 4.0  --        Blood Culture: No results for input(s): LABBLOO in the last 48 hours.  POCT Glucose:   Recent Labs   Lab 10/21/22  1310   POCTGLUCOSE 134*      Respiratory Culture: No results for input(s): GSRESP, RESPIRATORYC in the last 48 hours.  All pertinent labs within the past 24 hours have been reviewed.    Significant Imaging:  I have reviewed all pertinent imaging results/findings within the past 24 hours.    X-Ray Chest 1 View for Line/Tube Placement  Narrative: EXAMINATION:  XR CHEST 1 VIEW FOR LINE/TUBE PLACEMENT    CLINICAL HISTORY:  Picc line placement;.    TECHNIQUE:  Single frontal portable view of the chest was performed.    COMPARISON:  Multiple priors.    FINDINGS:  Support devices: Right PICC line tip over the distal SVC in good position.    Interval development of loculated left apical pleural fluid not seen on the prior exam.  Left basilar opacity possibly atelectasis or pneumonia.    Left chest tube in stable position.    The cardiac silhouette is enlarged.  The hilar and mediastinal contours are unremarkable.    Bones are intact.  Impression: PICC line in good position.    Interval development of loculated left apical pleural fluid.    Left basilar opacity possibly atelectasis pneumonia or aspiration.    This report was flagged in Epic as abnormal.    Electronically signed by: Emanuel Oden  Date:    10/22/2022  Time:    20:01  X-Ray Chest AP Single View  Narrative: EXAMINATION:  XR CHEST 1 VIEW    CLINICAL HISTORY:  post operative;    FINDINGS:  Comparison is made with the most recent prior chest x-ray.  The cardiomediastinal silhouette is within normal limits for AP technique and shallow inspiration.  Left chest tubes remain satisfactory in position.  Stable small left pleural effusion and adjacent left basilar airspace consolidation/compressive atelectasis.  The remainder of the lungs appear clear of active disease.  No new infiltrate or pneumothorax identified.  Right PICC line remains well position.  Impression: See above.    Electronically signed by: Delano Anderson MD  Date:    10/22/2022  Time:    08:57

## 2022-10-23 NOTE — SUBJECTIVE & OBJECTIVE
Interval History:  The patient is postop day 2 status post left VATS procedure with drainage of empyema and decortication    ROS  Medications:  Continuous Infusions:   dextrose 5 % and 0.45 % NaCl with KCl 20 mEq 25 mL/hr at 10/22/22 2115     Scheduled Meds:   acetaminophen  650 mg Oral Q6H    albuterol-ipratropium  3 mL Nebulization Q6H WAKE    ampicillin-sulbactim (UNASYN) IVPB  3 g Intravenous Q6H    docusate sodium  100 mg Oral BID    enoxaparin  40 mg Subcutaneous BID    famotidine  20 mg Oral BID    ferrous gluconate  324 mg Oral BID WM    ketorolac  30 mg Intravenous Q8H    linezolid  600 mg Oral Q12H    mupirocin   Nasal BID    polyethylene glycol  17 g Oral Daily     PRN Meds:albuterol sulfate, metoclopramide HCl, morphine, ondansetron, oxyCODONE, sodium chloride 0.9%, traZODone     Objective:     Vital Signs (Most Recent):  Temp: 98 °F (36.7 °C) (10/23/22 0755)  Pulse: 110 (10/23/22 0755)  Resp: (!) 24 (10/23/22 0755)  BP: 133/61 (10/23/22 0755)  SpO2: 96 % (10/23/22 0755)   Vital Signs (24h Range):  Temp:  [98 °F (36.7 °C)-99.6 °F (37.6 °C)] 98 °F (36.7 °C)  Pulse:  [] 110  Resp:  [16-24] 24  SpO2:  [91 %-99 %] 96 %  BP: (117-143)/(61-92) 133/61     Weight: (!) 150.5 kg (331 lb 12.7 oz)  Body mass index is 50.45 kg/m².    SpO2: 96 %  O2 Device (Oxygen Therapy): nasal cannula    Intake/Output - Last 3 Shifts         10/21 0700  10/22 0659 10/22 0700  10/23 0659 10/23 0700  10/24 0659    I.V. (mL/kg) 63.9 (0.4) 326.2 (2.2)     IV Piggyback 1848.2 187     Total Intake(mL/kg) 1912 (12.7) 513.2 (3.4)     Urine (mL/kg/hr) 675 (0.2) 0 (0)     Blood 50      Chest Tube 345 275 150    Total Output 1070 275 150    Net +842 +238.2 -150           Urine Occurrence  3 x             Lines/Drains/Airways       Peripherally Inserted Central Catheter Line  Duration             PICC Double Lumen 10/22/22 1919 right basilic <1 day              Drain  Duration                  Y Chest Tube 1 and 2 10/21/22 0958 1 Left  Pleural 36 Fr. 2 Left Other (Comment) 36 Fr. 1 day                    Physical Exam  Constitutional:       Appearance: Normal appearance.   HENT:      Head: Normocephalic and atraumatic.   Cardiovascular:      Rate and Rhythm: Normal rate and regular rhythm.      Heart sounds: Normal heart sounds.   Pulmonary:      Effort: Pulmonary effort is normal.      Breath sounds: Normal breath sounds.   Abdominal:      General: Abdomen is flat.      Palpations: Abdomen is soft.   Skin:     General: Skin is warm and dry.   Neurological:      General: No focal deficit present.      Mental Status: She is alert and oriented to person, place, and time.   Psychiatric:         Mood and Affect: Mood normal.         Behavior: Behavior normal.       Significant Labs:  All pertinent labs from the last 24 hours have been reviewed.    Significant Diagnostics:  I have reviewed all pertinent imaging results/findings within the past 24 hours.

## 2022-10-23 NOTE — PROGRESS NOTES
[facility] Medicine  Progress Note    Patient Name: Christina Leal  MRN: [id number]  Patient Class: IP- Inpatient   Admission Date: 10/19/2022  Length of Stay: 4 days  Attending Physician: Cynthia Wallis MD  Primary Care Provider: Primary Doctor No        Subjective:     Principal Problem:Empyema of left pleural space        HPI:  Christina Leal is a 27 yo female with history of obesity, former tobacco abuse on oral contraception who was referred to ED by primary care for abnormal CXR. Patient reports 1 week history of left sided rib cage pain associated with chest tightness, SOB and PAIZ. She also reports upper abdominal cramping and diarrhea. She denies n/v. OP imaging remarkable for pna with large left pleural effusion. CT chest shows large loculated appearing left pleural effusion with associated lower lobe atelectasis as well as mild left upper lobe atelectasis.  Bandlike opacities with ground-glass attenuation in the posterior right lung base likely represent additional subsegmental atelectasis. Mild rightward shift of the mediastinal contents noted. Patient febrile (Tmax 100.6) tachycardic and tachypenic, O2 sats 98% 2 L O2. Influenza, COVID, HIV, Hep C negative. Labs remarkable for wbc 29 with left shift, Tbili 2.1. She received cefepime. Pulmonology consulted. Patient admitted to hospital medicine.             Overview/Hospital Course:  10/20/22 patient s/p thoracentesis 1500 cc removed. Fluid analysis indicating left pleural effusion compatible with a diagnosis of an empyema   Pt reports feeling better. Repeat CXR: fluid reaccumulation. CVT consulted, plan for chest tube placement with VATs procedure.     10/21- Pt is currently undergoing  VATS procedure with chest tube placement and drainage of empyema with CT surgery. AM labs are not available. Blood cultures - NGTD. Nasal MRSA - neg. Pleural Fluid culture - pending. Antibiotic de escalated to Unasyn.     10/22- S/P VATS, chest tube  insertion x 2 and decortication. CT output 600 ml since surgery yesterday. Pleural fluid gram stain showed mod WBCs and rare GPC. Fluid cultures are in progress. WBC remains elevated 22K, CRP trending down. PCT is normal. Current antibiotic - Unasyn and Oral Zyvox.     10/23- Chest tube to suction continues. Output reported 150 ml in the last 24h. Follow up CXR showed persistent loculated left pleural effusion in the apex. CT Surgery will get CT chest for further eval. WBC trended up 28K today. Will consult ID to guide antibiotic selection. Hgb dropped to 8.6 from initial 11.3 on admit. No signs of active bleeding reported. Will monitor Hgb. Culture in progress       Interval History:   Chest tube to suction continues. WBC trended up 28K today. Will consult ID to guide antibiotic selection.      Review of Systems   Constitutional:  Positive for activity change. Negative for appetite change and fever.   HENT:  Negative for sore throat.    Eyes:  Negative for visual disturbance.   Respiratory:  Positive for shortness of breath (improved). Negative for cough and chest tightness.    Cardiovascular:  Positive for chest pain (left post chest wall pain). Negative for palpitations and leg swelling.   Gastrointestinal:  Negative for abdominal distention, abdominal pain, constipation, diarrhea, nausea and vomiting.   Endocrine: Negative for polyuria.   Genitourinary:  Negative for decreased urine volume, dysuria, flank pain, frequency and hematuria.   Musculoskeletal:  Negative for back pain and gait problem.   Skin:  Negative for rash.   Neurological:  Negative for syncope, speech difficulty, weakness, light-headedness and headaches.   Psychiatric/Behavioral:  Negative for confusion, hallucinations and sleep disturbance.    Objective:     Vital Signs (Most Recent):  Temp: 99.8 °F (37.7 °C) (10/23/22 1205)  Pulse: 110 (10/23/22 1205)  Resp: 20 (10/23/22 1205)  BP: 134/63 (10/23/22 1205)  SpO2: 96 % (10/23/22 1205) Vital Signs  (24h Range):  Temp:  [98 °F (36.7 °C)-99.8 °F (37.7 °C)] 99.8 °F (37.7 °C)  Pulse:  [] 110  Resp:  [18-24] 20  SpO2:  [91 %-99 %] 96 %  BP: (117-143)/(61-92) 134/63     Weight: (!) 150.5 kg (331 lb 12.7 oz)  Body mass index is 50.45 kg/m².    Intake/Output Summary (Last 24 hours) at 10/23/2022 1229  Last data filed at 10/23/2022 0955  Gross per 24 hour   Intake 513.22 ml   Output 152 ml   Net 361.22 ml      Physical Exam  Constitutional:       General: She is not in acute distress.     Appearance: She is well-developed. She is morbidly obese. She is not diaphoretic.   HENT:      Head: Normocephalic and atraumatic.      Mouth/Throat:      Pharynx: No oropharyngeal exudate.   Eyes:      Conjunctiva/sclera: Conjunctivae normal.      Pupils: Pupils are equal, round, and reactive to light.   Neck:      Thyroid: No thyromegaly.      Vascular: No JVD.   Cardiovascular:      Rate and Rhythm: Regular rhythm. Tachycardia present.      Heart sounds: Normal heart sounds. No murmur heard.  Pulmonary:      Effort: Pulmonary effort is normal. No respiratory distress.      Breath sounds: Examination of the left-lower field reveals decreased breath sounds. Decreased breath sounds present. No wheezing or rales.      Comments: Breath sounds diminished left lower lung   Chest:      Chest wall: No tenderness.   Abdominal:      General: Bowel sounds are normal. There is no distension.      Palpations: Abdomen is soft.      Tenderness: There is no abdominal tenderness. There is no guarding or rebound.   Musculoskeletal:         General: Normal range of motion.      Cervical back: Normal range of motion and neck supple.      Right lower leg: No edema.      Left lower leg: No edema.   Lymphadenopathy:      Cervical: No cervical adenopathy.   Skin:     General: Skin is warm and dry.      Findings: No rash.   Neurological:      General: No focal deficit present.      Mental Status: She is alert and oriented to person, place, and time.       Cranial Nerves: No cranial nerve deficit.      Sensory: No sensory deficit.      Deep Tendon Reflexes: Reflexes normal.   Psychiatric:         Mood and Affect: Mood normal.       Significant Labs: All pertinent labs within the past 24 hours have been reviewed.  BMP:   Recent Labs   Lab 10/23/22  0635   GLU 97   *   K 4.7      CO2 25   BUN 6   CREATININE 0.6   CALCIUM 7.8*   MG 2.1     CBC:   Recent Labs   Lab 10/22/22  0555 10/23/22  0635   WBC 22.64* 28.66*   HGB 8.9* 8.6*   HCT 28.4* 27.5*    442     CMP:   Recent Labs   Lab 10/22/22  0555 10/23/22  0635   * 135*   K 4.4 4.7    100   CO2 26 25   * 97   BUN 7 6   CREATININE 0.7 0.6   CALCIUM 7.7* 7.8*   PROT 5.5* 5.5*   ALBUMIN 1.8* 1.7*   BILITOT 0.6 0.6   ALKPHOS 61 63   AST 7* 8*   ALT 14 12   ANIONGAP 8 10       Significant Imaging: I have reviewed all pertinent imaging results/findings within the past 24 hours.      Assessment/Plan:      * Empyema of left pleural space  10/21- VATS procedure with chest tube placement and drainage of empyema with CT surgery today. Antibiotic de escalated to Unasyn.   10/22- POD #1. S/P VATS, chest tube insertion x 2 and decortication. Fluid cultures are in progress. WBC remains elevated 22K, CRP trending down. PCT is normal. Current antibiotic - Unasyn and Oral Zyvox.       Parapneumonic effusion, Left   Continue empiric vancomycin and cefepime   Pulmonology following  Thoracentesis, follow fluid analysis   Continue supplemental O2  Check TTE  Follow blood cultures     10/20  S/p Thora 1500 cc removed   CVT following, plan for placement of chest tube per VATs   TTE reviewed   Continue empiric vanc, cefepime, add flagyl   dvt ppx   10/21-   VATS procedure with chest tube placement and drainage of empyema with CT surgery. Antibiotic de escalated to Unasyn. Blood cultures - NGTD. Nasal MRSA - neg. Pleural Fluid culture - pending.   10/22- See plan under Empyema of left pleural space      Class 3 severe obesity in adult  Body mass index is 50.45 kg/m². Morbid obesity complicates all aspects of disease management from diagnostic modalities to treatment. Weight loss encouraged and health benefits explained to patient.         S/P chest tube placement          VTE Risk Mitigation (From admission, onward)         Ordered     Place JASON hose  Until discontinued         10/20/22 2109     Place sequential compression device  Until discontinued         10/20/22 2109     enoxaparin injection 40 mg  2 times daily         10/20/22 1323     Place sequential compression device  Until discontinued         10/19/22 1646                Discharge Planning   DANNY:      Code Status: Full Code   Is the patient medically ready for discharge?:     Reason for patient still in hospital (select all that apply): Patient trending condition  Discharge Plan A: Home                  Cynthia Wallis MD  Department of Hospital Medicine   [facility] - Med Surg

## 2022-10-23 NOTE — PLAN OF CARE
PT Apoorva completed today. Pt GT 2x20' with supervision without an AD; PT managed chest tube suction. Pt tolerated session well but limited by SOB.  PT recommends Home at D/C.

## 2022-10-24 LAB
ANION GAP SERPL CALC-SCNC: 13 MMOL/L (ref 8–16)
BACTERIA FLD AEROBE CULT: NORMAL
BACTERIA SPEC AEROBE CULT: NORMAL
BACTERIA SPEC AEROBE CULT: NORMAL
BASOPHILS # BLD AUTO: ABNORMAL K/UL (ref 0–0.2)
BASOPHILS NFR BLD: 0 % (ref 0–1.9)
BUN SERPL-MCNC: 8 MG/DL (ref 6–20)
CALCIUM SERPL-MCNC: 8.3 MG/DL (ref 8.7–10.5)
CHLORIDE SERPL-SCNC: 99 MMOL/L (ref 95–110)
CO2 SERPL-SCNC: 24 MMOL/L (ref 23–29)
CREAT SERPL-MCNC: 0.6 MG/DL (ref 0.5–1.4)
DIFFERENTIAL METHOD: ABNORMAL
EOSINOPHIL # BLD AUTO: ABNORMAL K/UL (ref 0–0.5)
EOSINOPHIL NFR BLD: 0 % (ref 0–8)
ERYTHROCYTE [DISTWIDTH] IN BLOOD BY AUTOMATED COUNT: 13.9 % (ref 11.5–14.5)
EST. GFR  (NO RACE VARIABLE): >60 ML/MIN/1.73 M^2
GLUCOSE SERPL-MCNC: 90 MG/DL (ref 70–110)
GRAM STN SPEC: NORMAL
GRAM STN SPEC: NORMAL
HCT VFR BLD AUTO: 26.1 % (ref 37–48.5)
HGB BLD-MCNC: 8.2 G/DL (ref 12–16)
IMM GRANULOCYTES # BLD AUTO: ABNORMAL K/UL (ref 0–0.04)
IMM GRANULOCYTES NFR BLD AUTO: ABNORMAL % (ref 0–0.5)
LYMPHOCYTES # BLD AUTO: ABNORMAL K/UL (ref 1–4.8)
LYMPHOCYTES NFR BLD: 19 % (ref 18–48)
MCH RBC QN AUTO: 26.5 PG (ref 27–31)
MCHC RBC AUTO-ENTMCNC: 31.4 G/DL (ref 32–36)
MCV RBC AUTO: 84 FL (ref 82–98)
MONOCYTES # BLD AUTO: ABNORMAL K/UL (ref 0.3–1)
MONOCYTES NFR BLD: 20 % (ref 4–15)
NEUTROPHILS NFR BLD: 61 % (ref 38–73)
NRBC BLD-RTO: 0 /100 WBC
PLATELET # BLD AUTO: 435 K/UL (ref 150–450)
PMV BLD AUTO: 9.1 FL (ref 9.2–12.9)
POTASSIUM SERPL-SCNC: 4.4 MMOL/L (ref 3.5–5.1)
RBC # BLD AUTO: 3.1 M/UL (ref 4–5.4)
SODIUM SERPL-SCNC: 136 MMOL/L (ref 136–145)
WBC # BLD AUTO: 22.72 K/UL (ref 3.9–12.7)

## 2022-10-24 PROCEDURE — 25000003 PHARM REV CODE 250: Performed by: THORACIC SURGERY (CARDIOTHORACIC VASCULAR SURGERY)

## 2022-10-24 PROCEDURE — 25000003 PHARM REV CODE 250: Performed by: INTERNAL MEDICINE

## 2022-10-24 PROCEDURE — 85007 BL SMEAR W/DIFF WBC COUNT: CPT | Performed by: INTERNAL MEDICINE

## 2022-10-24 PROCEDURE — 99232 SBSQ HOSP IP/OBS MODERATE 35: CPT | Mod: ,,, | Performed by: INTERNAL MEDICINE

## 2022-10-24 PROCEDURE — 97116 GAIT TRAINING THERAPY: CPT

## 2022-10-24 PROCEDURE — 25000003 PHARM REV CODE 250: Performed by: NURSE PRACTITIONER

## 2022-10-24 PROCEDURE — 63600175 PHARM REV CODE 636 W HCPCS: Performed by: THORACIC SURGERY (CARDIOTHORACIC VASCULAR SURGERY)

## 2022-10-24 PROCEDURE — 85027 COMPLETE CBC AUTOMATED: CPT | Performed by: INTERNAL MEDICINE

## 2022-10-24 PROCEDURE — 11000001 HC ACUTE MED/SURG PRIVATE ROOM

## 2022-10-24 PROCEDURE — 80048 BASIC METABOLIC PNL TOTAL CA: CPT | Performed by: INTERNAL MEDICINE

## 2022-10-24 PROCEDURE — 94640 AIRWAY INHALATION TREATMENT: CPT

## 2022-10-24 PROCEDURE — 25000242 PHARM REV CODE 250 ALT 637 W/ HCPCS: Performed by: THORACIC SURGERY (CARDIOTHORACIC VASCULAR SURGERY)

## 2022-10-24 PROCEDURE — 94761 N-INVAS EAR/PLS OXIMETRY MLT: CPT

## 2022-10-24 PROCEDURE — 99900035 HC TECH TIME PER 15 MIN (STAT)

## 2022-10-24 PROCEDURE — 99232 PR SUBSEQUENT HOSPITAL CARE,LEVL II: ICD-10-PCS | Mod: ,,, | Performed by: INTERNAL MEDICINE

## 2022-10-24 PROCEDURE — 63600175 PHARM REV CODE 636 W HCPCS: Performed by: INTERNAL MEDICINE

## 2022-10-24 PROCEDURE — 27000221 HC OXYGEN, UP TO 24 HOURS

## 2022-10-24 PROCEDURE — 97530 THERAPEUTIC ACTIVITIES: CPT

## 2022-10-24 PROCEDURE — 21400001 HC TELEMETRY ROOM

## 2022-10-24 RX ORDER — ENOXAPARIN SODIUM 100 MG/ML
40 INJECTION SUBCUTANEOUS EVERY 12 HOURS
Status: DISCONTINUED | OUTPATIENT
Start: 2022-10-25 | End: 2022-10-28 | Stop reason: HOSPADM

## 2022-10-24 RX ORDER — CYANOCOBALAMIN 1000 UG/ML
1000 INJECTION, SOLUTION INTRAMUSCULAR; SUBCUTANEOUS ONCE
Status: COMPLETED | OUTPATIENT
Start: 2022-10-24 | End: 2022-10-24

## 2022-10-24 RX ORDER — IBUPROFEN 600 MG/1
600 TABLET ORAL EVERY 6 HOURS PRN
Status: DISCONTINUED | OUTPATIENT
Start: 2022-10-24 | End: 2022-10-28 | Stop reason: HOSPADM

## 2022-10-24 RX ADMIN — LINEZOLID 600 MG: 600 TABLET, FILM COATED ORAL at 09:10

## 2022-10-24 RX ADMIN — ACETAMINOPHEN 650 MG: 325 TABLET ORAL at 09:10

## 2022-10-24 RX ADMIN — MUPIROCIN: 20 OINTMENT TOPICAL at 09:10

## 2022-10-24 RX ADMIN — ENOXAPARIN SODIUM 40 MG: 40 INJECTION SUBCUTANEOUS at 09:10

## 2022-10-24 RX ADMIN — FAMOTIDINE 20 MG: 20 TABLET ORAL at 09:10

## 2022-10-24 RX ADMIN — AMPICILLIN SODIUM AND SULBACTAM SODIUM 3 G: 2; 1 INJECTION, POWDER, FOR SOLUTION INTRAMUSCULAR; INTRAVENOUS at 09:10

## 2022-10-24 RX ADMIN — DOCUSATE SODIUM 100 MG: 100 CAPSULE, LIQUID FILLED ORAL at 09:10

## 2022-10-24 RX ADMIN — KETOROLAC TROMETHAMINE 30 MG: 30 INJECTION, SOLUTION INTRAMUSCULAR; INTRAVENOUS at 05:10

## 2022-10-24 RX ADMIN — Medication 324 MG: at 05:10

## 2022-10-24 RX ADMIN — Medication 324 MG: at 09:10

## 2022-10-24 RX ADMIN — Medication 1 TABLET: at 09:10

## 2022-10-24 RX ADMIN — IPRATROPIUM BROMIDE AND ALBUTEROL SULFATE 3 ML: 2.5; .5 SOLUTION RESPIRATORY (INHALATION) at 07:10

## 2022-10-24 RX ADMIN — AMPICILLIN SODIUM AND SULBACTAM SODIUM 3 G: 2; 1 INJECTION, POWDER, FOR SOLUTION INTRAMUSCULAR; INTRAVENOUS at 03:10

## 2022-10-24 RX ADMIN — OXYCODONE HYDROCHLORIDE 5 MG: 5 TABLET ORAL at 08:10

## 2022-10-24 RX ADMIN — CYANOCOBALAMIN 1000 MCG: 1000 INJECTION, SOLUTION INTRAMUSCULAR; SUBCUTANEOUS at 09:10

## 2022-10-24 RX ADMIN — IBUPROFEN 600 MG: 600 TABLET, FILM COATED ORAL at 06:10

## 2022-10-24 RX ADMIN — IPRATROPIUM BROMIDE AND ALBUTEROL SULFATE 3 ML: 2.5; .5 SOLUTION RESPIRATORY (INHALATION) at 01:10

## 2022-10-24 NOTE — PROGRESS NOTES
[facility] Surg  Cardiothoracic Surgery  Progress Note    Patient Name: Christina Leal  MRN: [id number]  Admission Date: 10/19/2022  Hospital Length of Stay: 5 days  Code Status: Full Code   Attending Physician: Cynthia Wallis MD   Referring Provider: Jimmy Castellanos NP  Principal Problem:Empyema of left pleural space            Subjective:     Post-Op Info:  Procedure(s) (LRB):  VATS, WITH CHEST TUBE INSERTION FOR DRAINAGE OF PLEURAL EFFUSION (Left)  BLOCK, NERVE, INTERCOSTAL, 2 OR MORE (Left)  VATS, WITH DECORTICATION, LUNG (Left)  EVACUATION, EMPYEMA (Left)   3 Days Post-Op     Interval History: The patient is postop day 3 status post VATS procedure with drainage of empyema and decortication.    ROS  Medications:  Continuous Infusions:   dextrose 5 % and 0.45 % NaCl with KCl 20 mEq 5 mL/hr at 10/24/22 0355     Scheduled Meds:   acetaminophen  650 mg Oral Q6H    albuterol-ipratropium  3 mL Nebulization Q6H WAKE    ampicillin-sulbactim (UNASYN) IVPB  3 g Intravenous Q6H    docusate sodium  100 mg Oral BID    enoxaparin  40 mg Subcutaneous BID    [START ON 10/25/2022] enoxaparin  40 mg Subcutaneous Q12H    famotidine  20 mg Oral BID    ferrous gluconate  324 mg Oral BID WM    folic acid-vit B6-vit B12 2.5-25-2 mg  1 tablet Oral Daily    ketorolac  30 mg Intravenous Q8H    linezolid  600 mg Oral Q12H    mupirocin   Nasal BID    polyethylene glycol  17 g Oral Daily     PRN Meds:albuterol sulfate, metoclopramide HCl, morphine, ondansetron, oxyCODONE, sodium chloride 0.9%, traZODone     Objective:     Vital Signs (Most Recent):  Temp: 98.6 °F (37 °C) (10/24/22 0715)  Pulse: 110 (10/24/22 0928)  Resp: 16 (10/24/22 0742)  BP: (!) 147/81 (10/24/22 0715)  SpO2: 97 % (10/24/22 0742)   Vital Signs (24h Range):  Temp:  [98.2 °F (36.8 °C)-99.9 °F (37.7 °C)] 98.6 °F (37 °C)  Pulse:  [] 110  Resp:  [16-20] 16  SpO2:  [94 %-99 %] 97 %  BP: (122-147)/(58-81) 147/81     Weight: (!) 150.5 kg (331 lb 12.7  oz)  Body mass index is 50.45 kg/m².    SpO2: 97 %  O2 Device (Oxygen Therapy): nasal cannula    Intake/Output - Last 3 Shifts         10/22 0700  10/23 0659 10/23 0700  10/24 0659 10/24 0700  10/25 0659    P.O.   240    I.V. (mL/kg) 326.2 (2.2) 33.2 (0.2)     IV Piggyback 187 502.6     Total Intake(mL/kg) 513.2 (3.4) 535.8 (3.6) 240 (1.6)    Urine (mL/kg/hr) 0 (0) 2 (0)     Emesis/NG output  1     Stool  1     Blood       Chest Tube 275 215     Total Output 275 219     Net +238.2 +316.8 +240           Urine Occurrence 3 x 1 x     Stool Occurrence  2 x             Lines/Drains/Airways       Peripherally Inserted Central Catheter Line  Duration             PICC Double Lumen 10/22/22 1919 right basilic 1 day              Drain  Duration                  Y Chest Tube 1 and 2 10/21/22 0958 1 Left Pleural 36 Fr. 2 Left Other (Comment) 36 Fr. 3 days                    Physical Exam  Constitutional:       Appearance: Normal appearance. She is obese.   HENT:      Head: Normocephalic and atraumatic.   Cardiovascular:      Rate and Rhythm: Regular rhythm.      Heart sounds: Normal heart sounds.   Pulmonary:      Effort: Pulmonary effort is normal.      Breath sounds: Normal breath sounds.   Abdominal:      General: Abdomen is flat. Bowel sounds are normal.      Palpations: Abdomen is soft.   Musculoskeletal:      Right lower leg: No edema.      Left lower leg: No edema.   Skin:     General: Skin is warm and dry.   Neurological:      General: No focal deficit present.      Mental Status: She is alert and oriented to person, place, and time.   Psychiatric:         Behavior: Behavior normal.       Significant Labs:  All pertinent labs from the last 24 hours have been reviewed.    Significant Diagnostics:  I have reviewed all pertinent imaging results/findings within the past 24 hours.    Assessment/Plan:     * Empyema of left pleural space  The patient is a 26-year-old female with a history of severe obesity (BMI 50.48) who was  admitted with complaints of left-sided chest pain over the past week.  Patient also noticed some shortness of breath and dyspnea on exertion in addition the patient has a cough.  The patient had a chest x-ray done which showed a left pleural effusion CT scan shows a loculated left pleural effusion.  Thoracentesis was performed with fluid analysis indicating that left pleural effusion is compatible with a diagnosis of an empyema.    The patient's left empyema requires drainage.  Patient will likely require a large bore chest tube for adequate drainage of empyema.  Bedside placement of chest tube in the setting of severe obesity will be difficult.  Will plan for placement of chest tube with VATS procedure in the operating room.  The risks and benefits of a VATS procedure were explained to the patient.  The patient understand the risks and benefits and agreed to proceed with VATS assisted drainage of empyema    10/22/2022   The patient is postop day 1 status post left VATS procedure with drainage of empyema and decortication.  Chest tube output has been about 600 mL since surgery yesterday.  Continue chest tube to suction.  Continue incentive spirometer and pulmonary toileting.  Continue broad-spectrum antibiotics.    10/23/2022   The patient is postop day 2 status post left VATS procedure with drainage of empyema and decortication.  Chest tube output over the past 24 hours is 150 mL.  Continue chest tube to suction.  Chest x-ray shows new opacity in the apical region of the left lung.  Will obtain CT scan of the chest    10/24/2022   Patient is postop day 3 status post VATS procedure with drainage of empyema and decortication.  CT scan shows new fluid collection in the apical region.  Patient discussed with IR.  Plan is for IR placed chest tube in the a.m..  Continue antibiotics.  Continue chest tube to suction.          Dusty Moyer MD  Cardiothoracic Surgery  [facility]

## 2022-10-24 NOTE — PT/OT/SLP PROGRESS
Physical Therapy Treatment    Patient Name:  Christina Leal   MRN:  [id number]    Recommendations:     Discharge Recommendations:  home   Discharge Equipment Recommendations: none   Barriers to discharge: None    Assessment:     Christina Leal is a 26 y.o. female admitted with a medical diagnosis of Empyema of left pleural space.  She presents with the following impairments/functional limitations:  impaired cardiopulmonary response to activity, impaired functional mobility. Pt demonstrated improved tolerance to activity today. Pt was able to GT within the halls secondary to chest tube suction removal.     Rehab Prognosis: Good; patient would benefit from acute skilled PT services to address these deficits and reach maximum level of function.    Recent Surgery: Procedure(s) (LRB):  VATS, WITH CHEST TUBE INSERTION FOR DRAINAGE OF PLEURAL EFFUSION (Left)  BLOCK, NERVE, INTERCOSTAL, 2 OR MORE (Left)  VATS, WITH DECORTICATION, LUNG (Left)  EVACUATION, EMPYEMA (Left) 3 Days Post-Op    Plan:     During this hospitalization, patient to be seen 3 x/week to address the identified rehab impairments via gait training, therapeutic activities and progress toward the following goals:    Plan of Care Expires:  11/06/22    Subjective     Chief Complaint: none stated  Patient/Family Comments/goals: to be able to walk in the halls freely  Pain/Comfort:  Pain Rating 1: 0/10  Pain Rating 2: 0/10      Objective:     Communicated with nurse Rust and epic chart review prior to session.  Patient found HOB elevated with chest tube, PICC line, telemetry upon PT entry to room.     General Precautions: Standard, fall   Orthopedic Precautions:N/A   Braces: N/A  Respiratory Status: Nasal cannula, flow 2 L/min     Functional Mobility:  Transfers:     Sit to Stand:  independence with no AD  Gait: 200' with supervision with no AD with IV, O2, and chest tube in tow. Pt was SOB following GT but was okay within 1-2 minutes after seated rest  "break.       AM-PAC 6 CLICK MOBILITY  Turning over in bed (including adjusting bedclothes, sheets and blankets)?: 4  Sitting down on and standing up from a chair with arms (e.g., wheelchair, bedside commode, etc.): 4  Moving from lying on back to sitting on the side of the bed?: 4  Moving to and from a bed to a chair (including a wheelchair)?: 4  Need to walk in hospital room?: 4  Climbing 3-5 steps with a railing?: 1  Basic Mobility Total Score: 21       Treatment & Education:  Gait belt and  socks donned prior to session.   Pt completed 5x sit to stands. Pt experienced SOB to follow but okay with 1-2 minutes.   Pt educated on the following: Pt verbally agreed in understanding.   Continue therapuetic exercises in chair, bed or standing, ambulate with nurse, and sit in chair throughought the day to increase functional strength and activity tolerance and decrease risk of blood clots and secondary infection.   "Call, don't fall" procedure of pressing red button on call bell for all transfers.        Patient left up in chair with all lines intact, call button in reach, boyfriend present, and all needs met . No chair alarm present in room.        GOALS:   Multidisciplinary Problems       Physical Therapy Goals          Problem: Physical Therapy    Goal Priority Disciplines Outcome Goal Variances Interventions   Physical Therapy Goal     PT, PT/OT Ongoing, Progressing     Description: LTGs:   POC expires 11/6/22  Pt will perform 3x10 sit to stands IND  in order improve endurance.   Pt will ' IND while maintaining chest tube in order to participate in household ambulation.    Pt will ascend and descend 1 flight of stairs IND in order to return to work.                       Time Tracking:     PT Received On: 10/24/22  PT Start Time: 0907     PT Stop Time: 0930  PT Total Time (min): 23 min     Billable Minutes: Gait Training 13 and Therapeutic Activity 12    Treatment Type: Treatment  PT/PTA: PT     PTA Visit " Number: 0     10/24/2022

## 2022-10-24 NOTE — PLAN OF CARE
Pt remains free of falls/injury this shift. Safety precautions maintained. Pt. denies any pain or discomfort this shift. IV fluids infusing. IV abx given. Sinus Tachy on tele monitor #[id number]. Regular diet tolerated well. Chest tube in place to suction. VSS. No signs and symptoms of acute distress noted at this time. 12 hour chart check completed. Will continue to monitor.

## 2022-10-24 NOTE — PROGRESS NOTES
[facility] Surg  Pulmonology  Progress Note    Patient Name: Christina Leal  MRN: [id number]  Admission Date: 10/19/2022  Hospital Length of Stay: 5 days  Code Status: Full Code  Attending Provider: Cynthia Wallis MD  Primary Care Provider: Primary Doctor No   Principal Problem: Empyema of left pleural space    Subjective:     Interval History:   10/24; seen and examined T max: 99.9F Chest Ct reviewed: pleural loculation left apex. chest tube tidal, has 1050 mls total, using IS    Oxygen Concentration (%):  [24] 24     Respiratory ROS: positive for - pleuritic pain  negative for - hemoptysis, sputum changes, stridor, tachypnea, or wheezing     Objective:     Vital Signs (Most Recent):  Temp: 98.6 °F (37 °C) (10/24/22 0715)  Pulse: 103 (10/24/22 0742)  Resp: 16 (10/24/22 0742)  BP: (!) 147/81 (10/24/22 0715)  SpO2: 97 % (10/24/22 0742)   Vital Signs (24h Range):  Temp:  [98.2 °F (36.8 °C)-99.9 °F (37.7 °C)] 98.6 °F (37 °C)  Pulse:  [] 103  Resp:  [16-20] 16  SpO2:  [94 %-99 %] 97 %  BP: (122-147)/(58-81) 147/81     Weight: (!) 150.5 kg (331 lb 12.7 oz)  Body mass index is 50.45 kg/m².      Intake/Output Summary (Last 24 hours) at 10/24/2022 0759  Last data filed at 10/24/2022 0531  Gross per 24 hour   Intake 535.75 ml   Output 69 ml   Net 466.75 ml       Physical Exam  Vitals and nursing note reviewed.   Constitutional:       Appearance: She is well-developed. She is morbidly obese.      Interventions: Nasal cannula in place.       HENT:      Head: Normocephalic and atraumatic.      Nose: Nose normal.      Mouth/Throat:      Pharynx: No oropharyngeal exudate.   Eyes:      General: No scleral icterus.     Conjunctiva/sclera: Conjunctivae normal.      Pupils: Pupils are equal, round, and reactive to light.   Neck:      Thyroid: No thyromegaly.      Vascular: No JVD.      Trachea: No tracheal deviation.   Cardiovascular:      Rate and Rhythm: Normal rate and regular rhythm.      Pulses: Normal pulses.       Heart sounds: Normal heart sounds, S1 normal and S2 normal. No murmur heard.  Pulmonary:      Effort: Pulmonary effort is normal. No tachypnea, accessory muscle usage or respiratory distress.      Breath sounds: Decreased air movement present. No stridor. Examination of the left-upper field reveals decreased breath sounds. Decreased breath sounds present.       Abdominal:      General: Bowel sounds are normal. There is no distension.      Palpations: Abdomen is soft. There is no hepatomegaly, splenomegaly or mass.      Tenderness: There is no abdominal tenderness. There is no guarding or rebound.   Musculoskeletal:         General: No tenderness. Normal range of motion.      Cervical back: Normal range of motion and neck supple.   Lymphadenopathy:      Upper Body:      Right upper body: No supraclavicular adenopathy.      Left upper body: No supraclavicular adenopathy.   Skin:     General: Skin is warm and dry.      Findings: No rash.      Nails: There is no clubbing.   Neurological:      General: No focal deficit present.      Mental Status: She is alert and oriented to person, place, and time.      Coordination: Coordination normal.      Gait: Gait normal.      Deep Tendon Reflexes: Reflexes are normal and symmetric.   Psychiatric:         Mood and Affect: Mood normal.       Vents:  Oxygen Concentration (%): 24 (10/24/22 0742)    Lines/Drains/Airways       Peripherally Inserted Central Catheter Line  Duration             PICC Double Lumen 10/22/22 1919 right basilic 1 day              Drain  Duration                  Y Chest Tube 1 and 2 10/21/22 0958 1 Left Pleural 36 Fr. 2 Left Other (Comment) 36 Fr. 2 days                    Significant Labs:    CBC/Anemia Profile:  Recent Labs   Lab 10/23/22  0635 10/23/22  1041 10/24/22  0524   WBC 28.66*  --  22.72*   HGB 8.6*  --  8.2*   HCT 27.5*  --  26.1*     --  435   MCV 84  --  84   RDW 13.9  --  13.9   IRON  --  13*  --    FERRITIN  --  307*  --    FOLATE  --   3.1*  --    [id number]  --  315  --         Chemistries:  Recent Labs   Lab 10/23/22  0635   *   K 4.7      CO2 25   BUN 6   CREATININE 0.6   CALCIUM 7.8*   ALBUMIN 1.7*   PROT 5.5*   BILITOT 0.6   ALKPHOS 63   ALT 12   AST 8*   MG 2.1     Culture, Body Fluid (Aerobic) w/ GS [[id number]]    Collected: 10/21/22 1105    Updated: 10/24/22 0636    Specimen Type: Pleural Fluid     AEROBIC CULTURE - FLUID No significant isolate    Gram Stain Result Moderate WBC's     Rare Gram positive cocci   Narrative:     LEFT PLEURAL FLUID   Aerobic culture [[id number]]    Collected: 10/21/22 1119    Updated: 10/24/22 0635    Specimen Type: Abscess    Specimen Source: Lung, Left     Aerobic Bacterial Culture Normal respiratory berta.   Narrative:     LEFT PLEURAL PEEL       Blood Culture: No results for input(s): LABBLOO in the last 48 hours.  Respiratory Culture: No results for input(s): GSRESP, RESPIRATORYC in the last 48 hours.  All pertinent labs within the past 24 hours have been reviewed.    Significant Imaging:  I have reviewed all pertinent imaging results/findings within the past 24 hours.    X-Ray Chest AP Single View  Narrative: EXAMINATION:  XR CHEST 1 VIEW    CLINICAL HISTORY:  post operative;.    TECHNIQUE:  Single frontal portable view of the chest was performed.    COMPARISON:  10/23/2022    FINDINGS:  Compared to 10/23/2022, no adverse interval change.  Lung volumes are low.  Impression: As above.    Electronically signed by: Adrian Lyn  Date:    10/24/2022  Time:    07:28       ABG  Recent Labs   Lab 10/21/22  1321   PH 7.377   PO2 124*   PCO2 49.8*   HCO3 29.3*   BE 4     Assessment/Plan:     * Empyema of left pleural space  SP VATS chest tube, decortication  CT management per CTS team  May need drainage for left loculation     S/P chest tube placement  Follow CXR  pleurovac has 900mls  Incentive spirometry to prevent aletectasis  Pain control  CTS following    Class 3 severe obesity in adult  Weight  loss    Parapneumonic effusion, Left   Empiric abx VANC, CEFEPIME, FLAGYL  Deescalate based on cultures  UNASYN         I have reviewed all labs and imaging studies and compared to previous results. I have also discussed labs with all the teams in the medical care of the patient and my plan is outlined below       Varun Wood MD  Pulmonology  [facility]

## 2022-10-24 NOTE — CONSULTS
Food & Nutrition Education     Diet Education: Fluid and salt restriction    Time Spent: 5 minutes    Learners: Pt, family members    Nutrition Education provided with handouts:  Low-Sodium Nutrition Therapy and Fluid-Restricted Diet (nutritioncaremanual.org)    Comments:  RD educated patient on low sodium diet and fluid restriction related to hospital diagnosis. Discussed the importance of limiting sodium to 2,000 mg per day and reading food labels to avoid further complications. Discussed using salt free seasonings and other herbs and spices in meals to enhance flavor without additional sodium. Discussed 1500 ml fluid restriction per MD and dietary sources of fluid. RD recommended using a cup with measurements for fluids and to try to consume small sips spread throughout the day rather than a lot at one time. Discussed dietary sources of cholesterol, the importance of incorporating healthy fats into the diet, and avoiding saturated and trans fats for heart health. For a generally healthy diet, aim for total fat less than 25-35% of calories.    All questions and concerns answered.    Provided handout with dietitian's contact information.    *Please re-consult as needed.    Thanks!  Santino Chin Dietitian

## 2022-10-24 NOTE — ASSESSMENT & PLAN NOTE
10/21- VATS procedure with chest tube placement and drainage of empyema with CT surgery today. Antibiotic de escalated to Unasyn.   10/22- POD #1. S/P VATS, chest tube insertion x 2 and decortication. Fluid cultures are in progress. WBC remains elevated 22K, CRP trending down. PCT is normal. Current antibiotic - Unasyn and Oral Zyvox.   10/23- CT chest with new fluid collection in the left lung apex. Pt will have IR chest tube placed in am. WBC trended down to 22K

## 2022-10-24 NOTE — PLAN OF CARE
Bed locked, in lowest position. Call bell in reach. Purposeful rounding done every two hours. Cardiac monitoring in use. Chart check complete. Will continue with plan of care.

## 2022-10-24 NOTE — SUBJECTIVE & OBJECTIVE
Interval History:   10/24; seen and examined T max: 99.9F Chest Ct reviewed: pleural loculation left apex. chest tube tidal, has 1050 mls total, using IS    Oxygen Concentration (%):  [24] 24     Respiratory ROS: positive for - pleuritic pain  negative for - hemoptysis, sputum changes, stridor, tachypnea, or wheezing     Objective:     Vital Signs (Most Recent):  Temp: 98.6 °F (37 °C) (10/24/22 0715)  Pulse: 103 (10/24/22 0742)  Resp: 16 (10/24/22 0742)  BP: (!) 147/81 (10/24/22 0715)  SpO2: 97 % (10/24/22 0742)   Vital Signs (24h Range):  Temp:  [98.2 °F (36.8 °C)-99.9 °F (37.7 °C)] 98.6 °F (37 °C)  Pulse:  [] 103  Resp:  [16-20] 16  SpO2:  [94 %-99 %] 97 %  BP: (122-147)/(58-81) 147/81     Weight: (!) 150.5 kg (331 lb 12.7 oz)  Body mass index is 50.45 kg/m².      Intake/Output Summary (Last 24 hours) at 10/24/2022 0759  Last data filed at 10/24/2022 0531  Gross per 24 hour   Intake 535.75 ml   Output 69 ml   Net 466.75 ml       Physical Exam  Vitals and nursing note reviewed.   Constitutional:       Appearance: She is well-developed. She is morbidly obese.      Interventions: Nasal cannula in place.       HENT:      Head: Normocephalic and atraumatic.      Nose: Nose normal.      Mouth/Throat:      Pharynx: No oropharyngeal exudate.   Eyes:      General: No scleral icterus.     Conjunctiva/sclera: Conjunctivae normal.      Pupils: Pupils are equal, round, and reactive to light.   Neck:      Thyroid: No thyromegaly.      Vascular: No JVD.      Trachea: No tracheal deviation.   Cardiovascular:      Rate and Rhythm: Normal rate and regular rhythm.      Pulses: Normal pulses.      Heart sounds: Normal heart sounds, S1 normal and S2 normal. No murmur heard.  Pulmonary:      Effort: Pulmonary effort is normal. No tachypnea, accessory muscle usage or respiratory distress.      Breath sounds: Decreased air movement present. No stridor. Examination of the left-upper field reveals decreased breath sounds. Decreased  breath sounds present.       Abdominal:      General: Bowel sounds are normal. There is no distension.      Palpations: Abdomen is soft. There is no hepatomegaly, splenomegaly or mass.      Tenderness: There is no abdominal tenderness. There is no guarding or rebound.   Musculoskeletal:         General: No tenderness. Normal range of motion.      Cervical back: Normal range of motion and neck supple.   Lymphadenopathy:      Upper Body:      Right upper body: No supraclavicular adenopathy.      Left upper body: No supraclavicular adenopathy.   Skin:     General: Skin is warm and dry.      Findings: No rash.      Nails: There is no clubbing.   Neurological:      General: No focal deficit present.      Mental Status: She is alert and oriented to person, place, and time.      Coordination: Coordination normal.      Gait: Gait normal.      Deep Tendon Reflexes: Reflexes are normal and symmetric.   Psychiatric:         Mood and Affect: Mood normal.       Vents:  Oxygen Concentration (%): 24 (10/24/22 0742)    Lines/Drains/Airways       Peripherally Inserted Central Catheter Line  Duration             PICC Double Lumen 10/22/22 1919 right basilic 1 day              Drain  Duration                  Y Chest Tube 1 and 2 10/21/22 0958 1 Left Pleural 36 Fr. 2 Left Other (Comment) 36 Fr. 2 days                    Significant Labs:    CBC/Anemia Profile:  Recent Labs   Lab 10/23/22  0635 10/23/22  1041 10/24/22  0524   WBC 28.66*  --  22.72*   HGB 8.6*  --  8.2*   HCT 27.5*  --  26.1*     --  435   MCV 84  --  84   RDW 13.9  --  13.9   IRON  --  13*  --    FERRITIN  --  307*  --    FOLATE  --  3.1*  --    [id number]  --  315  --         Chemistries:  Recent Labs   Lab 10/23/22  0635   *   K 4.7      CO2 25   BUN 6   CREATININE 0.6   CALCIUM 7.8*   ALBUMIN 1.7*   PROT 5.5*   BILITOT 0.6   ALKPHOS 63   ALT 12   AST 8*   MG 2.1     Culture, Body Fluid (Aerobic) w/ GS [[id number]]    Collected: 10/21/22 3483     Updated: 10/24/22 0636    Specimen Type: Pleural Fluid     AEROBIC CULTURE - FLUID No significant isolate    Gram Stain Result Moderate WBC's     Rare Gram positive cocci   Narrative:     LEFT PLEURAL FLUID   Aerobic culture [[id number]]    Collected: 10/21/22 1119    Updated: 10/24/22 0635    Specimen Type: Abscess    Specimen Source: Lung, Left     Aerobic Bacterial Culture Normal respiratory berta.   Narrative:     LEFT PLEURAL PEEL       Blood Culture: No results for input(s): LABBLOO in the last 48 hours.  Respiratory Culture: No results for input(s): GSRESP, RESPIRATORYC in the last 48 hours.  All pertinent labs within the past 24 hours have been reviewed.    Significant Imaging:  I have reviewed all pertinent imaging results/findings within the past 24 hours.    X-Ray Chest AP Single View  Narrative: EXAMINATION:  XR CHEST 1 VIEW    CLINICAL HISTORY:  post operative;.    TECHNIQUE:  Single frontal portable view of the chest was performed.    COMPARISON:  10/23/2022    FINDINGS:  Compared to 10/23/2022, no adverse interval change.  Lung volumes are low.  Impression: As above.    Electronically signed by: Adrian Lyn  Date:    10/24/2022  Time:    07:28

## 2022-10-24 NOTE — PROGRESS NOTES
[facility]  Progress Note    Patient Name: Christina Leal  MRN: [id number]  Patient Class: IP- Inpatient   Admission Date: 10/19/2022  Length of Stay: 5 days  Attending Physician: Cynthia Wallis MD  Primary Care Provider: Primary Doctor No        Subjective:     Principal Problem:Empyema of left pleural space        HPI:  Christina Leal is a 27 yo female with history of obesity, former tobacco abuse on oral contraception who was referred to ED by primary care for abnormal CXR. Patient reports 1 week history of left sided rib cage pain associated with chest tightness, SOB and PAIZ. She also reports upper abdominal cramping and diarrhea. She denies n/v. OP imaging remarkable for pna with large left pleural effusion. CT chest shows large loculated appearing left pleural effusion with associated lower lobe atelectasis as well as mild left upper lobe atelectasis.  Bandlike opacities with ground-glass attenuation in the posterior right lung base likely represent additional subsegmental atelectasis. Mild rightward shift of the mediastinal contents noted. Patient febrile (Tmax 100.6) tachycardic and tachypenic, O2 sats 98% 2 L O2. Influenza, COVID, HIV, Hep C negative. Labs remarkable for wbc 29 with left shift, Tbili 2.1. She received cefepime. Pulmonology consulted. Patient admitted to hospital medicine.             Overview/Hospital Course:  10/20/22 patient s/p thoracentesis 1500 cc removed. Fluid analysis indicating left pleural effusion compatible with a diagnosis of an empyema   Pt reports feeling better. Repeat CXR: fluid reaccumulation. CVT consulted, plan for chest tube placement with VATs procedure.     10/21- Pt is currently undergoing  VATS procedure with chest tube placement and drainage of empyema with CT surgery. AM labs are not available. Blood cultures - NGTD. Nasal MRSA - neg. Pleural Fluid culture - pending. Antibiotic de escalated to Unasyn.     10/22- S/P VATS, chest tube  insertion x 2 and decortication. CT output 600 ml since surgery yesterday. Pleural fluid gram stain showed mod WBCs and rare GPC. Fluid cultures are in progress. WBC remains elevated 22K, CRP trending down. PCT is normal. Current antibiotic - Unasyn and Oral Zyvox.     10/23- Chest tube to suction continues. Output reported 150 ml in the last 24h. Follow up CXR showed persistent loculated left pleural effusion in the apex. CT Surgery will get CT chest for further eval. WBC trended up 28K today. Will consult ID to guide antibiotic selection. Hgb dropped to 8.6 from initial 11.3 on admit. No signs of active bleeding reported. Will monitor Hgb. Culture in progress.    10/24- CT chest with new fluid collection in the left lung apex. Pt will have IR chest tube placed in am. WBC trended down to 22K. Hgb stable at 8.2. pleural fluid aerobic culture - Normal respiratory berta.Anaerobic culture in progress . Current antibiotic - Unasyn and oral Zyvox.       Interval History:    Pt will have IR chest tube placed in am for new fluid collection in the apical region left lung. WBC trended down to 22K.       Review of Systems   Constitutional:  Positive for activity change. Negative for appetite change and fever.   HENT:  Negative for sore throat.    Eyes:  Negative for visual disturbance.   Respiratory:  Positive for shortness of breath (improved). Negative for cough and chest tightness.    Cardiovascular:  Positive for chest pain (left post chest wall pain). Negative for palpitations and leg swelling.   Gastrointestinal:  Negative for abdominal distention, abdominal pain, constipation, diarrhea, nausea and vomiting.   Endocrine: Negative for polyuria.   Genitourinary:  Negative for decreased urine volume, dysuria, flank pain, frequency and hematuria.   Musculoskeletal:  Negative for back pain and gait problem.   Skin:  Negative for rash.   Neurological:  Negative for syncope, speech difficulty, weakness, light-headedness and  headaches.   Psychiatric/Behavioral:  Negative for confusion, hallucinations and sleep disturbance.    Objective:     Vital Signs (Most Recent):  Temp: 98.1 °F (36.7 °C) (10/24/22 1216)  Pulse: 108 (10/24/22 1216)  Resp: 20 (10/24/22 1216)  BP: (!) 144/94 (10/24/22 1216)  SpO2: 98 % (10/24/22 1216)   Vital Signs (24h Range):  Temp:  [98.1 °F (36.7 °C)-99.9 °F (37.7 °C)] 98.1 °F (36.7 °C)  Pulse:  [] 108  Resp:  [16-20] 20  SpO2:  [94 %-99 %] 98 %  BP: (122-147)/(58-94) 144/94     Weight: (!) 150.5 kg (331 lb 12.7 oz)  Body mass index is 50.45 kg/m².    Intake/Output Summary (Last 24 hours) at 10/24/2022 1230  Last data filed at 10/24/2022 1032  Gross per 24 hour   Intake 1015.75 ml   Output 67 ml   Net 948.75 ml      Physical Exam  Constitutional:       General: She is not in acute distress.     Appearance: She is well-developed. She is morbidly obese. She is not diaphoretic.   HENT:      Head: Normocephalic and atraumatic.      Mouth/Throat:      Pharynx: No oropharyngeal exudate.   Eyes:      Conjunctiva/sclera: Conjunctivae normal.      Pupils: Pupils are equal, round, and reactive to light.   Neck:      Thyroid: No thyromegaly.      Vascular: No JVD.   Cardiovascular:      Rate and Rhythm: Regular rhythm. Tachycardia present.      Heart sounds: Normal heart sounds. No murmur heard.  Pulmonary:      Effort: Pulmonary effort is normal. No respiratory distress.      Breath sounds: Examination of the left-lower field reveals decreased breath sounds. Decreased breath sounds present. No wheezing or rales.      Comments: Y Chest tube in place , left lower pleural space   Breath sounds diminished left lower lung   Chest:      Chest wall: No tenderness.   Abdominal:      General: Bowel sounds are normal. There is no distension.      Palpations: Abdomen is soft.      Tenderness: There is no abdominal tenderness. There is no guarding or rebound.   Musculoskeletal:         General: Normal range of motion.       Cervical back: Normal range of motion and neck supple.      Right lower leg: No edema.      Left lower leg: No edema.   Lymphadenopathy:      Cervical: No cervical adenopathy.   Skin:     General: Skin is warm and dry.      Findings: No rash.   Neurological:      General: No focal deficit present.      Mental Status: She is alert and oriented to person, place, and time.      Cranial Nerves: No cranial nerve deficit.      Sensory: No sensory deficit.      Deep Tendon Reflexes: Reflexes normal.   Psychiatric:         Mood and Affect: Mood normal.       Significant Labs: All pertinent labs within the past 24 hours have been reviewed.  BMP:   Recent Labs   Lab 10/23/22  0635 10/24/22  0524   GLU 97 90   * 136   K 4.7 4.4    99   CO2 25 24   BUN 6 8   CREATININE 0.6 0.6   CALCIUM 7.8* 8.3*   MG 2.1  --      CBC:   Recent Labs   Lab 10/23/22  0635 10/24/22  0524   WBC 28.66* 22.72*   HGB 8.6* 8.2*   HCT 27.5* 26.1*    435     CMP:   Recent Labs   Lab 10/23/22  0635 10/24/22  0524   * 136   K 4.7 4.4    99   CO2 25 24   GLU 97 90   BUN 6 8   CREATININE 0.6 0.6   CALCIUM 7.8* 8.3*   PROT 5.5*  --    ALBUMIN 1.7*  --    BILITOT 0.6  --    ALKPHOS 63  --    AST 8*  --    ALT 12  --    ANIONGAP 10 13       Significant Imaging:       Assessment/Plan:      * Empyema of left pleural space  10/21- VATS procedure with chest tube placement and drainage of empyema with CT surgery today. Antibiotic de escalated to Unasyn.   10/22- POD #1. S/P VATS, chest tube insertion x 2 and decortication. Fluid cultures are in progress. WBC remains elevated 22K, CRP trending down. PCT is normal. Current antibiotic - Unasyn and Oral Zyvox.   10/23- CT chest with new fluid collection in the left lung apex. Pt will have IR chest tube placed in am. WBC trended down to 22K    Parapneumonic effusion, Left   Continue empiric vancomycin and cefepime   Pulmonology following  Thoracentesis, follow fluid analysis   Continue  supplemental O2  Check TTE  Follow blood cultures     10/20  S/p Thora 1500 cc removed   CVT following, plan for placement of chest tube per VATs   TTE reviewed   Continue empiric vanc, cefepime, add flagyl   dvt ppx   10/21-   VATS procedure with chest tube placement and drainage of empyema with CT surgery. Antibiotic de escalated to Unasyn. Blood cultures - NGTD. Nasal MRSA - neg. Pleural Fluid culture - pending.   10/22- See plan under Empyema of left pleural space     Class 3 severe obesity in adult  Body mass index is 50.45 kg/m². Morbid obesity complicates all aspects of disease management from diagnostic modalities to treatment. Weight loss encouraged and health benefits explained to patient.         Anemia, hypochromic  - Iron study suggestive of Anemia of chronic inflammation with iron deficiency . Noted folic acid deficiency as well. B12 level lower end of normal   - Replace iron , Folic acid and B12.   -RD consult for better diet selection to improve nutritional intake       S/P chest tube placement          VTE Risk Mitigation (From admission, onward)         Ordered     enoxaparin injection 40 mg  Every 12 hours         10/24/22 0853     Place JASON hose  Until discontinued         10/20/22 2109     Place sequential compression device  Until discontinued         10/20/22 2109     enoxaparin injection 40 mg  2 times daily         10/20/22 1323     Place sequential compression device  Until discontinued         10/19/22 1646                Discharge Planning   DANNY:      Code Status: Full Code   Is the patient medically ready for discharge?:     Reason for patient still in hospital (select all that apply): Patient trending condition  Discharge Plan A: Home                  Cynthia Wallis MD  Department of Hospital Medicine   [facility] - Med Surg

## 2022-10-24 NOTE — ASSESSMENT & PLAN NOTE
SP VATS chest tube, decortication  CT management per CTS team  May need drainage for left loculation

## 2022-10-24 NOTE — ASSESSMENT & PLAN NOTE
The patient is a 26-year-old female with a history of severe obesity (BMI 50.48) who was admitted with complaints of left-sided chest pain over the past week.  Patient also noticed some shortness of breath and dyspnea on exertion in addition the patient has a cough.  The patient had a chest x-ray done which showed a left pleural effusion CT scan shows a loculated left pleural effusion.  Thoracentesis was performed with fluid analysis indicating that left pleural effusion is compatible with a diagnosis of an empyema.    The patient's left empyema requires drainage.  Patient will likely require a large bore chest tube for adequate drainage of empyema.  Bedside placement of chest tube in the setting of severe obesity will be difficult.  Will plan for placement of chest tube with VATS procedure in the operating room.  The risks and benefits of a VATS procedure were explained to the patient.  The patient understand the risks and benefits and agreed to proceed with VATS assisted drainage of empyema    10/22/2022   The patient is postop day 1 status post left VATS procedure with drainage of empyema and decortication.  Chest tube output has been about 600 mL since surgery yesterday.  Continue chest tube to suction.  Continue incentive spirometer and pulmonary toileting.  Continue broad-spectrum antibiotics.    10/23/2022   The patient is postop day 2 status post left VATS procedure with drainage of empyema and decortication.  Chest tube output over the past 24 hours is 150 mL.  Continue chest tube to suction.  Chest x-ray shows new opacity in the apical region of the left lung.  Will obtain CT scan of the chest    10/24/2022   Patient is postop day 3 status post VATS procedure with drainage of empyema and decortication.  CT scan shows new fluid collection in the apical region.  Patient discussed with IR.  Plan is for IR placed chest tube in the a.m..  Continue antibiotics.  Continue chest tube to suction.

## 2022-10-24 NOTE — ASSESSMENT & PLAN NOTE
- Iron study suggestive of Anemia of chronic inflammation with iron deficiency . Noted folic acid deficiency as well. B12 level lower end of normal   - Replace iron , Folic acid and B12.   -RD consult for better diet selection to improve nutritional intake

## 2022-10-24 NOTE — ANESTHESIA POSTPROCEDURE EVALUATION
Anesthesia Post Evaluation    Patient: Christina Leal    Procedure(s) Performed: Procedure(s) (LRB):  VATS, WITH CHEST TUBE INSERTION FOR DRAINAGE OF PLEURAL EFFUSION (Left)  BLOCK, NERVE, INTERCOSTAL, 2 OR MORE (Left)  VATS, WITH DECORTICATION, LUNG (Left)  EVACUATION, EMPYEMA (Left)    Final Anesthesia Type: general      Patient location during evaluation: PACU  Patient participation: Yes- Able to Participate  Level of consciousness: awake and alert and oriented  Post-procedure vital signs: reviewed and stable  Pain management: adequate  Airway patency: patent  TYRONE mitigation strategies: Verification of full reversal of neuromuscular block  PONV status at discharge: No PONV  Anesthetic complications: no      Cardiovascular status: blood pressure returned to baseline and hemodynamically stable  Respiratory status: nasal cannula and spontaneous ventilation  Hydration status: euvolemic  Follow-up not needed.          Vitals Value Taken Time   /94 10/21/22 1130   Temp 36.7 °C (98.1 °F) 10/21/22 1130   Pulse 98 10/21/22 1135   Resp 16 10/21/22 1130   SpO2 96 % 10/21/22 1130         Event Time   Out of Recovery 10/21/2022 14:05:00         Pain/Zoe Score: Pain Rating Prior to Med Admin: 0 (10/24/2022  9:28 AM)  Pain Rating Post Med Admin: 0 (10/24/2022 10:16 AM)

## 2022-10-24 NOTE — PLAN OF CARE
Pt ' with supervision without AD with IV, 2L O2, and chest tube in tow. Pt tolerated session well.   PT recommends home at D/C.

## 2022-10-24 NOTE — PLAN OF CARE
Problem: Adult Inpatient Plan of Care  Goal: Plan of Care Review  Outcome: Ongoing, Progressing  Goal: Patient-Specific Goal (Individualized)  Outcome: Ongoing, Progressing  Goal: Absence of Hospital-Acquired Illness or Injury  Outcome: Ongoing, Progressing  Goal: Optimal Comfort and Wellbeing  Outcome: Ongoing, Progressing  Goal: Readiness for Transition of Care  Outcome: Ongoing, Progressing     Problem: Bariatric Environmental Safety  Goal: Safety Maintained with Care  Outcome: Ongoing, Progressing     Problem: Pain Acute  Goal: Acceptable Pain Control and Functional Ability  Outcome: Ongoing, Progressing     Problem: Fall Injury Risk  Goal: Absence of Fall and Fall-Related Injury  Outcome: Ongoing, Progressing     Problem: Infection  Goal: Absence of Infection Signs and Symptoms  Outcome: Ongoing, Progressing

## 2022-10-24 NOTE — PLAN OF CARE
[facility]  Discharge Reassessment    Primary Care Provider: Primary Doctor No    Expected Discharge Date:     Reassessment (most recent)       Discharge Reassessment - 10/24/22 1733          Discharge Reassessment    Assessment Type Discharge Planning Reassessment     Did the patient's condition or plan change since previous assessment? No     Discharge Plan discussed with: Patient     Communicated DANNY with patient/caregiver Date not available/Unable to determine     Discharge Plan A Home     Discharge Plan B Home     DME Needed Upon Discharge  none     Discharge Barriers Identified None     Why the patient remains in the hospital Requires continued medical care                     CM met with patient at the bedside to discuss discharge needs.  Patient is normally independent with all needs.  Patient currently has a chest tube and stated that there is a plan to place another small chest tube.  CM will continue to follow for any discharge needs.

## 2022-10-24 NOTE — SUBJECTIVE & OBJECTIVE
Interval History:    Pt will have IR chest tube placed in am for new fluid collection in the apical region left lung. WBC trended down to 22K.       Review of Systems   Constitutional:  Positive for activity change. Negative for appetite change and fever.   HENT:  Negative for sore throat.    Eyes:  Negative for visual disturbance.   Respiratory:  Positive for shortness of breath (improved). Negative for cough and chest tightness.    Cardiovascular:  Positive for chest pain (left post chest wall pain). Negative for palpitations and leg swelling.   Gastrointestinal:  Negative for abdominal distention, abdominal pain, constipation, diarrhea, nausea and vomiting.   Endocrine: Negative for polyuria.   Genitourinary:  Negative for decreased urine volume, dysuria, flank pain, frequency and hematuria.   Musculoskeletal:  Negative for back pain and gait problem.   Skin:  Negative for rash.   Neurological:  Negative for syncope, speech difficulty, weakness, light-headedness and headaches.   Psychiatric/Behavioral:  Negative for confusion, hallucinations and sleep disturbance.    Objective:     Vital Signs (Most Recent):  Temp: 98.1 °F (36.7 °C) (10/24/22 1216)  Pulse: 108 (10/24/22 1216)  Resp: 20 (10/24/22 1216)  BP: (!) 144/94 (10/24/22 1216)  SpO2: 98 % (10/24/22 1216)   Vital Signs (24h Range):  Temp:  [98.1 °F (36.7 °C)-99.9 °F (37.7 °C)] 98.1 °F (36.7 °C)  Pulse:  [] 108  Resp:  [16-20] 20  SpO2:  [94 %-99 %] 98 %  BP: (122-147)/(58-94) 144/94     Weight: (!) 150.5 kg (331 lb 12.7 oz)  Body mass index is 50.45 kg/m².    Intake/Output Summary (Last 24 hours) at 10/24/2022 1230  Last data filed at 10/24/2022 1032  Gross per 24 hour   Intake 1015.75 ml   Output 67 ml   Net 948.75 ml      Physical Exam  Constitutional:       General: She is not in acute distress.     Appearance: She is well-developed. She is morbidly obese. She is not diaphoretic.   HENT:      Head: Normocephalic and atraumatic.      Mouth/Throat:       Pharynx: No oropharyngeal exudate.   Eyes:      Conjunctiva/sclera: Conjunctivae normal.      Pupils: Pupils are equal, round, and reactive to light.   Neck:      Thyroid: No thyromegaly.      Vascular: No JVD.   Cardiovascular:      Rate and Rhythm: Regular rhythm. Tachycardia present.      Heart sounds: Normal heart sounds. No murmur heard.  Pulmonary:      Effort: Pulmonary effort is normal. No respiratory distress.      Breath sounds: Examination of the left-lower field reveals decreased breath sounds. Decreased breath sounds present. No wheezing or rales.      Comments: Y Chest tube in place , left lower pleural space   Breath sounds diminished left lower lung   Chest:      Chest wall: No tenderness.   Abdominal:      General: Bowel sounds are normal. There is no distension.      Palpations: Abdomen is soft.      Tenderness: There is no abdominal tenderness. There is no guarding or rebound.   Musculoskeletal:         General: Normal range of motion.      Cervical back: Normal range of motion and neck supple.      Right lower leg: No edema.      Left lower leg: No edema.   Lymphadenopathy:      Cervical: No cervical adenopathy.   Skin:     General: Skin is warm and dry.      Findings: No rash.   Neurological:      General: No focal deficit present.      Mental Status: She is alert and oriented to person, place, and time.      Cranial Nerves: No cranial nerve deficit.      Sensory: No sensory deficit.      Deep Tendon Reflexes: Reflexes normal.   Psychiatric:         Mood and Affect: Mood normal.       Significant Labs: All pertinent labs within the past 24 hours have been reviewed.  BMP:   Recent Labs   Lab 10/23/22  0635 10/24/22  0524   GLU 97 90   * 136   K 4.7 4.4    99   CO2 25 24   BUN 6 8   CREATININE 0.6 0.6   CALCIUM 7.8* 8.3*   MG 2.1  --      CBC:   Recent Labs   Lab 10/23/22  0635 10/24/22  0524   WBC 28.66* 22.72*   HGB 8.6* 8.2*   HCT 27.5* 26.1*    435     CMP:   Recent Labs    Lab 10/23/22  0635 10/24/22  0524   * 136   K 4.7 4.4    99   CO2 25 24   GLU 97 90   BUN 6 8   CREATININE 0.6 0.6   CALCIUM 7.8* 8.3*   PROT 5.5*  --    ALBUMIN 1.7*  --    BILITOT 0.6  --    ALKPHOS 63  --    AST 8*  --    ALT 12  --    ANIONGAP 10 13       Significant Imaging:

## 2022-10-24 NOTE — SUBJECTIVE & OBJECTIVE
Interval History: The patient is postop day 3 status post VATS procedure with drainage of empyema and decortication.    ROS  Medications:  Continuous Infusions:   dextrose 5 % and 0.45 % NaCl with KCl 20 mEq 5 mL/hr at 10/24/22 0355     Scheduled Meds:   acetaminophen  650 mg Oral Q6H    albuterol-ipratropium  3 mL Nebulization Q6H WAKE    ampicillin-sulbactim (UNASYN) IVPB  3 g Intravenous Q6H    docusate sodium  100 mg Oral BID    enoxaparin  40 mg Subcutaneous BID    [START ON 10/25/2022] enoxaparin  40 mg Subcutaneous Q12H    famotidine  20 mg Oral BID    ferrous gluconate  324 mg Oral BID WM    folic acid-vit B6-vit B12 2.5-25-2 mg  1 tablet Oral Daily    ketorolac  30 mg Intravenous Q8H    linezolid  600 mg Oral Q12H    mupirocin   Nasal BID    polyethylene glycol  17 g Oral Daily     PRN Meds:albuterol sulfate, metoclopramide HCl, morphine, ondansetron, oxyCODONE, sodium chloride 0.9%, traZODone     Objective:     Vital Signs (Most Recent):  Temp: 98.6 °F (37 °C) (10/24/22 0715)  Pulse: 110 (10/24/22 0928)  Resp: 16 (10/24/22 0742)  BP: (!) 147/81 (10/24/22 0715)  SpO2: 97 % (10/24/22 0742)   Vital Signs (24h Range):  Temp:  [98.2 °F (36.8 °C)-99.9 °F (37.7 °C)] 98.6 °F (37 °C)  Pulse:  [] 110  Resp:  [16-20] 16  SpO2:  [94 %-99 %] 97 %  BP: (122-147)/(58-81) 147/81     Weight: (!) 150.5 kg (331 lb 12.7 oz)  Body mass index is 50.45 kg/m².    SpO2: 97 %  O2 Device (Oxygen Therapy): nasal cannula    Intake/Output - Last 3 Shifts         10/22 0700  10/23 0659 10/23 0700  10/24 0659 10/24 0700  10/25 0659    P.O.   240    I.V. (mL/kg) 326.2 (2.2) 33.2 (0.2)     IV Piggyback 187 502.6     Total Intake(mL/kg) 513.2 (3.4) 535.8 (3.6) 240 (1.6)    Urine (mL/kg/hr) 0 (0) 2 (0)     Emesis/NG output  1     Stool  1     Blood       Chest Tube 275 215     Total Output 275 219     Net +238.2 +316.8 +240           Urine Occurrence 3 x 1 x     Stool Occurrence  2 x             Lines/Drains/Airways       Peripherally  Inserted Central Catheter Line  Duration             PICC Double Lumen 10/22/22 1919 right basilic 1 day              Drain  Duration                  Y Chest Tube 1 and 2 10/21/22 0958 1 Left Pleural 36 Fr. 2 Left Other (Comment) 36 Fr. 3 days                    Physical Exam  Constitutional:       Appearance: Normal appearance. She is obese.   HENT:      Head: Normocephalic and atraumatic.   Cardiovascular:      Rate and Rhythm: Regular rhythm.      Heart sounds: Normal heart sounds.   Pulmonary:      Effort: Pulmonary effort is normal.      Breath sounds: Normal breath sounds.   Abdominal:      General: Abdomen is flat. Bowel sounds are normal.      Palpations: Abdomen is soft.   Musculoskeletal:      Right lower leg: No edema.      Left lower leg: No edema.   Skin:     General: Skin is warm and dry.   Neurological:      General: No focal deficit present.      Mental Status: She is alert and oriented to person, place, and time.   Psychiatric:         Behavior: Behavior normal.       Significant Labs:  All pertinent labs from the last 24 hours have been reviewed.    Significant Diagnostics:  I have reviewed all pertinent imaging results/findings within the past 24 hours.

## 2022-10-25 DIAGNOSIS — J86.9 EMPYEMA OF LEFT PLEURAL SPACE: Primary | ICD-10-CM

## 2022-10-25 LAB
ANION GAP SERPL CALC-SCNC: 9 MMOL/L (ref 8–16)
ANISOCYTOSIS BLD QL SMEAR: SLIGHT
BACTERIA BLD CULT: NORMAL
BACTERIA BLD CULT: NORMAL
BASOPHILS NFR BLD: 0 % (ref 0–1.9)
BUN SERPL-MCNC: 3 MG/DL (ref 6–20)
CALCIUM SERPL-MCNC: 8.8 MG/DL (ref 8.7–10.5)
CHLORIDE SERPL-SCNC: 101 MMOL/L (ref 95–110)
CO2 SERPL-SCNC: 25 MMOL/L (ref 23–29)
CREAT SERPL-MCNC: 0.7 MG/DL (ref 0.5–1.4)
CRP SERPL-MCNC: 179.1 MG/L (ref 0–8.2)
DACRYOCYTES BLD QL SMEAR: ABNORMAL
DIFFERENTIAL METHOD: ABNORMAL
EOSINOPHIL NFR BLD: 0 % (ref 0–8)
ERYTHROCYTE [DISTWIDTH] IN BLOOD BY AUTOMATED COUNT: 13.7 % (ref 11.5–14.5)
EST. GFR  (NO RACE VARIABLE): >60 ML/MIN/1.73 M^2
GLUCOSE SERPL-MCNC: 196 MG/DL (ref 70–110)
HCT VFR BLD AUTO: 28.3 % (ref 37–48.5)
HGB BLD-MCNC: 8.8 G/DL (ref 12–16)
HYPOCHROMIA BLD QL SMEAR: ABNORMAL
IMM GRANULOCYTES # BLD AUTO: ABNORMAL K/UL (ref 0–0.04)
IMM GRANULOCYTES NFR BLD AUTO: ABNORMAL % (ref 0–0.5)
LYMPHOCYTES NFR BLD: 15 % (ref 18–48)
MCH RBC QN AUTO: 26.3 PG (ref 27–31)
MCHC RBC AUTO-ENTMCNC: 31.1 G/DL (ref 32–36)
MCV RBC AUTO: 85 FL (ref 82–98)
MONOCYTES NFR BLD: 11 % (ref 4–15)
NEUTROPHILS NFR BLD: 71 % (ref 38–73)
NEUTS BAND NFR BLD MANUAL: 3 %
NRBC BLD-RTO: 0 /100 WBC
OVALOCYTES BLD QL SMEAR: ABNORMAL
PLATELET # BLD AUTO: 522 K/UL (ref 150–450)
PLATELET BLD QL SMEAR: ABNORMAL
PMV BLD AUTO: 8.9 FL (ref 9.2–12.9)
POCT GLUCOSE: 116 MG/DL (ref 70–110)
POIKILOCYTOSIS BLD QL SMEAR: SLIGHT
POLYCHROMASIA BLD QL SMEAR: ABNORMAL
POTASSIUM SERPL-SCNC: 4.8 MMOL/L (ref 3.5–5.1)
PROCALCITONIN SERPL IA-MCNC: 0.09 NG/ML
RBC # BLD AUTO: 3.35 M/UL (ref 4–5.4)
SODIUM SERPL-SCNC: 135 MMOL/L (ref 136–145)
WBC # BLD AUTO: 19.78 K/UL (ref 3.9–12.7)

## 2022-10-25 PROCEDURE — 25000003 PHARM REV CODE 250: Performed by: INTERNAL MEDICINE

## 2022-10-25 PROCEDURE — 63600175 PHARM REV CODE 636 W HCPCS: Performed by: THORACIC SURGERY (CARDIOTHORACIC VASCULAR SURGERY)

## 2022-10-25 PROCEDURE — 80048 BASIC METABOLIC PNL TOTAL CA: CPT | Performed by: INTERNAL MEDICINE

## 2022-10-25 PROCEDURE — 85007 BL SMEAR W/DIFF WBC COUNT: CPT | Performed by: INTERNAL MEDICINE

## 2022-10-25 PROCEDURE — 63600175 PHARM REV CODE 636 W HCPCS: Performed by: RADIOLOGY

## 2022-10-25 PROCEDURE — 94640 AIRWAY INHALATION TREATMENT: CPT

## 2022-10-25 PROCEDURE — 86140 C-REACTIVE PROTEIN: CPT | Performed by: INTERNAL MEDICINE

## 2022-10-25 PROCEDURE — 84145 PROCALCITONIN (PCT): CPT | Performed by: INTERNAL MEDICINE

## 2022-10-25 PROCEDURE — 97116 GAIT TRAINING THERAPY: CPT

## 2022-10-25 PROCEDURE — 97530 THERAPEUTIC ACTIVITIES: CPT

## 2022-10-25 PROCEDURE — 25000003 PHARM REV CODE 250: Performed by: NURSE PRACTITIONER

## 2022-10-25 PROCEDURE — 11000001 HC ACUTE MED/SURG PRIVATE ROOM

## 2022-10-25 PROCEDURE — 85027 COMPLETE CBC AUTOMATED: CPT | Performed by: INTERNAL MEDICINE

## 2022-10-25 PROCEDURE — 99232 PR SUBSEQUENT HOSPITAL CARE,LEVL II: ICD-10-PCS | Mod: ,,, | Performed by: INTERNAL MEDICINE

## 2022-10-25 PROCEDURE — 27000221 HC OXYGEN, UP TO 24 HOURS

## 2022-10-25 PROCEDURE — 99232 SBSQ HOSP IP/OBS MODERATE 35: CPT | Mod: ,,, | Performed by: INTERNAL MEDICINE

## 2022-10-25 PROCEDURE — 63600175 PHARM REV CODE 636 W HCPCS: Performed by: INTERNAL MEDICINE

## 2022-10-25 PROCEDURE — 25000242 PHARM REV CODE 250 ALT 637 W/ HCPCS: Performed by: THORACIC SURGERY (CARDIOTHORACIC VASCULAR SURGERY)

## 2022-10-25 PROCEDURE — 99900035 HC TECH TIME PER 15 MIN (STAT)

## 2022-10-25 PROCEDURE — 25000003 PHARM REV CODE 250: Performed by: THORACIC SURGERY (CARDIOTHORACIC VASCULAR SURGERY)

## 2022-10-25 PROCEDURE — 94761 N-INVAS EAR/PLS OXIMETRY MLT: CPT

## 2022-10-25 PROCEDURE — 21400001 HC TELEMETRY ROOM

## 2022-10-25 RX ORDER — CEFEPIME HYDROCHLORIDE 1 G/50ML
2 INJECTION, SOLUTION INTRAVENOUS
Status: DISCONTINUED | OUTPATIENT
Start: 2022-10-25 | End: 2022-10-28

## 2022-10-25 RX ORDER — FENTANYL CITRATE 50 UG/ML
INJECTION, SOLUTION INTRAMUSCULAR; INTRAVENOUS
Status: COMPLETED | OUTPATIENT
Start: 2022-10-25 | End: 2022-10-25

## 2022-10-25 RX ORDER — CEFEPIME HYDROCHLORIDE 1 G/50ML
1 INJECTION, SOLUTION INTRAVENOUS
Status: DISCONTINUED | OUTPATIENT
Start: 2022-10-25 | End: 2022-10-25 | Stop reason: DRUGHIGH

## 2022-10-25 RX ORDER — METRONIDAZOLE 500 MG/1
500 TABLET ORAL EVERY 8 HOURS
Status: DISCONTINUED | OUTPATIENT
Start: 2022-10-25 | End: 2022-10-28 | Stop reason: HOSPADM

## 2022-10-25 RX ORDER — MIDAZOLAM HYDROCHLORIDE 1 MG/ML
INJECTION INTRAMUSCULAR; INTRAVENOUS
Status: COMPLETED | OUTPATIENT
Start: 2022-10-25 | End: 2022-10-25

## 2022-10-25 RX ADMIN — ENOXAPARIN SODIUM 40 MG: 40 INJECTION SUBCUTANEOUS at 08:10

## 2022-10-25 RX ADMIN — VANCOMYCIN HYDROCHLORIDE 2000 MG: 10 INJECTION, POWDER, LYOPHILIZED, FOR SOLUTION INTRAVENOUS at 08:10

## 2022-10-25 RX ADMIN — IPRATROPIUM BROMIDE AND ALBUTEROL SULFATE 3 ML: 2.5; .5 SOLUTION RESPIRATORY (INHALATION) at 07:10

## 2022-10-25 RX ADMIN — VANCOMYCIN HYDROCHLORIDE 2500 MG: 10 INJECTION, POWDER, LYOPHILIZED, FOR SOLUTION INTRAVENOUS at 09:10

## 2022-10-25 RX ADMIN — AMPICILLIN SODIUM AND SULBACTAM SODIUM 3 G: 2; 1 INJECTION, POWDER, FOR SOLUTION INTRAMUSCULAR; INTRAVENOUS at 02:10

## 2022-10-25 RX ADMIN — MUPIROCIN: 20 OINTMENT TOPICAL at 08:10

## 2022-10-25 RX ADMIN — DOCUSATE SODIUM 100 MG: 100 CAPSULE, LIQUID FILLED ORAL at 08:10

## 2022-10-25 RX ADMIN — IPRATROPIUM BROMIDE AND ALBUTEROL SULFATE 3 ML: 2.5; .5 SOLUTION RESPIRATORY (INHALATION) at 02:10

## 2022-10-25 RX ADMIN — MIDAZOLAM HYDROCHLORIDE 1 MG: 1 INJECTION, SOLUTION INTRAMUSCULAR; INTRAVENOUS at 12:10

## 2022-10-25 RX ADMIN — FENTANYL CITRATE 50 MCG: 0.05 INJECTION, SOLUTION INTRAMUSCULAR; INTRAVENOUS at 12:10

## 2022-10-25 RX ADMIN — FAMOTIDINE 20 MG: 20 TABLET ORAL at 08:10

## 2022-10-25 RX ADMIN — DEXTROSE, SODIUM CHLORIDE, AND POTASSIUM CHLORIDE: 5; .45; .15 INJECTION INTRAVENOUS at 11:10

## 2022-10-25 RX ADMIN — CEFEPIME 2 G: 2 INJECTION, POWDER, FOR SOLUTION INTRAVENOUS at 02:10

## 2022-10-25 RX ADMIN — OXYCODONE HYDROCHLORIDE 5 MG: 5 TABLET ORAL at 03:10

## 2022-10-25 RX ADMIN — Medication 1 TABLET: at 08:10

## 2022-10-25 RX ADMIN — METRONIDAZOLE 500 MG: 500 TABLET ORAL at 02:10

## 2022-10-25 RX ADMIN — FENTANYL CITRATE 50 MCG: 0.05 INJECTION, SOLUTION INTRAMUSCULAR; INTRAVENOUS at 01:10

## 2022-10-25 RX ADMIN — Medication 324 MG: at 08:10

## 2022-10-25 RX ADMIN — MIDAZOLAM HYDROCHLORIDE 1 MG: 1 INJECTION, SOLUTION INTRAMUSCULAR; INTRAVENOUS at 01:10

## 2022-10-25 RX ADMIN — OXYCODONE HYDROCHLORIDE 5 MG: 5 TABLET ORAL at 11:10

## 2022-10-25 RX ADMIN — Medication 324 MG: at 05:10

## 2022-10-25 RX ADMIN — METRONIDAZOLE 500 MG: 500 TABLET ORAL at 10:10

## 2022-10-25 RX ADMIN — OXYCODONE HYDROCHLORIDE 5 MG: 5 TABLET ORAL at 02:10

## 2022-10-25 RX ADMIN — OXYCODONE HYDROCHLORIDE 5 MG: 5 TABLET ORAL at 07:10

## 2022-10-25 RX ADMIN — OXYCODONE HYDROCHLORIDE 5 MG: 5 TABLET ORAL at 06:10

## 2022-10-25 RX ADMIN — TRAZODONE HYDROCHLORIDE 100 MG: 100 TABLET ORAL at 11:10

## 2022-10-25 RX ADMIN — IPRATROPIUM BROMIDE AND ALBUTEROL SULFATE 3 ML: 2.5; .5 SOLUTION RESPIRATORY (INHALATION) at 09:10

## 2022-10-25 RX ADMIN — CEFEPIME 2 G: 2 INJECTION, POWDER, FOR SOLUTION INTRAVENOUS at 07:10

## 2022-10-25 NOTE — PROGRESS NOTES
[facility] Medicine  Progress Note    Patient Name: Christina Leal  MRN: [id number]  Patient Class: IP- Inpatient   Admission Date: 10/19/2022  Length of Stay: 6 days  Attending Physician: Cynthia Wallis MD  Primary Care Provider: Primary Doctor No        Subjective:     Principal Problem:Empyema of left pleural space        HPI:  Christina Leal is a 27 yo female with history of obesity, former tobacco abuse on oral contraception who was referred to ED by primary care for abnormal CXR. Patient reports 1 week history of left sided rib cage pain associated with chest tightness, SOB and PAIZ. She also reports upper abdominal cramping and diarrhea. She denies n/v. OP imaging remarkable for pna with large left pleural effusion. CT chest shows large loculated appearing left pleural effusion with associated lower lobe atelectasis as well as mild left upper lobe atelectasis.  Bandlike opacities with ground-glass attenuation in the posterior right lung base likely represent additional subsegmental atelectasis. Mild rightward shift of the mediastinal contents noted. Patient febrile (Tmax 100.6) tachycardic and tachypenic, O2 sats 98% 2 L O2. Influenza, COVID, HIV, Hep C negative. Labs remarkable for wbc 29 with left shift, Tbili 2.1. She received cefepime. Pulmonology consulted. Patient admitted to hospital medicine.             Overview/Hospital Course:  10/20/22 patient s/p thoracentesis 1500 cc removed. Fluid analysis indicating left pleural effusion compatible with a diagnosis of an empyema   Pt reports feeling better. Repeat CXR: fluid reaccumulation. CVT consulted, plan for chest tube placement with VATs procedure.     10/21- Pt is currently undergoing  VATS procedure with chest tube placement and drainage of empyema with CT surgery. AM labs are not available. Blood cultures - NGTD. Nasal MRSA - neg. Pleural Fluid culture - pending. Antibiotic de escalated to Unasyn.     10/22- S/P VATS, chest tube  insertion x 2 and decortication. CT output 600 ml since surgery yesterday. Pleural fluid gram stain showed mod WBCs and rare GPC. Fluid cultures are in progress. WBC remains elevated 22K, CRP trending down. PCT is normal. Current antibiotic - Unasyn and Oral Zyvox.     10/23- Chest tube to suction continues. Output reported 150 ml in the last 24h. Follow up CXR showed persistent loculated left pleural effusion in the apex. CT Surgery will get CT chest for further eval. WBC trended up 28K today. Will consult ID to guide antibiotic selection. Hgb dropped to 8.6 from initial 11.3 on admit. No signs of active bleeding reported. Will monitor Hgb. Culture in progress.    10/24- CT chest with new fluid collection in the left lung apex. Pt will have IR chest tube placed in am. WBC trended down to 22K. Hgb stable at 8.2. pleural fluid aerobic culture - Normal respiratory berta.Anaerobic culture in progress . Current antibiotic - Unasyn and oral Zyvox.     10/25- Additional chest tube by IR today. WBC down to 19K, 3% bands . Antibiotic changed to Cefepime , Flagyl and Vancomycin per ID. Cultures are so far unrevealing.       Interval History:   Additional chest tube by IR today. WBC down to 19K, 3% bands      Review of Systems   Constitutional:  Positive for activity change. Negative for appetite change and fever.   HENT:  Negative for sore throat.    Eyes:  Negative for visual disturbance.   Respiratory:  Positive for shortness of breath (improved). Negative for cough and chest tightness.    Cardiovascular:  Positive for chest pain (left post chest wall pain). Negative for palpitations and leg swelling.   Gastrointestinal:  Negative for abdominal distention, abdominal pain, constipation, diarrhea, nausea and vomiting.   Endocrine: Negative for polyuria.   Genitourinary:  Negative for decreased urine volume, dysuria, flank pain, frequency and hematuria.   Musculoskeletal:  Negative for back pain and gait problem.   Skin:   Negative for rash.   Neurological:  Negative for syncope, speech difficulty, weakness, light-headedness and headaches.   Psychiatric/Behavioral:  Negative for confusion, hallucinations and sleep disturbance.    Objective:     Vital Signs (Most Recent):  Temp: 98.2 °F (36.8 °C) (10/25/22 0712)  Pulse: 106 (10/25/22 1300)  Resp: 20 (10/25/22 1300)  BP: (!) 145/78 (10/25/22 1300)  SpO2: 97 % (10/25/22 1300)   Vital Signs (24h Range):  Temp:  [97.8 °F (36.6 °C)-98.4 °F (36.9 °C)] 98.2 °F (36.8 °C)  Pulse:  [106-126] 106  Resp:  [16-21] 20  SpO2:  [93 %-98 %] 97 %  BP: (113-157)/(63-96) 145/78     Weight: 126.6 kg (279 lb 1.6 oz)  Body mass index is 42.44 kg/m².    Intake/Output Summary (Last 24 hours) at 10/25/2022 1302  Last data filed at 10/25/2022 0600  Gross per 24 hour   Intake 1261.71 ml   Output 0 ml   Net 1261.71 ml      Physical Exam  Constitutional:       General: She is not in acute distress.     Appearance: She is well-developed. She is morbidly obese. She is not diaphoretic.   HENT:      Head: Normocephalic and atraumatic.      Mouth/Throat:      Pharynx: No oropharyngeal exudate.   Eyes:      Conjunctiva/sclera: Conjunctivae normal.      Pupils: Pupils are equal, round, and reactive to light.   Neck:      Thyroid: No thyromegaly.      Vascular: No JVD.   Cardiovascular:      Rate and Rhythm: Regular rhythm. Tachycardia present.      Heart sounds: Normal heart sounds. No murmur heard.  Pulmonary:      Effort: Pulmonary effort is normal. No respiratory distress.      Breath sounds: Examination of the left-lower field reveals decreased breath sounds. Decreased breath sounds present. No wheezing or rales.      Comments: Y Chest tube in place , left lower pleural space   Breath sounds diminished left lower lung   Chest:      Chest wall: No tenderness.   Abdominal:      General: Bowel sounds are normal. There is no distension.      Palpations: Abdomen is soft.      Tenderness: There is no abdominal tenderness.  There is no guarding or rebound.   Musculoskeletal:         General: Normal range of motion.      Cervical back: Normal range of motion and neck supple.      Right lower leg: No edema.      Left lower leg: No edema.   Lymphadenopathy:      Cervical: No cervical adenopathy.   Skin:     General: Skin is warm and dry.      Findings: No rash.   Neurological:      General: No focal deficit present.      Mental Status: She is alert and oriented to person, place, and time.      Cranial Nerves: No cranial nerve deficit.      Sensory: No sensory deficit.      Deep Tendon Reflexes: Reflexes normal.   Psychiatric:         Mood and Affect: Mood normal.       Significant Labs: All pertinent labs within the past 24 hours have been reviewed.  BMP:   Recent Labs   Lab 10/25/22  0559   *   *   K 4.8      CO2 25   BUN 3*   CREATININE 0.7   CALCIUM 8.8     CBC:   Recent Labs   Lab 10/24/22  0524 10/25/22  0559   WBC 22.72* 19.78*   HGB 8.2* 8.8*   HCT 26.1* 28.3*    522*     CMP:   Recent Labs   Lab 10/24/22  0524 10/25/22  0559    135*   K 4.4 4.8   CL 99 101   CO2 24 25   GLU 90 196*   BUN 8 3*   CREATININE 0.6 0.7   CALCIUM 8.3* 8.8   ANIONGAP 13 9       Significant Imaging:       Assessment/Plan:      * Empyema of left pleural space  10/21- VATS procedure with chest tube placement and drainage of empyema with CT surgery today. Antibiotic de escalated to Unasyn.   10/22- POD #1. S/P VATS, chest tube insertion x 2 and decortication. Fluid cultures are in progress. WBC remains elevated 22K, CRP trending down. PCT is normal. Current antibiotic - Unasyn and Oral Zyvox.   10/23- CT chest with new fluid collection in the left lung apex. Pt will have IR chest tube placed in am. WBC trended down to 22K  10/25- Additional chest tube by IR today. WBC down to 19K, 3% bands . Antibiotic changed to Cefepime , Flagyl and Vancomycin per ID. Cultures are so far unrevealing.     Parapneumonic effusion, Left    Continue empiric vancomycin and cefepime   Pulmonology following  Thoracentesis, follow fluid analysis   Continue supplemental O2  Check TTE  Follow blood cultures     10/20  S/p Thora 1500 cc removed   CVT following, plan for placement of chest tube per VATs   TTE reviewed   Continue empiric vanc, cefepime, add flagyl   dvt ppx   10/21-   VATS procedure with chest tube placement and drainage of empyema with CT surgery. Antibiotic de escalated to Unasyn. Blood cultures - NGTD. Nasal MRSA - neg. Pleural Fluid culture - pending.   10/22- See plan under Empyema of left pleural space     Class 3 severe obesity in adult  Body mass index is 42.44 kg/m². Morbid obesity complicates all aspects of disease management from diagnostic modalities to treatment. Weight loss encouraged and health benefits explained to patient.         Anemia, hypochromic  - Iron study suggestive of Anemia of chronic inflammation with iron deficiency . Noted folic acid deficiency as well. B12 level lower end of normal   - Replace iron , Folic acid and B12.   -RD consult for better diet selection to improve nutritional intake       S/P chest tube placement          VTE Risk Mitigation (From admission, onward)         Ordered     enoxaparin injection 40 mg  Every 12 hours         10/24/22 0853     Place JASON hose  Until discontinued         10/20/22 2109     Place sequential compression device  Until discontinued         10/20/22 2109     enoxaparin injection 40 mg  2 times daily         10/20/22 1323     Place sequential compression device  Until discontinued         10/19/22 1646                Discharge Planning   DANNY:      Code Status: Full Code   Is the patient medically ready for discharge?:     Reason for patient still in hospital (select all that apply): Patient trending condition  Discharge Plan A: Home                  Cynthia Wallis MD  Department of Hospital Medicine   [facility] - Med Surg

## 2022-10-25 NOTE — ASSESSMENT & PLAN NOTE
Follow CXR  pleurovac has 1110mls  Incentive spirometry to prevent aletectasis  Pain control  CTS following

## 2022-10-25 NOTE — SEDATION DOCUMENTATION
Pt placed on CT table supine. CM in place, VSS, NADN, and pt verbalizes understanding of procedure.

## 2022-10-25 NOTE — ASSESSMENT & PLAN NOTE
Body mass index is 42.44 kg/m². Morbid obesity complicates all aspects of disease management from diagnostic modalities to treatment. Weight loss encouraged and health benefits explained to patient.

## 2022-10-25 NOTE — ASSESSMENT & PLAN NOTE
SP VATS chest tube, decortication  CT management per CTS team  Await  drainage for left loculation

## 2022-10-25 NOTE — HPI
26 year old woman with history of   obesity, former tobacco abuse who was admitted with history of left rib cage pain . She was diagnosed with empyema nd had interval VATS with left chest tube placement (10/21/22). CT chest shows large loculated appearing left pleural effusion with associated lower lobe atelectasis as well as mild left upper lobe atelectasis.  Cultures- are negative till date .  Component      Latest Ref Rng & Units 10/25/2022 10/24/2022 10/23/2022 10/22/2022                WBC      3.90 - 12.70 K/uL 19.78 (H) 22.72 (H) 28.66 (H) 22.64 (H)

## 2022-10-25 NOTE — PT/OT/SLP PROGRESS
"Physical Therapy  Treatment    Christina Leal   MRN: [id number]   Admitting Diagnosis: Empyema of left pleural space    PT Received On: 10/25/22  PT Start Time: 1005     PT Stop Time: 1029    PT Total Time (min): 24 min       Billable Minutes:  Gait Training 14 and Therapeutic Activity 10    Treatment Type: Treatment  PT/PTA: PT     PTA Visit Number: 0       General Precautions: Standard, fall  Orthopedic Precautions: N/A   Braces: N/A  Respiratory Status: Nasal cannula, flow 1 L/min    Spiritual, Cultural Beliefs, Protestant Practices, Values that Affect Care: no    Subjective:  Communicated with nurse Green and epic chart review  prior to session.  Pt agreed to tx . " Im just waiting to get another chest tube. "    Pain/Comfort  Pain Rating 1: 0/10  Pain Rating Post-Intervention 1: 0/10    Objective:   Patient found with: peripheral IV, telemetry, chest tube, oxygen, PICC line    Functional Mobility:  PT COMPLETED BED MOBILITY MOD I WITH HOB ELEVATED. PT SCOOTED TO EOB. PT STOOD IND FOR GT TRAINING. PT GT TRAINED X 450' WITH O2 IN TOW IND WITH MANAGEMENT OF OWN CHEST TUBE. PT RETURNED TO ROOM FOR T/F TO CHAIR IND FOR REST BREAK. PT EDUCATED ON PURSED LIP BREATHING.  PT COMPLETED 2X5 REPS OF SIT>STAND TA WITH EMPHASIS ON LE STRENGTHENING WITH SHOULDER FLEX AND TRUNK EXTENSION TO FACILITATE DEEP BREATHING. PT COMPLETED WITH LONG SEATED REST BREAK. PT LEFT SEATED IN CHAIR WITH ALL NEEDS MET AND CALL BELL IN REACH.      AM-PAC 6 CLICK MOBILITY  How much help from another person does this patient currently need?   1 = Unable, Total/Dependent Assistance  2 = A lot, Maximum/Moderate Assistance  3 = A little, Minimum/Contact Guard/Supervision  4 = None, Modified [place]/Independent    Turning over in bed (including adjusting bedclothes, sheets and blankets)?: 4  Sitting down on and standing up from a chair with arms (e.g., wheelchair, bedside commode, etc.): 4  Moving from lying on back to sitting on the side of " the bed?: 4  Moving to and from a bed to a chair (including a wheelchair)?: 4  Need to walk in hospital room?: 4  Climbing 3-5 steps with a railing?: 1  Basic Mobility Total Score: 21    AM-PAC Raw Score CMS G-Code Modifier Level of Impairment Assistance   6 % Total / Unable   7 - 9 CM 80 - 100% Maximal Assist   10 - 14 CL 60 - 80% Moderate Assist   15 - 19 CK 40 - 60% Moderate Assist   20 - 22 CJ 20 - 40% Minimal Assist   23 CI 1-20% SBA / CGA   24 CH 0% Independent/ Mod I     Patient left up in chair with call button in reach.    Assessment:  PT PROGRESSING WITH GT TRAINING AND ENDURANCE.     Rehab identified problem list/impairments: Rehab identified problem list/impairments: impaired cardiopulmonary response to activity, impaired functional mobility    Rehab potential is good.    Activity tolerance: Good    Discharge recommendations: Discharge Facility/Level of Care Needs: home     Barriers to discharge:      Equipment recommendations: Equipment Needed After Discharge: none     GOALS:   Multidisciplinary Problems       Physical Therapy Goals          Problem: Physical Therapy    Goal Priority Disciplines Outcome Goal Variances Interventions   Physical Therapy Goal     PT, PT/OT Ongoing, Progressing     Description: LTGs:   POC expires 11/6/22  Pt will perform 3x10 sit to stands IND  in order improve endurance.   Pt will ' IND while maintaining chest tube in order to participate in household ambulation.    Pt will ascend and descend 1 flight of stairs IND in order to return to work.                       PLAN:    Patient to be seen 3 x/week  to address the above listed problems via gait training, therapeutic activities  Plan of Care expires: 11/06/22  Plan of Care reviewed with: patient, significant other         10/25/2022

## 2022-10-25 NOTE — PLAN OF CARE
Pt remains free of falls/injury this shift. Safety precautions maintained. Pain managed with PRN medications. Chest tubes in place and intact. IV fluids infusing. IV abx given. Regular diet tolerated well. Sinus Tachy on tele monitor #[id number]. VSS. No signs and symptoms of acute distress noted at this time. 12 hour chart check completed. Will continue to monitor.

## 2022-10-25 NOTE — ASSESSMENT & PLAN NOTE
S/p VATS  -10/21- and left chest tube .  All cultures reviewed .    Negative cultures so far.  Continue Unasyn and Zyvox for now.  Empiric out patient regime will be Vancomycin  ,Rocephin and Po Flagyl    Will treat for at least 2-3 weeks and will need repeat imaging to ensure resolution.

## 2022-10-25 NOTE — SEDATION DOCUMENTATION
Procedure completed at this time. VSS, NADN, and pt tolerated procedure well. Report called to Yocasta KAPOOR and verbalized understanding of all.

## 2022-10-25 NOTE — ASSESSMENT & PLAN NOTE
10/21- VATS procedure with chest tube placement and drainage of empyema with CT surgery today. Antibiotic de escalated to Unasyn.   10/22- POD #1. S/P VATS, chest tube insertion x 2 and decortication. Fluid cultures are in progress. WBC remains elevated 22K, CRP trending down. PCT is normal. Current antibiotic - Unasyn and Oral Zyvox.   10/23- CT chest with new fluid collection in the left lung apex. Pt will have IR chest tube placed in am. WBC trended down to 22K  10/25- Additional chest tube by IR today. WBC down to 19K, 3% bands . Antibiotic changed to Cefepime , Flagyl and Vancomycin per ID. Cultures are so far unrevealing.

## 2022-10-25 NOTE — SUBJECTIVE & OBJECTIVE
Interval History:     10/25: seen and examined: T max 98.4F, Await IR procedure,Pleural chamber has 1110mls. , wbcc trending down, reduced CT output    Respiratory ROS: no cough, shortness of breath, or wheezing      Objective:     Vital Signs (Most Recent):  Temp: 98.2 °F (36.8 °C) (10/25/22 0712)  Pulse: (!) 123 (10/25/22 0741)  Resp: 20 (10/25/22 0741)  BP: 136/82 (10/25/22 0712)  SpO2: (!) 93 % (10/25/22 0741)   Vital Signs (24h Range):  Temp:  [97.8 °F (36.6 °C)-98.4 °F (36.9 °C)] 98.2 °F (36.8 °C)  Pulse:  [108-126] 123  Resp:  [16-21] 20  SpO2:  [93 %-98 %] 93 %  BP: (113-157)/(63-96) 136/82     Weight: 126.6 kg (279 lb 1.6 oz)  Body mass index is 42.44 kg/m².      Intake/Output Summary (Last 24 hours) at 10/25/2022 0858  Last data filed at 10/25/2022 0600  Gross per 24 hour   Intake 1741.71 ml   Output 0 ml   Net 1741.71 ml       Physical Exam  Vitals and nursing note reviewed.   Constitutional:       Appearance: She is well-developed. She is morbidly obese.      Interventions: Nasal cannula in place.       HENT:      Head: Normocephalic and atraumatic.      Nose: Nose normal.      Mouth/Throat:      Pharynx: No oropharyngeal exudate.   Eyes:      General: No scleral icterus.     Conjunctiva/sclera: Conjunctivae normal.      Pupils: Pupils are equal, round, and reactive to light.   Neck:      Thyroid: No thyromegaly.      Vascular: No JVD.      Trachea: No tracheal deviation.   Cardiovascular:      Rate and Rhythm: Normal rate and regular rhythm.      Pulses: Normal pulses.      Heart sounds: Normal heart sounds, S1 normal and S2 normal. No murmur heard.  Pulmonary:      Effort: Pulmonary effort is normal. No tachypnea, accessory muscle usage or respiratory distress.      Breath sounds: Decreased air movement present. No stridor. Examination of the left-upper field reveals decreased breath sounds. Decreased breath sounds present.       Abdominal:      General: Bowel sounds are normal. There is no distension.       Palpations: Abdomen is soft. There is no hepatomegaly, splenomegaly or mass.      Tenderness: There is no abdominal tenderness. There is no guarding or rebound.   Musculoskeletal:         General: No tenderness. Normal range of motion.      Cervical back: Normal range of motion and neck supple.   Lymphadenopathy:      Upper Body:      Right upper body: No supraclavicular adenopathy.      Left upper body: No supraclavicular adenopathy.   Skin:     General: Skin is warm and dry.      Findings: No rash.      Nails: There is no clubbing.   Neurological:      General: No focal deficit present.      Mental Status: She is alert and oriented to person, place, and time.      Coordination: Coordination normal.      Gait: Gait normal.      Deep Tendon Reflexes: Reflexes are normal and symmetric.   Psychiatric:         Mood and Affect: Mood normal.       Vents:  Oxygen Concentration (%): 24 (10/25/22 0741)    Lines/Drains/Airways       Peripherally Inserted Central Catheter Line  Duration             PICC Double Lumen 10/22/22 1919 right basilic 2 days              Drain  Duration                  Y Chest Tube 1 and 2 10/21/22 0958 1 Left Pleural 36 Fr. 2 Left Other (Comment) 36 Fr. 3 days                    Significant Labs:    CBC/Anemia Profile:  Recent Labs   Lab 10/23/22  1041 10/24/22  0524 10/25/22  0559   WBC  --  22.72* 19.78*   HGB  --  8.2* 8.8*   HCT  --  26.1* 28.3*   PLT  --  435 522*   MCV  --  84 85   RDW  --  13.9 13.7   IRON 13*  --   --    FERRITIN 307*  --   --    FOLATE 3.1*  --   --    [id number] 315  --   --         Chemistries:  Recent Labs   Lab 10/24/22  0524 10/25/22  0559    135*   K 4.4 4.8   CL 99 101   CO2 24 25   BUN 8 3*   CREATININE 0.6 0.7   CALCIUM 8.3* 8.8       Blood Culture: No results for input(s): LABBLOO in the last 48 hours.  Respiratory Culture: No results for input(s): GSRESP, RESPIRATORYC in the last 48 hours.  Urine Culture: No results for input(s): LABURIN in the last  48 hours.  All pertinent labs within the past 24 hours have been reviewed.    Significant Imaging:  I have reviewed all pertinent imaging results/findings within the past 24 hours.    X-Ray Chest AP Single View  Narrative: EXAMINATION:  XR CHEST 1 VIEW    CLINICAL HISTORY:  post operative;.    TECHNIQUE:  Single frontal portable view of the chest was performed.    COMPARISON:  10/23/2022    FINDINGS:  Compared to 10/23/2022, no adverse interval change.  Lung volumes are low.  Impression: As above.    Electronically signed by: Adrian Lyn  Date:    10/24/2022  Time:    07:28

## 2022-10-25 NOTE — SUBJECTIVE & OBJECTIVE
History reviewed. No pertinent past medical history.    Past Surgical History:   Procedure Laterality Date    EVACUATION OF EMPYEMA Left 10/21/2022    Procedure: EVACUATION, EMPYEMA;  Surgeon: Dusty Moyer MD;  Location: [facility] OR;  Service: Cardiothoracic;  Laterality: Left;  VATS, WITH EVACUATION OF EMPYEMA    INJECTION OF ANESTHETIC AGENT AROUND MULTIPLE INTERCOSTAL NERVES Left 10/21/2022    Procedure: BLOCK, NERVE, INTERCOSTAL, 2 OR MORE;  Surgeon: Dusty Moyer MD;  Location: [facility] OR;  Service: Cardiothoracic;  Laterality: Left;    THORACOSCOPIC DECORTICATION OF LUNG Left 10/21/2022    Procedure: VATS, WITH DECORTICATION, LUNG;  Surgeon: Dusty Moyer MD;  Location: [facility] OR;  Service: Cardiothoracic;  Laterality: Left;  AND EVACUATION OF EMPYEMA       Review of patient's allergies indicates:  No Known Allergies    Medications:  Medications Prior to Admission   Medication Sig    acetaminophen (TYLENOL) 500 MG tablet Take 500-1,000 mg by mouth every 6 (six) hours as needed for Pain.    ibuprofen (ADVIL,MOTRIN) 200 MG tablet Take 200-400 mg by mouth every 6 (six) hours as needed for Pain.    methocarbamoL (ROBAXIN) 500 MG Tab Take 1 tablet (500 mg total) by mouth 2 (two) times daily as needed.    norgestimate-ethinyl estradioL (ORTHO TRI-CYCLEN,TRI-SPRINTEC) 0.18/0.215/0.25 mg-35 mcg (28) tablet Take 1 tablet by mouth once daily.     Antibiotics (From admission, onward)      Start     Stop Route Frequency Ordered    10/22/22 0930  linezolid tablet 600 mg         10/29 0859 Oral Every 12 hours 10/22/22 0818    10/21/22 1800  ampicillin-sulbactam 3 g in sodium chloride 0.9 % 100 mL IVPB (ready to mix system)         -- IV Every 6 hours (non-standard times) 10/21/22 1049    10/21/22 0930  mupirocin 2 % ointment         10/26 0859 Nasl 2 times daily 10/21/22 0823          Antifungals (From admission, onward)      None          Antivirals (From admission, onward)      None             Immunization History    Administered Date(s) Administered    COVID-19, vector-nr, rS-Ad26, PF ([facility]) 03/13/2021    DTP 02/21/1995, 04/18/1995, 06/13/1995, 01/09/1996, 04/06/1999    HIB 02/21/1995, 04/18/1995, 06/13/1995, 01/09/1996    Hepatitis B, Pediatric/Adolescent 01/17/1995, 02/21/1995, 09/26/1995    Influenza 11/22/2011    MMR 01/09/1996, 04/06/1999    Meningococcal Conjugate (MCV4P) 09/23/2009    OPV 02/21/1995, 04/18/1995, 06/13/1995, 04/06/1999    PPD Test 12/17/2018    Tdap 09/23/2009       Family History       Problem Relation (Age of Onset)    Esophageal cancer Father          Social History     Socioeconomic History    Marital status: Single   Tobacco Use    Smoking status: Former     Types: Cigarettes    Smokeless tobacco: Never   Substance and Sexual Activity    Alcohol use: Yes     Comment: social    Drug use: No    Sexual activity: Yes     Partners: Male     Birth control/protection: Condom   Social History Narrative    She is a graduate of [employer] and will be attending [employer] in the fall. She is a non-smoker and doesn't drink alcohol. 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      Social Determinants of Health     Financial Resource Strain: Low Risk     Difficulty of Paying Living Expenses: Not hard at all   Food Insecurity: No Food Insecurity    Worried About Running Out of Food in the Last Year: Never true    Ran Out of Food in the Last Year: Never true   Transportation Needs: No Transportation Needs    Lack of Transportation (Medical): No    Lack of Transportation (Non-Medical): No   Physical Activity: Insufficiently Active    Days of Exercise per Week: 2 days    Minutes of Exercise per Session: 20 min   Stress: No Stress Concern Present    Feeling of Stress : Not at all   Social Connections: Socially Isolated    Frequency of Communication with Friends and Family: More than three times a week    Attends [employer] Services: Never    Active Member of Clubs or Organizations: No    Attends Club or Organization  Meetings: Never    Marital Status: Never    Housing Stability: Low Risk     Unable to Pay for Housing in the Last Year: No    Number of Places Lived in the Last Year: 1    Unstable Housing in the Last Year: No     Review of Systems   Constitutional:  Negative for chills and fatigue.   HENT:  Negative for congestion, ear pain, facial swelling, sinus pressure and sore throat.    Eyes:  Negative for pain.   Respiratory:  Positive for shortness of breath. Negative for apnea, chest tightness and stridor.         Left sided chest pain   Cardiovascular:  Negative for chest pain, palpitations and leg swelling.   Gastrointestinal:  Negative for abdominal distention, abdominal pain, diarrhea and nausea.   Endocrine: Negative for polydipsia and polyphagia.   Genitourinary:  Negative for decreased urine volume, difficulty urinating, frequency and genital sores.   Musculoskeletal:  Negative for arthralgias and gait problem.   Neurological:  Negative for light-headedness and headaches.   Hematological:  Negative for adenopathy.   Psychiatric/Behavioral:  Negative for agitation, confusion and decreased concentration.    Objective:     Vital Signs (Most Recent):  Temp: 98.2 °F (36.8 °C) (10/25/22 0712)  Pulse: (!) 122 (10/25/22 0712)  Resp: 16 (10/25/22 0712)  BP: 136/82 (10/25/22 0712)  SpO2: 95 % (10/25/22 0712)   Vital Signs (24h Range):  Temp:  [97.8 °F (36.6 °C)-98.4 °F (36.9 °C)] 98.2 °F (36.8 °C)  Pulse:  [108-126] 122  Resp:  [16-21] 16  SpO2:  [95 %-98 %] 95 %  BP: (113-157)/(63-96) 136/82     Weight: 126.6 kg (279 lb 1.6 oz)  Body mass index is 42.44 kg/m².    Estimated Creatinine Clearance: 197.9 mL/min (based on SCr of 0.6 mg/dL).    Physical Exam  Vitals and nursing note reviewed.   Constitutional:       Appearance: She is well-developed.   HENT:      Head: Normocephalic and atraumatic.   Eyes:      Conjunctiva/sclera: Conjunctivae normal.      Pupils: Pupils are equal, round, and reactive to light.   Neck:       Thyroid: No thyroid mass or thyromegaly.   Cardiovascular:      Rate and Rhythm: Normal rate.      Heart sounds: Normal heart sounds.   Pulmonary:      Effort: Pulmonary effort is normal. No accessory muscle usage or respiratory distress.      Breath sounds: Normal breath sounds.      Comments: Left sided chest tube  Abdominal:      General: Bowel sounds are normal.      Palpations: Abdomen is soft. There is no mass.      Tenderness: There is no abdominal tenderness.   Musculoskeletal:         General: Normal range of motion.      Cervical back: Normal range of motion and neck supple.   Skin:     Findings: No rash.   Neurological:      Mental Status: She is alert and oriented to person, place, and time.       Significant Labs: BMP:   Recent Labs   Lab 10/24/22  0524   GLU 90      K 4.4   CL 99   CO2 24   BUN 8   CREATININE 0.6   CALCIUM 8.3*     CBC:   Recent Labs   Lab 10/24/22  0524 10/25/22  0559   WBC 22.72* 19.78*   HGB 8.2* 8.8*   HCT 26.1* 28.3*    522*     All pertinent labs within the past 24 hours have been reviewed.    Significant Imaging: I have reviewed all pertinent imaging results/findings within the past 24 hours.

## 2022-10-25 NOTE — SUBJECTIVE & OBJECTIVE
Interval History:   Additional chest tube by IR today. WBC down to 19K, 3% bands      Review of Systems   Constitutional:  Positive for activity change. Negative for appetite change and fever.   HENT:  Negative for sore throat.    Eyes:  Negative for visual disturbance.   Respiratory:  Positive for shortness of breath (improved). Negative for cough and chest tightness.    Cardiovascular:  Positive for chest pain (left post chest wall pain). Negative for palpitations and leg swelling.   Gastrointestinal:  Negative for abdominal distention, abdominal pain, constipation, diarrhea, nausea and vomiting.   Endocrine: Negative for polyuria.   Genitourinary:  Negative for decreased urine volume, dysuria, flank pain, frequency and hematuria.   Musculoskeletal:  Negative for back pain and gait problem.   Skin:  Negative for rash.   Neurological:  Negative for syncope, speech difficulty, weakness, light-headedness and headaches.   Psychiatric/Behavioral:  Negative for confusion, hallucinations and sleep disturbance.    Objective:     Vital Signs (Most Recent):  Temp: 98.2 °F (36.8 °C) (10/25/22 0712)  Pulse: 106 (10/25/22 1300)  Resp: 20 (10/25/22 1300)  BP: (!) 145/78 (10/25/22 1300)  SpO2: 97 % (10/25/22 1300)   Vital Signs (24h Range):  Temp:  [97.8 °F (36.6 °C)-98.4 °F (36.9 °C)] 98.2 °F (36.8 °C)  Pulse:  [106-126] 106  Resp:  [16-21] 20  SpO2:  [93 %-98 %] 97 %  BP: (113-157)/(63-96) 145/78     Weight: 126.6 kg (279 lb 1.6 oz)  Body mass index is 42.44 kg/m².    Intake/Output Summary (Last 24 hours) at 10/25/2022 1302  Last data filed at 10/25/2022 0600  Gross per 24 hour   Intake 1261.71 ml   Output 0 ml   Net 1261.71 ml      Physical Exam  Constitutional:       General: She is not in acute distress.     Appearance: She is well-developed. She is morbidly obese. She is not diaphoretic.   HENT:      Head: Normocephalic and atraumatic.      Mouth/Throat:      Pharynx: No oropharyngeal exudate.   Eyes:       Conjunctiva/sclera: Conjunctivae normal.      Pupils: Pupils are equal, round, and reactive to light.   Neck:      Thyroid: No thyromegaly.      Vascular: No JVD.   Cardiovascular:      Rate and Rhythm: Regular rhythm. Tachycardia present.      Heart sounds: Normal heart sounds. No murmur heard.  Pulmonary:      Effort: Pulmonary effort is normal. No respiratory distress.      Breath sounds: Examination of the left-lower field reveals decreased breath sounds. Decreased breath sounds present. No wheezing or rales.      Comments: Y Chest tube in place , left lower pleural space   Breath sounds diminished left lower lung   Chest:      Chest wall: No tenderness.   Abdominal:      General: Bowel sounds are normal. There is no distension.      Palpations: Abdomen is soft.      Tenderness: There is no abdominal tenderness. There is no guarding or rebound.   Musculoskeletal:         General: Normal range of motion.      Cervical back: Normal range of motion and neck supple.      Right lower leg: No edema.      Left lower leg: No edema.   Lymphadenopathy:      Cervical: No cervical adenopathy.   Skin:     General: Skin is warm and dry.      Findings: No rash.   Neurological:      General: No focal deficit present.      Mental Status: She is alert and oriented to person, place, and time.      Cranial Nerves: No cranial nerve deficit.      Sensory: No sensory deficit.      Deep Tendon Reflexes: Reflexes normal.   Psychiatric:         Mood and Affect: Mood normal.       Significant Labs: All pertinent labs within the past 24 hours have been reviewed.  BMP:   Recent Labs   Lab 10/25/22  0559   *   *   K 4.8      CO2 25   BUN 3*   CREATININE 0.7   CALCIUM 8.8     CBC:   Recent Labs   Lab 10/24/22  0524 10/25/22  0559   WBC 22.72* 19.78*   HGB 8.2* 8.8*   HCT 26.1* 28.3*    522*     CMP:   Recent Labs   Lab 10/24/22  0524 10/25/22  0559    135*   K 4.4 4.8   CL 99 101   CO2 24 25   GLU 90 196*   BUN  8 3*   CREATININE 0.6 0.7   CALCIUM 8.3* 8.8   ANIONGAP 13 9       Significant Imaging:

## 2022-10-25 NOTE — OP NOTE
Pre Op Diagnosis: left loculated effusion     Post Op Diagnosis: same     Procedure:  drainage     Procedure performed by: Riki HERNANDEZ, Gaurav HESS     Written Informed Consent Obtained: Yes     Specimen Removed:  yes     Estimated Blood Loss:  minimal     Findings: Local anesthesia and moderate sedation were used.     The patient tolerated the procedure well and there were no complications.      Disposition:  F/U in clinic or with ordering physician    Discharge instructions:  Light activity for 24 hours.  Remove band aid in 24 hours.  No baths (showers are appropriate).      Sterile technique was performed in the anterior left chest, lidocaine was used as a local anesthetic.  8.5 fr drainage catheter placed and approx 50 ccs of milana fluid removed and sent to lab.  Catheter connected to a Pleura vac.  Pt tolerated the procedure well without immediate complications.  Please see radiologist report for details. F/u with PCP and/or ordering physician.

## 2022-10-25 NOTE — INTERVAL H&P NOTE
The patient has been examined and the H&P has been reviewed:    I concur with the findings and no changes have occurred since H&P was written.        Active Hospital Problems    Diagnosis  POA    *Empyema of left pleural space [J86.9]  Yes    S/P chest tube placement [Z93.8]  Not Applicable    Anemia, hypochromic [D64.9]  Yes    Class 3 severe obesity in adult [E66.01]  Yes    Parapneumonic effusion, Left  [J18.9, J91.8]  Yes      Resolved Hospital Problems    Diagnosis Date Resolved POA    Pleural effusion [J90] 10/21/2022 Yes    Acute respiratory distress [R06.03] 10/22/2022 Yes

## 2022-10-25 NOTE — PROGRESS NOTES
[facility] Surg  Pulmonology  Progress Note    Patient Name: Christina Leal  MRN: [id number]  Admission Date: 10/19/2022  Hospital Length of Stay: 6 days  Code Status: Full Code  Attending Provider: Cynthia Wallis MD  Primary Care Provider: Primary Doctor No   Principal Problem: Empyema of left pleural space    Subjective:     Interval History:     10/25: seen and examined: T max 98.4F, Await IR procedure,Pleural chamber has 1110mls. , wbcc trending down, reduced CT output    Respiratory ROS: no cough, shortness of breath, or wheezing      Objective:     Vital Signs (Most Recent):  Temp: 98.2 °F (36.8 °C) (10/25/22 0712)  Pulse: (!) 123 (10/25/22 0741)  Resp: 20 (10/25/22 0741)  BP: 136/82 (10/25/22 0712)  SpO2: (!) 93 % (10/25/22 0741)   Vital Signs (24h Range):  Temp:  [97.8 °F (36.6 °C)-98.4 °F (36.9 °C)] 98.2 °F (36.8 °C)  Pulse:  [108-126] 123  Resp:  [16-21] 20  SpO2:  [93 %-98 %] 93 %  BP: (113-157)/(63-96) 136/82     Weight: 126.6 kg (279 lb 1.6 oz)  Body mass index is 42.44 kg/m².      Intake/Output Summary (Last 24 hours) at 10/25/2022 0858  Last data filed at 10/25/2022 0600  Gross per 24 hour   Intake 1741.71 ml   Output 0 ml   Net 1741.71 ml       Physical Exam  Vitals and nursing note reviewed.   Constitutional:       Appearance: She is well-developed. She is morbidly obese.      Interventions: Nasal cannula in place.       HENT:      Head: Normocephalic and atraumatic.      Nose: Nose normal.      Mouth/Throat:      Pharynx: No oropharyngeal exudate.   Eyes:      General: No scleral icterus.     Conjunctiva/sclera: Conjunctivae normal.      Pupils: Pupils are equal, round, and reactive to light.   Neck:      Thyroid: No thyromegaly.      Vascular: No JVD.      Trachea: No tracheal deviation.   Cardiovascular:      Rate and Rhythm: Normal rate and regular rhythm.      Pulses: Normal pulses.      Heart sounds: Normal heart sounds, S1 normal and S2 normal. No murmur heard.  Pulmonary:      Effort:  Pulmonary effort is normal. No tachypnea, accessory muscle usage or respiratory distress.      Breath sounds: Decreased air movement present. No stridor. Examination of the left-upper field reveals decreased breath sounds. Decreased breath sounds present.       Abdominal:      General: Bowel sounds are normal. There is no distension.      Palpations: Abdomen is soft. There is no hepatomegaly, splenomegaly or mass.      Tenderness: There is no abdominal tenderness. There is no guarding or rebound.   Musculoskeletal:         General: No tenderness. Normal range of motion.      Cervical back: Normal range of motion and neck supple.   Lymphadenopathy:      Upper Body:      Right upper body: No supraclavicular adenopathy.      Left upper body: No supraclavicular adenopathy.   Skin:     General: Skin is warm and dry.      Findings: No rash.      Nails: There is no clubbing.   Neurological:      General: No focal deficit present.      Mental Status: She is alert and oriented to person, place, and time.      Coordination: Coordination normal.      Gait: Gait normal.      Deep Tendon Reflexes: Reflexes are normal and symmetric.   Psychiatric:         Mood and Affect: Mood normal.       Vents:  Oxygen Concentration (%): 24 (10/25/22 0741)    Lines/Drains/Airways       Peripherally Inserted Central Catheter Line  Duration             PICC Double Lumen 10/22/22 1919 right basilic 2 days              Drain  Duration                  Y Chest Tube 1 and 2 10/21/22 0958 1 Left Pleural 36 Fr. 2 Left Other (Comment) 36 Fr. 3 days                    Significant Labs:    CBC/Anemia Profile:  Recent Labs   Lab 10/23/22  1041 10/24/22  0524 10/25/22  0559   WBC  --  22.72* 19.78*   HGB  --  8.2* 8.8*   HCT  --  26.1* 28.3*   PLT  --  435 522*   MCV  --  84 85   RDW  --  13.9 13.7   IRON 13*  --   --    FERRITIN 307*  --   --    FOLATE 3.1*  --   --    [id number] 315  --   --         Chemistries:  Recent Labs   Lab 10/24/22  0524  10/25/22  0559    135*   K 4.4 4.8   CL 99 101   CO2 24 25   BUN 8 3*   CREATININE 0.6 0.7   CALCIUM 8.3* 8.8       Blood Culture: No results for input(s): LABBLOO in the last 48 hours.  Respiratory Culture: No results for input(s): GSRESP, RESPIRATORYC in the last 48 hours.  Urine Culture: No results for input(s): LABURIN in the last 48 hours.  All pertinent labs within the past 24 hours have been reviewed.    Significant Imaging:  I have reviewed all pertinent imaging results/findings within the past 24 hours.    X-Ray Chest AP Single View  Narrative: EXAMINATION:  XR CHEST 1 VIEW    CLINICAL HISTORY:  post operative;.    TECHNIQUE:  Single frontal portable view of the chest was performed.    COMPARISON:  10/23/2022    FINDINGS:  Compared to 10/23/2022, no adverse interval change.  Lung volumes are low.  Impression: As above.    Electronically signed by: Adrian Lyn  Date:    10/24/2022  Time:    07:28        ABG  Recent Labs   Lab 10/21/22  1321   PH 7.377   PO2 124*   PCO2 49.8*   HCO3 29.3*   BE 4     Assessment/Plan:     * Empyema of left pleural space  SP VATS chest tube, decortication  CT management per CTS team  Await  drainage for left loculation     S/P chest tube placement  Follow CXR  pleurovac has 1110mls  Incentive spirometry to prevent aletectasis  Pain control  CTS following    Class 3 severe obesity in adult  Weight loss    Parapneumonic effusion, Left   Empiric abx VANC, CEFEPIME, FLAGYL  Deescalate based on cultures  UNASYN+ ZYVOX per ID           Varun Wood MD  Pulmonology  [facility]

## 2022-10-25 NOTE — CONSULTS
[facility] - Med [facility]  Infectious Disease  Consult Note    Patient Name: Christina Leal  MRN: [id number]  Admission Date: 10/19/2022  Hospital Length of Stay: 6 days  Attending Physician: Cynthia Wallis MD  Primary Care Provider: Primary Doctor No     Isolation Status: No active isolations    Patient information was obtained from patient, past medical records and ER records.      Consults  Assessment/Plan:     * Empyema of left pleural space  S/p VATS  -10/21- and left chest tube .  All cultures reviewed .    Negative cultures so far.  Continue Unasyn and Zyvox for now.  Empiric out patient regime will be Vancomycin  ,Rocephin and Po Flagyl    Will treat for at least 2-3 weeks and will need repeat imaging to ensure resolution.          S/P chest tube placement  CVT follow up .    Class 3 severe obesity in adult  Out patient follow up for weight loss regime .          Thank you for your consult. I will follow-up with patient. Please contact us if you have any additional questions.    Waqas Stevens MD, [facility]  Infectious Disease  [facility] - [facility]    Subjective:     Principal Problem: Empyema of left pleural space    HPI:   26 year old woman with history of   obesity, former tobacco abuse who was admitted with history of left rib cage pain . She was diagnosed with empyema nd had interval VATS with left chest tube placement (10/21/22). CT chest shows large loculated appearing left pleural effusion with associated lower lobe atelectasis as well as mild left upper lobe atelectasis.  Cultures- are negative till date .  Component      Latest Ref Rng & Units 10/25/2022 10/24/2022 10/23/2022 10/22/2022                WBC      3.90 - 12.70 K/uL 19.78 (H) 22.72 (H) 28.66 (H) 22.64 (H)             History reviewed. No pertinent past medical history.    Past Surgical History:   Procedure Laterality Date    EVACUATION OF EMPYEMA Left 10/21/2022    Procedure: EVACUATION, EMPYEMA;  Surgeon: Dusty Moyer MD;  Location: [facility] OR;   Service: Cardiothoracic;  Laterality: Left;  VATS, WITH EVACUATION OF EMPYEMA    INJECTION OF ANESTHETIC AGENT AROUND MULTIPLE INTERCOSTAL NERVES Left 10/21/2022    Procedure: BLOCK, NERVE, INTERCOSTAL, 2 OR MORE;  Surgeon: Dusty Moyer MD;  Location: [facility] OR;  Service: Cardiothoracic;  Laterality: Left;    THORACOSCOPIC DECORTICATION OF LUNG Left 10/21/2022    Procedure: VATS, WITH DECORTICATION, LUNG;  Surgeon: Dusty Moyer MD;  Location: [facility] OR;  Service: Cardiothoracic;  Laterality: Left;  AND EVACUATION OF EMPYEMA       Review of patient's allergies indicates:  No Known Allergies    Medications:  Medications Prior to Admission   Medication Sig    acetaminophen (TYLENOL) 500 MG tablet Take 500-1,000 mg by mouth every 6 (six) hours as needed for Pain.    ibuprofen (ADVIL,MOTRIN) 200 MG tablet Take 200-400 mg by mouth every 6 (six) hours as needed for Pain.    methocarbamoL (ROBAXIN) 500 MG Tab Take 1 tablet (500 mg total) by mouth 2 (two) times daily as needed.    norgestimate-ethinyl estradioL (ORTHO TRI-CYCLEN,TRI-SPRINTEC) 0.18/0.215/0.25 mg-35 mcg (28) tablet Take 1 tablet by mouth once daily.     Antibiotics (From admission, onward)      Start     Stop Route Frequency Ordered    10/22/22 0930  linezolid tablet 600 mg         10/29 0859 Oral Every 12 hours 10/22/22 0818    10/21/22 1800  ampicillin-sulbactam 3 g in sodium chloride 0.9 % 100 mL IVPB (ready to mix system)         -- IV Every 6 hours (non-standard times) 10/21/22 1049    10/21/22 0930  mupirocin 2 % ointment         10/26 0859 Nasl 2 times daily 10/21/22 0823          Antifungals (From admission, onward)      None          Antivirals (From admission, onward)      None             Immunization History   Administered Date(s) Administered    COVID-19, vector-nr, rS-Ad26, PF ([place]) 03/13/2021    DTP 02/21/1995, 04/18/1995, 06/13/1995, 01/09/1996, 04/06/1999    HIB 02/21/1995, 04/18/1995, 06/13/1995, 01/09/1996    Hepatitis B,  Pediatric/Adolescent 01/17/1995, 02/21/1995, 09/26/1995    Influenza 11/22/2011    MMR 01/09/1996, 04/06/1999    Meningococcal Conjugate (MCV4P) 09/23/2009    OPV 02/21/1995, 04/18/1995, 06/13/1995, 04/06/1999    PPD Test 12/17/2018    Tdap 09/23/2009       Family History       Problem Relation (Age of Onset)    Esophageal cancer Father          Social History     Socioeconomic History    Marital status: Single   Tobacco Use    Smoking status: Former     Types: Cigarettes    Smokeless tobacco: Never   Substance and Sexual Activity    Alcohol use: Yes     Comment: social    Drug use: No    Sexual activity: Yes     Partners: Male     Birth control/protection: Condom   Social History Narrative    She is a graduate of [employer] and will be attending [employer] in the fall. She is a non-smoker and doesn't drink alcohol. 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      Social Determinants of Health     Financial Resource Strain: Low Risk     Difficulty of Paying Living Expenses: Not hard at all   Food Insecurity: No Food Insecurity    Worried About Running Out of Food in the Last Year: Never true    Ran Out of Food in the Last Year: Never true   Transportation Needs: No Transportation Needs    Lack of Transportation (Medical): No    Lack of Transportation (Non-Medical): No   Physical Activity: Insufficiently Active    Days of Exercise per Week: 2 days    Minutes of Exercise per Session: 20 min   Stress: No Stress Concern Present    Feeling of Stress : Not at all   Social Connections: Socially Isolated    Frequency of Communication with Friends and Family: More than three times a week    Attends Congregational Services: Never    Active Member of Clubs or Organizations: No    Attends Club or Organization Meetings: Never    Marital Status: Never    Housing Stability: Low Risk     Unable to Pay for Housing in the Last Year: No    Number of Places Lived in the Last Year: 1    Unstable Housing in the  Last Year: No     Review of Systems   Constitutional:  Negative for chills and fatigue.   HENT:  Negative for congestion, ear pain, facial swelling, sinus pressure and sore throat.    Eyes:  Negative for pain.   Respiratory:  Positive for shortness of breath. Negative for apnea, chest tightness and stridor.         Left sided chest pain   Cardiovascular:  Negative for chest pain, palpitations and leg swelling.   Gastrointestinal:  Negative for abdominal distention, abdominal pain, diarrhea and nausea.   Endocrine: Negative for polydipsia and polyphagia.   Genitourinary:  Negative for decreased urine volume, difficulty urinating, frequency and genital sores.   Musculoskeletal:  Negative for arthralgias and gait problem.   Neurological:  Negative for light-headedness and headaches.   Hematological:  Negative for adenopathy.   Psychiatric/Behavioral:  Negative for agitation, confusion and decreased concentration.    Objective:     Vital Signs (Most Recent):  Temp: 98.2 °F (36.8 °C) (10/25/22 0712)  Pulse: (!) 122 (10/25/22 0712)  Resp: 16 (10/25/22 0712)  BP: 136/82 (10/25/22 0712)  SpO2: 95 % (10/25/22 0712)   Vital Signs (24h Range):  Temp:  [97.8 °F (36.6 °C)-98.4 °F (36.9 °C)] 98.2 °F (36.8 °C)  Pulse:  [108-126] 122  Resp:  [16-21] 16  SpO2:  [95 %-98 %] 95 %  BP: (113-157)/(63-96) 136/82     Weight: 126.6 kg (279 lb 1.6 oz)  Body mass index is 42.44 kg/m².    Estimated Creatinine Clearance: 197.9 mL/min (based on SCr of 0.6 mg/dL).    Physical Exam  Vitals and nursing note reviewed.   Constitutional:       Appearance: She is well-developed.   HENT:      Head: Normocephalic and atraumatic.   Eyes:      Conjunctiva/sclera: Conjunctivae normal.      Pupils: Pupils are equal, round, and reactive to light.   Neck:      Thyroid: No thyroid mass or thyromegaly.   Cardiovascular:      Rate and Rhythm: Normal rate.      Heart sounds: Normal heart sounds.   Pulmonary:      Effort: Pulmonary effort is normal. No accessory  muscle usage or respiratory distress.      Breath sounds: Normal breath sounds.      Comments: Left sided chest tube  Abdominal:      General: Bowel sounds are normal.      Palpations: Abdomen is soft. There is no mass.      Tenderness: There is no abdominal tenderness.   Musculoskeletal:         General: Normal range of motion.      Cervical back: Normal range of motion and neck supple.   Skin:     Findings: No rash.   Neurological:      Mental Status: She is alert and oriented to person, place, and time.       Significant Labs: BMP:   Recent Labs   Lab 10/24/22  0524   GLU 90      K 4.4   CL 99   CO2 24   BUN 8   CREATININE 0.6   CALCIUM 8.3*     CBC:   Recent Labs   Lab 10/24/22  0524 10/25/22  0559   WBC 22.72* 19.78*   HGB 8.2* 8.8*   HCT 26.1* 28.3*    522*     All pertinent labs within the past 24 hours have been reviewed.    Significant Imaging: I have reviewed all pertinent imaging results/findings within the past 24 hours.

## 2022-10-25 NOTE — PLAN OF CARE
Problem: Fluid Imbalance (Pneumonia)  Goal: Fluid Balance  Outcome: Ongoing, Progressing     Problem: Pain Acute  Goal: Acceptable Pain Control and Functional Ability  Outcome: Ongoing, Progressing     Problem: Fall Injury Risk  Goal: Absence of Fall and Fall-Related Injury  Outcome: Ongoing, Progressing     Problem: Infection  Goal: Absence of Infection Signs and Symptoms  Outcome: Ongoing, Progressing     Problem: Bariatric Environmental Safety  Goal: Safety Maintained with Care  Outcome: Ongoing, Progressing

## 2022-10-25 NOTE — CONSULTS
Pharmacokinetic Initial Assessment: IV Vancomycin    Assessment/Plan:    Initiate intravenous vancomycin with loading dose of 2500 mg once followed by a maintenance dose of vancomycin 2000mg IV every 12 hours  Desired empiric serum trough concentration is 15 to 20 mcg/mL  Draw vancomycin trough level 60 min prior to fourth dose on 10/26 at approximately 2000  Pharmacy will continue to follow and monitor vancomycin.      Please contact pharmacy at extension [phone] with any questions regarding this assessment.     Thank you for the consult,   Megan Magallanes, Joe 10/25/2022 10:05 AM         Patient brief summary:  Christina Leal is a 26 y.o. female initiated on antimicrobial therapy with IV Vancomycin for treatment of suspected lower respiratory infection    Drug Allergies:   Review of patient's allergies indicates:  No Known Allergies    Actual Body Weight:   126.6 kg    Renal Function:   Estimated Creatinine Clearance: 169.6 mL/min (based on SCr of 0.7 mg/dL).,     Dialysis Method (if applicable):  N/A    CBC (last 72 hours):  Recent Labs   Lab Result Units 10/23/22  0635 10/24/22  0524 10/25/22  0559   WBC K/uL 28.66* 22.72* 19.78*   Hemoglobin g/dL 8.6* 8.2* 8.8*   Hematocrit % 27.5* 26.1* 28.3*   Platelets K/uL 442 435 522*   Gran % % 64.0 61.0 71.0   Lymph % % 8.0* 19.0 15.0*   Mono % % 28.0* 20.0* 11.0   Eosinophil % % 0.0 0.0 0.0   Basophil % % 0.0 0.0 0.0   Differential Method  Manual Manual Manual       Metabolic Panel (last 72 hours):  Recent Labs   Lab Result Units 10/23/22  0635 10/24/22  0524 10/25/22  0559   Sodium mmol/L 135* 136 135*   Potassium mmol/L 4.7 4.4 4.8   Chloride mmol/L 100 99 101   CO2 mmol/L 25 24 25   Glucose mg/dL 97 90 196*   BUN mg/dL 6 8 3*   Creatinine mg/dL 0.6 0.6 0.7   Albumin g/dL 1.7*  --   --    Total Bilirubin mg/dL 0.6  --   --    Alkaline Phosphatase U/L 63  --   --    AST U/L 8*  --   --    ALT U/L 12  --   --    Magnesium mg/dL 2.1  --   --        Drug levels (last  3 results):  No results for input(s): VANCOMYCINRA, VANCORANDOM, VANCOMYCINPE, VANCOPEAK, VANCOMYCINTR, VANCOTROUGH in the last 72 hours.    Microbiologic Results:  Microbiology Results (last 7 days)       Procedure Component Value Units Date/Time    Culture, Anaerobe [[id number]] Collected: 10/21/22 1119    Order Status: Completed Specimen: Abscess from Lung, Left Updated: 10/25/22 1003     Anaerobic Culture Culture in progress    Narrative:      LEFT PLEURAL PEEL    Culture, Anaerobe [[id number]] Collected: 10/21/22 1105    Order Status: Completed Specimen: Pleural Fluid Updated: 10/25/22 1002     Anaerobic Culture Culture in progress    Narrative:      LEFT PLEURAL FLUID    Blood culture #2 [[id number]] Collected: 10/19/22 1538    Order Status: Completed Specimen: Blood from Peripheral, Antecubital, Left Updated: 10/25/22 0612     Blood Culture, Routine No growth after 5 days.    Blood culture #1 [[id number]] Collected: 10/19/22 1508    Order Status: Completed Specimen: Blood from Peripheral, Antecubital, Right Updated: 10/25/22 0612     Blood Culture, Routine No growth after 5 days.    AFB Culture & Smear [[id number]] Collected: 10/21/22 1105    Order Status: Completed Specimen: Pleural Fluid Updated: 10/24/22 1538     AFB Culture & Smear Culture in progress     AFB CULTURE STAIN No acid fast bacilli seen.    Narrative:      LEFT PLEURAL FLUID    AFB Culture & Smear [[id number]] Collected: 10/21/22 1119    Order Status: Completed Specimen: Abscess from Lung, Left Updated: 10/24/22 1538     AFB Culture & Smear Culture in progress     AFB CULTURE STAIN No acid fast bacilli seen.    Narrative:      LEFT PLEURAL PEEL    Fungus culture [[id number]] Collected: 10/21/22 1119    Order Status: Completed Specimen: Abscess from Lung, Left Updated: 10/24/22 1204     Fungus (Mycology) Culture Culture in progress    Narrative:      LEFT PLEURAL PEEL    Fungus culture [[id number]] Collected: 10/21/22 1105    Order Status: Completed  Specimen: Pleural Fluid Updated: 10/24/22 1203     Fungus (Mycology) Culture Culture in progress    Narrative:      LEFT PLEURAL FLUID    Aerobic culture [[id number]] Collected: 10/21/22 1105    Order Status: Completed Specimen: Pleural Fluid Updated: 10/24/22 0637     Aerobic Bacterial Culture No significant isolate    Narrative:      LEFT PLEURAL FLUID    Culture, Body Fluid (Aerobic) w/ GS [[id number]] Collected: 10/21/22 1105    Order Status: Completed Specimen: Pleural Fluid Updated: 10/24/22 0636     AEROBIC CULTURE - FLUID No significant isolate     Gram Stain Result Moderate WBC's      Rare Gram positive cocci    Narrative:      LEFT PLEURAL FLUID    Aerobic culture [[id number]] Collected: 10/21/22 1119    Order Status: Completed Specimen: Abscess from Lung, Left Updated: 10/24/22 0635     Aerobic Bacterial Culture Normal respiratory berta.    Narrative:      LEFT PLEURAL PEEL    Gram stain [[id number]] Collected: 10/21/22 1119    Order Status: Completed Specimen: Abscess from Lung, Left Updated: 10/22/22 0218     Gram Stain Result Moderate WBC's      Rare Gram positive cocci    Narrative:      LEFT PLEURAL PEEL    Culture, MRSA [[id number]] Collected: 10/19/22 1657    Order Status: Completed Specimen: MRSA source from Nares, Left Updated: 10/21/22 0710     MRSA Surveillance Screen No MRSA isolated    Culture, Body Fluid - Bactec [[id number]]     Order Status: No result Specimen: Body Fluid from Pleural Fluid     Gram stain [[id number]]     Order Status: No result Specimen: Body Fluid from Pleural Fluid     AFB culture (includes stain) [[id number]]     Order Status: No result Specimen: Body Fluid from Pleural Fluid

## 2022-10-26 LAB
ANION GAP SERPL CALC-SCNC: 8 MMOL/L (ref 8–16)
BACTERIA SPEC ANAEROBE CULT: NORMAL
BACTERIA SPEC ANAEROBE CULT: NORMAL
BASOPHILS # BLD AUTO: ABNORMAL K/UL (ref 0–0.2)
BASOPHILS NFR BLD: 0 % (ref 0–1.9)
BUN SERPL-MCNC: 5 MG/DL (ref 6–20)
CALCIUM SERPL-MCNC: 8.9 MG/DL (ref 8.7–10.5)
CHLORIDE SERPL-SCNC: 101 MMOL/L (ref 95–110)
CO2 SERPL-SCNC: 27 MMOL/L (ref 23–29)
CREAT SERPL-MCNC: 0.6 MG/DL (ref 0.5–1.4)
CREAT SERPL-MCNC: 0.6 MG/DL (ref 0.5–1.4)
DIFFERENTIAL METHOD: ABNORMAL
EOSINOPHIL # BLD AUTO: ABNORMAL K/UL (ref 0–0.5)
EOSINOPHIL NFR BLD: 0 % (ref 0–8)
ERYTHROCYTE [DISTWIDTH] IN BLOOD BY AUTOMATED COUNT: 13.6 % (ref 11.5–14.5)
EST. GFR  (NO RACE VARIABLE): >60 ML/MIN/1.73 M^2
EST. GFR  (NO RACE VARIABLE): >60 ML/MIN/1.73 M^2
FINAL PATHOLOGIC DIAGNOSIS: NORMAL
FINAL PATHOLOGIC DIAGNOSIS: NORMAL
GLUCOSE SERPL-MCNC: 122 MG/DL (ref 70–110)
GROSS: NORMAL
HCT VFR BLD AUTO: 26.8 % (ref 37–48.5)
HGB BLD-MCNC: 8.4 G/DL (ref 12–16)
IMM GRANULOCYTES # BLD AUTO: ABNORMAL K/UL (ref 0–0.04)
IMM GRANULOCYTES NFR BLD AUTO: ABNORMAL % (ref 0–0.5)
LYMPHOCYTES # BLD AUTO: ABNORMAL K/UL (ref 1–4.8)
LYMPHOCYTES NFR BLD: 8 % (ref 18–48)
Lab: NORMAL
Lab: NORMAL
MCH RBC QN AUTO: 26.3 PG (ref 27–31)
MCHC RBC AUTO-ENTMCNC: 31.3 G/DL (ref 32–36)
MCV RBC AUTO: 84 FL (ref 82–98)
MONOCYTES # BLD AUTO: ABNORMAL K/UL (ref 0.3–1)
MONOCYTES NFR BLD: 24 % (ref 4–15)
NEUTROPHILS # BLD AUTO: ABNORMAL K/UL (ref 1.8–7.7)
NEUTROPHILS NFR BLD: 68 % (ref 38–73)
NRBC BLD-RTO: 0 /100 WBC
PLATELET # BLD AUTO: 516 K/UL (ref 150–450)
PMV BLD AUTO: 8.7 FL (ref 9.2–12.9)
POTASSIUM SERPL-SCNC: 4.4 MMOL/L (ref 3.5–5.1)
RBC # BLD AUTO: 3.2 M/UL (ref 4–5.4)
SODIUM SERPL-SCNC: 136 MMOL/L (ref 136–145)
VANCOMYCIN TROUGH SERPL-MCNC: 7.2 UG/ML (ref 10–22)
WBC # BLD AUTO: 16.73 K/UL (ref 3.9–12.7)

## 2022-10-26 PROCEDURE — 94640 AIRWAY INHALATION TREATMENT: CPT

## 2022-10-26 PROCEDURE — 25000003 PHARM REV CODE 250: Performed by: INTERNAL MEDICINE

## 2022-10-26 PROCEDURE — 97110 THERAPEUTIC EXERCISES: CPT | Mod: CQ

## 2022-10-26 PROCEDURE — 82565 ASSAY OF CREATININE: CPT | Performed by: INTERNAL MEDICINE

## 2022-10-26 PROCEDURE — 63600175 PHARM REV CODE 636 W HCPCS: Mod: JG | Performed by: NURSE PRACTITIONER

## 2022-10-26 PROCEDURE — 99232 SBSQ HOSP IP/OBS MODERATE 35: CPT | Mod: ,,, | Performed by: INTERNAL MEDICINE

## 2022-10-26 PROCEDURE — 63600175 PHARM REV CODE 636 W HCPCS: Performed by: THORACIC SURGERY (CARDIOTHORACIC VASCULAR SURGERY)

## 2022-10-26 PROCEDURE — 25000242 PHARM REV CODE 250 ALT 637 W/ HCPCS: Performed by: THORACIC SURGERY (CARDIOTHORACIC VASCULAR SURGERY)

## 2022-10-26 PROCEDURE — 85007 BL SMEAR W/DIFF WBC COUNT: CPT | Performed by: INTERNAL MEDICINE

## 2022-10-26 PROCEDURE — 21400001 HC TELEMETRY ROOM

## 2022-10-26 PROCEDURE — 63600175 PHARM REV CODE 636 W HCPCS: Performed by: INTERNAL MEDICINE

## 2022-10-26 PROCEDURE — 99232 PR SUBSEQUENT HOSPITAL CARE,LEVL II: ICD-10-PCS | Mod: ,,, | Performed by: INTERNAL MEDICINE

## 2022-10-26 PROCEDURE — 11000001 HC ACUTE MED/SURG PRIVATE ROOM

## 2022-10-26 PROCEDURE — 27000221 HC OXYGEN, UP TO 24 HOURS

## 2022-10-26 PROCEDURE — 25000003 PHARM REV CODE 250: Performed by: NURSE PRACTITIONER

## 2022-10-26 PROCEDURE — 80202 ASSAY OF VANCOMYCIN: CPT | Performed by: INTERNAL MEDICINE

## 2022-10-26 PROCEDURE — 97116 GAIT TRAINING THERAPY: CPT | Mod: CQ

## 2022-10-26 PROCEDURE — 80048 BASIC METABOLIC PNL TOTAL CA: CPT | Performed by: INTERNAL MEDICINE

## 2022-10-26 PROCEDURE — 85027 COMPLETE CBC AUTOMATED: CPT | Performed by: INTERNAL MEDICINE

## 2022-10-26 PROCEDURE — 99900035 HC TECH TIME PER 15 MIN (STAT)

## 2022-10-26 PROCEDURE — 94761 N-INVAS EAR/PLS OXIMETRY MLT: CPT

## 2022-10-26 PROCEDURE — 94799 UNLISTED PULMONARY SVC/PX: CPT

## 2022-10-26 PROCEDURE — 25000003 PHARM REV CODE 250: Performed by: THORACIC SURGERY (CARDIOTHORACIC VASCULAR SURGERY)

## 2022-10-26 RX ORDER — FUROSEMIDE 10 MG/ML
20 INJECTION INTRAMUSCULAR; INTRAVENOUS
Status: DISCONTINUED | OUTPATIENT
Start: 2022-10-26 | End: 2022-10-27

## 2022-10-26 RX ORDER — POTASSIUM CHLORIDE 750 MG/1
10 TABLET, EXTENDED RELEASE ORAL DAILY
Status: DISCONTINUED | OUTPATIENT
Start: 2022-10-26 | End: 2022-10-28

## 2022-10-26 RX ADMIN — IPRATROPIUM BROMIDE AND ALBUTEROL SULFATE 3 ML: 2.5; .5 SOLUTION RESPIRATORY (INHALATION) at 12:10

## 2022-10-26 RX ADMIN — OXYCODONE HYDROCHLORIDE 5 MG: 5 TABLET ORAL at 11:10

## 2022-10-26 RX ADMIN — FAMOTIDINE 20 MG: 20 TABLET ORAL at 08:10

## 2022-10-26 RX ADMIN — Medication 1 TABLET: at 09:10

## 2022-10-26 RX ADMIN — ENOXAPARIN SODIUM 40 MG: 40 INJECTION SUBCUTANEOUS at 09:10

## 2022-10-26 RX ADMIN — VANCOMYCIN HYDROCHLORIDE 2000 MG: 10 INJECTION, POWDER, LYOPHILIZED, FOR SOLUTION INTRAVENOUS at 09:10

## 2022-10-26 RX ADMIN — Medication 324 MG: at 05:10

## 2022-10-26 RX ADMIN — IPRATROPIUM BROMIDE AND ALBUTEROL SULFATE 3 ML: 2.5; .5 SOLUTION RESPIRATORY (INHALATION) at 07:10

## 2022-10-26 RX ADMIN — OXYCODONE HYDROCHLORIDE 5 MG: 5 TABLET ORAL at 05:10

## 2022-10-26 RX ADMIN — OXYCODONE HYDROCHLORIDE 5 MG: 5 TABLET ORAL at 07:10

## 2022-10-26 RX ADMIN — FUROSEMIDE 20 MG: 10 INJECTION, SOLUTION INTRAMUSCULAR; INTRAVENOUS at 09:10

## 2022-10-26 RX ADMIN — ENOXAPARIN SODIUM 40 MG: 40 INJECTION SUBCUTANEOUS at 08:10

## 2022-10-26 RX ADMIN — CEFEPIME 2 G: 2 INJECTION, POWDER, FOR SOLUTION INTRAVENOUS at 11:10

## 2022-10-26 RX ADMIN — DOCUSATE SODIUM 100 MG: 100 CAPSULE, LIQUID FILLED ORAL at 09:10

## 2022-10-26 RX ADMIN — METRONIDAZOLE 500 MG: 500 TABLET ORAL at 09:10

## 2022-10-26 RX ADMIN — Medication 324 MG: at 09:10

## 2022-10-26 RX ADMIN — FAMOTIDINE 20 MG: 20 TABLET ORAL at 09:10

## 2022-10-26 RX ADMIN — CEFEPIME 2 G: 2 INJECTION, POWDER, FOR SOLUTION INTRAVENOUS at 04:10

## 2022-10-26 RX ADMIN — DOCUSATE SODIUM 100 MG: 100 CAPSULE, LIQUID FILLED ORAL at 08:10

## 2022-10-26 RX ADMIN — POLYETHYLENE GLYCOL 3350 17 G: 17 POWDER, FOR SOLUTION ORAL at 09:10

## 2022-10-26 RX ADMIN — CEFEPIME 2 G: 2 INJECTION, POWDER, FOR SOLUTION INTRAVENOUS at 06:10

## 2022-10-26 RX ADMIN — ALTEPLASE 2 MG: 2.2 INJECTION, POWDER, LYOPHILIZED, FOR SOLUTION INTRAVENOUS at 02:10

## 2022-10-26 RX ADMIN — IPRATROPIUM BROMIDE AND ALBUTEROL SULFATE 3 ML: 2.5; .5 SOLUTION RESPIRATORY (INHALATION) at 08:10

## 2022-10-26 RX ADMIN — METRONIDAZOLE 500 MG: 500 TABLET ORAL at 01:10

## 2022-10-26 RX ADMIN — POTASSIUM CHLORIDE 10 MEQ: 750 TABLET, EXTENDED RELEASE ORAL at 09:10

## 2022-10-26 RX ADMIN — METRONIDAZOLE 500 MG: 500 TABLET ORAL at 05:10

## 2022-10-26 NOTE — NURSING
Pt aaox4, currently on 1L NC. Pt remains free of injury throughout the shift. Safety measures remain in place. Chest tubes in place, dressings CDI. Pt SR on the cardiac monitor. No s/s of acute distress. POC reviewed with pt, verbalizes understanding. Hourly rounding done, will cont with poc.

## 2022-10-26 NOTE — SUBJECTIVE & OBJECTIVE
Interval History:   10/26: seen and examined: T max 98.4F,  IR chest tube drained 50 mls  chamber has 1250mls. , wbcc trending down, reduced CT output    Respiratory ROS: positive for - shortness of breath  negative for - hemoptysis, pleuritic pain, sputum changes, or wheezing     Objective:     Vital Signs (Most Recent):  Temp: 98.3 °F (36.8 °C) (10/26/22 0717)  Pulse: 98 (10/26/22 0725)  Resp: 18 (10/26/22 0725)  BP: 130/62 (10/26/22 0717)  SpO2: 97 % (10/26/22 0725) Vital Signs (24h Range):  Temp:  [98.2 °F (36.8 °C)-100.2 °F (37.9 °C)] 98.3 °F (36.8 °C)  Pulse:  [] 98  Resp:  [17-21] 18  SpO2:  [94 %-98 %] 97 %  BP: (114-147)/(62-88) 130/62     Weight: 126.6 kg (279 lb 1.6 oz)  Body mass index is 42.44 kg/m².      Intake/Output Summary (Last 24 hours) at 10/26/2022 0847  Last data filed at 10/25/2022 1802  Gross per 24 hour   Intake 1242.56 ml   Output --   Net 1242.56 ml       Physical Exam  Vitals and nursing note reviewed.   Constitutional:       Appearance: She is well-developed. She is morbidly obese.      Interventions: Nasal cannula in place.       HENT:      Head: Normocephalic and atraumatic.      Nose: Nose normal.      Mouth/Throat:      Pharynx: No oropharyngeal exudate.   Eyes:      General: No scleral icterus.     Conjunctiva/sclera: Conjunctivae normal.      Pupils: Pupils are equal, round, and reactive to light.   Neck:      Thyroid: No thyromegaly.      Vascular: No JVD.      Trachea: No tracheal deviation.   Cardiovascular:      Rate and Rhythm: Normal rate and regular rhythm.      Pulses: Normal pulses.      Heart sounds: Normal heart sounds, S1 normal and S2 normal. No murmur heard.  Pulmonary:      Effort: Pulmonary effort is normal. No tachypnea, accessory muscle usage or respiratory distress.      Breath sounds: Decreased air movement present. No stridor. Examination of the left-upper field reveals decreased breath sounds. Decreased breath sounds present.       Abdominal:       General: Bowel sounds are normal. There is no distension.      Palpations: Abdomen is soft. There is no hepatomegaly, splenomegaly or mass.      Tenderness: There is no abdominal tenderness. There is no guarding or rebound.   Musculoskeletal:         General: No tenderness. Normal range of motion.      Cervical back: Normal range of motion and neck supple.   Lymphadenopathy:      Upper Body:      Right upper body: No supraclavicular adenopathy.      Left upper body: No supraclavicular adenopathy.   Skin:     General: Skin is warm and dry.      Findings: No rash.      Nails: There is no clubbing.   Neurological:      General: No focal deficit present.      Mental Status: She is alert and oriented to person, place, and time.      Coordination: Coordination normal.      Gait: Gait normal.      Deep Tendon Reflexes: Reflexes are normal and symmetric.   Psychiatric:         Mood and Affect: Mood normal.       Vents:  Oxygen Concentration (%): 28 (10/26/22 0725)    Lines/Drains/Airways       Peripherally Inserted Central Catheter Line  Duration             PICC Double Lumen 10/22/22 1919 right basilic 3 days              Drain  Duration                  Y Chest Tube 1 and 2 10/21/22 0958 1 Left Pleural 36 Fr. 2 Left Other (Comment) 36 Fr. 4 days         Chest Tube 10/25/22 1313 3 Left Midclavicular 8.5 Fr. <1 day                    Significant Labs:    CBC/Anemia Profile:  Recent Labs   Lab 10/25/22  0559 10/26/22  0558   WBC 19.78* 16.73*   HGB 8.8* 8.4*   HCT 28.3* 26.8*   * 516*   MCV 85 84   RDW 13.7 13.6        Chemistries:  Recent Labs   Lab 10/25/22  0559 10/26/22  0558   * 136   K 4.8 4.4    101   CO2 25 27   BUN 3* 5*   CREATININE 0.7 0.6   CALCIUM 8.8 8.9       Blood Culture: No results for input(s): LABBLOO in the last 48 hours.  POCT Glucose:   Recent Labs   Lab 10/25/22  2003   POCTGLUCOSE 116*     Respiratory Culture: No results for input(s): GSRESP, RESPIRATORYC in the last 48 hours.  All  pertinent labs within the past 24 hours have been reviewed.    Significant Imaging:  I have reviewed all pertinent imaging results/findings within the past 24 hours.    X-Ray Chest AP Portable  Narrative: EXAMINATION:  XR CHEST AP PORTABLE    CLINICAL HISTORY:  s/p vats;    TECHNIQUE:  Single frontal view of the chest was performed.    COMPARISON:  10/25/2022    FINDINGS:  Pigtail catheter placement this overlies the apical loculation.  Chest tubes poorly in the more inferior lung.  Overall appearance of the lungs is otherwise unchanged from the prior of 10/24/2022.    The cardiac silhouette is stable.  The hilar and mediastinal contours are unremarkable.    Bones are intact.  Impression: In comparison to the prior study, there is no adverse interval changes    Electronically signed by: Francisco Muhammad MD  Date:    10/26/2022  Time:    06:51

## 2022-10-26 NOTE — ASSESSMENT & PLAN NOTE
Follow CXR  pleurovac has 1250 mls  Incentive spirometry to prevent aletectasis  Pain control  CTS following

## 2022-10-26 NOTE — PROGRESS NOTES
[facility] Medicine  Progress Note    Patient Name: Christina Leal  MRN: [id number]  Patient Class: IP- Inpatient   Admission Date: 10/19/2022  Length of Stay: 7 days  Attending Physician: Cynthia Wallis MD  Primary Care Provider: Primary Doctor No        Subjective:     Principal Problem:Empyema of left pleural space        HPI:  Christina Leal is a 25 yo female with history of obesity, former tobacco abuse on oral contraception who was referred to ED by primary care for abnormal CXR. Patient reports 1 week history of left sided rib cage pain associated with chest tightness, SOB and PAIZ. She also reports upper abdominal cramping and diarrhea. She denies n/v. OP imaging remarkable for pna with large left pleural effusion. CT chest shows large loculated appearing left pleural effusion with associated lower lobe atelectasis as well as mild left upper lobe atelectasis.  Bandlike opacities with ground-glass attenuation in the posterior right lung base likely represent additional subsegmental atelectasis. Mild rightward shift of the mediastinal contents noted. Patient febrile (Tmax 100.6) tachycardic and tachypenic, O2 sats 98% 2 L O2. Influenza, COVID, HIV, Hep C negative. Labs remarkable for wbc 29 with left shift, Tbili 2.1. She received cefepime. Pulmonology consulted. Patient admitted to hospital medicine.             Overview/Hospital Course:  10/20/22 patient s/p thoracentesis 1500 cc removed. Fluid analysis indicating left pleural effusion compatible with a diagnosis of an empyema   Pt reports feeling better. Repeat CXR: fluid reaccumulation. CVT consulted, plan for chest tube placement with VATs procedure.     10/21- Pt is currently undergoing  VATS procedure with chest tube placement and drainage of empyema with CT surgery. AM labs are not available. Blood cultures - NGTD. Nasal MRSA - neg. Pleural Fluid culture - pending. Antibiotic de escalated to Unasyn.     10/22- S/P VATS, chest tube  insertion x 2 and decortication. CT output 600 ml since surgery yesterday. Pleural fluid gram stain showed mod WBCs and rare GPC. Fluid cultures are in progress. WBC remains elevated 22K, CRP trending down. PCT is normal. Current antibiotic - Unasyn and Oral Zyvox.     10/23- Chest tube to suction continues. Output reported 150 ml in the last 24h. Follow up CXR showed persistent loculated left pleural effusion in the apex. CT Surgery will get CT chest for further eval. WBC trended up 28K today. Will consult ID to guide antibiotic selection. Hgb dropped to 8.6 from initial 11.3 on admit. No signs of active bleeding reported. Will monitor Hgb. Culture in progress.    10/24- CT chest with new fluid collection in the left lung apex. Pt will have IR chest tube placed in am. WBC trended down to 22K. Hgb stable at 8.2. pleural fluid aerobic culture - Normal respiratory berta.Anaerobic culture in progress . Current antibiotic - Unasyn and oral Zyvox.     10/25- Additional chest tube by IR today. WBC down to 19K, 3% bands . Antibiotic changed to Cefepime , Flagyl and Vancomycin per ID. Cultures are so far unrevealing.     10/26- S/P pigtail catheter placement into apical loculated collection anterior chest yesterday. Y chest tube in place in the post chest. WBC down to 16K.       Interval History:   Still with significant chest tube output.     Review of Systems   Constitutional:  Positive for activity change. Negative for appetite change and fever.   HENT:  Negative for sore throat.    Eyes:  Negative for visual disturbance.   Respiratory:  Positive for shortness of breath (improved). Negative for cough and chest tightness.    Cardiovascular:  Positive for chest pain (left post chest wall pain). Negative for palpitations and leg swelling.   Gastrointestinal:  Negative for abdominal distention, abdominal pain, constipation, diarrhea, nausea and vomiting.   Endocrine: Negative for polyuria.   Genitourinary:  Negative for  decreased urine volume, dysuria, flank pain, frequency and hematuria.   Musculoskeletal:  Negative for back pain and gait problem.   Skin:  Negative for rash.   Neurological:  Negative for syncope, speech difficulty, weakness, light-headedness and headaches.   Psychiatric/Behavioral:  Negative for confusion, hallucinations and sleep disturbance.    Objective:     Vital Signs (Most Recent):  Temp: 98.3 °F (36.8 °C) (10/26/22 0717)  Pulse: 86 (10/26/22 1214)  Resp: 20 (10/26/22 1214)  BP: 130/62 (10/26/22 0717)  SpO2: 95 % (10/26/22 1214) Vital Signs (24h Range):  Temp:  [98.2 °F (36.8 °C)-100.2 °F (37.9 °C)] 98.3 °F (36.8 °C)  Pulse:  [] 86  Resp:  [17-21] 20  SpO2:  [94 %-98 %] 95 %  BP: (114-147)/(62-88) 130/62     Weight: 126.6 kg (279 lb 1.6 oz)  Body mass index is 42.44 kg/m².    Intake/Output Summary (Last 24 hours) at 10/26/2022 1224  Last data filed at 10/25/2022 1802  Gross per 24 hour   Intake 1242.56 ml   Output --   Net 1242.56 ml      Physical Exam  Constitutional:       General: She is not in acute distress.     Appearance: She is well-developed. She is morbidly obese. She is not diaphoretic.   HENT:      Head: Normocephalic and atraumatic.      Mouth/Throat:      Pharynx: No oropharyngeal exudate.   Eyes:      Conjunctiva/sclera: Conjunctivae normal.      Pupils: Pupils are equal, round, and reactive to light.   Neck:      Thyroid: No thyromegaly.      Vascular: No JVD.   Cardiovascular:      Rate and Rhythm: Regular rhythm. Tachycardia present.      Heart sounds: Normal heart sounds. No murmur heard.  Pulmonary:      Effort: Pulmonary effort is normal. No respiratory distress.      Breath sounds: Examination of the left-lower field reveals decreased breath sounds. Decreased breath sounds present. No wheezing or rales.      Comments: Y Chest tube in place , left lower pleural space   Breath sounds diminished left lower lung   Chest:      Chest wall: No tenderness.   Abdominal:      General: Bowel  sounds are normal. There is no distension.      Palpations: Abdomen is soft.      Tenderness: There is no abdominal tenderness. There is no guarding or rebound.   Musculoskeletal:         General: Normal range of motion.      Cervical back: Normal range of motion and neck supple.      Right lower leg: No edema.      Left lower leg: No edema.   Lymphadenopathy:      Cervical: No cervical adenopathy.   Skin:     General: Skin is warm and dry.      Findings: No rash.   Neurological:      General: No focal deficit present.      Mental Status: She is alert and oriented to person, place, and time.      Cranial Nerves: No cranial nerve deficit.      Sensory: No sensory deficit.      Deep Tendon Reflexes: Reflexes normal.   Psychiatric:         Mood and Affect: Mood normal.       Significant Labs: All pertinent labs within the past 24 hours have been reviewed.  BMP:   Recent Labs   Lab 10/26/22  0558   *      K 4.4      CO2 27   BUN 5*   CREATININE 0.6   CALCIUM 8.9     CBC:   Recent Labs   Lab 10/25/22  0559 10/26/22  0558   WBC 19.78* 16.73*   HGB 8.8* 8.4*   HCT 28.3* 26.8*   * 516*     CMP:   Recent Labs   Lab 10/25/22  0559 10/26/22  0558   * 136   K 4.8 4.4    101   CO2 25 27   * 122*   BUN 3* 5*   CREATININE 0.7 0.6   CALCIUM 8.8 8.9   ANIONGAP 9 8       Significant Imaging:       Assessment/Plan:      * Empyema of left pleural space  10/21- VATS procedure with chest tube placement and drainage of empyema with CT surgery today. Antibiotic de escalated to Unasyn.   10/22- POD #1. S/P VATS, chest tube insertion x 2 and decortication. Fluid cultures are in progress. WBC remains elevated 22K, CRP trending down. PCT is normal. Current antibiotic - Unasyn and Oral Zyvox.   10/23- CT chest with new fluid collection in the left lung apex. Pt will have IR chest tube placed in am. WBC trended down to 22K  10/25- Additional chest tube by IR today. WBC down to 19K, 3% bands .  Antibiotic changed to Cefepime , Flagyl and Vancomycin per ID. Cultures are so far unrevealing.     Parapneumonic effusion, Left   Continue empiric vancomycin and cefepime   Pulmonology following  Thoracentesis, follow fluid analysis   Continue supplemental O2  Check TTE  Follow blood cultures     10/20  S/p Thora 1500 cc removed   CVT following, plan for placement of chest tube per VATs   TTE reviewed   Continue empiric vanc, cefepime, add flagyl   dvt ppx   10/21-   VATS procedure with chest tube placement and drainage of empyema with CT surgery. Antibiotic de escalated to Unasyn. Blood cultures - NGTD. Nasal MRSA - neg. Pleural Fluid culture - pending.   10/22- See plan under Empyema of left pleural space     Class 3 severe obesity in adult  Body mass index is 42.44 kg/m². Morbid obesity complicates all aspects of disease management from diagnostic modalities to treatment. Weight loss encouraged and health benefits explained to patient.         Anemia, hypochromic  - Iron study suggestive of Anemia of chronic inflammation with iron deficiency . Noted folic acid deficiency as well. B12 level lower end of normal   - Replace iron , Folic acid and B12.   -RD consult for better diet selection to improve nutritional intake       S/P chest tube placement          VTE Risk Mitigation (From admission, onward)         Ordered     enoxaparin injection 40 mg  Every 12 hours         10/24/22 0853     Place JASON hose  Until discontinued         10/20/22 2109     Place sequential compression device  Until discontinued         10/20/22 2109     enoxaparin injection 40 mg  2 times daily         10/20/22 1323     Place sequential compression device  Until discontinued         10/19/22 1646                Discharge Planning   DANNY:      Code Status: Full Code   Is the patient medically ready for discharge?:     Reason for patient still in hospital (select all that apply): Patient trending condition  Discharge Plan A: Home                   Cynthia Wallis MD  Department of Hospital Medicine   [facility]

## 2022-10-26 NOTE — SUBJECTIVE & OBJECTIVE
Interval History: The patient is postop day 5 status post VATS procedure for drainage of empyema and decortication.  The patient is postop day 1 status post placement of pigtail catheter in loculated apical collection.    ROS  Medications:  Continuous Infusions:  Scheduled Meds:   albuterol-ipratropium  3 mL Nebulization Q6H WAKE    ceFEPime (MAXIPIME) IVPB  2 g Intravenous Q8H    docusate sodium  100 mg Oral BID    enoxaparin  40 mg Subcutaneous Q12H    famotidine  20 mg Oral BID    ferrous gluconate  324 mg Oral BID WM    folic acid-vit B6-vit B12 2.5-25-2 mg  1 tablet Oral Daily    furosemide (LASIX) injection  20 mg Intravenous Q12H    metroNIDAZOLE  500 mg Oral Q8H    polyethylene glycol  17 g Oral Daily    potassium chloride  10 mEq Oral Daily    vancomycin (VANCOCIN) IVPB  2,000 mg Intravenous Q12H     PRN Meds:albuterol sulfate, ibuprofen, metoclopramide HCl, morphine, ondansetron, oxyCODONE, sodium chloride 0.9%, traZODone, Pharmacy to dose Vancomycin consult **AND** vancomycin - pharmacy to dose     Objective:     Vital Signs (Most Recent):  Temp: 98.3 °F (36.8 °C) (10/26/22 0717)  Pulse: 98 (10/26/22 0725)  Resp: 18 (10/26/22 0725)  BP: 130/62 (10/26/22 0717)  SpO2: 97 % (10/26/22 0725) Vital Signs (24h Range):  Temp:  [98.2 °F (36.8 °C)-100.2 °F (37.9 °C)] 98.3 °F (36.8 °C)  Pulse:  [] 98  Resp:  [17-21] 18  SpO2:  [94 %-98 %] 97 %  BP: (114-147)/(62-88) 130/62     Weight: 126.6 kg (279 lb 1.6 oz)  Body mass index is 42.44 kg/m².    SpO2: 97 %  O2 Device (Oxygen Therapy): nasal cannula    Intake/Output - Last 3 Shifts         10/24 0700  10/25 0659 10/25 0700  10/26 0659 10/26 0700  10/27 0659    P.O. 1440      I.V. (mL/kg) 293.8 (2.3) 527.3 (4.2)     IV Piggyback 248 715.3     Total Intake(mL/kg) 1981.7 (15.7) 1242.6 (9.8)     Urine (mL/kg/hr)       Emesis/NG output       Stool       Chest Tube 0      Total Output 0      Net +1981.7 +1242.6            Urine Occurrence 5 x 2 x     Stool Occurrence  1 x 1 x             Lines/Drains/Airways       Peripherally Inserted Central Catheter Line  Duration             PICC Double Lumen 10/22/22 1919 right basilic 3 days              Drain  Duration                  Y Chest Tube 1 and 2 10/21/22 0958 1 Left Pleural 36 Fr. 2 Left Other (Comment) 36 Fr. 4 days         Chest Tube 10/25/22 1313 3 Left Midclavicular 8.5 Fr. <1 day                    Physical Exam  Constitutional:       Appearance: Normal appearance. She is obese.   Cardiovascular:      Rate and Rhythm: Regular rhythm.      Heart sounds: Normal heart sounds.   Pulmonary:      Effort: Pulmonary effort is normal.      Breath sounds: Normal breath sounds.   Abdominal:      General: Abdomen is flat.      Palpations: Abdomen is soft.   Musculoskeletal:      Right lower leg: Edema present.      Left lower leg: Edema present.   Neurological:      Mental Status: She is alert and oriented to person, place, and time.   Psychiatric:         Behavior: Behavior normal.       Significant Labs:  All pertinent labs from the last 24 hours have been reviewed.    Significant Diagnostics:  I have reviewed all pertinent imaging results/findings within the past 24 hours.

## 2022-10-26 NOTE — PROGRESS NOTES
[facility]  Pulmonology  Progress Note    Patient Name: Christina Leal  MRN: [id number]  Admission Date: 10/19/2022  Hospital Length of Stay: 7 days  Code Status: Full Code  Attending Provider: Cynthia Wallis MD  Primary Care Provider: Primary Doctor No   Principal Problem: Empyema of left pleural space    Subjective:     Interval History:   10/26: seen and examined: T max 98.4F,  IR chest tube drained 50 mls  chamber has 1250mls. , wbcc trending down, reduced CT output    Respiratory ROS: positive for - shortness of breath  negative for - hemoptysis, pleuritic pain, sputum changes, or wheezing     Objective:     Vital Signs (Most Recent):  Temp: 98.3 °F (36.8 °C) (10/26/22 0717)  Pulse: 98 (10/26/22 0725)  Resp: 18 (10/26/22 0725)  BP: 130/62 (10/26/22 0717)  SpO2: 97 % (10/26/22 0725) Vital Signs (24h Range):  Temp:  [98.2 °F (36.8 °C)-100.2 °F (37.9 °C)] 98.3 °F (36.8 °C)  Pulse:  [] 98  Resp:  [17-21] 18  SpO2:  [94 %-98 %] 97 %  BP: (114-147)/(62-88) 130/62     Weight: 126.6 kg (279 lb 1.6 oz)  Body mass index is 42.44 kg/m².      Intake/Output Summary (Last 24 hours) at 10/26/2022 0847  Last data filed at 10/25/2022 1802  Gross per 24 hour   Intake 1242.56 ml   Output --   Net 1242.56 ml       Physical Exam  Vitals and nursing note reviewed.   Constitutional:       Appearance: She is well-developed. She is morbidly obese.      Interventions: Nasal cannula in place.       HENT:      Head: Normocephalic and atraumatic.      Nose: Nose normal.      Mouth/Throat:      Pharynx: No oropharyngeal exudate.   Eyes:      General: No scleral icterus.     Conjunctiva/sclera: Conjunctivae normal.      Pupils: Pupils are equal, round, and reactive to light.   Neck:      Thyroid: No thyromegaly.      Vascular: No JVD.      Trachea: No tracheal deviation.   Cardiovascular:      Rate and Rhythm: Normal rate and regular rhythm.      Pulses: Normal pulses.      Heart sounds: Normal heart sounds, S1 normal and S2  normal. No murmur heard.  Pulmonary:      Effort: Pulmonary effort is normal. No tachypnea, accessory muscle usage or respiratory distress.      Breath sounds: Decreased air movement present. No stridor. Examination of the left-upper field reveals decreased breath sounds. Decreased breath sounds present.       Abdominal:      General: Bowel sounds are normal. There is no distension.      Palpations: Abdomen is soft. There is no hepatomegaly, splenomegaly or mass.      Tenderness: There is no abdominal tenderness. There is no guarding or rebound.   Musculoskeletal:         General: No tenderness. Normal range of motion.      Cervical back: Normal range of motion and neck supple.   Lymphadenopathy:      Upper Body:      Right upper body: No supraclavicular adenopathy.      Left upper body: No supraclavicular adenopathy.   Skin:     General: Skin is warm and dry.      Findings: No rash.      Nails: There is no clubbing.   Neurological:      General: No focal deficit present.      Mental Status: She is alert and oriented to person, place, and time.      Coordination: Coordination normal.      Gait: Gait normal.      Deep Tendon Reflexes: Reflexes are normal and symmetric.   Psychiatric:         Mood and Affect: Mood normal.       Vents:  Oxygen Concentration (%): 28 (10/26/22 0725)    Lines/Drains/Airways       Peripherally Inserted Central Catheter Line  Duration             PICC Double Lumen 10/22/22 1919 right basilic 3 days              Drain  Duration                  Y Chest Tube 1 and 2 10/21/22 0958 1 Left Pleural 36 Fr. 2 Left Other (Comment) 36 Fr. 4 days         Chest Tube 10/25/22 1313 3 Left Midclavicular 8.5 Fr. <1 day                    Significant Labs:    CBC/Anemia Profile:  Recent Labs   Lab 10/25/22  0559 10/26/22  0558   WBC 19.78* 16.73*   HGB 8.8* 8.4*   HCT 28.3* 26.8*   * 516*   MCV 85 84   RDW 13.7 13.6        Chemistries:  Recent Labs   Lab 10/25/22  0559 10/26/22  0558   * 136    K 4.8 4.4    101   CO2 25 27   BUN 3* 5*   CREATININE 0.7 0.6   CALCIUM 8.8 8.9       Blood Culture: No results for input(s): LABBLOO in the last 48 hours.  POCT Glucose:   Recent Labs   Lab 10/25/22  2003   POCTGLUCOSE 116*     Respiratory Culture: No results for input(s): GSRESP, RESPIRATORYC in the last 48 hours.  All pertinent labs within the past 24 hours have been reviewed.    Significant Imaging:  I have reviewed all pertinent imaging results/findings within the past 24 hours.    X-Ray Chest AP Portable  Narrative: EXAMINATION:  XR CHEST AP PORTABLE    CLINICAL HISTORY:  s/p vats;    TECHNIQUE:  Single frontal view of the chest was performed.    COMPARISON:  10/25/2022    FINDINGS:  Pigtail catheter placement this overlies the apical loculation.  Chest tubes poorly in the more inferior lung.  Overall appearance of the lungs is otherwise unchanged from the prior of 10/24/2022.    The cardiac silhouette is stable.  The hilar and mediastinal contours are unremarkable.    Bones are intact.  Impression: In comparison to the prior study, there is no adverse interval changes    Electronically signed by: Francisco Muhammad MD  Date:    10/26/2022  Time:    06:51       ABG  Recent Labs   Lab 10/21/22  1321   PH 7.377   PO2 124*   PCO2 49.8*   HCO3 29.3*   BE 4     Assessment/Plan:     * Empyema of left pleural space  SP VATS chest tube, decortication  CT management per CTS team  SP drainage for left loculation   Drainage last 24 hrs 140 mls    S/P chest tube placement  Follow CXR  pleurovac has 1250 mls  Incentive spirometry to prevent aletectasis  Pain control  CTS following    Class 3 severe obesity in adult  Weight loss    Parapneumonic effusion, Left   Empiric abx VANC, CEFEPIME, FLAGYL  Deescalate based on cultures  UNASYN+ ZYVOX per ID as outpatient           Varun Wood MD  Pulmonology  [facility]

## 2022-10-26 NOTE — PLAN OF CARE
Pt remains free of falls/injury this shift. Safety precautions maintained. Pain managed with PRN medications. Chest tubes in place, output monitored. IV abx given. Sinus tachy on tele monitor #[id number]. VSS. No signs and symptoms of acute distress noted at this time. 12 hour chart check completed. Will continue to monitor.

## 2022-10-26 NOTE — ASSESSMENT & PLAN NOTE
SP VATS chest tube, decortication  CT management per CTS team  SP drainage for left loculation   Drainage last 24 hrs 140 mls

## 2022-10-26 NOTE — PROGRESS NOTES
[facility] Surg  Cardiothoracic Surgery  Progress Note    Patient Name: Christina Leal  MRN: [id number]  Admission Date: 10/19/2022  Hospital Length of Stay: 7 days  Code Status: Full Code   Attending Physician: Cynthia Wallis MD   Referring Provider: Jimmy Castellanos NP  Principal Problem:Empyema of left pleural space            Subjective:     Post-Op Info:  Procedure(s) (LRB):  VATS, WITH CHEST TUBE INSERTION FOR DRAINAGE OF PLEURAL EFFUSION (Left)  BLOCK, NERVE, INTERCOSTAL, 2 OR MORE (Left)  VATS, WITH DECORTICATION, LUNG (Left)  EVACUATION, EMPYEMA (Left)   5 Days Post-Op     Interval History: The patient is postop day 5 status post VATS procedure for drainage of empyema and decortication.  The patient is postop day 1 status post placement of pigtail catheter in loculated apical collection.    ROS  Medications:  Continuous Infusions:  Scheduled Meds:   albuterol-ipratropium  3 mL Nebulization Q6H WAKE    ceFEPime (MAXIPIME) IVPB  2 g Intravenous Q8H    docusate sodium  100 mg Oral BID    enoxaparin  40 mg Subcutaneous Q12H    famotidine  20 mg Oral BID    ferrous gluconate  324 mg Oral BID WM    folic acid-vit B6-vit B12 2.5-25-2 mg  1 tablet Oral Daily    furosemide (LASIX) injection  20 mg Intravenous Q12H    metroNIDAZOLE  500 mg Oral Q8H    polyethylene glycol  17 g Oral Daily    potassium chloride  10 mEq Oral Daily    vancomycin (VANCOCIN) IVPB  2,000 mg Intravenous Q12H     PRN Meds:albuterol sulfate, ibuprofen, metoclopramide HCl, morphine, ondansetron, oxyCODONE, sodium chloride 0.9%, traZODone, Pharmacy to dose Vancomycin consult **AND** vancomycin - pharmacy to dose     Objective:     Vital Signs (Most Recent):  Temp: 98.3 °F (36.8 °C) (10/26/22 0717)  Pulse: 98 (10/26/22 0725)  Resp: 18 (10/26/22 0725)  BP: 130/62 (10/26/22 0717)  SpO2: 97 % (10/26/22 0725) Vital Signs (24h Range):  Temp:  [98.2 °F (36.8 °C)-100.2 °F (37.9 °C)] 98.3 °F (36.8 °C)  Pulse:  [] 98  Resp:  [17-21]  18  SpO2:  [94 %-98 %] 97 %  BP: (114-147)/(62-88) 130/62     Weight: 126.6 kg (279 lb 1.6 oz)  Body mass index is 42.44 kg/m².    SpO2: 97 %  O2 Device (Oxygen Therapy): nasal cannula    Intake/Output - Last 3 Shifts         10/24 0700  10/25 0659 10/25 0700  10/26 0659 10/26 0700  10/27 0659    P.O. 1440      I.V. (mL/kg) 293.8 (2.3) 527.3 (4.2)     IV Piggyback 248 715.3     Total Intake(mL/kg) 1981.7 (15.7) 1242.6 (9.8)     Urine (mL/kg/hr)       Emesis/NG output       Stool       Chest Tube 0      Total Output 0      Net +1981.7 +1242.6            Urine Occurrence 5 x 2 x     Stool Occurrence 1 x 1 x             Lines/Drains/Airways       Peripherally Inserted Central Catheter Line  Duration             PICC Double Lumen 10/22/22 1919 right basilic 3 days              Drain  Duration                  Y Chest Tube 1 and 2 10/21/22 0958 1 Left Pleural 36 Fr. 2 Left Other (Comment) 36 Fr. 4 days         Chest Tube 10/25/22 1313 3 Left Midclavicular 8.5 Fr. <1 day                    Physical Exam  Constitutional:       Appearance: Normal appearance. She is obese.   Cardiovascular:      Rate and Rhythm: Regular rhythm.      Heart sounds: Normal heart sounds.   Pulmonary:      Effort: Pulmonary effort is normal.      Breath sounds: Normal breath sounds.   Abdominal:      General: Abdomen is flat.      Palpations: Abdomen is soft.   Musculoskeletal:      Right lower leg: Edema present.      Left lower leg: Edema present.   Neurological:      Mental Status: She is alert and oriented to person, place, and time.   Psychiatric:         Behavior: Behavior normal.       Significant Labs:  All pertinent labs from the last 24 hours have been reviewed.    Significant Diagnostics:  I have reviewed all pertinent imaging results/findings within the past 24 hours.    Assessment/Plan:     * Empyema of left pleural space  The patient is a 26-year-old female with a history of severe obesity (BMI 50.48) who was admitted with  complaints of left-sided chest pain over the past week.  Patient also noticed some shortness of breath and dyspnea on exertion in addition the patient has a cough.  The patient had a chest x-ray done which showed a left pleural effusion CT scan shows a loculated left pleural effusion.  Thoracentesis was performed with fluid analysis indicating that left pleural effusion is compatible with a diagnosis of an empyema.    The patient's left empyema requires drainage.  Patient will likely require a large bore chest tube for adequate drainage of empyema.  Bedside placement of chest tube in the setting of severe obesity will be difficult.  Will plan for placement of chest tube with VATS procedure in the operating room.  The risks and benefits of a VATS procedure were explained to the patient.  The patient understand the risks and benefits and agreed to proceed with VATS assisted drainage of empyema    10/22/2022   The patient is postop day 1 status post left VATS procedure with drainage of empyema and decortication.  Chest tube output has been about 600 mL since surgery yesterday.  Continue chest tube to suction.  Continue incentive spirometer and pulmonary toileting.  Continue broad-spectrum antibiotics.    10/23/2022   The patient is postop day 2 status post left VATS procedure with drainage of empyema and decortication.  Chest tube output over the past 24 hours is 150 mL.  Continue chest tube to suction.  Chest x-ray shows new opacity in the apical region of the left lung.  Will obtain CT scan of the chest    10/24/2022   Patient is postop day 3 status post VATS procedure with drainage of empyema and decortication.  CT scan shows new fluid collection in the apical region.  Patient discussed with IR.  Plan is for IR placed chest tube in the a.m..  Continue antibiotics.  Continue chest tube to suction.    10/26/2022  The patient is postop day 5 status post VATS procedure with drainage of empyema and decortication.  She is  day 1 status post pigtail catheter placement into apical loculated collection.  Chest tubes continue to drain significant amount of fluid.  Patient has bilateral pitting edema.  Will start diuresis with Lasix.  Continue chest tubes to suction.          Dusty Moyer MD  Cardiothoracic Surgery  '[facility] Surg

## 2022-10-26 NOTE — PT/OT/SLP PROGRESS
"Physical Therapy  Treatment    Christina Leal   MRN: [id number]   Admitting Diagnosis: Empyema of left pleural space    PT Received On: 10/26/22  PT Start Time: 1410     PT Stop Time: 1440    PT Total Time (min): 30 min       Billable Minutes:  Gait Training 15 and Therapeutic Exercise 15    Treatment Type: Treatment  PT/PTA: PTA     PTA Visit Number: 1       General Precautions: Standard, fall  Orthopedic Precautions: N/A   Braces: N/A  Respiratory Status: Room air    Spiritual, Cultural Beliefs, [place]ze Practices, Values that Affect Care: no    Subjective:  Communicated with nursing staff, Yocasta and completed Epic chart review prior to session.  Patient agreeable to PT.    Pain/Comfort  Pain Rating 1: 0/10    Objective:   Patient found with: peripheral IV, chest tube, PICC line, telemetry  Patient presents sitting in bedside chair, accompanied by friend.  O2 saturation during therapy session: 95% 87% 94%.     Functional Mobility:  Bed Mobility:    Patient OOB    Transfers:   Sit to stand: modified independent   SPT to chair: modified independent     Gait:    200 feet without assistive device, IV pole and chest tube x 2 present, Supervision provided.    Treatment and Education:  Performed seated therapeutic exercises (AP, LAQ, GS, MIP) for 15 reps to improve range of motion and strengthening of BLEs.    Pt educated on the following: Pt verbally agreed in understanding.   Pursed-lip breathing to improve O2 quality.  Continue therapuetic exercises in chair throughout the day to increase activity tolerance and decrease risk for pneumonia and blood clots.  Sit in chair for all 3 meals and when guests visit.    "Call, don't fall" procedure of pressing red button on call bell for all transfers.      AM-PAC 6 CLICK MOBILITY  How much help from another person does this patient currently need?   1 = Unable, Total/Dependent Assistance  2 = A lot, Maximum/Moderate Assistance  3 = A little, Minimum/Contact " Baby had to be given formula in recovery, mom wants to attempt breastfeeding. History of PCOS and hypothyroid, reports difficulty conceiving and minimal changes in breasts with pregnancy. Has breast pump ordered for at home. Baby placed in STS, reviewed feeding expectations, early cues, and benefits of STS. Education given, encouraged to call for assist when baby begins to cue. Guard/Supervision  4 = None, Modified [place]/Independent    Turning over in bed (including adjusting bedclothes, sheets and blankets)?: 4  Sitting down on and standing up from a chair with arms (e.g., wheelchair, bedside commode, etc.): 4  Moving from lying on back to sitting on the side of the bed?: 4  Moving to and from a bed to a chair (including a wheelchair)?: 4  Need to walk in hospital room?: 4  Climbing 3-5 steps with a railing?: 1  Basic Mobility Total Score: 21    AM-PAC Raw Score CMS G-Code Modifier Level of Impairment Assistance   6 % Total / Unable   7 - 9 CM 80 - 100% Maximal Assist   10 - 14 CL 60 - 80% Moderate Assist   15 - 19 CK 40 - 60% Moderate Assist   20 - 22 CJ 20 - 40% Minimal Assist   23 CI 1-20% SBA / CGA   24 CH 0% Independent/ Mod I     Patient left up in chair with all lines intact, call button in reach, nursing  notified, and friend present.  All needs met.     Assessment:  Christina Leal is a 26 y.o. female with a medical diagnosis of Empyema of left pleural space and presents with overall decline in functional mobility. Patient tolerated therapy session fair this date as she was able to perform therapeutic exercises and gait training without increase in pain or revealing SOB. Patient desat after ambulation but able to recover within 1 minute. Will continue to progress toward goals per patient tolerance and PT plan of care.    Rehab identified problem list/impairments: Rehab identified problem list/impairments: impaired cardiopulmonary response to activity, impaired functional mobility    Rehab potential is good.    Activity tolerance: Good    Discharge recommendations: Discharge Facility/Level of Care Needs: home     Barriers to discharge:      Equipment recommendations: Equipment Needed After Discharge: none     GOALS:   Multidisciplinary Problems       Physical Therapy Goals          Problem: Physical Therapy    Goal Priority Disciplines Outcome Goal Variances  Interventions   Physical Therapy Goal     PT, PT/OT Ongoing, Progressing     Description: LTGs:   POC expires 11/6/22  Pt will perform 3x10 sit to stands IND  in order improve endurance.   Pt will ' IND while maintaining chest tube in order to participate in household ambulation.    Pt will ascend and descend 1 flight of stairs IND in order to return to work.                       PLAN:    Patient to be seen 3 x/week  to address the above listed problems via gait training, therapeutic exercises  Plan of Care expires: 11/06/22  Plan of Care reviewed with: patient, friend         10/26/2022

## 2022-10-26 NOTE — SUBJECTIVE & OBJECTIVE
Interval History:   Still with significant chest tube output.     Review of Systems   Constitutional:  Positive for activity change. Negative for appetite change and fever.   HENT:  Negative for sore throat.    Eyes:  Negative for visual disturbance.   Respiratory:  Positive for shortness of breath (improved). Negative for cough and chest tightness.    Cardiovascular:  Positive for chest pain (left post chest wall pain). Negative for palpitations and leg swelling.   Gastrointestinal:  Negative for abdominal distention, abdominal pain, constipation, diarrhea, nausea and vomiting.   Endocrine: Negative for polyuria.   Genitourinary:  Negative for decreased urine volume, dysuria, flank pain, frequency and hematuria.   Musculoskeletal:  Negative for back pain and gait problem.   Skin:  Negative for rash.   Neurological:  Negative for syncope, speech difficulty, weakness, light-headedness and headaches.   Psychiatric/Behavioral:  Negative for confusion, hallucinations and sleep disturbance.    Objective:     Vital Signs (Most Recent):  Temp: 98.3 °F (36.8 °C) (10/26/22 0717)  Pulse: 86 (10/26/22 1214)  Resp: 20 (10/26/22 1214)  BP: 130/62 (10/26/22 0717)  SpO2: 95 % (10/26/22 1214) Vital Signs (24h Range):  Temp:  [98.2 °F (36.8 °C)-100.2 °F (37.9 °C)] 98.3 °F (36.8 °C)  Pulse:  [] 86  Resp:  [17-21] 20  SpO2:  [94 %-98 %] 95 %  BP: (114-147)/(62-88) 130/62     Weight: 126.6 kg (279 lb 1.6 oz)  Body mass index is 42.44 kg/m².    Intake/Output Summary (Last 24 hours) at 10/26/2022 1224  Last data filed at 10/25/2022 1802  Gross per 24 hour   Intake 1242.56 ml   Output --   Net 1242.56 ml      Physical Exam  Constitutional:       General: She is not in acute distress.     Appearance: She is well-developed. She is morbidly obese. She is not diaphoretic.   HENT:      Head: Normocephalic and atraumatic.      Mouth/Throat:      Pharynx: No oropharyngeal exudate.   Eyes:      Conjunctiva/sclera: Conjunctivae normal.       Pupils: Pupils are equal, round, and reactive to light.   Neck:      Thyroid: No thyromegaly.      Vascular: No JVD.   Cardiovascular:      Rate and Rhythm: Regular rhythm. Tachycardia present.      Heart sounds: Normal heart sounds. No murmur heard.  Pulmonary:      Effort: Pulmonary effort is normal. No respiratory distress.      Breath sounds: Examination of the left-lower field reveals decreased breath sounds. Decreased breath sounds present. No wheezing or rales.      Comments: Y Chest tube in place , left lower pleural space   Breath sounds diminished left lower lung   Chest:      Chest wall: No tenderness.   Abdominal:      General: Bowel sounds are normal. There is no distension.      Palpations: Abdomen is soft.      Tenderness: There is no abdominal tenderness. There is no guarding or rebound.   Musculoskeletal:         General: Normal range of motion.      Cervical back: Normal range of motion and neck supple.      Right lower leg: No edema.      Left lower leg: No edema.   Lymphadenopathy:      Cervical: No cervical adenopathy.   Skin:     General: Skin is warm and dry.      Findings: No rash.   Neurological:      General: No focal deficit present.      Mental Status: She is alert and oriented to person, place, and time.      Cranial Nerves: No cranial nerve deficit.      Sensory: No sensory deficit.      Deep Tendon Reflexes: Reflexes normal.   Psychiatric:         Mood and Affect: Mood normal.       Significant Labs: All pertinent labs within the past 24 hours have been reviewed.  BMP:   Recent Labs   Lab 10/26/22  0558   *      K 4.4      CO2 27   BUN 5*   CREATININE 0.6   CALCIUM 8.9     CBC:   Recent Labs   Lab 10/25/22  0559 10/26/22  0558   WBC 19.78* 16.73*   HGB 8.8* 8.4*   HCT 28.3* 26.8*   * 516*     CMP:   Recent Labs   Lab 10/25/22  0559 10/26/22  0558   * 136   K 4.8 4.4    101   CO2 25 27   * 122*   BUN 3* 5*   CREATININE 0.7 0.6   CALCIUM 8.8  8.9   ANIONGAP 9 8       Significant Imaging:

## 2022-10-26 NOTE — ASSESSMENT & PLAN NOTE
The patient is a 26-year-old female with a history of severe obesity (BMI 50.48) who was admitted with complaints of left-sided chest pain over the past week.  Patient also noticed some shortness of breath and dyspnea on exertion in addition the patient has a cough.  The patient had a chest x-ray done which showed a left pleural effusion CT scan shows a loculated left pleural effusion.  Thoracentesis was performed with fluid analysis indicating that left pleural effusion is compatible with a diagnosis of an empyema.    The patient's left empyema requires drainage.  Patient will likely require a large bore chest tube for adequate drainage of empyema.  Bedside placement of chest tube in the setting of severe obesity will be difficult.  Will plan for placement of chest tube with VATS procedure in the operating room.  The risks and benefits of a VATS procedure were explained to the patient.  The patient understand the risks and benefits and agreed to proceed with VATS assisted drainage of empyema    10/22/2022   The patient is postop day 1 status post left VATS procedure with drainage of empyema and decortication.  Chest tube output has been about 600 mL since surgery yesterday.  Continue chest tube to suction.  Continue incentive spirometer and pulmonary toileting.  Continue broad-spectrum antibiotics.    10/23/2022   The patient is postop day 2 status post left VATS procedure with drainage of empyema and decortication.  Chest tube output over the past 24 hours is 150 mL.  Continue chest tube to suction.  Chest x-ray shows new opacity in the apical region of the left lung.  Will obtain CT scan of the chest    10/24/2022   Patient is postop day 3 status post VATS procedure with drainage of empyema and decortication.  CT scan shows new fluid collection in the apical region.  Patient discussed with IR.  Plan is for IR placed chest tube in the a.m..  Continue antibiotics.  Continue chest tube to suction.    10/26/2022  The  patient is postop day 5 status post VATS procedure with drainage of empyema and decortication.  She is day 1 status post pigtail catheter placement into apical loculated collection.  Chest tubes continue to drain significant amount of fluid.  Patient has bilateral pitting edema.  Will start diuresis with Lasix.  Continue chest tubes to suction.

## 2022-10-26 NOTE — ASSESSMENT & PLAN NOTE
Empiric abx VANC, CEFEPIME, FLAGYL  Deescalate based on cultures  UNASYN+ ZYVOX per ID as outpatient

## 2022-10-27 LAB
ANION GAP SERPL CALC-SCNC: 12 MMOL/L (ref 8–16)
BASOPHILS # BLD AUTO: 0.05 K/UL (ref 0–0.2)
BASOPHILS # BLD AUTO: 0.07 K/UL (ref 0–0.2)
BASOPHILS NFR BLD: 0.4 % (ref 0–1.9)
BASOPHILS NFR BLD: 0.5 % (ref 0–1.9)
BUN SERPL-MCNC: 5 MG/DL (ref 6–20)
CALCIUM SERPL-MCNC: 9.3 MG/DL (ref 8.7–10.5)
CHLORIDE SERPL-SCNC: 99 MMOL/L (ref 95–110)
CO2 SERPL-SCNC: 25 MMOL/L (ref 23–29)
CREAT SERPL-MCNC: 0.6 MG/DL (ref 0.5–1.4)
DIFFERENTIAL METHOD: ABNORMAL
DIFFERENTIAL METHOD: ABNORMAL
EOSINOPHIL # BLD AUTO: 0.1 K/UL (ref 0–0.5)
EOSINOPHIL # BLD AUTO: 0.1 K/UL (ref 0–0.5)
EOSINOPHIL NFR BLD: 0.7 % (ref 0–8)
EOSINOPHIL NFR BLD: 0.8 % (ref 0–8)
ERYTHROCYTE [DISTWIDTH] IN BLOOD BY AUTOMATED COUNT: 13.2 % (ref 11.5–14.5)
ERYTHROCYTE [DISTWIDTH] IN BLOOD BY AUTOMATED COUNT: 13.3 % (ref 11.5–14.5)
EST. GFR  (NO RACE VARIABLE): >60 ML/MIN/1.73 M^2
GLUCOSE SERPL-MCNC: 101 MG/DL (ref 70–110)
HCT VFR BLD AUTO: 24.7 % (ref 37–48.5)
HCT VFR BLD AUTO: 27.9 % (ref 37–48.5)
HGB BLD-MCNC: 7.6 G/DL (ref 12–16)
HGB BLD-MCNC: 8.9 G/DL (ref 12–16)
IMM GRANULOCYTES # BLD AUTO: 0.58 K/UL (ref 0–0.04)
IMM GRANULOCYTES # BLD AUTO: 0.64 K/UL (ref 0–0.04)
IMM GRANULOCYTES NFR BLD AUTO: 4.2 % (ref 0–0.5)
IMM GRANULOCYTES NFR BLD AUTO: 4.9 % (ref 0–0.5)
LYMPHOCYTES # BLD AUTO: 1.1 K/UL (ref 1–4.8)
LYMPHOCYTES # BLD AUTO: 1.5 K/UL (ref 1–4.8)
LYMPHOCYTES NFR BLD: 10.5 % (ref 18–48)
LYMPHOCYTES NFR BLD: 8.2 % (ref 18–48)
MAGNESIUM SERPL-MCNC: 2.1 MG/DL (ref 1.6–2.6)
MCH RBC QN AUTO: 26.2 PG (ref 27–31)
MCH RBC QN AUTO: 26.5 PG (ref 27–31)
MCHC RBC AUTO-ENTMCNC: 30.8 G/DL (ref 32–36)
MCHC RBC AUTO-ENTMCNC: 31.9 G/DL (ref 32–36)
MCV RBC AUTO: 83 FL (ref 82–98)
MCV RBC AUTO: 85 FL (ref 82–98)
MONOCYTES # BLD AUTO: 1.1 K/UL (ref 0.3–1)
MONOCYTES # BLD AUTO: 1.2 K/UL (ref 0.3–1)
MONOCYTES NFR BLD: 8.2 % (ref 4–15)
MONOCYTES NFR BLD: 8.7 % (ref 4–15)
NEUTROPHILS # BLD AUTO: 10.1 K/UL (ref 1.8–7.7)
NEUTROPHILS # BLD AUTO: 10.6 K/UL (ref 1.8–7.7)
NEUTROPHILS NFR BLD: 75.9 % (ref 38–73)
NEUTROPHILS NFR BLD: 77 % (ref 38–73)
NRBC BLD-RTO: 0 /100 WBC
NRBC BLD-RTO: 0 /100 WBC
PHOSPHATE SERPL-MCNC: 4.2 MG/DL (ref 2.7–4.5)
PLATELET # BLD AUTO: 432 K/UL (ref 150–450)
PLATELET # BLD AUTO: 552 K/UL (ref 150–450)
PMV BLD AUTO: 8.6 FL (ref 9.2–12.9)
PMV BLD AUTO: 9.6 FL (ref 9.2–12.9)
POTASSIUM SERPL-SCNC: 4.4 MMOL/L (ref 3.5–5.1)
RBC # BLD AUTO: 2.9 M/UL (ref 4–5.4)
RBC # BLD AUTO: 3.36 M/UL (ref 4–5.4)
SODIUM SERPL-SCNC: 136 MMOL/L (ref 136–145)
WBC # BLD AUTO: 13.1 K/UL (ref 3.9–12.7)
WBC # BLD AUTO: 13.96 K/UL (ref 3.9–12.7)

## 2022-10-27 PROCEDURE — 85025 COMPLETE CBC W/AUTO DIFF WBC: CPT | Performed by: INTERNAL MEDICINE

## 2022-10-27 PROCEDURE — 94761 N-INVAS EAR/PLS OXIMETRY MLT: CPT

## 2022-10-27 PROCEDURE — 80048 BASIC METABOLIC PNL TOTAL CA: CPT | Performed by: INTERNAL MEDICINE

## 2022-10-27 PROCEDURE — 63600175 PHARM REV CODE 636 W HCPCS: Performed by: THORACIC SURGERY (CARDIOTHORACIC VASCULAR SURGERY)

## 2022-10-27 PROCEDURE — 25000242 PHARM REV CODE 250 ALT 637 W/ HCPCS: Performed by: THORACIC SURGERY (CARDIOTHORACIC VASCULAR SURGERY)

## 2022-10-27 PROCEDURE — 94799 UNLISTED PULMONARY SVC/PX: CPT

## 2022-10-27 PROCEDURE — 25000003 PHARM REV CODE 250: Performed by: THORACIC SURGERY (CARDIOTHORACIC VASCULAR SURGERY)

## 2022-10-27 PROCEDURE — 94640 AIRWAY INHALATION TREATMENT: CPT

## 2022-10-27 PROCEDURE — 25000003 PHARM REV CODE 250: Performed by: NURSE PRACTITIONER

## 2022-10-27 PROCEDURE — 25000003 PHARM REV CODE 250: Performed by: INTERNAL MEDICINE

## 2022-10-27 PROCEDURE — 63600175 PHARM REV CODE 636 W HCPCS: Performed by: INTERNAL MEDICINE

## 2022-10-27 PROCEDURE — 84100 ASSAY OF PHOSPHORUS: CPT | Performed by: INTERNAL MEDICINE

## 2022-10-27 PROCEDURE — 99232 PR SUBSEQUENT HOSPITAL CARE,LEVL II: ICD-10-PCS | Mod: ,,, | Performed by: INTERNAL MEDICINE

## 2022-10-27 PROCEDURE — 11000001 HC ACUTE MED/SURG PRIVATE ROOM

## 2022-10-27 PROCEDURE — 99232 SBSQ HOSP IP/OBS MODERATE 35: CPT | Mod: ,,, | Performed by: INTERNAL MEDICINE

## 2022-10-27 PROCEDURE — 83735 ASSAY OF MAGNESIUM: CPT | Performed by: INTERNAL MEDICINE

## 2022-10-27 PROCEDURE — 99900035 HC TECH TIME PER 15 MIN (STAT)

## 2022-10-27 PROCEDURE — 63600175 PHARM REV CODE 636 W HCPCS: Mod: JG | Performed by: NURSE PRACTITIONER

## 2022-10-27 PROCEDURE — 97116 GAIT TRAINING THERAPY: CPT | Mod: CQ

## 2022-10-27 PROCEDURE — 21400001 HC TELEMETRY ROOM

## 2022-10-27 RX ORDER — FUROSEMIDE 10 MG/ML
20 INJECTION INTRAMUSCULAR; INTRAVENOUS
Status: DISCONTINUED | OUTPATIENT
Start: 2022-10-27 | End: 2022-10-28

## 2022-10-27 RX ADMIN — CEFEPIME 2 G: 2 INJECTION, POWDER, FOR SOLUTION INTRAVENOUS at 01:10

## 2022-10-27 RX ADMIN — FAMOTIDINE 20 MG: 20 TABLET ORAL at 09:10

## 2022-10-27 RX ADMIN — DOCUSATE SODIUM 100 MG: 100 CAPSULE, LIQUID FILLED ORAL at 09:10

## 2022-10-27 RX ADMIN — ENOXAPARIN SODIUM 40 MG: 40 INJECTION SUBCUTANEOUS at 09:10

## 2022-10-27 RX ADMIN — ALTEPLASE 2 MG: 2.2 INJECTION, POWDER, LYOPHILIZED, FOR SOLUTION INTRAVENOUS at 04:10

## 2022-10-27 RX ADMIN — VANCOMYCIN HYDROCHLORIDE 1500 MG: 1.5 INJECTION, POWDER, LYOPHILIZED, FOR SOLUTION INTRAVENOUS at 10:10

## 2022-10-27 RX ADMIN — POTASSIUM CHLORIDE 10 MEQ: 750 TABLET, EXTENDED RELEASE ORAL at 09:10

## 2022-10-27 RX ADMIN — CEFEPIME 2 G: 2 INJECTION, POWDER, FOR SOLUTION INTRAVENOUS at 07:10

## 2022-10-27 RX ADMIN — VANCOMYCIN HYDROCHLORIDE 1500 MG: 1.5 INJECTION, POWDER, LYOPHILIZED, FOR SOLUTION INTRAVENOUS at 05:10

## 2022-10-27 RX ADMIN — FUROSEMIDE 20 MG: 10 INJECTION, SOLUTION INTRAMUSCULAR; INTRAVENOUS at 05:10

## 2022-10-27 RX ADMIN — METRONIDAZOLE 500 MG: 500 TABLET ORAL at 01:10

## 2022-10-27 RX ADMIN — Medication 324 MG: at 10:10

## 2022-10-27 RX ADMIN — Medication 1 TABLET: at 09:10

## 2022-10-27 RX ADMIN — OXYCODONE HYDROCHLORIDE 5 MG: 5 TABLET ORAL at 01:10

## 2022-10-27 RX ADMIN — METRONIDAZOLE 500 MG: 500 TABLET ORAL at 09:10

## 2022-10-27 RX ADMIN — POLYETHYLENE GLYCOL 3350 17 G: 17 POWDER, FOR SOLUTION ORAL at 09:10

## 2022-10-27 RX ADMIN — Medication 324 MG: at 05:10

## 2022-10-27 RX ADMIN — METRONIDAZOLE 500 MG: 500 TABLET ORAL at 05:10

## 2022-10-27 RX ADMIN — IPRATROPIUM BROMIDE AND ALBUTEROL SULFATE 3 ML: 2.5; .5 SOLUTION RESPIRATORY (INHALATION) at 07:10

## 2022-10-27 RX ADMIN — CEFEPIME 2 G: 2 INJECTION, POWDER, FOR SOLUTION INTRAVENOUS at 09:10

## 2022-10-27 RX ADMIN — IBUPROFEN 600 MG: 600 TABLET, FILM COATED ORAL at 01:10

## 2022-10-27 RX ADMIN — IPRATROPIUM BROMIDE AND ALBUTEROL SULFATE 3 ML: 2.5; .5 SOLUTION RESPIRATORY (INHALATION) at 08:10

## 2022-10-27 RX ADMIN — IPRATROPIUM BROMIDE AND ALBUTEROL SULFATE 3 ML: 2.5; .5 SOLUTION RESPIRATORY (INHALATION) at 02:10

## 2022-10-27 NOTE — PROGRESS NOTES
[facility] Medicine  Progress Note    Patient Name: Christina Leal  MRN: [id number]  Patient Class: IP- Inpatient   Admission Date: 10/19/2022  Length of Stay: 8 days  Attending Physician: Cynthia Wallis MD  Primary Care Provider: Primary Doctor No        Subjective:     Principal Problem:Empyema of left pleural space        HPI:  Christina Leal is a 27 yo female with history of obesity, former tobacco abuse on oral contraception who was referred to ED by primary care for abnormal CXR. Patient reports 1 week history of left sided rib cage pain associated with chest tightness, SOB and PAIZ. She also reports upper abdominal cramping and diarrhea. She denies n/v. OP imaging remarkable for pna with large left pleural effusion. CT chest shows large loculated appearing left pleural effusion with associated lower lobe atelectasis as well as mild left upper lobe atelectasis.  Bandlike opacities with ground-glass attenuation in the posterior right lung base likely represent additional subsegmental atelectasis. Mild rightward shift of the mediastinal contents noted. Patient febrile (Tmax 100.6) tachycardic and tachypenic, O2 sats 98% 2 L O2. Influenza, COVID, HIV, Hep C negative. Labs remarkable for wbc 29 with left shift, Tbili 2.1. She received cefepime. Pulmonology consulted. Patient admitted to hospital medicine.             Overview/Hospital Course:  10/20/22 patient s/p thoracentesis 1500 cc removed. Fluid analysis indicating left pleural effusion compatible with a diagnosis of an empyema   Pt reports feeling better. Repeat CXR: fluid reaccumulation. CVT consulted, plan for chest tube placement with VATs procedure.     10/21- Pt is currently undergoing  VATS procedure with chest tube placement and drainage of empyema with CT surgery. AM labs are not available. Blood cultures - NGTD. Nasal MRSA - neg. Pleural Fluid culture - pending. Antibiotic de escalated to Unasyn.     10/22- S/P VATS, chest tube  insertion x 2 and decortication. CT output 600 ml since surgery yesterday. Pleural fluid gram stain showed mod WBCs and rare GPC. Fluid cultures are in progress. WBC remains elevated 22K, CRP trending down. PCT is normal. Current antibiotic - Unasyn and Oral Zyvox.     10/23- Chest tube to suction continues. Output reported 150 ml in the last 24h. Follow up CXR showed persistent loculated left pleural effusion in the apex. CT Surgery will get CT chest for further eval. WBC trended up 28K today. Will consult ID to guide antibiotic selection. Hgb dropped to 8.6 from initial 11.3 on admit. No signs of active bleeding reported. Will monitor Hgb. Culture in progress.    10/24- CT chest with new fluid collection in the left lung apex. Pt will have IR chest tube placed in am. WBC trended down to 22K. Hgb stable at 8.2. pleural fluid aerobic culture - Normal respiratory berta.Anaerobic culture in progress . Current antibiotic - Unasyn and oral Zyvox.     10/25- Additional chest tube by IR today. WBC down to 19K, 3% bands . Antibiotic changed to Cefepime , Flagyl and Vancomycin per ID. Cultures are so far unrevealing.     10/26- S/P pigtail catheter placement into apical loculated collection anterior chest yesterday. Y chest tube in place in the post chest. WBC down to 16K.     10/27- POD #6  VATS procedure with drainage of empyema and decortication and Y chest tube placement. POD #2  pigtail catheter placement to apical loculated collection left lung Ant chest. Chest tube OP is decreasing. Further chest tube management per CT surgery. All cultures are so far neg. AM labs are pending. WBC started to trend down. Last ID recs from 10/24-- Empiric out patient regime will be Vancomycin  ,Rocephin and Po Flagyl for 2 to 3 weeks or until radiologic resolution.       Interval History:   Last ID recs from 10/24-- Empiric out patient regime will be Vancomycin  ,Rocephin and Po Flagyl for 2 to 3 weeks or until radiologic resolution.        Review of Systems   Constitutional:  Positive for activity change. Negative for appetite change and fever.   HENT:  Negative for sore throat.    Eyes:  Negative for visual disturbance.   Respiratory:  Positive for shortness of breath (improved). Negative for cough and chest tightness.    Cardiovascular:  Positive for chest pain (left post chest wall pain). Negative for palpitations and leg swelling.   Gastrointestinal:  Negative for abdominal distention, abdominal pain, constipation, diarrhea, nausea and vomiting.   Endocrine: Negative for polyuria.   Genitourinary:  Negative for decreased urine volume, dysuria, flank pain, frequency and hematuria.   Musculoskeletal:  Negative for back pain and gait problem.   Skin:  Negative for rash.   Neurological:  Negative for syncope, speech difficulty, weakness, light-headedness and headaches.   Psychiatric/Behavioral:  Negative for confusion, hallucinations and sleep disturbance.    Objective:     Vital Signs (Most Recent):  Temp: 98.8 °F (37.1 °C) (10/27/22 1117)  Pulse: 99 (10/27/22 1117)  Resp: 18 (10/27/22 1117)  BP: (!) 136/94 (10/27/22 1117)  SpO2: 96 % (10/27/22 1117)   Vital Signs (24h Range):  Temp:  [97.4 °F (36.3 °C)-99 °F (37.2 °C)] 98.8 °F (37.1 °C)  Pulse:  [] 99  Resp:  [16-20] 18  SpO2:  [93 %-98 %] 96 %  BP: (123-149)/(66-94) 136/94     Weight: (!) 158.2 kg (348 lb 12.3 oz)  Body mass index is 53.03 kg/m².    Intake/Output Summary (Last 24 hours) at 10/27/2022 1146  Last data filed at 10/27/2022 0330  Gross per 24 hour   Intake 1284.73 ml   Output 30 ml   Net 1254.73 ml      Physical Exam  Constitutional:       General: She is not in acute distress.     Appearance: She is well-developed. She is morbidly obese. She is not diaphoretic.   HENT:      Head: Normocephalic and atraumatic.      Mouth/Throat:      Pharynx: No oropharyngeal exudate.   Eyes:      Conjunctiva/sclera: Conjunctivae normal.      Pupils: Pupils are equal, round, and reactive to  light.   Neck:      Thyroid: No thyromegaly.      Vascular: No JVD.   Cardiovascular:      Rate and Rhythm: Regular rhythm. Tachycardia present.      Heart sounds: Normal heart sounds. No murmur heard.  Pulmonary:      Effort: Pulmonary effort is normal. No respiratory distress.      Breath sounds: Examination of the left-lower field reveals decreased breath sounds. Decreased breath sounds present. No wheezing or rales.      Comments: Y Chest tube in place , left lower pleural space   Breath sounds diminished left lower lung   Chest:      Chest wall: No tenderness.   Abdominal:      General: Bowel sounds are normal. There is no distension.      Palpations: Abdomen is soft.      Tenderness: There is no abdominal tenderness. There is no guarding or rebound.   Musculoskeletal:         General: Normal range of motion.      Cervical back: Normal range of motion and neck supple.      Right lower leg: No edema.      Left lower leg: No edema.   Lymphadenopathy:      Cervical: No cervical adenopathy.   Skin:     General: Skin is warm and dry.      Findings: No rash.   Neurological:      General: No focal deficit present.      Mental Status: She is alert and oriented to person, place, and time.      Cranial Nerves: No cranial nerve deficit.      Sensory: No sensory deficit.      Deep Tendon Reflexes: Reflexes normal.   Psychiatric:         Mood and Affect: Mood normal.       Significant Labs: All pertinent labs within the past 24 hours have been reviewed.  BMP:   Recent Labs   Lab 10/26/22  0558 10/26/22  2041   *  --      --    K 4.4  --      --    CO2 27  --    BUN 5*  --    CREATININE 0.6 0.6   CALCIUM 8.9  --      CBC:   Recent Labs   Lab 10/26/22  0558   WBC 16.73*   HGB 8.4*   HCT 26.8*   *     CMP:   Recent Labs   Lab 10/26/22  0558 10/26/22  2041     --    K 4.4  --      --    CO2 27  --    *  --    BUN 5*  --    CREATININE 0.6 0.6   CALCIUM 8.9  --    ANIONGAP 8  --         Significant Imaging:       Assessment/Plan:      * Empyema of left pleural space  10/21- VATS procedure with chest tube placement and drainage of empyema with CT surgery today. Antibiotic de escalated to Unasyn.   10/22- POD #1. S/P VATS, chest tube insertion x 2 and decortication. Fluid cultures are in progress. WBC remains elevated 22K, CRP trending down. PCT is normal. Current antibiotic - Unasyn and Oral Zyvox.   10/23- CT chest with new fluid collection in the left lung apex. Pt will have IR chest tube placed in am. WBC trended down to 22K  10/25- Additional chest tube by IR today. WBC down to 19K, 3% bands . Antibiotic changed to Cefepime , Flagyl and Vancomycin per ID. Cultures are so far unrevealing.     Parapneumonic effusion, Left   Continue empiric vancomycin and cefepime   Pulmonology following  Thoracentesis, follow fluid analysis   Continue supplemental O2  Check TTE  Follow blood cultures     10/20  S/p Thora 1500 cc removed   CVT following, plan for placement of chest tube per VATs   TTE reviewed   Continue empiric vanc, cefepime, add flagyl   dvt ppx   10/21-   VATS procedure with chest tube placement and drainage of empyema with CT surgery. Antibiotic de escalated to Unasyn. Blood cultures - NGTD. Nasal MRSA - neg. Pleural Fluid culture - pending.   10/22- See plan under Empyema of left pleural space     Class 3 severe obesity in adult  Body mass index is 42.44 kg/m². Morbid obesity complicates all aspects of disease management from diagnostic modalities to treatment. Weight loss encouraged and health benefits explained to patient.         Anemia, hypochromic  - Iron study suggestive of Anemia of chronic inflammation with iron deficiency . Noted folic acid deficiency as well. B12 level lower end of normal   - Replace iron , Folic acid and B12.   -RD consult for better diet selection to improve nutritional intake       S/P chest tube placement  10/27- POD #6  VATS procedure with drainage of  empyema and decortication and Y chest tube placement. POD #2  pigtail catheter placement to apical loculated collection left lung Ant chest        VTE Risk Mitigation (From admission, onward)         Ordered     enoxaparin injection 40 mg  Every 12 hours         10/24/22 0853     Place JASON hose  Until discontinued         10/20/22 2109     Place sequential compression device  Until discontinued         10/20/22 2109     enoxaparin injection 40 mg  2 times daily         10/20/22 1323     Place sequential compression device  Until discontinued         10/19/22 1646                Discharge Planning   DANNY:      Code Status: Full Code   Is the patient medically ready for discharge?:     Reason for patient still in hospital (select all that apply): Patient trending condition  Discharge Plan A: Home                  Cynthia Wallis MD  Department of Hospital Medicine   [facility] - Med Surg

## 2022-10-27 NOTE — PLAN OF CARE
Problem: Adult Inpatient Plan of Care  Goal: Optimal Comfort and Wellbeing  Outcome: Ongoing, Progressing     Problem: Bariatric Environmental Safety  Goal: Safety Maintained with Care  Outcome: Ongoing, Progressing     Problem: Pain Acute  Goal: Acceptable Pain Control and Functional Ability  Outcome: Ongoing, Progressing     Problem: Fall Injury Risk  Goal: Absence of Fall and Fall-Related Injury  Outcome: Ongoing, Progressing     Problem: Infection (Pneumonia)  Goal: Resolution of Infection Signs and Symptoms  Outcome: Ongoing, Progressing     Problem: Infection  Goal: Absence of Infection Signs and Symptoms  Outcome: Ongoing, Progressing

## 2022-10-27 NOTE — ASSESSMENT & PLAN NOTE
SP VATS chest tube, decortication  CT management per CTS team  SP drainage for left loculation   Drainage last 24 hrs 30 mls

## 2022-10-27 NOTE — NURSING
Pt remains free of injury throughout the shift, safety measures remain in place. POC reviewed with pt and spouse, both verbalizes understanding. Chest tubes remain in place, dressings CDI. Pt ST on the cardiac monitor. No acute s/s of distress. VSS. PICC currently not working, awaiting second order for cathflo. Hourly rounding done, will cont with poc.

## 2022-10-27 NOTE — ASSESSMENT & PLAN NOTE
10/27- POD #6  VATS procedure with drainage of empyema and decortication and Y chest tube placement. POD #2  pigtail catheter placement to apical loculated collection left lung Ant chest

## 2022-10-27 NOTE — CONSULTS
Pharmacokinetic Assessment Follow Up: IV Vancomycin    Vancomycin serum concentration assessment(s):    The trough level was drawn correctly and can be used to guide therapy at this time. The measurement is below the desired definitive target range of 15 to 20 mcg/mL.    Vancomycin Regimen Plan:    Change regimen to Vancomycin 1500 mg IV every 8 hours with next serum trough concentration measured at 2100 prior to 3rd new dose on 10/27/22.    Drug levels (last 3 results):  Recent Labs   Lab Result Units 10/26/22  2041   Vancomycin-Trough ug/mL 7.2*       Pharmacy will continue to follow and monitor vancomycin.    Please contact pharmacy at extension [phone] for questions regarding this assessment.    Thank you for the consult,   Bridgette Valentin PharmD       Patient brief summary:  Christina Leal is a 26 y.o. female initiated on antimicrobial therapy with IV Vancomycin for treatment of lower respiratory infection    The patient's current regimen is Vancomycin 2000 mg IV every 12 hours. Regimen changed to 1500 mg IV every 8 hours based on subtherapeutic trough.     Drug Allergies:   Review of patient's allergies indicates:  No Known Allergies    Actual Body Weight:   158.2 kg    Renal Function:   Estimated Creatinine Clearance: 225.9 mL/min (based on SCr of 0.6 mg/dL).,     Dialysis Method (if applicable):  N/A    CBC (last 72 hours):  Recent Labs   Lab Result Units 10/24/22  0524 10/25/22  0559 10/26/22  0558   WBC K/uL 22.72* 19.78* 16.73*   Hemoglobin g/dL 8.2* 8.8* 8.4*   Hematocrit % 26.1* 28.3* 26.8*   Platelets K/uL 435 522* 516*   Gran % % 61.0 71.0 68.0   Lymph % % 19.0 15.0* 8.0*   Mono % % 20.0* 11.0 24.0*   Eosinophil % % 0.0 0.0 0.0   Basophil % % 0.0 0.0 0.0   Differential Method  Manual Manual Manual       Metabolic Panel (last 72 hours):  Recent Labs   Lab Result Units 10/24/22  0524 10/25/22  0559 10/26/22  0558 10/26/22  2041   Sodium mmol/L 136 135* 136  --    Potassium mmol/L 4.4 4.8 4.4  --     Chloride mmol/L 99 101 101  --    CO2 mmol/L 24 25 27  --    Glucose mg/dL 90 196* 122*  --    BUN mg/dL 8 3* 5*  --    Creatinine mg/dL 0.6 0.7 0.6 0.6       Vancomycin Administrations:  vancomycin given in the last 96 hours                     vancomycin 2 g in dextrose 5 % 500 mL IVPB (mg) 2,000 mg New Bag 10/26/22 2138     2,000 mg New Bag  0921     2,000 mg New Bag 10/25/22 2059    vancomycin (VANCOCIN) 2,500 mg in dextrose 5 % 500 mL IVPB ()  Restarted 10/25/22 1137     2,500 mg New Bag  0910                    Microbiologic Results:  Microbiology Results (last 7 days)       Procedure Component Value Units Date/Time    Culture, Anaerobe [[id number]] Collected: 10/21/22 1119    Order Status: Completed Specimen: Abscess from Lung, Left Updated: 10/26/22 0937     Anaerobic Culture No anaerobes isolated    Narrative:      LEFT PLEURAL PEEL    Culture, Anaerobe [[id number]] Collected: 10/21/22 1105    Order Status: Completed Specimen: Pleural Fluid Updated: 10/26/22 0937     Anaerobic Culture No anaerobes isolated    Narrative:      LEFT PLEURAL FLUID    Blood culture #2 [[id number]] Collected: 10/19/22 1538    Order Status: Completed Specimen: Blood from Peripheral, Antecubital, Left Updated: 10/25/22 0612     Blood Culture, Routine No growth after 5 days.    Blood culture #1 [[id number]] Collected: 10/19/22 1508    Order Status: Completed Specimen: Blood from Peripheral, Antecubital, Right Updated: 10/25/22 0612     Blood Culture, Routine No growth after 5 days.    AFB Culture & Smear [[id number]] Collected: 10/21/22 1105    Order Status: Completed Specimen: Pleural Fluid Updated: 10/24/22 1538     AFB Culture & Smear Culture in progress     AFB CULTURE STAIN No acid fast bacilli seen.    Narrative:      LEFT PLEURAL FLUID    AFB Culture & Smear [[id number]] Collected: 10/21/22 1119    Order Status: Completed Specimen: Abscess from Lung, Left Updated: 10/24/22 1538     AFB Culture & Smear Culture in progress     AFB  CULTURE STAIN No acid fast bacilli seen.    Narrative:      LEFT PLEURAL PEEL    Fungus culture [[id number]] Collected: 10/21/22 1119    Order Status: Completed Specimen: Abscess from Lung, Left Updated: 10/24/22 1204     Fungus (Mycology) Culture Culture in progress    Narrative:      LEFT PLEURAL PEEL    Fungus culture [[id number]] Collected: 10/21/22 1105    Order Status: Completed Specimen: Pleural Fluid Updated: 10/24/22 1203     Fungus (Mycology) Culture Culture in progress    Narrative:      LEFT PLEURAL FLUID    Aerobic culture [[id number]] Collected: 10/21/22 1105    Order Status: Completed Specimen: Pleural Fluid Updated: 10/24/22 0637     Aerobic Bacterial Culture No significant isolate    Narrative:      LEFT PLEURAL FLUID    Culture, Body Fluid (Aerobic) w/ GS [[id number]] Collected: 10/21/22 1105    Order Status: Completed Specimen: Pleural Fluid Updated: 10/24/22 0636     AEROBIC CULTURE - FLUID No significant isolate     Gram Stain Result Moderate WBC's      Rare Gram positive cocci    Narrative:      LEFT PLEURAL FLUID    Aerobic culture [[id number]] Collected: 10/21/22 1119    Order Status: Completed Specimen: Abscess from Lung, Left Updated: 10/24/22 0635     Aerobic Bacterial Culture Normal respiratory berta.    Narrative:      LEFT PLEURAL PEEL    Gram stain [[id number]] Collected: 10/21/22 1119    Order Status: Completed Specimen: Abscess from Lung, Left Updated: 10/22/22 0218     Gram Stain Result Moderate WBC's      Rare Gram positive cocci    Narrative:      LEFT PLEURAL PEEL    Culture, MRSA [[id number]] Collected: 10/19/22 1657    Order Status: Completed Specimen: MRSA source from Nares, Left Updated: 10/21/22 0710     MRSA Surveillance Screen No MRSA isolated          Thank you for allowing us to participate in this patient's care.     Bridgette Valentin, Joe 10/27/2022 1:22 AM

## 2022-10-27 NOTE — ASSESSMENT & PLAN NOTE
The patient is a 26-year-old female with a history of severe obesity (BMI 50.48) who was admitted with complaints of left-sided chest pain over the past week.  Patient also noticed some shortness of breath and dyspnea on exertion in addition the patient has a cough.  The patient had a chest x-ray done which showed a left pleural effusion CT scan shows a loculated left pleural effusion.  Thoracentesis was performed with fluid analysis indicating that left pleural effusion is compatible with a diagnosis of an empyema.    The patient's left empyema requires drainage.  Patient will likely require a large bore chest tube for adequate drainage of empyema.  Bedside placement of chest tube in the setting of severe obesity will be difficult.  Will plan for placement of chest tube with VATS procedure in the operating room.  The risks and benefits of a VATS procedure were explained to the patient.  The patient understand the risks and benefits and agreed to proceed with VATS assisted drainage of empyema    10/22/2022   The patient is postop day 1 status post left VATS procedure with drainage of empyema and decortication.  Chest tube output has been about 600 mL since surgery yesterday.  Continue chest tube to suction.  Continue incentive spirometer and pulmonary toileting.  Continue broad-spectrum antibiotics.    10/23/2022   The patient is postop day 2 status post left VATS procedure with drainage of empyema and decortication.  Chest tube output over the past 24 hours is 150 mL.  Continue chest tube to suction.  Chest x-ray shows new opacity in the apical region of the left lung.  Will obtain CT scan of the chest    10/24/2022   Patient is postop day 3 status post VATS procedure with drainage of empyema and decortication.  CT scan shows new fluid collection in the apical region.  Patient discussed with IR.  Plan is for IR placed chest tube in the a.m..  Continue antibiotics.  Continue chest tube to suction.    10/26/2022  The  patient is postop day 5 status post VATS procedure with drainage of empyema and decortication.  She is day 1 status post pigtail catheter placement into apical loculated collection.  Chest tubes continue to drain significant amount of fluid.  Patient has bilateral pitting edema.  Will start diuresis with Lasix.  Continue chest tubes to suction.    10/27/2022   Patient is day 6 status post VATS procedure with drainage of empyema and decortication.  She is day 2 status post pigtail catheter placement in 2 apical loculated collection.  There has been a significant decrease in chest tube output over the past 24 hours.  If this trend continues anticipate discontinue chest tubes tomorrow.  Continue Lasix and continue chest tubes to suction.

## 2022-10-27 NOTE — SUBJECTIVE & OBJECTIVE
Interval History:   Last ID recs from 10/24-- Empiric out patient regime will be Vancomycin  ,Rocephin and Po Flagyl for 2 to 3 weeks or until radiologic resolution.       Review of Systems   Constitutional:  Positive for activity change. Negative for appetite change and fever.   HENT:  Negative for sore throat.    Eyes:  Negative for visual disturbance.   Respiratory:  Positive for shortness of breath (improved). Negative for cough and chest tightness.    Cardiovascular:  Positive for chest pain (left post chest wall pain). Negative for palpitations and leg swelling.   Gastrointestinal:  Negative for abdominal distention, abdominal pain, constipation, diarrhea, nausea and vomiting.   Endocrine: Negative for polyuria.   Genitourinary:  Negative for decreased urine volume, dysuria, flank pain, frequency and hematuria.   Musculoskeletal:  Negative for back pain and gait problem.   Skin:  Negative for rash.   Neurological:  Negative for syncope, speech difficulty, weakness, light-headedness and headaches.   Psychiatric/Behavioral:  Negative for confusion, hallucinations and sleep disturbance.    Objective:     Vital Signs (Most Recent):  Temp: 98.8 °F (37.1 °C) (10/27/22 1117)  Pulse: 99 (10/27/22 1117)  Resp: 18 (10/27/22 1117)  BP: (!) 136/94 (10/27/22 1117)  SpO2: 96 % (10/27/22 1117)   Vital Signs (24h Range):  Temp:  [97.4 °F (36.3 °C)-99 °F (37.2 °C)] 98.8 °F (37.1 °C)  Pulse:  [] 99  Resp:  [16-20] 18  SpO2:  [93 %-98 %] 96 %  BP: (123-149)/(66-94) 136/94     Weight: (!) 158.2 kg (348 lb 12.3 oz)  Body mass index is 53.03 kg/m².    Intake/Output Summary (Last 24 hours) at 10/27/2022 1146  Last data filed at 10/27/2022 0330  Gross per 24 hour   Intake 1284.73 ml   Output 30 ml   Net 1254.73 ml      Physical Exam  Constitutional:       General: She is not in acute distress.     Appearance: She is well-developed. She is morbidly obese. She is not diaphoretic.   HENT:      Head: Normocephalic and atraumatic.       Mouth/Throat:      Pharynx: No oropharyngeal exudate.   Eyes:      Conjunctiva/sclera: Conjunctivae normal.      Pupils: Pupils are equal, round, and reactive to light.   Neck:      Thyroid: No thyromegaly.      Vascular: No JVD.   Cardiovascular:      Rate and Rhythm: Regular rhythm. Tachycardia present.      Heart sounds: Normal heart sounds. No murmur heard.  Pulmonary:      Effort: Pulmonary effort is normal. No respiratory distress.      Breath sounds: Examination of the left-lower field reveals decreased breath sounds. Decreased breath sounds present. No wheezing or rales.      Comments: Y Chest tube in place , left lower pleural space   Breath sounds diminished left lower lung   Chest:      Chest wall: No tenderness.   Abdominal:      General: Bowel sounds are normal. There is no distension.      Palpations: Abdomen is soft.      Tenderness: There is no abdominal tenderness. There is no guarding or rebound.   Musculoskeletal:         General: Normal range of motion.      Cervical back: Normal range of motion and neck supple.      Right lower leg: No edema.      Left lower leg: No edema.   Lymphadenopathy:      Cervical: No cervical adenopathy.   Skin:     General: Skin is warm and dry.      Findings: No rash.   Neurological:      General: No focal deficit present.      Mental Status: She is alert and oriented to person, place, and time.      Cranial Nerves: No cranial nerve deficit.      Sensory: No sensory deficit.      Deep Tendon Reflexes: Reflexes normal.   Psychiatric:         Mood and Affect: Mood normal.       Significant Labs: All pertinent labs within the past 24 hours have been reviewed.  BMP:   Recent Labs   Lab 10/26/22  0558 10/26/22  2041   *  --      --    K 4.4  --      --    CO2 27  --    BUN 5*  --    CREATININE 0.6 0.6   CALCIUM 8.9  --      CBC:   Recent Labs   Lab 10/26/22  0558   WBC 16.73*   HGB 8.4*   HCT 26.8*   *     CMP:   Recent Labs   Lab  10/26/22  0558 10/26/22  2041     --    K 4.4  --      --    CO2 27  --    *  --    BUN 5*  --    CREATININE 0.6 0.6   CALCIUM 8.9  --    ANIONGAP 8  --        Significant Imaging:

## 2022-10-27 NOTE — SUBJECTIVE & OBJECTIVE
Interval History:  10/27: seen and examined: T max 99F,  IR chest tube drained 50 mls  . , wbcc trending down, reduced CT output    Respiratory ROS: no cough, shortness of breath, or wheezing     Objective:     Vital Signs (Most Recent):  Temp: 98.2 °F (36.8 °C) (10/27/22 0755)  Pulse: 107 (10/27/22 0755)  Resp: 16 (10/27/22 0755)  BP: (!) 143/88 (10/27/22 0755)  SpO2: (!) 93 % (10/27/22 0755)   Vital Signs (24h Range):  Temp:  [97.4 °F (36.3 °C)-99 °F (37.2 °C)] 98.2 °F (36.8 °C)  Pulse:  [] 107  Resp:  [16-20] 16  SpO2:  [93 %-98 %] 93 %  BP: (123-149)/(66-88) 143/88     Weight: (!) 158.2 kg (348 lb 12.3 oz)  Body mass index is 53.03 kg/m².      Intake/Output Summary (Last 24 hours) at 10/27/2022 0941  Last data filed at 10/27/2022 0330  Gross per 24 hour   Intake 1404.73 ml   Output 30 ml   Net 1374.73 ml       Physical Exam  Vitals and nursing note reviewed.   Constitutional:       Appearance: She is well-developed. She is morbidly obese.      Interventions: Nasal cannula in place.       HENT:      Head: Normocephalic and atraumatic.      Nose: Nose normal.      Mouth/Throat:      Pharynx: No oropharyngeal exudate.   Eyes:      General: No scleral icterus.     Conjunctiva/sclera: Conjunctivae normal.      Pupils: Pupils are equal, round, and reactive to light.   Neck:      Thyroid: No thyromegaly.      Vascular: No JVD.      Trachea: No tracheal deviation.   Cardiovascular:      Rate and Rhythm: Normal rate and regular rhythm.      Pulses: Normal pulses.      Heart sounds: Normal heart sounds, S1 normal and S2 normal. No murmur heard.  Pulmonary:      Effort: Pulmonary effort is normal. No tachypnea, accessory muscle usage or respiratory distress.      Breath sounds: Decreased air movement present. No stridor. Examination of the left-upper field reveals decreased breath sounds. Decreased breath sounds present.       Abdominal:      General: Bowel sounds are normal. There is no distension.      Palpations:  Abdomen is soft. There is no hepatomegaly, splenomegaly or mass.      Tenderness: There is no abdominal tenderness. There is no guarding or rebound.   Musculoskeletal:         General: No tenderness. Normal range of motion.      Cervical back: Normal range of motion and neck supple.   Lymphadenopathy:      Upper Body:      Right upper body: No supraclavicular adenopathy.      Left upper body: No supraclavicular adenopathy.   Skin:     General: Skin is warm and dry.      Findings: No rash.      Nails: There is no clubbing.   Neurological:      General: No focal deficit present.      Mental Status: She is alert and oriented to person, place, and time.      Coordination: Coordination normal.      Gait: Gait normal.      Deep Tendon Reflexes: Reflexes are normal and symmetric.   Psychiatric:         Mood and Affect: Mood normal.       Vents:  Oxygen Concentration (%): 28 (10/26/22 1214)    Lines/Drains/Airways       Peripherally Inserted Central Catheter Line  Duration             PICC Double Lumen 10/22/22 1919 right basilic 4 days              Drain  Duration                  Y Chest Tube 1 and 2 10/21/22 0958 1 Left Pleural 36 Fr. 2 Left Other (Comment) 36 Fr. 5 days         Chest Tube 10/25/22 1313 3 Left Midclavicular 8.5 Fr. 1 day                    Significant Labs:    CBC/Anemia Profile:  Recent Labs   Lab 10/26/22  0558   WBC 16.73*   HGB 8.4*   HCT 26.8*   *   MCV 84   RDW 13.6        Chemistries:  Recent Labs   Lab 10/26/22  0558 10/26/22  2041     --    K 4.4  --      --    CO2 27  --    BUN 5*  --    CREATININE 0.6 0.6   CALCIUM 8.9  --        All pertinent labs within the past 24 hours have been reviewed.    Significant Imaging:  I have reviewed all pertinent imaging results/findings within the past 24 hours.    X-Ray Chest AP Portable  Narrative: EXAMINATION:  XR CHEST AP PORTABLE    CLINICAL HISTORY:  s/p vats;.    TECHNIQUE:  Single frontal portable view of the chest was  performed.    COMPARISON:  10/26/2022    FINDINGS:  Left upper lobe pigtail catheter unchanged position.  No pneumothorax.  Lung volumes are low similar to prior exam.  Left-sided pleuroparenchymal scarring and subsegmental atelectasis unchanged.  Tiny left pleural effusion.  Right lung is clear.  Heart normal size.  Impression: No acute abnormality.    Electronically signed by: Adrian Lyn  Date:    10/27/2022  Time:    07:15

## 2022-10-27 NOTE — PT/OT/SLP PROGRESS
"Physical Therapy  Treatment    Christina Leal   MRN: [id number]   Admitting Diagnosis: Empyema of left pleural space    PT Received On: 10/27/22  PT Start Time: 1551     PT Stop Time: 1607    PT Total Time (min): 16 min       Billable Minutes:  Gait Training 16    Treatment Type: Treatment  PT/PTA: PTA     PTA Visit Number: 2       General Precautions: Standard, fall  Orthopedic Precautions: N/A   Braces: N/A  Respiratory Status: Room air    Spiritual, Cultural Beliefs, Adventist Practices, Values that Affect Care: no    Subjective:  Communicated with nursing staff, Earl and completed Epic chart review prior to session.  Patient agreeable to PT.    Pain/Comfort  Pain Rating 1: 0/10    Objective:   Patient found with: peripheral IV, chest tube, telemetry (x 2)  O2 saturation during session: 95%, 93%, 95%.    Functional Mobility:  Bed Mobility:    Supine to sit: modified independent   Scooting to edge of bed: modified independent     Transfers:   Sit to stand: modified independent   SPT to bedside chair: Supervision     Gait:    400 without assistive device, Supervision for patient safety.    Treatment and Education:  Pt educated on the following: Pt verbally agreed in understanding.   Pursed-lip breathing to improve O2 quality.  Continue therapuetic exercises in chair throughout the day to increase activity tolerance and decrease risk for pneumonia and blood clots.  Sit in chair for all 3 meals and when guests visit.    "Call, don't fall" procedure of pressing red button on call bell for all transfers.         AM-PAC 6 CLICK MOBILITY  How much help from another person does this patient currently need?   1 = Unable, Total/Dependent Assistance  2 = A lot, Maximum/Moderate Assistance  3 = A little, Minimum/Contact Guard/Supervision  4 = None, Modified [place]/Independent    Turning over in bed (including adjusting bedclothes, sheets and blankets)?: 4  Sitting down on and standing up from a chair with arms (e.g., " wheelchair, bedside commode, etc.): 4  Moving from lying on back to sitting on the side of the bed?: 4  Moving to and from a bed to a chair (including a wheelchair)?: 4  Need to walk in hospital room?: 4  Climbing 3-5 steps with a railing?: 1  Basic Mobility Total Score: 21    AM-PAC Raw Score CMS G-Code Modifier Level of Impairment Assistance   6 % Total / Unable   7 - 9 CM 80 - 100% Maximal Assist   10 - 14 CL 60 - 80% Moderate Assist   15 - 19 CK 40 - 60% Moderate Assist   20 - 22 CJ 20 - 40% Minimal Assist   23 CI 1-20% SBA / CGA   24 CH 0% Independent/ Mod I     Patient left up in chair with all lines intact, call button in reach, nursing  notified, and friend present.  All needs met.    Assessment:  Christina Leal is a 26 y.o. female with a medical diagnosis of Empyema of left pleural space and presents with overall decline in functional mobility. Patient tolerated therapy session well this date as she was able to ambulate for a further distance (400 feet) and maintain O2 saturation within functional limits throughout session. Patient is improving and will continue to progress toward therapy goals with continuance of skilled PT sessions.    Rehab identified problem list/impairments: Rehab identified problem list/impairments: impaired cardiopulmonary response to activity, impaired functional mobility    Rehab potential is good.    Activity tolerance: Good    Discharge recommendations: Discharge Facility/Level of Care Needs: home     Barriers to discharge:      Equipment recommendations: Equipment Needed After Discharge: none     GOALS:   Multidisciplinary Problems       Physical Therapy Goals          Problem: Physical Therapy    Goal Priority Disciplines Outcome Goal Variances Interventions   Physical Therapy Goal     PT, PT/OT Ongoing, Progressing     Description: LTGs:   POC expires 11/6/22  Pt will perform 3x10 sit to stands IND  in order improve endurance.   Pt will ' IND while maintaining  chest tube in order to participate in household ambulation.    Pt will ascend and descend 1 flight of stairs IND in order to return to work.                       PLAN:    Patient to be seen 3 x/week  to address the above listed problems via gait training  Plan of Care expires: 11/06/22  Plan of Care reviewed with: patient         10/27/2022

## 2022-10-27 NOTE — SUBJECTIVE & OBJECTIVE
Interval History: The patient is postop day 6 status post VATS procedure for drainage of empyema and decortication.  The patient is day 2 status post placement of pigtail catheter in apical collection.    ROS  Medications:  Continuous Infusions:  Scheduled Meds:   albuterol-ipratropium  3 mL Nebulization Q6H WAKE    alteplase  2 mg Intra-Catheter Once    ceFEPime (MAXIPIME) IVPB  2 g Intravenous Q8H    docusate sodium  100 mg Oral BID    enoxaparin  40 mg Subcutaneous Q12H    famotidine  20 mg Oral BID    ferrous gluconate  324 mg Oral BID WM    folic acid-vit B6-vit B12 2.5-25-2 mg  1 tablet Oral Daily    furosemide (LASIX) injection  20 mg Intravenous BID WAKE    metroNIDAZOLE  500 mg Oral Q8H    polyethylene glycol  17 g Oral Daily    potassium chloride  10 mEq Oral Daily    vancomycin (VANCOCIN) IVPB  1,500 mg Intravenous Q8H     PRN Meds:albuterol sulfate, ibuprofen, metoclopramide HCl, morphine, ondansetron, oxyCODONE, sodium chloride 0.9%, traZODone, Pharmacy to dose Vancomycin consult **AND** vancomycin - pharmacy to dose     Objective:     Vital Signs (Most Recent):  Temp: 98.2 °F (36.8 °C) (10/27/22 0755)  Pulse: 107 (10/27/22 0755)  Resp: 16 (10/27/22 0755)  BP: (!) 143/88 (10/27/22 0755)  SpO2: (!) 93 % (10/27/22 0755)   Vital Signs (24h Range):  Temp:  [97.4 °F (36.3 °C)-99 °F (37.2 °C)] 98.2 °F (36.8 °C)  Pulse:  [] 107  Resp:  [16-20] 16  SpO2:  [93 %-98 %] 93 %  BP: (123-149)/(66-88) 143/88     Weight: (!) 158.2 kg (348 lb 12.3 oz)  Body mass index is 53.03 kg/m².    SpO2: (!) 93 %  O2 Device (Oxygen Therapy): room air    Intake/Output - Last 3 Shifts         10/25 0700  10/26 0659 10/26 0700  10/27 0659 10/27 0700  10/28 0659    P.O.  120     I.V. (mL/kg) 527.3 (4.2) 207 (1.3)     IV Piggyback 715.3 1077.8     Total Intake(mL/kg) 1242.6 (9.8) 1404.7 (8.9)     Chest Tube  30     Total Output  30     Net +1242.6 +1374.7            Urine Occurrence 2 x 2 x     Stool Occurrence 1 x               Lines/Drains/Airways       Peripherally Inserted Central Catheter Line  Duration             PICC Double Lumen 10/22/22 1919 right basilic 4 days              Drain  Duration                  Y Chest Tube 1 and 2 10/21/22 0958 1 Left Pleural 36 Fr. 2 Left Other (Comment) 36 Fr. 6 days         Chest Tube 10/25/22 1313 3 Left Midclavicular 8.5 Fr. 1 day                    Physical Exam  Constitutional:       Appearance: Normal appearance. She is obese.   HENT:      Head: Normocephalic and atraumatic.   Cardiovascular:      Rate and Rhythm: Normal rate and regular rhythm.      Heart sounds: Normal heart sounds.   Pulmonary:      Effort: Pulmonary effort is normal.      Breath sounds: Normal breath sounds.   Abdominal:      General: Abdomen is flat. Bowel sounds are normal.      Palpations: Abdomen is soft.   Musculoskeletal:      Right lower leg: Edema present.      Left lower leg: Edema present.   Skin:     General: Skin is warm and dry.   Neurological:      General: No focal deficit present.      Mental Status: She is alert and oriented to person, place, and time.   Psychiatric:         Behavior: Behavior normal.       Significant Labs:  All pertinent labs from the last 24 hours have been reviewed.    Significant Diagnostics:  I have reviewed all pertinent imaging results/findings within the past 24 hours.

## 2022-10-27 NOTE — PROGRESS NOTES
[facility]  Cardiothoracic Surgery  Progress Note    Patient Name: Christina Leal  MRN: [id number]  Admission Date: 10/19/2022  Hospital Length of Stay: 8 days  Code Status: Full Code   Attending Physician: Cynthia Wallis MD   Referring Provider: Jimmy Castellanos NP  Principal Problem:Empyema of left pleural space            Subjective:     Post-Op Info:  Procedure(s) (LRB):  VATS, WITH CHEST TUBE INSERTION FOR DRAINAGE OF PLEURAL EFFUSION (Left)  BLOCK, NERVE, INTERCOSTAL, 2 OR MORE (Left)  VATS, WITH DECORTICATION, LUNG (Left)  EVACUATION, EMPYEMA (Left)   6 Days Post-Op     Interval History: The patient is postop day 6 status post VATS procedure for drainage of empyema and decortication.  The patient is day 2 status post placement of pigtail catheter in apical collection.    ROS  Medications:  Continuous Infusions:  Scheduled Meds:   albuterol-ipratropium  3 mL Nebulization Q6H WAKE    alteplase  2 mg Intra-Catheter Once    ceFEPime (MAXIPIME) IVPB  2 g Intravenous Q8H    docusate sodium  100 mg Oral BID    enoxaparin  40 mg Subcutaneous Q12H    famotidine  20 mg Oral BID    ferrous gluconate  324 mg Oral BID WM    folic acid-vit B6-vit B12 2.5-25-2 mg  1 tablet Oral Daily    furosemide (LASIX) injection  20 mg Intravenous BID WAKE    metroNIDAZOLE  500 mg Oral Q8H    polyethylene glycol  17 g Oral Daily    potassium chloride  10 mEq Oral Daily    vancomycin (VANCOCIN) IVPB  1,500 mg Intravenous Q8H     PRN Meds:albuterol sulfate, ibuprofen, metoclopramide HCl, morphine, ondansetron, oxyCODONE, sodium chloride 0.9%, traZODone, Pharmacy to dose Vancomycin consult **AND** vancomycin - pharmacy to dose     Objective:     Vital Signs (Most Recent):  Temp: 98.2 °F (36.8 °C) (10/27/22 0755)  Pulse: 107 (10/27/22 0755)  Resp: 16 (10/27/22 0755)  BP: (!) 143/88 (10/27/22 0755)  SpO2: (!) 93 % (10/27/22 0755)   Vital Signs (24h Range):  Temp:  [97.4 °F (36.3 °C)-99 °F (37.2 °C)] 98.2 °F (36.8  °C)  Pulse:  [] 107  Resp:  [16-20] 16  SpO2:  [93 %-98 %] 93 %  BP: (123-149)/(66-88) 143/88     Weight: (!) 158.2 kg (348 lb 12.3 oz)  Body mass index is 53.03 kg/m².    SpO2: (!) 93 %  O2 Device (Oxygen Therapy): room air    Intake/Output - Last 3 Shifts         10/25 0700  10/26 0659 10/26 0700  10/27 0659 10/27 0700  10/28 0659    P.O.  120     I.V. (mL/kg) 527.3 (4.2) 207 (1.3)     IV Piggyback 715.3 1077.8     Total Intake(mL/kg) 1242.6 (9.8) 1404.7 (8.9)     Chest Tube  30     Total Output  30     Net +1242.6 +1374.7            Urine Occurrence 2 x 2 x     Stool Occurrence 1 x              Lines/Drains/Airways       Peripherally Inserted Central Catheter Line  Duration             PICC Double Lumen 10/22/22 1919 right basilic 4 days              Drain  Duration                  Y Chest Tube 1 and 2 10/21/22 0958 1 Left Pleural 36 Fr. 2 Left Other (Comment) 36 Fr. 6 days         Chest Tube 10/25/22 1313 3 Left Midclavicular 8.5 Fr. 1 day                    Physical Exam  Constitutional:       Appearance: Normal appearance. She is obese.   HENT:      Head: Normocephalic and atraumatic.   Cardiovascular:      Rate and Rhythm: Normal rate and regular rhythm.      Heart sounds: Normal heart sounds.   Pulmonary:      Effort: Pulmonary effort is normal.      Breath sounds: Normal breath sounds.   Abdominal:      General: Abdomen is flat. Bowel sounds are normal.      Palpations: Abdomen is soft.   Musculoskeletal:      Right lower leg: Edema present.      Left lower leg: Edema present.   Skin:     General: Skin is warm and dry.   Neurological:      General: No focal deficit present.      Mental Status: She is alert and oriented to person, place, and time.   Psychiatric:         Behavior: Behavior normal.       Significant Labs:  All pertinent labs from the last 24 hours have been reviewed.    Significant Diagnostics:  I have reviewed all pertinent imaging results/findings within the past 24  hours.    Assessment/Plan:     * Empyema of left pleural space  The patient is a 26-year-old female with a history of severe obesity (BMI 50.48) who was admitted with complaints of left-sided chest pain over the past week.  Patient also noticed some shortness of breath and dyspnea on exertion in addition the patient has a cough.  The patient had a chest x-ray done which showed a left pleural effusion CT scan shows a loculated left pleural effusion.  Thoracentesis was performed with fluid analysis indicating that left pleural effusion is compatible with a diagnosis of an empyema.    The patient's left empyema requires drainage.  Patient will likely require a large bore chest tube for adequate drainage of empyema.  Bedside placement of chest tube in the setting of severe obesity will be difficult.  Will plan for placement of chest tube with VATS procedure in the operating room.  The risks and benefits of a VATS procedure were explained to the patient.  The patient understand the risks and benefits and agreed to proceed with VATS assisted drainage of empyema    10/22/2022   The patient is postop day 1 status post left VATS procedure with drainage of empyema and decortication.  Chest tube output has been about 600 mL since surgery yesterday.  Continue chest tube to suction.  Continue incentive spirometer and pulmonary toileting.  Continue broad-spectrum antibiotics.    10/23/2022   The patient is postop day 2 status post left VATS procedure with drainage of empyema and decortication.  Chest tube output over the past 24 hours is 150 mL.  Continue chest tube to suction.  Chest x-ray shows new opacity in the apical region of the left lung.  Will obtain CT scan of the chest    10/24/2022   Patient is postop day 3 status post VATS procedure with drainage of empyema and decortication.  CT scan shows new fluid collection in the apical region.  Patient discussed with IR.  Plan is for IR placed chest tube in the a.m..  Continue  antibiotics.  Continue chest tube to suction.    10/26/2022  The patient is postop day 5 status post VATS procedure with drainage of empyema and decortication.  She is day 1 status post pigtail catheter placement into apical loculated collection.  Chest tubes continue to drain significant amount of fluid.  Patient has bilateral pitting edema.  Will start diuresis with Lasix.  Continue chest tubes to suction.    10/27/2022   Patient is day 6 status post VATS procedure with drainage of empyema and decortication.  She is day 2 status post pigtail catheter placement in 2 apical loculated collection.  There has been a significant decrease in chest tube output over the past 24 hours.  If this trend continues anticipate discontinue chest tubes tomorrow.  Continue Lasix and continue chest tubes to suction.          Dusty Moyer MD  Cardiothoracic Surgery  [facility]

## 2022-10-27 NOTE — PROGRESS NOTES
[facility] Surg  Pulmonology  Progress Note    Patient Name: Christina Leal  MRN: [id number]  Admission Date: 10/19/2022  Hospital Length of Stay: 8 days  Code Status: Full Code  Attending Provider: Cynthia Wallis MD  Primary Care Provider: Primary Doctor No   Principal Problem: Empyema of left pleural space    Subjective:     Interval History:  10/27: seen and examined: T max 99F,  IR chest tube drained 50 mls  . , wbcc trending down, reduced CT output    Respiratory ROS: no cough, shortness of breath, or wheezing     Objective:     Vital Signs (Most Recent):  Temp: 98.2 °F (36.8 °C) (10/27/22 0755)  Pulse: 107 (10/27/22 0755)  Resp: 16 (10/27/22 0755)  BP: (!) 143/88 (10/27/22 0755)  SpO2: (!) 93 % (10/27/22 0755)   Vital Signs (24h Range):  Temp:  [97.4 °F (36.3 °C)-99 °F (37.2 °C)] 98.2 °F (36.8 °C)  Pulse:  [] 107  Resp:  [16-20] 16  SpO2:  [93 %-98 %] 93 %  BP: (123-149)/(66-88) 143/88     Weight: (!) 158.2 kg (348 lb 12.3 oz)  Body mass index is 53.03 kg/m².      Intake/Output Summary (Last 24 hours) at 10/27/2022 0941  Last data filed at 10/27/2022 0330  Gross per 24 hour   Intake 1404.73 ml   Output 30 ml   Net 1374.73 ml       Physical Exam  Vitals and nursing note reviewed.   Constitutional:       Appearance: She is well-developed. She is morbidly obese.      Interventions: Nasal cannula in place.       HENT:      Head: Normocephalic and atraumatic.      Nose: Nose normal.      Mouth/Throat:      Pharynx: No oropharyngeal exudate.   Eyes:      General: No scleral icterus.     Conjunctiva/sclera: Conjunctivae normal.      Pupils: Pupils are equal, round, and reactive to light.   Neck:      Thyroid: No thyromegaly.      Vascular: No JVD.      Trachea: No tracheal deviation.   Cardiovascular:      Rate and Rhythm: Normal rate and regular rhythm.      Pulses: Normal pulses.      Heart sounds: Normal heart sounds, S1 normal and S2 normal. No murmur heard.  Pulmonary:      Effort: Pulmonary effort is  normal. No tachypnea, accessory muscle usage or respiratory distress.      Breath sounds: Decreased air movement present. No stridor. Examination of the left-upper field reveals decreased breath sounds. Decreased breath sounds present.       Abdominal:      General: Bowel sounds are normal. There is no distension.      Palpations: Abdomen is soft. There is no hepatomegaly, splenomegaly or mass.      Tenderness: There is no abdominal tenderness. There is no guarding or rebound.   Musculoskeletal:         General: No tenderness. Normal range of motion.      Cervical back: Normal range of motion and neck supple.   Lymphadenopathy:      Upper Body:      Right upper body: No supraclavicular adenopathy.      Left upper body: No supraclavicular adenopathy.   Skin:     General: Skin is warm and dry.      Findings: No rash.      Nails: There is no clubbing.   Neurological:      General: No focal deficit present.      Mental Status: She is alert and oriented to person, place, and time.      Coordination: Coordination normal.      Gait: Gait normal.      Deep Tendon Reflexes: Reflexes are normal and symmetric.   Psychiatric:         Mood and Affect: Mood normal.       Vents:  Oxygen Concentration (%): 28 (10/26/22 1214)    Lines/Drains/Airways       Peripherally Inserted Central Catheter Line  Duration             PICC Double Lumen 10/22/22 1919 right basilic 4 days              Drain  Duration                  Y Chest Tube 1 and 2 10/21/22 0958 1 Left Pleural 36 Fr. 2 Left Other (Comment) 36 Fr. 5 days         Chest Tube 10/25/22 1313 3 Left Midclavicular 8.5 Fr. 1 day                    Significant Labs:    CBC/Anemia Profile:  Recent Labs   Lab 10/26/22  0558   WBC 16.73*   HGB 8.4*   HCT 26.8*   *   MCV 84   RDW 13.6        Chemistries:  Recent Labs   Lab 10/26/22  0558 10/26/22  2041     --    K 4.4  --      --    CO2 27  --    BUN 5*  --    CREATININE 0.6 0.6   CALCIUM 8.9  --        All pertinent labs  within the past 24 hours have been reviewed.    Significant Imaging:  I have reviewed all pertinent imaging results/findings within the past 24 hours.    X-Ray Chest AP Portable  Narrative: EXAMINATION:  XR CHEST AP PORTABLE    CLINICAL HISTORY:  s/p vats;.    TECHNIQUE:  Single frontal portable view of the chest was performed.    COMPARISON:  10/26/2022    FINDINGS:  Left upper lobe pigtail catheter unchanged position.  No pneumothorax.  Lung volumes are low similar to prior exam.  Left-sided pleuroparenchymal scarring and subsegmental atelectasis unchanged.  Tiny left pleural effusion.  Right lung is clear.  Heart normal size.  Impression: No acute abnormality.    Electronically signed by: Adrian Lyn  Date:    10/27/2022  Time:    07:15       ABG  Recent Labs   Lab 10/21/22  1321   PH 7.377   PO2 124*   PCO2 49.8*   HCO3 29.3*   BE 4     Assessment/Plan:     * Empyema of left pleural space  SP VATS chest tube, decortication  CT management per CTS team  SP drainage for left loculation   Drainage last 24 hrs 30 mls    S/P chest tube placement  Follow CXR  Incentive spirometry to prevent aletectasis  Pain control  CTS following    Class 3 severe obesity in adult  Weight loss    Parapneumonic effusion, Left   Empiric abx VANC, CEFEPIME, FLAGYL  Deescalate based on cultures  UNASYN+ ZYVOX per ID as outpatient           Varun Wood MD  Pulmonology  [facility]

## 2022-10-28 VITALS
TEMPERATURE: 99 F | RESPIRATION RATE: 20 BRPM | DIASTOLIC BLOOD PRESSURE: 83 MMHG | HEIGHT: 68 IN | WEIGHT: 293 LBS | HEART RATE: 104 BPM | SYSTOLIC BLOOD PRESSURE: 119 MMHG | OXYGEN SATURATION: 96 % | BODY MASS INDEX: 44.41 KG/M2

## 2022-10-28 LAB
ALBUMIN SERPL BCP-MCNC: 2.2 G/DL (ref 3.5–5.2)
ALP SERPL-CCNC: 100 U/L (ref 55–135)
ALT SERPL W/O P-5'-P-CCNC: 27 U/L (ref 10–44)
ANION GAP SERPL CALC-SCNC: 11 MMOL/L (ref 8–16)
AST SERPL-CCNC: 22 U/L (ref 10–40)
BASOPHILS # BLD AUTO: 0.1 K/UL (ref 0–0.2)
BASOPHILS NFR BLD: 0.7 % (ref 0–1.9)
BILIRUB SERPL-MCNC: 0.5 MG/DL (ref 0.1–1)
BUN SERPL-MCNC: 5 MG/DL (ref 6–20)
CALCIUM SERPL-MCNC: 9.5 MG/DL (ref 8.7–10.5)
CHLORIDE SERPL-SCNC: 99 MMOL/L (ref 95–110)
CO2 SERPL-SCNC: 26 MMOL/L (ref 23–29)
CREAT SERPL-MCNC: 0.7 MG/DL (ref 0.5–1.4)
CRP SERPL-MCNC: 95 MG/L (ref 0–8.2)
DIFFERENTIAL METHOD: ABNORMAL
EOSINOPHIL # BLD AUTO: 0.2 K/UL (ref 0–0.5)
EOSINOPHIL NFR BLD: 1.6 % (ref 0–8)
ERYTHROCYTE [DISTWIDTH] IN BLOOD BY AUTOMATED COUNT: 13.3 % (ref 11.5–14.5)
EST. GFR  (NO RACE VARIABLE): >60 ML/MIN/1.73 M^2
GLUCOSE SERPL-MCNC: 116 MG/DL (ref 70–110)
HCT VFR BLD AUTO: 29.6 % (ref 37–48.5)
HGB BLD-MCNC: 9.3 G/DL (ref 12–16)
IMM GRANULOCYTES # BLD AUTO: 0.59 K/UL (ref 0–0.04)
IMM GRANULOCYTES NFR BLD AUTO: 4.1 % (ref 0–0.5)
LYMPHOCYTES # BLD AUTO: 1.7 K/UL (ref 1–4.8)
LYMPHOCYTES NFR BLD: 11.9 % (ref 18–48)
MCH RBC QN AUTO: 26.3 PG (ref 27–31)
MCHC RBC AUTO-ENTMCNC: 31.4 G/DL (ref 32–36)
MCV RBC AUTO: 84 FL (ref 82–98)
MONOCYTES # BLD AUTO: 1.3 K/UL (ref 0.3–1)
MONOCYTES NFR BLD: 8.8 % (ref 4–15)
NEUTROPHILS # BLD AUTO: 10.5 K/UL (ref 1.8–7.7)
NEUTROPHILS NFR BLD: 72.9 % (ref 38–73)
NRBC BLD-RTO: 0 /100 WBC
PLATELET # BLD AUTO: 666 K/UL (ref 150–450)
PMV BLD AUTO: 8.5 FL (ref 9.2–12.9)
POTASSIUM SERPL-SCNC: 4.4 MMOL/L (ref 3.5–5.1)
PROT SERPL-MCNC: 7.8 G/DL (ref 6–8.4)
RBC # BLD AUTO: 3.54 M/UL (ref 4–5.4)
SODIUM SERPL-SCNC: 136 MMOL/L (ref 136–145)
VANCOMYCIN TROUGH SERPL-MCNC: 13.8 UG/ML (ref 10–22)
WBC # BLD AUTO: 14.38 K/UL (ref 3.9–12.7)

## 2022-10-28 PROCEDURE — 94640 AIRWAY INHALATION TREATMENT: CPT

## 2022-10-28 PROCEDURE — 25000242 PHARM REV CODE 250 ALT 637 W/ HCPCS: Performed by: THORACIC SURGERY (CARDIOTHORACIC VASCULAR SURGERY)

## 2022-10-28 PROCEDURE — 80053 COMPREHEN METABOLIC PANEL: CPT | Performed by: INTERNAL MEDICINE

## 2022-10-28 PROCEDURE — 94761 N-INVAS EAR/PLS OXIMETRY MLT: CPT

## 2022-10-28 PROCEDURE — 85025 COMPLETE CBC W/AUTO DIFF WBC: CPT | Performed by: INTERNAL MEDICINE

## 2022-10-28 PROCEDURE — 25000003 PHARM REV CODE 250: Performed by: INTERNAL MEDICINE

## 2022-10-28 PROCEDURE — 99233 PR SUBSEQUENT HOSPITAL CARE,LEVL III: ICD-10-PCS | Mod: ,,, | Performed by: INTERNAL MEDICINE

## 2022-10-28 PROCEDURE — 63600175 PHARM REV CODE 636 W HCPCS: Performed by: INTERNAL MEDICINE

## 2022-10-28 PROCEDURE — 97116 GAIT TRAINING THERAPY: CPT | Mod: CQ

## 2022-10-28 PROCEDURE — 63600175 PHARM REV CODE 636 W HCPCS: Performed by: NURSE PRACTITIONER

## 2022-10-28 PROCEDURE — 25000003 PHARM REV CODE 250: Performed by: NURSE PRACTITIONER

## 2022-10-28 PROCEDURE — 25000003 PHARM REV CODE 250: Performed by: THORACIC SURGERY (CARDIOTHORACIC VASCULAR SURGERY)

## 2022-10-28 PROCEDURE — 99233 SBSQ HOSP IP/OBS HIGH 50: CPT | Mod: ,,, | Performed by: INTERNAL MEDICINE

## 2022-10-28 PROCEDURE — 80202 ASSAY OF VANCOMYCIN: CPT | Performed by: INTERNAL MEDICINE

## 2022-10-28 PROCEDURE — 86140 C-REACTIVE PROTEIN: CPT | Performed by: INTERNAL MEDICINE

## 2022-10-28 PROCEDURE — 63600175 PHARM REV CODE 636 W HCPCS: Performed by: THORACIC SURGERY (CARDIOTHORACIC VASCULAR SURGERY)

## 2022-10-28 RX ORDER — METRONIDAZOLE 500 MG/1
500 TABLET ORAL EVERY 8 HOURS
Qty: 42 TABLET | Refills: 0 | Status: SHIPPED | OUTPATIENT
Start: 2022-10-28 | End: 2022-11-11

## 2022-10-28 RX ADMIN — POTASSIUM CHLORIDE 10 MEQ: 750 TABLET, EXTENDED RELEASE ORAL at 09:10

## 2022-10-28 RX ADMIN — VANCOMYCIN HYDROCHLORIDE 1500 MG: 1.5 INJECTION, POWDER, LYOPHILIZED, FOR SOLUTION INTRAVENOUS at 10:10

## 2022-10-28 RX ADMIN — Medication 324 MG: at 04:10

## 2022-10-28 RX ADMIN — POLYETHYLENE GLYCOL 3350 17 G: 17 POWDER, FOR SOLUTION ORAL at 09:10

## 2022-10-28 RX ADMIN — FAMOTIDINE 20 MG: 20 TABLET ORAL at 09:10

## 2022-10-28 RX ADMIN — VANCOMYCIN HYDROCHLORIDE 1500 MG: 1.5 INJECTION, POWDER, LYOPHILIZED, FOR SOLUTION INTRAVENOUS at 02:10

## 2022-10-28 RX ADMIN — DOCUSATE SODIUM 100 MG: 100 CAPSULE, LIQUID FILLED ORAL at 09:10

## 2022-10-28 RX ADMIN — CEFEPIME 2 G: 2 INJECTION, POWDER, FOR SOLUTION INTRAVENOUS at 04:10

## 2022-10-28 RX ADMIN — ENOXAPARIN SODIUM 40 MG: 40 INJECTION SUBCUTANEOUS at 09:10

## 2022-10-28 RX ADMIN — Medication 324 MG: at 09:10

## 2022-10-28 RX ADMIN — FUROSEMIDE 20 MG: 10 INJECTION, SOLUTION INTRAMUSCULAR; INTRAVENOUS at 09:10

## 2022-10-28 RX ADMIN — IPRATROPIUM BROMIDE AND ALBUTEROL SULFATE 3 ML: 2.5; .5 SOLUTION RESPIRATORY (INHALATION) at 01:10

## 2022-10-28 RX ADMIN — Medication 1 TABLET: at 09:10

## 2022-10-28 RX ADMIN — METRONIDAZOLE 500 MG: 500 TABLET ORAL at 02:10

## 2022-10-28 RX ADMIN — IPRATROPIUM BROMIDE AND ALBUTEROL SULFATE 3 ML: 2.5; .5 SOLUTION RESPIRATORY (INHALATION) at 07:10

## 2022-10-28 RX ADMIN — METRONIDAZOLE 500 MG: 500 TABLET ORAL at 05:10

## 2022-10-28 RX ADMIN — CEFTRIAXONE 2 G: 2 INJECTION, SOLUTION INTRAVENOUS at 04:10

## 2022-10-28 RX ADMIN — CEFEPIME 2 G: 2 INJECTION, POWDER, FOR SOLUTION INTRAVENOUS at 10:10

## 2022-10-28 NOTE — PROGRESS NOTES
Pharmacokinetic Assessment Follow Up: IV Vancomycin    Vancomycin serum concentration assessment(s):    The trough level was drawn correctly and can be used to guide therapy at this time. The measurement is below the desired definitive target range of 15 to 20 mcg/mL.    Vancomycin Regimen Plan:    Continue regimen of Vancomycin 1500 mg IV every 8 hours with next serum trough concentration measured at 0930 prior to next fourth dose on 10/29/22.    Drug levels (last 3 results):  Recent Labs   Lab Result Units 10/26/22  2041 10/28/22  0202   Vancomycin-Trough ug/mL 7.2* 13.8       Pharmacy will continue to follow and monitor vancomycin.    Please contact pharmacy at extension [phone] for questions regarding this assessment.    Thank you for the consult,   Aj Bach       Patient brief summary:  Christina Leal is a 26 y.o. female initiated on antimicrobial therapy with IV Vancomycin for treatment of lower respiratory infection        Drug Allergies:   Review of patient's allergies indicates:  No Known Allergies    Actual Body Weight:   158.2 kg    Renal Function:   Estimated Creatinine Clearance: 225.9 mL/min (based on SCr of 0.6 mg/dL).,     Dialysis Method (if applicable):  N/A    CBC (last 72 hours):  Recent Labs   Lab Result Units 10/25/22  0559 10/26/22  0558 10/27/22  1440 10/27/22  1655   WBC K/uL 19.78* 16.73* 13.10* 13.96*   Hemoglobin g/dL 8.8* 8.4* 7.6* 8.9*   Hematocrit % 28.3* 26.8* 24.7* 27.9*   Platelets K/uL 522* 516* 432 552*   Gran % % 71.0 68.0 77.0* 75.9*   Lymph % % 15.0* 8.0* 8.2* 10.5*   Mono % % 11.0 24.0* 8.7 8.2   Eosinophil % % 0.0 0.0 0.8 0.7   Basophil % % 0.0 0.0 0.4 0.5   Differential Method  Manual Manual Automated Automated       Metabolic Panel (last 72 hours):  Recent Labs   Lab Result Units 10/25/22  0559 10/26/22  0558 10/26/22  2041 10/27/22  1655   Sodium mmol/L 135* 136  --  136   Potassium mmol/L 4.8 4.4  --  4.4   Chloride mmol/L 101 101  --  99   CO2 mmol/L 25 27  --  25    Glucose mg/dL 196* 122*  --  101   BUN mg/dL 3* 5*  --  5*   Creatinine mg/dL 0.7 0.6 0.6 0.6   Magnesium mg/dL  --   --   --  2.1   Phosphorus mg/dL  --   --   --  4.2       Vancomycin Administrations:  vancomycin given in the last 96 hours                     vancomycin 1.5 g in dextrose 5 % 250 mL IVPB (ready to mix) (mg) 1,500 mg New Bag 10/28/22 0206      Restarted 10/27/22 1847     1,500 mg New Bag  1732     1,500 mg New Bag  1014    vancomycin 2 g in dextrose 5 % 500 mL IVPB (mg) 2,000 mg New Bag 10/26/22 2138     2,000 mg New Bag  0921     2,000 mg New Bag 10/25/22 2059    vancomycin (VANCOCIN) 2,500 mg in dextrose 5 % 500 mL IVPB ()  Restarted 10/25/22 1137     2,500 mg New Bag  0910                    Microbiologic Results:  Microbiology Results (last 7 days)       Procedure Component Value Units Date/Time    Culture, Anaerobe [[id number]] Collected: 10/21/22 1119    Order Status: Completed Specimen: Abscess from Lung, Left Updated: 10/26/22 0937     Anaerobic Culture No anaerobes isolated    Narrative:      LEFT PLEURAL PEEL    Culture, Anaerobe [[id number]] Collected: 10/21/22 1105    Order Status: Completed Specimen: Pleural Fluid Updated: 10/26/22 0937     Anaerobic Culture No anaerobes isolated    Narrative:      LEFT PLEURAL FLUID    Blood culture #2 [[id number]] Collected: 10/19/22 1538    Order Status: Completed Specimen: Blood from Peripheral, Antecubital, Left Updated: 10/25/22 0612     Blood Culture, Routine No growth after 5 days.    Blood culture #1 [[id number]] Collected: 10/19/22 1508    Order Status: Completed Specimen: Blood from Peripheral, Antecubital, Right Updated: 10/25/22 0612     Blood Culture, Routine No growth after 5 days.    AFB Culture & Smear [[id number]] Collected: 10/21/22 1105    Order Status: Completed Specimen: Pleural Fluid Updated: 10/24/22 1538     AFB Culture & Smear Culture in progress     AFB CULTURE STAIN No acid fast bacilli seen.    Narrative:      LEFT PLEURAL  FLUID    AFB Culture & Smear [[id number]] Collected: 10/21/22 1119    Order Status: Completed Specimen: Abscess from Lung, Left Updated: 10/24/22 1538     AFB Culture & Smear Culture in progress     AFB CULTURE STAIN No acid fast bacilli seen.    Narrative:      LEFT PLEURAL PEEL    Fungus culture [[id number]] Collected: 10/21/22 1119    Order Status: Completed Specimen: Abscess from Lung, Left Updated: 10/24/22 1204     Fungus (Mycology) Culture Culture in progress    Narrative:      LEFT PLEURAL PEEL    Fungus culture [[id number]] Collected: 10/21/22 1105    Order Status: Completed Specimen: Pleural Fluid Updated: 10/24/22 1203     Fungus (Mycology) Culture Culture in progress    Narrative:      LEFT PLEURAL FLUID    Aerobic culture [[id number]] Collected: 10/21/22 1105    Order Status: Completed Specimen: Pleural Fluid Updated: 10/24/22 0637     Aerobic Bacterial Culture No significant isolate    Narrative:      LEFT PLEURAL FLUID    Culture, Body Fluid (Aerobic) w/ GS [[id number]] Collected: 10/21/22 1105    Order Status: Completed Specimen: Pleural Fluid Updated: 10/24/22 0636     AEROBIC CULTURE - FLUID No significant isolate     Gram Stain Result Moderate WBC's      Rare Gram positive cocci    Narrative:      LEFT PLEURAL FLUID    Aerobic culture [[id number]] Collected: 10/21/22 1119    Order Status: Completed Specimen: Abscess from Lung, Left Updated: 10/24/22 0635     Aerobic Bacterial Culture Normal respiratory berta.    Narrative:      LEFT PLEURAL PEEL    Gram stain [[id number]] Collected: 10/21/22 1119    Order Status: Completed Specimen: Abscess from Lung, Left Updated: 10/22/22 0218     Gram Stain Result Moderate WBC's      Rare Gram positive cocci    Narrative:      LEFT PLEURAL PEEL    Culture, MRSA [[id number]] Collected: 10/19/22 1657    Order Status: Completed Specimen: MRSA source from Nares, Left Updated: 10/21/22 0710     MRSA Surveillance Screen No MRSA isolated

## 2022-10-28 NOTE — SUBJECTIVE & OBJECTIVE
Interval History: The patient is postop day 7 status post VATS procedure for drainage of empyema and decortication.  The patient is day 3 status post placement of pigtail catheter in apical pleural collection.    ROS  Medications:  Continuous Infusions:  Scheduled Meds:   albuterol-ipratropium  3 mL Nebulization Q6H WAKE    alteplase  2 mg Intra-Catheter Once    ceFEPime (MAXIPIME) IVPB  2 g Intravenous Q8H    docusate sodium  100 mg Oral BID    enoxaparin  40 mg Subcutaneous Q12H    famotidine  20 mg Oral BID    ferrous gluconate  324 mg Oral BID WM    folic acid-vit B6-vit B12 2.5-25-2 mg  1 tablet Oral Daily    metroNIDAZOLE  500 mg Oral Q8H    polyethylene glycol  17 g Oral Daily    vancomycin (VANCOCIN) IVPB  1,500 mg Intravenous Q8H     PRN Meds:albuterol sulfate, ibuprofen, metoclopramide HCl, morphine, ondansetron, oxyCODONE, sodium chloride 0.9%, traZODone, Pharmacy to dose Vancomycin consult **AND** vancomycin - pharmacy to dose     Objective:     Vital Signs (Most Recent):  Temp: 98.5 °F (36.9 °C) (10/28/22 0733)  Pulse: 90 (10/28/22 0737)  Resp: 18 (10/28/22 0737)  BP: (!) 143/74 (10/28/22 0733)  SpO2: 95 % (10/28/22 0737)   Vital Signs (24h Range):  Temp:  [97.9 °F (36.6 °C)-98.8 °F (37.1 °C)] 98.5 °F (36.9 °C)  Pulse:  [] 90  Resp:  [16-20] 18  SpO2:  [95 %-99 %] 95 %  BP: (136-143)/(71-94) 143/74     Weight: (!) 158.2 kg (348 lb 12.3 oz)  Body mass index is 53.03 kg/m².    SpO2: 95 %  O2 Device (Oxygen Therapy): room air    Intake/Output - Last 3 Shifts         10/26 0700  10/27 0659 10/27 0700  10/28 0659 10/28 0700  10/29 0659    P.O. 120 990     I.V. (mL/kg) 207 (1.3)      IV Piggyback 1077.8 664.8     Total Intake(mL/kg) 1404.7 (8.9) 1654.8 (10.5)     Chest Tube 30 65     Total Output 30 65     Net +1374.7 +1589.8            Urine Occurrence 2 x 3 x     Stool Occurrence  2 x             Lines/Drains/Airways       Peripherally Inserted Central Catheter Line  Duration             PICC Double  Lumen 10/22/22 1919 right basilic 5 days                    Physical Exam  Constitutional:       Appearance: Normal appearance. She is obese.   HENT:      Head: Normocephalic and atraumatic.   Cardiovascular:      Rate and Rhythm: Normal rate and regular rhythm.      Heart sounds: Normal heart sounds.   Pulmonary:      Effort: Pulmonary effort is normal.      Breath sounds: Normal breath sounds.   Abdominal:      General: Abdomen is flat. Bowel sounds are normal.      Palpations: Abdomen is soft.   Musculoskeletal:      Right lower leg: No edema.      Left lower leg: No edema.   Skin:     General: Skin is warm and dry.   Neurological:      General: No focal deficit present.      Mental Status: She is alert and oriented to person, place, and time.   Psychiatric:         Behavior: Behavior normal.       Significant Labs:  All pertinent labs from the last 24 hours have been reviewed.    Significant Diagnostics:  I have reviewed all pertinent imaging results/findings within the past 24 hours.

## 2022-10-28 NOTE — PT/OT/SLP PROGRESS
"Physical Therapy  Treatment    Christina Leal   MRN: [id number]   Admitting Diagnosis: Empyema of left pleural space    PT Received On: 10/28/22  PT Start Time: 1109     PT Stop Time: 1128    PT Total Time (min): 19 min       Billable Minutes:  Gait Training 19    Treatment Type: Treatment  PT/PTA: PTA     PTA Visit Number: 3       General Precautions: Standard, fall  Orthopedic Precautions: N/A   Braces: N/A  Respiratory Status: Room air    Spiritual, Cultural Beliefs, Sabianist Practices, Values that Affect Care: no    Subjective:  Communicated with nursing staff, Fariba and completed Epic chart review prior to session.  Patient agreeable to PT.    Pain/Comfort  Pain Rating 1: 0/10    Objective:   Patient found with: peripheral IV, telemetry  Patient presents sitting in bedside chair, accompanied by friend.  O2 saturation during therapy session: 97% 91% 96%    Functional Mobility:  Bed Mobility:    Patient OOB.    Transfers:   Sit to stand: modified independent  SPT to bedside chair: modified independent     Gait:    600 feet without assistive device, SBA provided.       Treatment and Education:  Pt educated on the following: Pt verbally agreed in understanding.   Pursed-lip breathing to improve O2 quality.  Continue therapuetic exercises in chair throughout the day to increase activity tolerance and decrease risk for pneumonia and blood clots.  Sit in chair for all 3 meals and when guests visit.    "Call, don't fall" procedure of pressing red button on call bell for all transfers.        AM-PAC 6 CLICK MOBILITY  How much help from another person does this patient currently need?   1 = Unable, Total/Dependent Assistance  2 = A lot, Maximum/Moderate Assistance  3 = A little, Minimum/Contact Guard/Supervision  4 = None, Modified [place]/Independent    Turning over in bed (including adjusting bedclothes, sheets and blankets)?: 4  Sitting down on and standing up from a chair with arms (e.g., wheelchair, bedside " commode, etc.): 4  Moving from lying on back to sitting on the side of the bed?: 4  Moving to and from a bed to a chair (including a wheelchair)?: 4  Need to walk in hospital room?: 4  Climbing 3-5 steps with a railing?: 1  Basic Mobility Total Score: 21    AM-PAC Raw Score CMS G-Code Modifier Level of Impairment Assistance   6 % Total / Unable   7 - 9 CM 80 - 100% Maximal Assist   10 - 14 CL 60 - 80% Moderate Assist   15 - 19 CK 40 - 60% Moderate Assist   20 - 22 CJ 20 - 40% Minimal Assist   23 CI 1-20% SBA / CGA   24 CH 0% Independent/ Mod I     Patient left up in chair with all lines intact, call button in reach, nursing  notified, and friend present.  All needs met.   Assessment:  Christina Leal is a 26 y.o. female with a medical diagnosis of Empyema of left pleural space and presents with overall decline in functional mobility. Patient tolerated therapy session well this date as she was able to ambulate for a further distance (600 feet). Patient demonstrates good rehab potential and will benefit from skilled PT sessions. Will continue to progress toward goals per patient tolerance and PT plan of care.     Rehab identified problem list/impairments: Rehab identified problem list/impairments: impaired cardiopulmonary response to activity, impaired functional mobility    Rehab potential is good.    Activity tolerance: Good    Discharge recommendations: Discharge Facility/Level of Care Needs: home     Barriers to discharge:      Equipment recommendations: Equipment Needed After Discharge: none     GOALS:   Multidisciplinary Problems       Physical Therapy Goals          Problem: Physical Therapy    Goal Priority Disciplines Outcome Goal Variances Interventions   Physical Therapy Goal     PT, PT/OT Ongoing, Progressing     Description: LTGs:   POC expires 11/6/22  Pt will perform 3x10 sit to stands IND  in order improve endurance.   Pt will ' IND while maintaining chest tube in order to participate  in household ambulation.    Pt will ascend and descend 1 flight of stairs IND in order to return to work.                       PLAN:    Patient to be seen 3 x/week  to address the above listed problems via gait training  Plan of Care expires: 11/06/22  Plan of Care reviewed with: patient         10/28/2022

## 2022-10-28 NOTE — PLAN OF CARE
10/28/22 1517   Post-Acute Status   Post-Acute Authorization [facility]   Home Health Status Set-up Complete/Auth obtained   Accepted by [facility]

## 2022-10-28 NOTE — ASSESSMENT & PLAN NOTE
Empiric abx VANC, CEFEPIME, FLAGYL  Deescalate based on cultures  UNASYN+ ZYVOX per ID as outpatient  10/28 PICC line in place.  Outpatient antibiotic therapy total of 4-6 weeks.  Id follow-up.  I am positive cocci on Gram stain of pleural fluid noted.  Outpatient pulmonology follow-up.  Chest tube removed today.

## 2022-10-28 NOTE — ASSESSMENT & PLAN NOTE
The patient is a 26-year-old female with a history of severe obesity (BMI 50.48) who was admitted with complaints of left-sided chest pain over the past week.  Patient also noticed some shortness of breath and dyspnea on exertion in addition the patient has a cough.  The patient had a chest x-ray done which showed a left pleural effusion CT scan shows a loculated left pleural effusion.  Thoracentesis was performed with fluid analysis indicating that left pleural effusion is compatible with a diagnosis of an empyema.    The patient's left empyema requires drainage.  Patient will likely require a large bore chest tube for adequate drainage of empyema.  Bedside placement of chest tube in the setting of severe obesity will be difficult.  Will plan for placement of chest tube with VATS procedure in the operating room.  The risks and benefits of a VATS procedure were explained to the patient.  The patient understand the risks and benefits and agreed to proceed with VATS assisted drainage of empyema    10/22/2022   The patient is postop day 1 status post left VATS procedure with drainage of empyema and decortication.  Chest tube output has been about 600 mL since surgery yesterday.  Continue chest tube to suction.  Continue incentive spirometer and pulmonary toileting.  Continue broad-spectrum antibiotics.    10/23/2022   The patient is postop day 2 status post left VATS procedure with drainage of empyema and decortication.  Chest tube output over the past 24 hours is 150 mL.  Continue chest tube to suction.  Chest x-ray shows new opacity in the apical region of the left lung.  Will obtain CT scan of the chest    10/24/2022   Patient is postop day 3 status post VATS procedure with drainage of empyema and decortication.  CT scan shows new fluid collection in the apical region.  Patient discussed with IR.  Plan is for IR placed chest tube in the a.m..  Continue antibiotics.  Continue chest tube to suction.    10/26/2022  The  patient is postop day 5 status post VATS procedure with drainage of empyema and decortication.  She is day 1 status post pigtail catheter placement into apical loculated collection.  Chest tubes continue to drain significant amount of fluid.  Patient has bilateral pitting edema.  Will start diuresis with Lasix.  Continue chest tubes to suction.    10/27/2022   Patient is day 6 status post VATS procedure with drainage of empyema and decortication.  She is day 2 status post pigtail catheter placement in 2 apical loculated collection.  There has been a significant decrease in chest tube output over the past 24 hours.  If this trend continues anticipate discontinue chest tubes tomorrow.  Continue Lasix and continue chest tubes to suction.    10/28/2022   The patient is postop day 7 status post VATS procedure for drainage of empyema and decortication.  She is day 3 status post pigtail catheter placement in apical loculated collection.  Chest tube output has been minimal over the past 24 hours.  Chest tube has been discontinued.  Patient has bilateral pitting edema is much improved.  Will discontinue Lasix.  Patient should be scheduled for follow-up Cardiothoracic surgery Clinic appointment after discharge.

## 2022-10-28 NOTE — PLAN OF CARE
Patient remained free from injury. Chest tubes maintained. IV abx administered. No s/s of acute distress.12hr chart check complete.

## 2022-10-28 NOTE — ASSESSMENT & PLAN NOTE
SP VATS chest tube, decortication  CT management per CTS team  SP drainage for left loculation   Drainage last 24 hrs 30 mls  10/28 PICC line in place.  Outpatient antibiotic therapy total of 4-6 weeks.  Id follow-up.  I am positive cocci on Gram stain of pleural fluid noted.  Outpatient pulmonology follow-up.  Chest tube removed today.

## 2022-10-28 NOTE — SUBJECTIVE & OBJECTIVE
Interval History: 10/28 seen and examined.  Afebrile.  Chest tube removed.  WBC 14 K.  Afebrile.  On room air.    Review of Systems   Constitutional:  Positive for malaise/fatigue. Negative for chills and fever.   HENT:  Negative for hearing loss and nosebleeds.    Eyes:  Negative for discharge.   Respiratory:  Positive for cough and shortness of breath.    Cardiovascular:  Positive for leg swelling. Negative for chest pain.   Gastrointestinal:  Negative for abdominal pain.   Genitourinary:  Negative for hematuria.   Musculoskeletal:  Negative for falls.   Skin:  Negative for itching.   Neurological:  Positive for weakness. Negative for speech change, seizures and loss of consciousness.   Endo/Heme/Allergies:  Negative for polydipsia. Does not bruise/bleed easily.   Psychiatric/Behavioral:  Negative for hallucinations.        Objective:     Vital Signs (Most Recent):  Temp: 99.3 °F (37.4 °C) (10/28/22 1154)  Pulse: 106 (10/28/22 1154)  Resp: 20 (10/28/22 1154)  BP: 138/67 (10/28/22 1154)  SpO2: 96 % (10/28/22 1154) Vital Signs (24h Range):  Temp:  [97.9 °F (36.6 °C)-99.3 °F (37.4 °C)] 99.3 °F (37.4 °C)  Pulse:  [] 106  Resp:  [16-20] 20  SpO2:  [95 %-99 %] 96 %  BP: (137-143)/(67-87) 138/67     Weight: (!) 158.2 kg (348 lb 12.3 oz)  Body mass index is 53.03 kg/m².      Intake/Output Summary (Last 24 hours) at 10/28/2022 1235  Last data filed at 10/28/2022 0604  Gross per 24 hour   Intake 1294.79 ml   Output 65 ml   Net 1229.79 ml       Physical Exam  Vitals and nursing note reviewed.   Constitutional:       General: She is not in acute distress.     Appearance: She is well-developed. She is ill-appearing.   HENT:      Head: Normocephalic and atraumatic.      Nose: Nose normal.   Eyes:      Extraocular Movements: Extraocular movements intact.   Cardiovascular:      Rate and Rhythm: Normal rate and regular rhythm.   Pulmonary:      Effort: Pulmonary effort is normal.      Breath sounds: No stridor.   Abdominal:       General: There is no distension.   Musculoskeletal:         General: No signs of injury.      Cervical back: Normal range of motion and neck supple.   Skin:     General: Skin is dry.   Neurological:      General: No focal deficit present.      Mental Status: She is alert and oriented to person, place, and time. Mental status is at baseline.   Psychiatric:         Behavior: Behavior normal.       Vents:  Oxygen Concentration (%): 28 (10/26/22 1214)    Lines/Drains/Airways       Peripherally Inserted Central Catheter Line  Duration             PICC Double Lumen 10/22/22 1919 right basilic 5 days                    Significant Labs:    CBC/Anemia Profile:  Recent Labs   Lab 10/27/22  1440 10/27/22  1655 10/28/22  0558   WBC 13.10* 13.96* 14.38*   HGB 7.6* 8.9* 9.3*   HCT 24.7* 27.9* 29.6*    552* 666*   MCV 85 83 84   RDW 13.2 13.3 13.3        Chemistries:  Recent Labs   Lab 10/26/22  2041 10/27/22  1655 10/28/22  0558   NA  --  136 136   K  --  4.4 4.4   CL  --  99 99   CO2  --  25 26   BUN  --  5* 5*   CREATININE 0.6 0.6 0.7   CALCIUM  --  9.3 9.5   ALBUMIN  --   --  2.2*   PROT  --   --  7.8   BILITOT  --   --  0.5   ALKPHOS  --   --  100   ALT  --   --  27   AST  --   --  22   MG  --  2.1  --    PHOS  --  4.2  --    Pleural fluid culture g positive cocci on Gram stain 10/21        Significant Imaging:    Chest x-ray 10/28/2022  COMPARISON:  10/27/2022.     FINDINGS:  Left upper lobe pigtail catheter unchanged position.  No pneumothorax.  Lung volumes are low similar to prior exam.  Left-sided pleuroparenchymal scarring and subsegmental atelectasis unchanged.  Tiny left pleural effusion.  Right lung is clear.  Heart normal size.In comparison to the prior study, there is no adverse interval changes.

## 2022-10-28 NOTE — CONSULTS
Cm provided a home health list to make preferences.  CM made a referral to [place] for IV antibiotics.

## 2022-10-28 NOTE — PROGRESS NOTES
[facility] Surg  Cardiothoracic Surgery  Progress Note    Patient Name: Christina Leal  MRN: [id number]  Admission Date: 10/19/2022  Hospital Length of Stay: 9 days  Code Status: Full Code   Attending Physician: Krystian Alexander, *   Referring Provider: Jimmy Castellanos NP  Principal Problem:Empyema of left pleural space            Subjective:     Post-Op Info:  Procedure(s) (LRB):  VATS, WITH CHEST TUBE INSERTION FOR DRAINAGE OF PLEURAL EFFUSION (Left)  BLOCK, NERVE, INTERCOSTAL, 2 OR MORE (Left)  VATS, WITH DECORTICATION, LUNG (Left)  EVACUATION, EMPYEMA (Left)   7 Days Post-Op     Interval History: The patient is postop day 7 status post VATS procedure for drainage of empyema and decortication.  The patient is day 3 status post placement of pigtail catheter in apical pleural collection.    ROS  Medications:  Continuous Infusions:  Scheduled Meds:   albuterol-ipratropium  3 mL Nebulization Q6H WAKE    alteplase  2 mg Intra-Catheter Once    ceFEPime (MAXIPIME) IVPB  2 g Intravenous Q8H    docusate sodium  100 mg Oral BID    enoxaparin  40 mg Subcutaneous Q12H    famotidine  20 mg Oral BID    ferrous gluconate  324 mg Oral BID WM    folic acid-vit B6-vit B12 2.5-25-2 mg  1 tablet Oral Daily    metroNIDAZOLE  500 mg Oral Q8H    polyethylene glycol  17 g Oral Daily    vancomycin (VANCOCIN) IVPB  1,500 mg Intravenous Q8H     PRN Meds:albuterol sulfate, ibuprofen, metoclopramide HCl, morphine, ondansetron, oxyCODONE, sodium chloride 0.9%, traZODone, Pharmacy to dose Vancomycin consult **AND** vancomycin - pharmacy to dose     Objective:     Vital Signs (Most Recent):  Temp: 98.5 °F (36.9 °C) (10/28/22 0733)  Pulse: 90 (10/28/22 0737)  Resp: 18 (10/28/22 0737)  BP: (!) 143/74 (10/28/22 0733)  SpO2: 95 % (10/28/22 0737)   Vital Signs (24h Range):  Temp:  [97.9 °F (36.6 °C)-98.8 °F (37.1 °C)] 98.5 °F (36.9 °C)  Pulse:  [] 90  Resp:  [16-20] 18  SpO2:  [95 %-99 %] 95 %  BP: (136-143)/(71-94) 143/74      Weight: (!) 158.2 kg (348 lb 12.3 oz)  Body mass index is 53.03 kg/m².    SpO2: 95 %  O2 Device (Oxygen Therapy): room air    Intake/Output - Last 3 Shifts         10/26 0700  10/27 0659 10/27 0700  10/28 0659 10/28 0700  10/29 0659    P.O. 120 990     I.V. (mL/kg) 207 (1.3)      IV Piggyback 1077.8 664.8     Total Intake(mL/kg) 1404.7 (8.9) 1654.8 (10.5)     Chest Tube 30 65     Total Output 30 65     Net +1374.7 +1589.8            Urine Occurrence 2 x 3 x     Stool Occurrence  2 x             Lines/Drains/Airways       Peripherally Inserted Central Catheter Line  Duration             PICC Double Lumen 10/22/22 1919 right basilic 5 days                    Physical Exam  Constitutional:       Appearance: Normal appearance. She is obese.   HENT:      Head: Normocephalic and atraumatic.   Cardiovascular:      Rate and Rhythm: Normal rate and regular rhythm.      Heart sounds: Normal heart sounds.   Pulmonary:      Effort: Pulmonary effort is normal.      Breath sounds: Normal breath sounds.   Abdominal:      General: Abdomen is flat. Bowel sounds are normal.      Palpations: Abdomen is soft.   Musculoskeletal:      Right lower leg: No edema.      Left lower leg: No edema.   Skin:     General: Skin is warm and dry.   Neurological:      General: No focal deficit present.      Mental Status: She is alert and oriented to person, place, and time.   Psychiatric:         Behavior: Behavior normal.       Significant Labs:  All pertinent labs from the last 24 hours have been reviewed.    Significant Diagnostics:  I have reviewed all pertinent imaging results/findings within the past 24 hours.    Assessment/Plan:     * Empyema of left pleural space  The patient is a 26-year-old female with a history of severe obesity (BMI 50.48) who was admitted with complaints of left-sided chest pain over the past week.  Patient also noticed some shortness of breath and dyspnea on exertion in addition the patient has a cough.  The patient  had a chest x-ray done which showed a left pleural effusion CT scan shows a loculated left pleural effusion.  Thoracentesis was performed with fluid analysis indicating that left pleural effusion is compatible with a diagnosis of an empyema.    The patient's left empyema requires drainage.  Patient will likely require a large bore chest tube for adequate drainage of empyema.  Bedside placement of chest tube in the setting of severe obesity will be difficult.  Will plan for placement of chest tube with VATS procedure in the operating room.  The risks and benefits of a VATS procedure were explained to the patient.  The patient understand the risks and benefits and agreed to proceed with VATS assisted drainage of empyema    10/22/2022   The patient is postop day 1 status post left VATS procedure with drainage of empyema and decortication.  Chest tube output has been about 600 mL since surgery yesterday.  Continue chest tube to suction.  Continue incentive spirometer and pulmonary toileting.  Continue broad-spectrum antibiotics.    10/23/2022   The patient is postop day 2 status post left VATS procedure with drainage of empyema and decortication.  Chest tube output over the past 24 hours is 150 mL.  Continue chest tube to suction.  Chest x-ray shows new opacity in the apical region of the left lung.  Will obtain CT scan of the chest    10/24/2022   Patient is postop day 3 status post VATS procedure with drainage of empyema and decortication.  CT scan shows new fluid collection in the apical region.  Patient discussed with IR.  Plan is for IR placed chest tube in the a.m..  Continue antibiotics.  Continue chest tube to suction.    10/26/2022  The patient is postop day 5 status post VATS procedure with drainage of empyema and decortication.  She is day 1 status post pigtail catheter placement into apical loculated collection.  Chest tubes continue to drain significant amount of fluid.  Patient has bilateral pitting edema.   Will start diuresis with Lasix.  Continue chest tubes to suction.    10/27/2022   Patient is day 6 status post VATS procedure with drainage of empyema and decortication.  She is day 2 status post pigtail catheter placement in 2 apical loculated collection.  There has been a significant decrease in chest tube output over the past 24 hours.  If this trend continues anticipate discontinue chest tubes tomorrow.  Continue Lasix and continue chest tubes to suction.    10/28/2022   The patient is postop day 7 status post VATS procedure for drainage of empyema and decortication.  She is day 3 status post pigtail catheter placement in apical loculated collection.  Chest tube output has been minimal over the past 24 hours.  Chest tube has been discontinued.  Patient has bilateral pitting edema is much improved.  Will discontinue Lasix.  Patient should be scheduled for follow-up Cardiothoracic surgery Clinic appointment after discharge.        Dusty Moyer MD  Cardiothoracic Surgery  [facility]

## 2022-10-28 NOTE — PROGRESS NOTES
[facility]  Pulmonology  Progress Note    Patient Name: Christina Leal  MRN: [id number]  Admission Date: 10/19/2022  Hospital Length of Stay: 9 days  Code Status: Full Code  Attending Provider: Krystian Alexander, *  Primary Care Provider: Primary Doctor No   Principal Problem: Empyema of left pleural space    Subjective:     Interval History: 10/28 seen and examined.  Afebrile.  Chest tube removed.  WBC 14 K.  Afebrile.  On room air.    Review of Systems   Constitutional:  Positive for malaise/fatigue. Negative for chills and fever.   HENT:  Negative for hearing loss and nosebleeds.    Eyes:  Negative for discharge.   Respiratory:  Positive for cough and shortness of breath.    Cardiovascular:  Positive for leg swelling. Negative for chest pain.   Gastrointestinal:  Negative for abdominal pain.   Genitourinary:  Negative for hematuria.   Musculoskeletal:  Negative for falls.   Skin:  Negative for itching.   Neurological:  Positive for weakness. Negative for speech change, seizures and loss of consciousness.   Endo/Heme/Allergies:  Negative for polydipsia. Does not bruise/bleed easily.   Psychiatric/Behavioral:  Negative for hallucinations.        Objective:     Vital Signs (Most Recent):  Temp: 99.3 °F (37.4 °C) (10/28/22 1154)  Pulse: 106 (10/28/22 1154)  Resp: 20 (10/28/22 1154)  BP: 138/67 (10/28/22 1154)  SpO2: 96 % (10/28/22 1154) Vital Signs (24h Range):  Temp:  [97.9 °F (36.6 °C)-99.3 °F (37.4 °C)] 99.3 °F (37.4 °C)  Pulse:  [] 106  Resp:  [16-20] 20  SpO2:  [95 %-99 %] 96 %  BP: (137-143)/(67-87) 138/67     Weight: (!) 158.2 kg (348 lb 12.3 oz)  Body mass index is 53.03 kg/m².      Intake/Output Summary (Last 24 hours) at 10/28/2022 1235  Last data filed at 10/28/2022 0604  Gross per 24 hour   Intake 1294.79 ml   Output 65 ml   Net 1229.79 ml       Physical Exam  Vitals and nursing note reviewed.   Constitutional:       General: She is not in acute distress.     Appearance: She is  well-developed. She is ill-appearing.   HENT:      Head: Normocephalic and atraumatic.      Nose: Nose normal.   Eyes:      Extraocular Movements: Extraocular movements intact.   Cardiovascular:      Rate and Rhythm: Normal rate and regular rhythm.   Pulmonary:      Effort: Pulmonary effort is normal.      Breath sounds: No stridor.   Abdominal:      General: There is no distension.   Musculoskeletal:         General: No signs of injury.      Cervical back: Normal range of motion and neck supple.   Skin:     General: Skin is dry.   Neurological:      General: No focal deficit present.      Mental Status: She is alert and oriented to person, place, and time. Mental status is at baseline.   Psychiatric:         Behavior: Behavior normal.       Vents:  Oxygen Concentration (%): 28 (10/26/22 1214)    Lines/Drains/Airways       Peripherally Inserted Central Catheter Line  Duration             PICC Double Lumen 10/22/22 1919 right basilic 5 days                    Significant Labs:    CBC/Anemia Profile:  Recent Labs   Lab 10/27/22  1440 10/27/22  1655 10/28/22  0558   WBC 13.10* 13.96* 14.38*   HGB 7.6* 8.9* 9.3*   HCT 24.7* 27.9* 29.6*    552* 666*   MCV 85 83 84   RDW 13.2 13.3 13.3        Chemistries:  Recent Labs   Lab 10/26/22  2041 10/27/22  1655 10/28/22  0558   NA  --  136 136   K  --  4.4 4.4   CL  --  99 99   CO2  --  25 26   BUN  --  5* 5*   CREATININE 0.6 0.6 0.7   CALCIUM  --  9.3 9.5   ALBUMIN  --   --  2.2*   PROT  --   --  7.8   BILITOT  --   --  0.5   ALKPHOS  --   --  100   ALT  --   --  27   AST  --   --  22   MG  --  2.1  --    PHOS  --  4.2  --    Pleural fluid culture g positive cocci on Gram stain 10/21        Significant Imaging:    Chest x-ray 10/28/2022  COMPARISON:  10/27/2022.     FINDINGS:  Left upper lobe pigtail catheter unchanged position.  No pneumothorax.  Lung volumes are low similar to prior exam.  Left-sided pleuroparenchymal scarring and subsegmental atelectasis unchanged.   Tiny left pleural effusion.  Right lung is clear.  Heart normal size.In comparison to the prior study, there is no adverse interval changes.          ABG  Recent Labs   Lab 10/21/22  1321   PH 7.377   PO2 124*   PCO2 49.8*   HCO3 29.3*   BE 4     Assessment/Plan:     * Empyema of left pleural space  SP VATS chest tube, decortication  CT management per CTS team  SP drainage for left loculation   Drainage last 24 hrs 30 mls  10/28 PICC line in place.  Outpatient antibiotic therapy total of 4-6 weeks.  Id follow-up.  I am positive cocci on Gram stain of pleural fluid noted.  Outpatient pulmonology follow-up.  Chest tube removed today.      S/P chest tube placement  Follow CXR  Incentive spirometry to prevent aletectasis  Pain control  CTS following  10/2810/28 PICC line in place.  Outpatient antibiotic therapy total of 4-6 weeks.  Id follow-up.  I am positive cocci on Gram stain of pleural fluid noted.  Outpatient pulmonology follow-up.  Chest tube removed today.  No distress.      Parapneumonic effusion, Left   Empiric abx VANC, CEFEPIME, FLAGYL  Deescalate based on cultures  UNASYN+ ZYVOX per ID as outpatient  10/28 PICC line in place.  Outpatient antibiotic therapy total of 4-6 weeks.  Id follow-up.  I am positive cocci on Gram stain of pleural fluid noted.  Outpatient pulmonology follow-up.  Chest tube removed today.      Discussed with infectious disease       Roberto Fine MD  Pulmonology  [facility]

## 2022-10-28 NOTE — PLAN OF CARE
[facility]  Discharge Reassessment    Primary Care Provider: Primary Doctor No    Expected Discharge Date:     Reassessment (most recent)       Discharge Reassessment - 10/28/22 5144          Discharge Reassessment    Assessment Type Discharge Planning Reassessment     Did the patient's condition or plan change since previous assessment? No     Discharge Plan discussed with: Patient     Communicated DANNY with patient/caregiver Yes     Discharge Plan A Home with family;Home Health     Discharge Plan B Home with family;Home Health     DME Needed Upon Discharge  none     Discharge Barriers Identified None     Why the patient remains in the hospital Requires continued medical care        Post-Acute Status    Post-Acute Authorization Home Health;IV Infusion     Home Health Status --   List provided    IV Infusion Status Referral(s) sent                   CM met with patient at the bedside to discuss IV antibiotics and home health services.  Patient agreeable to this plan.  Patient stated she is familiar with [facility] for the IV antimitotics but will review the list of home health companies and call CM with preferences.  CM made a referral to [facility] in [facility].

## 2022-10-28 NOTE — PLAN OF CARE
CM received a call from patient and preferences are for 1. [facility] 2. [facility] 3. [facility].  Referral made in [facility] for accepting home health agency.

## 2022-10-28 NOTE — PLAN OF CARE
Pt AAOx3, OOB ambulating, remains free from falls/injuries this shift. Safety precautions maintained. Pain managed with rest. No s/s of acute distress noted. Will continue to monitor. Chart check completed.

## 2022-10-28 NOTE — DISCHARGE SUMMARY
[facility] Medicine  Discharge Summary      Patient Name: Christina Leal  MRN: [id number]  Patient Class: IP- Inpatient  Admission Date: 10/19/2022  Hospital Length of Stay: 9 days  Discharge Date and Time:  10/28/2022 5:00 PM  Attending Physician: Krystian Alexander, *   Discharging Provider: Eliazar Ayala NP  Primary Care Provider: Primary Doctor No      HPI:   Christina Leal is a 27 yo female with history of obesity, former tobacco abuse on oral contraception who was referred to ED by primary care for abnormal CXR. Patient reports 1 week history of left sided rib cage pain associated with chest tightness, SOB and PAIZ. She also reports upper abdominal cramping and diarrhea. She denies n/v. OP imaging remarkable for pna with large left pleural effusion. CT chest shows large loculated appearing left pleural effusion with associated lower lobe atelectasis as well as mild left upper lobe atelectasis.  Bandlike opacities with ground-glass attenuation in the posterior right lung base likely represent additional subsegmental atelectasis. Mild rightward shift of the mediastinal contents noted. Patient febrile (Tmax 100.6) tachycardic and tachypenic, O2 sats 98% 2 L O2. Influenza, COVID, HIV, Hep C negative. Labs remarkable for wbc 29 with left shift, Tbili 2.1. She received cefepime. Pulmonology consulted. Patient admitted to hospital medicine.             Procedure(s) (LRB):  VATS, WITH CHEST TUBE INSERTION FOR DRAINAGE OF PLEURAL EFFUSION (Left)  BLOCK, NERVE, INTERCOSTAL, 2 OR MORE (Left)  VATS, WITH DECORTICATION, LUNG (Left)  EVACUATION, EMPYEMA (Left)      Hospital Course:   10/20/22 patient s/p thoracentesis 1500 cc removed. Fluid analysis indicating left pleural effusion compatible with a diagnosis of an empyema   Pt reports feeling better. Repeat CXR: fluid reaccumulation. CVT consulted, plan for chest tube placement with VATs procedure.     10/21- Pt is currently undergoing  VATS  procedure with chest tube placement and drainage of empyema with CT surgery. AM labs are not available. Blood cultures - NGTD. Nasal MRSA - neg. Pleural Fluid culture - pending. Antibiotic de escalated to Unasyn.     10/22- S/P VATS, chest tube insertion x 2 and decortication. CT output 600 ml since surgery yesterday. Pleural fluid gram stain showed mod WBCs and rare GPC. Fluid cultures are in progress. WBC remains elevated 22K, CRP trending down. PCT is normal. Current antibiotic - Unasyn and Oral Zyvox.     10/23- Chest tube to suction continues. Output reported 150 ml in the last 24h. Follow up CXR showed persistent loculated left pleural effusion in the apex. CT Surgery will get CT chest for further eval. WBC trended up 28K today. Will consult ID to guide antibiotic selection. Hgb dropped to 8.6 from initial 11.3 on admit. No signs of active bleeding reported. Will monitor Hgb. Culture in progress.    10/24- CT chest with new fluid collection in the left lung apex. Pt will have IR chest tube placed in am. WBC trended down to 22K. Hgb stable at 8.2. pleural fluid aerobic culture - Normal respiratory berta.Anaerobic culture in progress . Current antibiotic - Unasyn and oral Zyvox.     10/25- Additional chest tube by IR today. WBC down to 19K, 3% bands . Antibiotic changed to Cefepime , Flagyl and Vancomycin per ID. Cultures are so far unrevealing.     10/26- S/P pigtail catheter placement into apical loculated collection anterior chest yesterday. Y chest tube in place in the post chest. WBC down to 16K.     10/27- POD #6  VATS procedure with drainage of empyema and decortication and Y chest tube placement. POD #2  pigtail catheter placement to apical loculated collection left lung Ant chest. Chest tube OP is decreasing. Further chest tube management per CT surgery. All cultures are so far neg. AM labs are pending. WBC started to trend down. Last ID recs from 10/24-- Empiric out patient regime will be Vancomycin   ,Rocephin and Po Flagyl for 2 to 3 weeks or until radiologic resolution.     10/28/22  Chest tube removed today. Doing well on room air. Will plan for vancomycin, Rocephin, and PO Flagyl x 4 weeks. She was asked to follow up with Pulmonology and ID in 2 weeks. She has been educated on IV used.  will assist with IV picc line care and labs. She is stable for discharge.       Goals of Care Treatment Preferences:  Code Status: Full Code      Consults:   Consults (From admission, onward)        Status Ordering Provider     Inpatient consult to Social Work  Once        Provider:  (Not yet assigned)    Completed ASTER COURTNEY     Pharmacy to dose Vancomycin consult  Once        Provider:  (Not yet assigned)   See [facility] for full Linked Orders Report.    Acknowledged SHANTELLE CHATMAN     Inpatient consult to Registered Dietitian/Nutritionist  Once        Provider:  (Not yet assigned)    Completed ANA LUISA NEUMANN     Inpatient consult to Infectious Diseases  Once        Provider:  (Not yet assigned)    Acknowledged ANA LUISA NEUMANN     [facility]  Once        Provider:  (Not yet assigned)    Acknowledged ANA LUISA NEUMANN     IP consult case management/social work  Once        Provider:  (Not yet assigned)    Completed CINTHYA CLEMENTS     Inpatient consult to Respiratory Care  Once        Provider:  (Not yet assigned)    Acknowledged CINTHYA CLEMENTS     [facility]  Once        Provider:  (Not yet assigned)    Acknowledged ENRIQUE LEHMAN     Inpatient consult to Cardiothoracic Surgery  Once        Provider:  Cinthya Clements MD    Completed SHAYLA NOGUERA     Inpatient consult to Pulmonology  Once        Provider:  (Not yet assigned)    Completed WINSTON BERGER          No new Assessment & Plan notes have been filed under this hospital service since the last note was generated.  Service: Hospital Medicine    Final Active Diagnoses:    Diagnosis Date  Noted POA    PRINCIPAL PROBLEM:  Empyema of left pleural space [J86.9] 10/20/2022 Yes    S/P chest tube placement [Z93.8] 10/23/2022 Not Applicable    Anemia, hypochromic [D64.9] 10/23/2022 Yes    Class 3 severe obesity in adult [E66.01] 10/22/2022 Yes    Parapneumonic effusion, Left  [J18.9, J91.8] 10/19/2022 Yes      Problems Resolved During this Admission:    Diagnosis Date Noted Date Resolved POA    Pleural effusion [J90] 10/19/2022 10/21/2022 Yes    Acute respiratory distress [R06.03] 10/19/2022 10/22/2022 Yes       Discharged Condition: stable    Disposition: Home-Health Care Svc    Follow Up:   Follow-up Information     Dusty Moyer MD Follow up on 11/9/2022.    Specialty: Cardiothoracic Surgery  Contact information:  [address] [place] [address] [address] [address]  [phone]             [facility] Follow up.    Specialties: Home Health Services, Home Therapy Services  Why: Home Health  Contact information:  [address] [facility] [address]  [facility] [address]  [phone]           [facility] Follow up.    Specialties: Pharmacist, DME Provider, IV Infusion  Why: Home Infusion- ([facility])  Contact information:  [address]  [address]  [facility] [address]  [phone]                       Patient Instructions:      X-Ray Chest PA And Lateral   Standing Status: Future Standing Exp. Date: 10/28/23     Order Specific Question Answer Comments   Reason for Exam: s/p left VATS for empyema    May the Radiologist modify the order per protocol to meet the clinical needs of the patient? Yes    Release to patient Immediate      Ambulatory referral/consult to Pulmonology   Standing Status: Future   Referral Priority: Routine Referral Type: Consultation   Referral Reason: Specialty Services Required   Requested Specialty: Pulmonary Disease   Number of Visits Requested: 1     Ambulatory referral/consult to Infectious Disease   Standing Status:  Future   Referral Priority: Routine Referral Type: Consultation   Referral Reason: Specialty Services Required   Requested Specialty: Infectious Diseases   Number of Visits Requested: 1       Significant Diagnostic Studies: Labs:   CMP   Recent Labs   Lab 10/26/22  2041 10/27/22  1655 10/28/22  0558   NA  --  136 136   K  --  4.4 4.4   CL  --  99 99   CO2  --  25 26   GLU  --  101 116*   BUN  --  5* 5*   CREATININE 0.6 0.6 0.7   CALCIUM  --  9.3 9.5   PROT  --   --  7.8   ALBUMIN  --   --  2.2*   BILITOT  --   --  0.5   ALKPHOS  --   --  100   AST  --   --  22   ALT  --   --  27   ANIONGAP  --  12 11    and CBC   Recent Labs   Lab 10/27/22  1440 10/27/22  1655 10/28/22  0558   WBC 13.10* 13.96* 14.38*   HGB 7.6* 8.9* 9.3*   HCT 24.7* 27.9* 29.6*    552* 666*       Pending Diagnostic Studies:     Procedure Component Value Units Date/Time    APTT [[id number]] Collected: 10/20/22 2151    Order Status: Sent Lab Status: In process Updated: 10/20/22 2153    Specimen: Blood     IR Insertion Tube Thoracostomy Includes Water Seal-Open w/Imaging [[id number]]     Order Status: Sent Lab Status: No result     Protime-INR [[id number]] Collected: 10/20/22 2151    Order Status: Sent Lab Status: In process Updated: 10/20/22 2153    Specimen: Blood          Medications:  Reconciled Home Medications:      Medication List      START taking these medications    cefTRIAXone 2 g/50 mL Pgbk IVPB  Commonly known as: ROCEPHIN  Inject 50 mLs (2 g total) into the vein once. for 1 dose     metroNIDAZOLE 500 MG tablet  Commonly known as: FLAGYL  Take 1 tablet (500 mg total) by mouth every 8 (eight) hours. for 14 days     VANCOMYCIN 1 G/250 ML D5W (READY TO MIX SYSTEM)  Inject 375 mLs (1,500 mg total) into the vein 2 (two) times a day.        CONTINUE taking these medications    acetaminophen 500 MG tablet  Commonly known as: TYLENOL  Take 500-1,000 mg by mouth every 6 (six) hours as needed for Pain.     ibuprofen 200 MG tablet  Commonly  known as: ADVIL,MOTRIN  Take 200-400 mg by mouth every 6 (six) hours as needed for Pain.     methocarbamoL 500 MG Tab  Commonly known as: ROBAXIN  Take 1 tablet (500 mg total) by mouth 2 (two) times daily as needed.     norgestimate-ethinyl estradioL 0.18/0.215/0.25 mg-35 mcg (28) tablet  Commonly known as: ORTHO TRI-CYCLEN,TRI-SPRINTEC  Take 1 tablet by mouth once daily.            Indwelling Lines/Drains at time of discharge:   Lines/Drains/Airways     Peripherally Inserted Central Catheter Line  Duration           PICC Double Lumen 10/22/22 1919 right basilic 5 days                Time spent on the discharge of patient: >35 minutes         Eliazar Ayala NP  Department of Hospital Medicine  [facility] - Med Surg

## 2022-10-28 NOTE — PLAN OF CARE
MARYSE communicated with Saba at [employer].  Patient antibiotics approved and teaching has been completed.  Patient can discharge if medically ready.  CM updated the provider

## 2022-10-28 NOTE — ASSESSMENT & PLAN NOTE
Follow CXR  Incentive spirometry to prevent aletectasis  Pain control  CTS following  10/2810/28 PICC line in place.  Outpatient antibiotic therapy total of 4-6 weeks.  Id follow-up.  I am positive cocci on Gram stain of pleural fluid noted.  Outpatient pulmonology follow-up.  Chest tube removed today.  No distress.

## 2022-10-31 ENCOUNTER — LAB VISIT (OUTPATIENT)
Dept: LAB | Facility: HOSPITAL | Age: 27
End: 2022-10-31
Payer: COMMERCIAL

## 2022-10-31 ENCOUNTER — PATIENT MESSAGE (OUTPATIENT)
Dept: CARDIOTHORACIC SURGERY | Facility: CLINIC | Age: 27
End: 2022-10-31
Payer: COMMERCIAL

## 2022-10-31 DIAGNOSIS — J18.9 UNRESOLVED PNEUMONIA: ICD-10-CM

## 2022-10-31 LAB
ALBUMIN SERPL BCP-MCNC: 2.5 G/DL (ref 3.5–5.2)
ALP SERPL-CCNC: 53 U/L (ref 55–135)
ALT SERPL W/O P-5'-P-CCNC: 15 U/L (ref 10–44)
ANION GAP SERPL CALC-SCNC: 11 MMOL/L (ref 8–16)
AST SERPL-CCNC: 16 U/L (ref 10–40)
BILIRUB SERPL-MCNC: 0.4 MG/DL (ref 0.1–1)
BUN SERPL-MCNC: 6 MG/DL (ref 6–20)
CALCIUM SERPL-MCNC: 9.2 MG/DL (ref 8.7–10.5)
CHLORIDE SERPL-SCNC: 100 MMOL/L (ref 95–110)
CO2 SERPL-SCNC: 26 MMOL/L (ref 23–29)
CREAT SERPL-MCNC: 0.8 MG/DL (ref 0.5–1.4)
CRP SERPL-MCNC: 33 MG/L (ref 0–8.2)
ERYTHROCYTE [DISTWIDTH] IN BLOOD BY AUTOMATED COUNT: 13.7 % (ref 11.5–14.5)
ERYTHROCYTE [SEDIMENTATION RATE] IN BLOOD BY PHOTOMETRIC METHOD: 74 MM/HR (ref 0–36)
EST. GFR  (NO RACE VARIABLE): >60 ML/MIN/1.73 M^2
GLUCOSE SERPL-MCNC: 90 MG/DL (ref 70–110)
HCT VFR BLD AUTO: 29.9 % (ref 37–48.5)
HGB BLD-MCNC: 9.4 G/DL (ref 12–16)
MCH RBC QN AUTO: 26.9 PG (ref 27–31)
MCHC RBC AUTO-ENTMCNC: 31.4 G/DL (ref 32–36)
MCV RBC AUTO: 85 FL (ref 82–98)
PLATELET # BLD AUTO: 615 K/UL (ref 150–450)
PMV BLD AUTO: 8.5 FL (ref 9.2–12.9)
POTASSIUM SERPL-SCNC: 3.9 MMOL/L (ref 3.5–5.1)
PROT SERPL-MCNC: 8.4 G/DL (ref 6–8.4)
RBC # BLD AUTO: 3.5 M/UL (ref 4–5.4)
SODIUM SERPL-SCNC: 137 MMOL/L (ref 136–145)
VANCOMYCIN TROUGH SERPL-MCNC: 12.9 UG/ML (ref 10–22)
WBC # BLD AUTO: 12.12 K/UL (ref 3.9–12.7)

## 2022-10-31 PROCEDURE — 86140 C-REACTIVE PROTEIN: CPT | Performed by: INTERNAL MEDICINE

## 2022-10-31 PROCEDURE — 80053 COMPREHEN METABOLIC PANEL: CPT | Performed by: INTERNAL MEDICINE

## 2022-10-31 PROCEDURE — 85027 COMPLETE CBC AUTOMATED: CPT | Performed by: INTERNAL MEDICINE

## 2022-10-31 PROCEDURE — 80202 ASSAY OF VANCOMYCIN: CPT | Performed by: INTERNAL MEDICINE

## 2022-10-31 PROCEDURE — 85652 RBC SED RATE AUTOMATED: CPT | Performed by: INTERNAL MEDICINE

## 2022-10-31 NOTE — PLAN OF CARE
[facility] - Med Surg  Discharge Final Note    Primary Care Provider: Primary Doctor No    Expected Discharge Date: 10/28/2022    Final Discharge Note (most recent)       Final Note - 10/31/22 0811          Final Note    Assessment Type Final Discharge Note     Anticipated Discharge Disposition Home-Health Care [facility]     Hospital Resources/Appts/Education Provided Appointments scheduled and added to AVS        Post-Acute Status    Post-Acute Authorization Home Health;IV Infusion     Home Health Status Set-up Complete/Auth obtained     IV Infusion Status Set-up Complete/Auth obtained                     Important Message from Medicare             Contact Info       Dusty Moyer MD   Specialty: Cardiothoracic Surgery    [address] [place] [address] [address] [address]   Phone: [phone]       Next Steps: Follow up on 11/9/2022    [facility]   Specialty: Home Health Services, Home Therapy Services    [address]  [facility] [address]   Phone: [phone]       Next Steps: Follow up    Instructions: Home Health    [facility]   Specialty: Pharmacist, DME Provider, IV Infusion    [address]  [facility] [address]   Phone: [phone]       Next Steps: Follow up    Instructions: Home Infusion- ([facility])          Nov2 Post OP 10:30 AM   MD Kimmy Kapadia - Cardiothoracic Surg   [address] [place] [address]  [address]   [place] [id number] [phone]

## 2022-11-01 ENCOUNTER — PATIENT MESSAGE (OUTPATIENT)
Dept: INFECTIOUS DISEASES | Facility: CLINIC | Age: 27
End: 2022-11-01
Payer: COMMERCIAL

## 2022-11-01 ENCOUNTER — PATIENT OUTREACH (OUTPATIENT)
Dept: ADMINISTRATIVE | Facility: CLINIC | Age: 27
End: 2022-11-01
Payer: COMMERCIAL

## 2022-11-01 ENCOUNTER — PATIENT MESSAGE (OUTPATIENT)
Dept: CARDIOLOGY | Facility: CLINIC | Age: 27
End: 2022-11-01
Payer: COMMERCIAL

## 2022-11-01 DIAGNOSIS — J86.9 EMPYEMA OF LEFT PLEURAL SPACE: Primary | ICD-10-CM

## 2022-11-01 RX ORDER — ONDANSETRON 4 MG/1
4 TABLET, FILM COATED ORAL EVERY 8 HOURS PRN
Qty: 20 TABLET | Refills: 1 | Status: SHIPPED | OUTPATIENT
Start: 2022-11-01 | End: 2022-11-11

## 2022-11-01 RX ORDER — FLUCONAZOLE 150 MG/1
150 TABLET ORAL
Qty: 10 TABLET | Refills: 1 | Status: SHIPPED | OUTPATIENT
Start: 2022-11-01 | End: 2022-11-08 | Stop reason: SDUPTHER

## 2022-11-01 NOTE — PROGRESS NOTES
C3 nurse spoke with Christina Leal for a TCC post hospital discharge follow up call. The patient reports does not have a scheduled HOSFU appointment. C3 nurse was unable to schedule [facility]. Patient accepted referral for NP @ home for HOSFU. Message to ABILIO Stevens MD staff concerning prescription issue.

## 2022-11-02 ENCOUNTER — HOSPITAL ENCOUNTER (OUTPATIENT)
Dept: RADIOLOGY | Facility: HOSPITAL | Age: 27
Discharge: HOME | End: 2022-11-02
Attending: THORACIC SURGERY (CARDIOTHORACIC VASCULAR SURGERY)
Payer: COMMERCIAL

## 2022-11-02 ENCOUNTER — OFFICE VISIT (OUTPATIENT)
Dept: CARDIOTHORACIC SURGERY | Facility: CLINIC | Age: 27
End: 2022-11-02
Payer: COMMERCIAL

## 2022-11-02 ENCOUNTER — OFFICE VISIT (OUTPATIENT)
Dept: INTERNAL MEDICINE | Facility: CLINIC | Age: 27
End: 2022-11-02
Payer: COMMERCIAL

## 2022-11-02 VITALS
HEART RATE: 102 BPM | DIASTOLIC BLOOD PRESSURE: 80 MMHG | WEIGHT: 293 LBS | BODY MASS INDEX: 44.41 KG/M2 | SYSTOLIC BLOOD PRESSURE: 134 MMHG | OXYGEN SATURATION: 98 % | HEIGHT: 68 IN

## 2022-11-02 VITALS — SYSTOLIC BLOOD PRESSURE: 134 MMHG | DIASTOLIC BLOOD PRESSURE: 80 MMHG | WEIGHT: 293 LBS | BODY MASS INDEX: 47.29 KG/M2

## 2022-11-02 DIAGNOSIS — J86.9 EMPYEMA OF LEFT PLEURAL SPACE: Primary | ICD-10-CM

## 2022-11-02 DIAGNOSIS — Z93.8 S/P CHEST TUBE PLACEMENT: ICD-10-CM

## 2022-11-02 DIAGNOSIS — J91.8 PARAPNEUMONIC EFFUSION: Primary | ICD-10-CM

## 2022-11-02 DIAGNOSIS — J18.9 PARAPNEUMONIC EFFUSION: Primary | ICD-10-CM

## 2022-11-02 DIAGNOSIS — K30 INDIGESTION: ICD-10-CM

## 2022-11-02 DIAGNOSIS — J86.9 EMPYEMA OF LEFT PLEURAL SPACE: ICD-10-CM

## 2022-11-02 PROCEDURE — 3079F PR MOST RECENT DIASTOLIC BLOOD PRESSURE 80-89 MM HG: ICD-10-PCS | Mod: CPTII,95,, | Performed by: FAMILY MEDICINE

## 2022-11-02 PROCEDURE — 3008F PR BODY MASS INDEX (BMI) DOCUMENTED: ICD-10-PCS | Mod: CPTII,95,, | Performed by: FAMILY MEDICINE

## 2022-11-02 PROCEDURE — 99999 PR PBB SHADOW E&M-EST. PATIENT-LVL III: ICD-10-PCS | Mod: PBBFAC,,, | Performed by: THORACIC SURGERY (CARDIOTHORACIC VASCULAR SURGERY)

## 2022-11-02 PROCEDURE — 3075F SYST BP GE 130 - 139MM HG: CPT | Mod: CPTII,S$GLB,, | Performed by: THORACIC SURGERY (CARDIOTHORACIC VASCULAR SURGERY)

## 2022-11-02 PROCEDURE — 3079F PR MOST RECENT DIASTOLIC BLOOD PRESSURE 80-89 MM HG: ICD-10-PCS | Mod: CPTII,S$GLB,, | Performed by: THORACIC SURGERY (CARDIOTHORACIC VASCULAR SURGERY)

## 2022-11-02 PROCEDURE — 1159F MED LIST DOCD IN RCRD: CPT | Mod: CPTII,95,, | Performed by: FAMILY MEDICINE

## 2022-11-02 PROCEDURE — 99213 OFFICE O/P EST LOW 20 MIN: CPT | Mod: 95,,, | Performed by: FAMILY MEDICINE

## 2022-11-02 PROCEDURE — 71046 X-RAY EXAM CHEST 2 VIEWS: CPT | Mod: 26,,, | Performed by: RADIOLOGY

## 2022-11-02 PROCEDURE — 3008F BODY MASS INDEX DOCD: CPT | Mod: CPTII,S$GLB,, | Performed by: THORACIC SURGERY (CARDIOTHORACIC VASCULAR SURGERY)

## 2022-11-02 PROCEDURE — 3075F PR MOST RECENT SYSTOLIC BLOOD PRESS GE 130-139MM HG: ICD-10-PCS | Mod: CPTII,S$GLB,, | Performed by: THORACIC SURGERY (CARDIOTHORACIC VASCULAR SURGERY)

## 2022-11-02 PROCEDURE — 3075F PR MOST RECENT SYSTOLIC BLOOD PRESS GE 130-139MM HG: ICD-10-PCS | Mod: CPTII,95,, | Performed by: FAMILY MEDICINE

## 2022-11-02 PROCEDURE — 3008F PR BODY MASS INDEX (BMI) DOCUMENTED: ICD-10-PCS | Mod: CPTII,S$GLB,, | Performed by: THORACIC SURGERY (CARDIOTHORACIC VASCULAR SURGERY)

## 2022-11-02 PROCEDURE — 99024 PR POST-OP FOLLOW-UP VISIT: ICD-10-PCS | Mod: S$GLB,,, | Performed by: THORACIC SURGERY (CARDIOTHORACIC VASCULAR SURGERY)

## 2022-11-02 PROCEDURE — 1111F DSCHRG MED/CURRENT MED MERGE: CPT | Mod: CPTII,95,, | Performed by: FAMILY MEDICINE

## 2022-11-02 PROCEDURE — 99213 PR OFFICE/OUTPT VISIT, EST, LEVL III, 20-29 MIN: ICD-10-PCS | Mod: 95,,, | Performed by: FAMILY MEDICINE

## 2022-11-02 PROCEDURE — 1111F PR DISCHARGE MEDS RECONCILED W/ CURRENT OUTPATIENT MED LIST: ICD-10-PCS | Mod: CPTII,95,, | Performed by: FAMILY MEDICINE

## 2022-11-02 PROCEDURE — 3079F DIAST BP 80-89 MM HG: CPT | Mod: CPTII,S$GLB,, | Performed by: THORACIC SURGERY (CARDIOTHORACIC VASCULAR SURGERY)

## 2022-11-02 PROCEDURE — 71046 X-RAY EXAM CHEST 2 VIEWS: CPT | Mod: TC

## 2022-11-02 PROCEDURE — 3075F SYST BP GE 130 - 139MM HG: CPT | Mod: CPTII,95,, | Performed by: FAMILY MEDICINE

## 2022-11-02 PROCEDURE — 99024 POSTOP FOLLOW-UP VISIT: CPT | Mod: S$GLB,,, | Performed by: THORACIC SURGERY (CARDIOTHORACIC VASCULAR SURGERY)

## 2022-11-02 PROCEDURE — 99999 PR PBB SHADOW E&M-EST. PATIENT-LVL III: CPT | Mod: PBBFAC,,, | Performed by: THORACIC SURGERY (CARDIOTHORACIC VASCULAR SURGERY)

## 2022-11-02 PROCEDURE — 3079F DIAST BP 80-89 MM HG: CPT | Mod: CPTII,95,, | Performed by: FAMILY MEDICINE

## 2022-11-02 PROCEDURE — 71046 XR CHEST PA AND LATERAL: ICD-10-PCS | Mod: 26,,, | Performed by: RADIOLOGY

## 2022-11-02 PROCEDURE — 1159F PR MEDICATION LIST DOCUMENTED IN MEDICAL RECORD: ICD-10-PCS | Mod: CPTII,95,, | Performed by: FAMILY MEDICINE

## 2022-11-02 PROCEDURE — 3008F BODY MASS INDEX DOCD: CPT | Mod: CPTII,95,, | Performed by: FAMILY MEDICINE

## 2022-11-02 RX ORDER — PANTOPRAZOLE SODIUM 40 MG/1
40 TABLET, DELAYED RELEASE ORAL DAILY
Qty: 30 TABLET | Refills: 11 | Status: SHIPPED | OUTPATIENT
Start: 2022-11-02 | End: 2023-05-30

## 2022-11-02 NOTE — PROGRESS NOTES
"Subjective:      Patient ID: Christina Leal is a 26 y.o. female.    Chief Complaint: Post-op Evaluation    HPI:   The patient is status post left VATS procedure for the drainage of an empyema and decortication.  The patient is currently on home with IV antibiotics treatment.  The patient denies any fever or chills.  The patient denies any pain.  She denies any shortness of breath.  Review of patient's allergies indicates:  No Known Allergies         Objective:   /80 (BP Location: Right arm, Patient Position: Sitting, BP Method: Large (Manual))   Pulse 102   Ht 5' 8" (1.727 m)   Wt (!) 141.3 kg (311 lb 8.2 oz)   SpO2 98%   BMI 47.36 kg/m²     X-Ray Chest AP Portable  Narrative: EXAMINATION:  XR CHEST AP PORTABLE    CLINICAL HISTORY:  s/p vats; Pleural effusion, not elsewhere classified    TECHNIQUE:  Single frontal view of the chest was performed.    COMPARISON:  10/27/2022.    FINDINGS:  Left upper lobe pigtail catheter unchanged position.  No pneumothorax.  Lung volumes are low similar to prior exam.  Left-sided pleuroparenchymal scarring and subsegmental atelectasis unchanged.  Tiny left pleural effusion.  Right lung is clear.  Heart normal size.In comparison to the prior study, there is no adverse interval changes.  Impression: In comparison to the prior study, there is no adverse interval changes    Electronically signed by: Francisco Muhammad MD  Date:    10/28/2022  Time:    06:03         Physical Exam  Constitutional:       Appearance: Normal appearance. She is obese.   HENT:      Head: Normocephalic and atraumatic.   Cardiovascular:      Rate and Rhythm: Normal rate and regular rhythm.      Heart sounds: Normal heart sounds.   Pulmonary:      Effort: Pulmonary effort is normal.      Breath sounds: Normal breath sounds.   Abdominal:      General: Abdomen is flat. Bowel sounds are normal.      Palpations: Abdomen is soft.   Musculoskeletal:      Right lower leg: No edema.      Left lower leg: No " edema.   Skin:     General: Skin is warm and dry.      Comments: There is a small pocket of seropurulent material at the posterior chest tube site.  No evidence of erythema or induration started in the drainage.   Neurological:      General: No focal deficit present.      Mental Status: She is alert and oriented to person, place, and time.   Psychiatric:         Behavior: Behavior normal.       Assessment:     1. Empyema of left pleural space    2. S/P chest tube placement          Plan   The patient is status post VATS procedure with drainage of empyema and decortication.  Continue antibiotics as per id service.    Dry sterile changes to chest tube site.    Chest x-ray pending.    Return to clinic in 1 week.          Harshal Heardsiva Cardiothoracic Surgery

## 2022-11-02 NOTE — PROGRESS NOTES
Christina Leal  11/02/2022  [id number]    Jimmy Castellanos NP  Patient Care Team:  Jimmy Castellanos NP as PCP - General    The patient location is: home   The chief complaint leading to consultation is: GI    Visit type: audiovisual    Face to Face time with patient:    minutes of total time spent on the encounter, which includes face to face time and non-face to face time preparing to see the patient (eg, review of tests), Obtaining and/or reviewing separately obtained history, Documenting clinical information in the electronic or other health record, Independently interpreting results (not separately reported) and communicating results to the patient/family/caregiver, or Care coordination (not separately reported).         Each patient to whom he or she provides medical services by telemedicine is:  (1) informed of the relationship between the physician and patient and the respective role of any other health care provider with respect to management of the patient; and (2) notified that he or she may decline to receive medical services by telemedicine and may withdraw from such care at any time.    Notes:        Visit Type:an urgent visit for a new problem    Chief Complaint:  Chief Complaint   Patient presents with    GI Problem     Started about a week and a half ago. Was in the hospital recently. Now soft foods are causing her problems         History of Present Illness:  The patient is status post left VATS procedure for the drainage of an empyema and decortication.  The patient is currently on home with IV antibiotics treatment.  The patient denies any fever or chills.  The patient denies any pain.  She denies any shortness of breath.    GI complaints  See long list of reported ROS    She spoke with Dr. Stevens about her side effects and she was given Zofran and Diflucan for her yeast infection.   She is on PPT.  She has indigestion.  She is having bowel movements        History:  History reviewed. No pertinent past  medical history.  Past Surgical History:   Procedure Laterality Date    EVACUATION OF EMPYEMA Left 10/21/2022    Procedure: EVACUATION, EMPYEMA;  Surgeon: Dusty Moyer MD;  Location: [facility] OR;  Service: Cardiothoracic;  Laterality: Left;  VATS, WITH EVACUATION OF EMPYEMA    INJECTION OF ANESTHETIC AGENT AROUND MULTIPLE INTERCOSTAL NERVES Left 10/21/2022    Procedure: BLOCK, NERVE, INTERCOSTAL, 2 OR MORE;  Surgeon: Dusty Moyer MD;  Location: [facility] OR;  Service: Cardiothoracic;  Laterality: Left;    THORACOSCOPIC DECORTICATION OF LUNG Left 10/21/2022    Procedure: VATS, WITH DECORTICATION, LUNG;  Surgeon: Dusty Moyer MD;  Location: [facility] OR;  Service: Cardiothoracic;  Laterality: Left;  AND EVACUATION OF EMPYEMA     Family History   Problem Relation Age of Onset    Esophageal cancer Father     Heart disease Neg Hx     Cancer Neg Hx      Social History     Socioeconomic History    Marital status: Single   Tobacco Use    Smoking status: Former     Types: Cigarettes    Smokeless tobacco: Never   Substance and Sexual Activity    Alcohol use: Yes     Comment: social    Drug use: No    Sexual activity: Yes     Partners: Male     Birth control/protection: Condom   Social History Narrative    She is a graduate of [employer] and will be attending [employer] in the fall. She is a non-smoker and doesn't drink alcohol. 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      Social Determinants of Health     Financial Resource Strain: Low Risk     Difficulty of Paying Living Expenses: Not hard at all   Food Insecurity: No Food Insecurity    Worried About Running Out of Food in the Last Year: Never true    Ran Out of Food in the Last Year: Never true   Transportation Needs: No Transportation Needs    Lack of Transportation (Medical): No    Lack of Transportation (Non-Medical): No   Physical Activity: Insufficiently Active    Days of Exercise per Week: 2 days    Minutes of Exercise per Session: 20 min   Stress: No Stress Concern  Present    Feeling of Stress : Not at all   Social Connections: Socially Isolated    Frequency of Communication with Friends and Family: More than three times a week    Attends Mandaen Services: Never    Active Member of Clubs or Organizations: No    Attends Club or Organization Meetings: Never    Marital Status: Never    Housing Stability: Low Risk     Unable to Pay for Housing in the Last Year: No    Number of Places Lived in the Last Year: 1    Unstable Housing in the Last Year: No     Patient Active Problem List   Diagnosis    Parapneumonic effusion, Left     Empyema of left pleural space    Class 3 severe obesity in adult    S/P chest tube placement    Anemia, hypochromic     Review of patient's allergies indicates:  No Known Allergies    The following were reviewed at this visit: active problem list, medication list, allergies, family history, social history, and health maintenance.    Medications:  Current Outpatient Medications on File Prior to Visit   Medication Sig Dispense Refill    acetaminophen (TYLENOL) 500 MG tablet Take 500-1,000 mg by mouth every 6 (six) hours as needed for Pain.      fluconazole (DIFLUCAN) 150 MG Tab Take 1 tablet (150 mg total) by mouth as needed (TAKE DAILY AS NEEDED WITH YEAST INFECTION). 10 tablet 1    metroNIDAZOLE (FLAGYL) 500 MG tablet Take 1 tablet (500 mg total) by mouth every 8 (eight) hours. for 14 days 42 tablet 0    ondansetron (ZOFRAN) 4 MG tablet Take 1 tablet (4 mg total) by mouth every 8 (eight) hours as needed for Nausea. 20 tablet 1    vancomycin HCl (VANCOMYCIN 1 G/250 ML D5W, READY TO MIX SYSTEM,) Inject 375 mLs (1,500 mg total) into the vein 2 (two) times a day.      ibuprofen (ADVIL,MOTRIN) 200 MG tablet Take 200-400 mg by mouth every 6 (six) hours as needed for Pain.      norgestimate-ethinyl estradioL (ORTHO TRI-CYCLEN,TRI-SPRINTEC) 0.18/0.215/0.25 mg-35 mcg (28) tablet Take 1 tablet by mouth once daily. (Patient not taking: Reported on 11/2/2022)  28 tablet 11     No current facility-administered medications on file prior to visit.       Medications have been reviewed and reconciled with patient at this visit.  Barriers to medications reviewed with patient.    Adverse reactions to current medications reviewed with patient..    Over the counter medications reviewed and reconciled with patient.    Exam:  Wt Readings from Last 3 Encounters:   11/02/22 (!) 141.1 kg (311 lb)   11/02/22 (!) 141.3 kg (311 lb 8.2 oz)   10/27/22 (!) 158.2 kg (348 lb 12.3 oz)     Temp Readings from Last 3 Encounters:   10/28/22 98.7 °F (37.1 °C) (Oral)   10/18/22 (!) 100.6 °F (38.1 °C) (Oral)   09/06/22 97.9 °F (36.6 °C) (Temporal)     BP Readings from Last 3 Encounters:   11/02/22 134/80   11/02/22 134/80   10/28/22 119/83     Pulse Readings from Last 3 Encounters:   11/02/22 102   10/28/22 104   10/21/22 (!) 122     Body mass index is 47.29 kg/m².      Review of Systems   Constitutional:  Positive for weight loss. Negative for fever.   Gastrointestinal:  Positive for abdominal pain, nausea and vomiting. Negative for constipation, diarrhea and melena.   Genitourinary:  Negative for dysuria, frequency and hematuria.   Musculoskeletal:  Negative for myalgias.   Neurological:  Negative for headaches.   Physical Exam  Nursing note reviewed.   Pulmonary:      Effort: Pulmonary effort is normal. No respiratory distress.   Neurological:      Mental Status: She is alert and oriented to person, place, and time.   Psychiatric:         Mood and Affect: Mood normal.         Behavior: Behavior normal.         Thought Content: Thought content normal.         Judgment: Judgment normal.       Laboratory Reviewed ({Yes)  Lab Results   Component Value Date    WBC 12.12 10/31/2022    HGB 9.4 (L) 10/31/2022    HCT 29.9 (L) 10/31/2022     (H) 10/31/2022    ALT 15 10/31/2022    AST 16 10/31/2022     10/31/2022    K 3.9 10/31/2022     10/31/2022    CREATININE 0.8 10/31/2022    BUN 6  10/31/2022    CO2 26 10/31/2022       Christina was seen today for gi problem.    Diagnoses and all orders for this visit:    Parapneumonic effusion, Left   S/P chest tube placement  Saw CVT  Stable    Follow up with ID, with Dr. Stevens.    Indigestion  -     pantoprazole (PROTONIX) 40 MG tablet; Take 1 tablet (40 mg total) by mouth once daily.  -     Ambulatory referral/consult to Gastroenterology; Future  Probiotics      She will get off meds in 2 weeks  I will refer to GI if not improved with PPI and smaller meals  No diarrhea          Care Plan/Goals: Reviewed    Goals    None         Follow up: No follow-ups on file.    After visit summary was printed and given to patient upon discharge today.  Patient goals and care plan are included in After Visit Summary.

## 2022-11-07 ENCOUNTER — TELEPHONE (OUTPATIENT)
Dept: GASTROENTEROLOGY | Facility: CLINIC | Age: 27
End: 2022-11-07
Payer: COMMERCIAL

## 2022-11-07 ENCOUNTER — PES CALL (OUTPATIENT)
Dept: ADMINISTRATIVE | Facility: CLINIC | Age: 27
End: 2022-11-07
Payer: COMMERCIAL

## 2022-11-07 ENCOUNTER — LAB VISIT (OUTPATIENT)
Dept: LAB | Facility: HOSPITAL | Age: 27
End: 2022-11-07
Payer: COMMERCIAL

## 2022-11-07 DIAGNOSIS — Z51.81 ENCOUNTER FOR THERAPEUTIC DRUG MONITORING: ICD-10-CM

## 2022-11-07 DIAGNOSIS — Z79.2 ENCOUNTER FOR LONG-TERM (CURRENT) USE OF ANTIBIOTICS: ICD-10-CM

## 2022-11-07 DIAGNOSIS — J86.9 EMPYEMA OF PLEURA: ICD-10-CM

## 2022-11-07 LAB
ALBUMIN SERPL BCP-MCNC: 3 G/DL (ref 3.5–5.2)
ALP SERPL-CCNC: 38 U/L (ref 55–135)
ALT SERPL W/O P-5'-P-CCNC: 14 U/L (ref 10–44)
ANION GAP SERPL CALC-SCNC: 12 MMOL/L (ref 8–16)
ANISOCYTOSIS BLD QL SMEAR: SLIGHT
AST SERPL-CCNC: 11 U/L (ref 10–40)
BASOPHILS # BLD AUTO: 0.09 K/UL (ref 0–0.2)
BASOPHILS NFR BLD: 1.9 % (ref 0–1.9)
BILIRUB SERPL-MCNC: 0.4 MG/DL (ref 0.1–1)
BUN SERPL-MCNC: 7 MG/DL (ref 6–20)
CALCIUM SERPL-MCNC: 8.7 MG/DL (ref 8.7–10.5)
CHLORIDE SERPL-SCNC: 98 MMOL/L (ref 95–110)
CO2 SERPL-SCNC: 26 MMOL/L (ref 23–29)
CREAT SERPL-MCNC: 1.6 MG/DL (ref 0.5–1.4)
CRP SERPL-MCNC: 28.2 MG/L (ref 0–8.2)
DACRYOCYTES BLD QL SMEAR: ABNORMAL
DIFFERENTIAL METHOD: ABNORMAL
EOSINOPHIL # BLD AUTO: 0.1 K/UL (ref 0–0.5)
EOSINOPHIL NFR BLD: 1.7 % (ref 0–8)
ERYTHROCYTE [DISTWIDTH] IN BLOOD BY AUTOMATED COUNT: 14 % (ref 11.5–14.5)
ERYTHROCYTE [SEDIMENTATION RATE] IN BLOOD BY PHOTOMETRIC METHOD: >120 MM/HR (ref 0–36)
EST. GFR  (NO RACE VARIABLE): 45 ML/MIN/1.73 M^2
GLUCOSE SERPL-MCNC: 93 MG/DL (ref 70–110)
HCT VFR BLD AUTO: 28.8 % (ref 37–48.5)
HGB BLD-MCNC: 9.1 G/DL (ref 12–16)
IMM GRANULOCYTES # BLD AUTO: 0.02 K/UL (ref 0–0.04)
IMM GRANULOCYTES NFR BLD AUTO: 0.4 % (ref 0–0.5)
LYMPHOCYTES # BLD AUTO: 0.9 K/UL (ref 1–4.8)
LYMPHOCYTES NFR BLD: 18 % (ref 18–48)
MCH RBC QN AUTO: 26.5 PG (ref 27–31)
MCHC RBC AUTO-ENTMCNC: 31.6 G/DL (ref 32–36)
MCV RBC AUTO: 84 FL (ref 82–98)
MONOCYTES # BLD AUTO: 0.8 K/UL (ref 0.3–1)
MONOCYTES NFR BLD: 16.9 % (ref 4–15)
NEUTROPHILS # BLD AUTO: 3 K/UL (ref 1.8–7.7)
NEUTROPHILS NFR BLD: 61.1 % (ref 38–73)
NRBC BLD-RTO: 0 /100 WBC
OVALOCYTES BLD QL SMEAR: ABNORMAL
PLATELET # BLD AUTO: 369 K/UL (ref 150–450)
PLATELET BLD QL SMEAR: ABNORMAL
PMV BLD AUTO: 8.8 FL (ref 9.2–12.9)
POIKILOCYTOSIS BLD QL SMEAR: SLIGHT
POLYCHROMASIA BLD QL SMEAR: ABNORMAL
POTASSIUM SERPL-SCNC: 3.7 MMOL/L (ref 3.5–5.1)
PROT SERPL-MCNC: 8.3 G/DL (ref 6–8.4)
RBC # BLD AUTO: 3.44 M/UL (ref 4–5.4)
SODIUM SERPL-SCNC: 136 MMOL/L (ref 136–145)
SPHEROCYTES BLD QL SMEAR: ABNORMAL
TOXIC GRANULES BLD QL SMEAR: PRESENT
VANCOMYCIN TROUGH SERPL-MCNC: 31.7 UG/ML (ref 10–22)
WBC # BLD AUTO: 4.84 K/UL (ref 3.9–12.7)

## 2022-11-07 PROCEDURE — 80053 COMPREHEN METABOLIC PANEL: CPT | Performed by: INTERNAL MEDICINE

## 2022-11-07 PROCEDURE — 85652 RBC SED RATE AUTOMATED: CPT | Performed by: INTERNAL MEDICINE

## 2022-11-07 PROCEDURE — 86140 C-REACTIVE PROTEIN: CPT | Performed by: INTERNAL MEDICINE

## 2022-11-07 PROCEDURE — 85025 COMPLETE CBC W/AUTO DIFF WBC: CPT | Performed by: INTERNAL MEDICINE

## 2022-11-07 PROCEDURE — 80202 ASSAY OF VANCOMYCIN: CPT | Performed by: INTERNAL MEDICINE

## 2022-11-08 ENCOUNTER — PATIENT MESSAGE (OUTPATIENT)
Dept: INFECTIOUS DISEASES | Facility: CLINIC | Age: 27
End: 2022-11-08
Payer: COMMERCIAL

## 2022-11-08 ENCOUNTER — LAB VISIT (OUTPATIENT)
Dept: LAB | Facility: HOSPITAL | Age: 27
End: 2022-11-08
Payer: COMMERCIAL

## 2022-11-08 DIAGNOSIS — Z79.2 ENCOUNTER FOR LONG-TERM (CURRENT) USE OF ANTIBIOTICS: ICD-10-CM

## 2022-11-08 DIAGNOSIS — J86.9 EMPYEMA OF PLEURA: ICD-10-CM

## 2022-11-08 DIAGNOSIS — Z51.81 ENCOUNTER FOR THERAPEUTIC DRUG MONITORING: ICD-10-CM

## 2022-11-08 LAB
ALBUMIN SERPL BCP-MCNC: 3 G/DL (ref 3.5–5.2)
ALP SERPL-CCNC: 35 U/L (ref 55–135)
ALT SERPL W/O P-5'-P-CCNC: 9 U/L (ref 10–44)
ANION GAP SERPL CALC-SCNC: 14 MMOL/L (ref 8–16)
AST SERPL-CCNC: 12 U/L (ref 10–40)
BILIRUB SERPL-MCNC: 0.3 MG/DL (ref 0.1–1)
BUN SERPL-MCNC: 7 MG/DL (ref 6–20)
CALCIUM SERPL-MCNC: 8.7 MG/DL (ref 8.7–10.5)
CHLORIDE SERPL-SCNC: 98 MMOL/L (ref 95–110)
CO2 SERPL-SCNC: 25 MMOL/L (ref 23–29)
CREAT SERPL-MCNC: 1.6 MG/DL (ref 0.5–1.4)
EST. GFR  (NO RACE VARIABLE): 45 ML/MIN/1.73 M^2
GLUCOSE SERPL-MCNC: 93 MG/DL (ref 70–110)
POTASSIUM SERPL-SCNC: 3.6 MMOL/L (ref 3.5–5.1)
PROT SERPL-MCNC: 8.5 G/DL (ref 6–8.4)
SODIUM SERPL-SCNC: 137 MMOL/L (ref 136–145)

## 2022-11-08 PROCEDURE — 80053 COMPREHEN METABOLIC PANEL: CPT | Performed by: INTERNAL MEDICINE

## 2022-11-08 PROCEDURE — 80202 ASSAY OF VANCOMYCIN: CPT | Performed by: INTERNAL MEDICINE

## 2022-11-08 PROCEDURE — 36415 COLL VENOUS BLD VENIPUNCTURE: CPT | Performed by: INTERNAL MEDICINE

## 2022-11-08 RX ORDER — FLUCONAZOLE 150 MG/1
150 TABLET ORAL
Qty: 10 TABLET | Refills: 1 | Status: SHIPPED | OUTPATIENT
Start: 2022-11-08 | End: 2022-11-09 | Stop reason: SDUPTHER

## 2022-11-09 ENCOUNTER — HOSPITAL ENCOUNTER (OUTPATIENT)
Dept: RADIOLOGY | Facility: HOSPITAL | Age: 27
Discharge: HOME | End: 2022-11-09
Attending: THORACIC SURGERY (CARDIOTHORACIC VASCULAR SURGERY)
Payer: COMMERCIAL

## 2022-11-09 ENCOUNTER — OFFICE VISIT (OUTPATIENT)
Dept: CARDIOTHORACIC SURGERY | Facility: CLINIC | Age: 27
End: 2022-11-09
Payer: COMMERCIAL

## 2022-11-09 VITALS
HEIGHT: 68 IN | SYSTOLIC BLOOD PRESSURE: 144 MMHG | WEIGHT: 293 LBS | DIASTOLIC BLOOD PRESSURE: 100 MMHG | BODY MASS INDEX: 44.41 KG/M2 | OXYGEN SATURATION: 97 % | HEART RATE: 110 BPM

## 2022-11-09 DIAGNOSIS — J86.9 EMPYEMA OF LEFT PLEURAL SPACE: ICD-10-CM

## 2022-11-09 DIAGNOSIS — Z93.8 S/P CHEST TUBE PLACEMENT: Primary | ICD-10-CM

## 2022-11-09 DIAGNOSIS — Z93.8 S/P CHEST TUBE PLACEMENT: ICD-10-CM

## 2022-11-09 LAB — VANCOMYCIN TROUGH SERPL-MCNC: 16.6 UG/ML (ref 10–22)

## 2022-11-09 PROCEDURE — 3080F PR MOST RECENT DIASTOLIC BLOOD PRESSURE >= 90 MM HG: ICD-10-PCS | Mod: CPTII,S$GLB,, | Performed by: THORACIC SURGERY (CARDIOTHORACIC VASCULAR SURGERY)

## 2022-11-09 PROCEDURE — 99999 PR PBB SHADOW E&M-EST. PATIENT-LVL III: ICD-10-PCS | Mod: PBBFAC,,, | Performed by: THORACIC SURGERY (CARDIOTHORACIC VASCULAR SURGERY)

## 2022-11-09 PROCEDURE — 1159F PR MEDICATION LIST DOCUMENTED IN MEDICAL RECORD: ICD-10-PCS | Mod: CPTII,S$GLB,, | Performed by: THORACIC SURGERY (CARDIOTHORACIC VASCULAR SURGERY)

## 2022-11-09 PROCEDURE — 3008F BODY MASS INDEX DOCD: CPT | Mod: CPTII,S$GLB,, | Performed by: THORACIC SURGERY (CARDIOTHORACIC VASCULAR SURGERY)

## 2022-11-09 PROCEDURE — 99999 PR PBB SHADOW E&M-EST. PATIENT-LVL III: CPT | Mod: PBBFAC,,, | Performed by: THORACIC SURGERY (CARDIOTHORACIC VASCULAR SURGERY)

## 2022-11-09 PROCEDURE — 99024 PR POST-OP FOLLOW-UP VISIT: ICD-10-PCS | Mod: S$GLB,,, | Performed by: THORACIC SURGERY (CARDIOTHORACIC VASCULAR SURGERY)

## 2022-11-09 PROCEDURE — 3080F DIAST BP >= 90 MM HG: CPT | Mod: CPTII,S$GLB,, | Performed by: THORACIC SURGERY (CARDIOTHORACIC VASCULAR SURGERY)

## 2022-11-09 PROCEDURE — 99024 POSTOP FOLLOW-UP VISIT: CPT | Mod: S$GLB,,, | Performed by: THORACIC SURGERY (CARDIOTHORACIC VASCULAR SURGERY)

## 2022-11-09 PROCEDURE — 3077F SYST BP >= 140 MM HG: CPT | Mod: CPTII,S$GLB,, | Performed by: THORACIC SURGERY (CARDIOTHORACIC VASCULAR SURGERY)

## 2022-11-09 PROCEDURE — 71046 X-RAY EXAM CHEST 2 VIEWS: CPT | Mod: 26,,, | Performed by: RADIOLOGY

## 2022-11-09 PROCEDURE — 71046 XR CHEST PA AND LATERAL: ICD-10-PCS | Mod: 26,,, | Performed by: RADIOLOGY

## 2022-11-09 PROCEDURE — 3077F PR MOST RECENT SYSTOLIC BLOOD PRESSURE >= 140 MM HG: ICD-10-PCS | Mod: CPTII,S$GLB,, | Performed by: THORACIC SURGERY (CARDIOTHORACIC VASCULAR SURGERY)

## 2022-11-09 PROCEDURE — 3008F PR BODY MASS INDEX (BMI) DOCUMENTED: ICD-10-PCS | Mod: CPTII,S$GLB,, | Performed by: THORACIC SURGERY (CARDIOTHORACIC VASCULAR SURGERY)

## 2022-11-09 PROCEDURE — 71046 X-RAY EXAM CHEST 2 VIEWS: CPT | Mod: TC

## 2022-11-09 PROCEDURE — 1159F MED LIST DOCD IN RCRD: CPT | Mod: CPTII,S$GLB,, | Performed by: THORACIC SURGERY (CARDIOTHORACIC VASCULAR SURGERY)

## 2022-11-09 RX ORDER — FLUCONAZOLE 150 MG/1
150 TABLET ORAL
Qty: 10 TABLET | Refills: 1 | Status: SHIPPED | OUTPATIENT
Start: 2022-11-09 | End: 2022-11-10

## 2022-11-10 ENCOUNTER — DOCUMENT SCAN (OUTPATIENT)
Dept: HOME HEALTH SERVICES | Facility: HOSPITAL | Age: 27
End: 2022-11-10
Payer: COMMERCIAL

## 2022-11-10 ENCOUNTER — EXTERNAL HOME HEALTH (OUTPATIENT)
Dept: HOME HEALTH SERVICES | Facility: HOSPITAL | Age: 27
End: 2022-11-10
Payer: COMMERCIAL

## 2022-11-14 ENCOUNTER — OFFICE VISIT (OUTPATIENT)
Dept: HOME HEALTH SERVICES | Facility: CLINIC | Age: 27
End: 2022-11-14
Payer: COMMERCIAL

## 2022-11-14 ENCOUNTER — LAB VISIT (OUTPATIENT)
Dept: LAB | Facility: HOSPITAL | Age: 27
End: 2022-11-14
Payer: COMMERCIAL

## 2022-11-14 VITALS
OXYGEN SATURATION: 97 % | TEMPERATURE: 98 F | HEART RATE: 100 BPM | SYSTOLIC BLOOD PRESSURE: 128 MMHG | DIASTOLIC BLOOD PRESSURE: 78 MMHG | RESPIRATION RATE: 18 BRPM

## 2022-11-14 DIAGNOSIS — J86.9 EMPYEMA OF LEFT PLEURAL SPACE: Primary | ICD-10-CM

## 2022-11-14 DIAGNOSIS — Z51.81 ENCOUNTER FOR THERAPEUTIC DRUG MONITORING: ICD-10-CM

## 2022-11-14 DIAGNOSIS — Z79.2 ENCOUNTER FOR LONG-TERM (CURRENT) USE OF ANTIBIOTICS: ICD-10-CM

## 2022-11-14 DIAGNOSIS — J86.9 EMPYEMA OF PLEURA: ICD-10-CM

## 2022-11-14 LAB
ALBUMIN SERPL BCP-MCNC: 3.2 G/DL (ref 3.5–5.2)
ALP SERPL-CCNC: 28 U/L (ref 55–135)
ALT SERPL W/O P-5'-P-CCNC: 20 U/L (ref 10–44)
ANION GAP SERPL CALC-SCNC: 15 MMOL/L (ref 8–16)
ANISOCYTOSIS BLD QL SMEAR: SLIGHT
AST SERPL-CCNC: 31 U/L (ref 10–40)
BASOPHILS NFR BLD: 0 % (ref 0–1.9)
BILIRUB SERPL-MCNC: 0.4 MG/DL (ref 0.1–1)
BUN SERPL-MCNC: 7 MG/DL (ref 6–20)
CALCIUM SERPL-MCNC: 8.3 MG/DL (ref 8.7–10.5)
CHLORIDE SERPL-SCNC: 97 MMOL/L (ref 95–110)
CO2 SERPL-SCNC: 22 MMOL/L (ref 23–29)
CREAT SERPL-MCNC: 1.5 MG/DL (ref 0.5–1.4)
CRP SERPL-MCNC: 15 MG/L (ref 0–8.2)
DACRYOCYTES BLD QL SMEAR: ABNORMAL
DIFFERENTIAL METHOD: ABNORMAL
EOSINOPHIL NFR BLD: 2 % (ref 0–8)
ERYTHROCYTE [DISTWIDTH] IN BLOOD BY AUTOMATED COUNT: 14 % (ref 11.5–14.5)
ERYTHROCYTE [SEDIMENTATION RATE] IN BLOOD BY PHOTOMETRIC METHOD: 79 MM/HR (ref 0–36)
EST. GFR  (NO RACE VARIABLE): 49 ML/MIN/1.73 M^2
GLUCOSE SERPL-MCNC: 92 MG/DL (ref 70–110)
HCT VFR BLD AUTO: 27.6 % (ref 37–48.5)
HGB BLD-MCNC: 8.8 G/DL (ref 12–16)
IMM GRANULOCYTES # BLD AUTO: ABNORMAL K/UL (ref 0–0.04)
IMM GRANULOCYTES NFR BLD AUTO: ABNORMAL % (ref 0–0.5)
LYMPHOCYTES NFR BLD: 26 % (ref 18–48)
MCH RBC QN AUTO: 26.3 PG (ref 27–31)
MCHC RBC AUTO-ENTMCNC: 31.9 G/DL (ref 32–36)
MCV RBC AUTO: 82 FL (ref 82–98)
MONOCYTES NFR BLD: 20 % (ref 4–15)
NEUTROPHILS NFR BLD: 44 % (ref 38–73)
NEUTS BAND NFR BLD MANUAL: 8 %
NRBC BLD-RTO: 0 /100 WBC
OVALOCYTES BLD QL SMEAR: ABNORMAL
PLATELET # BLD AUTO: 249 K/UL (ref 150–450)
PMV BLD AUTO: 9.8 FL (ref 9.2–12.9)
POIKILOCYTOSIS BLD QL SMEAR: SLIGHT
POLYCHROMASIA BLD QL SMEAR: ABNORMAL
POTASSIUM SERPL-SCNC: 3.6 MMOL/L (ref 3.5–5.1)
PROT SERPL-MCNC: 8.1 G/DL (ref 6–8.4)
RBC # BLD AUTO: 3.35 M/UL (ref 4–5.4)
SODIUM SERPL-SCNC: 134 MMOL/L (ref 136–145)
SPHEROCYTES BLD QL SMEAR: ABNORMAL
VANCOMYCIN TROUGH SERPL-MCNC: 12.6 UG/ML (ref 10–22)
WBC # BLD AUTO: 1.75 K/UL (ref 3.9–12.7)

## 2022-11-14 PROCEDURE — 3078F PR MOST RECENT DIASTOLIC BLOOD PRESSURE < 80 MM HG: ICD-10-PCS | Mod: CPTII,S$GLB,, | Performed by: NURSE PRACTITIONER

## 2022-11-14 PROCEDURE — 99350 HOME/RES VST EST HIGH MDM 60: CPT | Mod: ,,, | Performed by: NURSE PRACTITIONER

## 2022-11-14 PROCEDURE — 80053 COMPREHEN METABOLIC PANEL: CPT | Performed by: INTERNAL MEDICINE

## 2022-11-14 PROCEDURE — 3078F DIAST BP <80 MM HG: CPT | Mod: CPTII,S$GLB,, | Performed by: NURSE PRACTITIONER

## 2022-11-14 PROCEDURE — 85007 BL SMEAR W/DIFF WBC COUNT: CPT | Performed by: INTERNAL MEDICINE

## 2022-11-14 PROCEDURE — 86140 C-REACTIVE PROTEIN: CPT | Performed by: INTERNAL MEDICINE

## 2022-11-14 PROCEDURE — 80202 ASSAY OF VANCOMYCIN: CPT | Performed by: INTERNAL MEDICINE

## 2022-11-14 PROCEDURE — 85652 RBC SED RATE AUTOMATED: CPT | Performed by: INTERNAL MEDICINE

## 2022-11-14 PROCEDURE — 3074F PR MOST RECENT SYSTOLIC BLOOD PRESSURE < 130 MM HG: ICD-10-PCS | Mod: CPTII,S$GLB,, | Performed by: NURSE PRACTITIONER

## 2022-11-14 PROCEDURE — 3074F SYST BP LT 130 MM HG: CPT | Mod: CPTII,S$GLB,, | Performed by: NURSE PRACTITIONER

## 2022-11-14 PROCEDURE — 99350 PR HOME VISIT,ESTAB PATIENT,LEVEL IV: ICD-10-PCS | Mod: ,,, | Performed by: NURSE PRACTITIONER

## 2022-11-14 PROCEDURE — 85027 COMPLETE CBC AUTOMATED: CPT | Performed by: INTERNAL MEDICINE

## 2022-11-14 NOTE — PROGRESS NOTES
[facility]  Transition of Care Home Visit    Visit Date: 11/14/2022  Encounter Provider: Janna Dewey   PCP:  Jimmy Castellanos NP    PRESENTING HISTORY      Patient ID: Christina Leal is a 26 y.o. female.    Consult Requested By:  Janna Dewey  Reason for Consult:  Hospital Follow Up.    Christina is being seen at home due to being seen at home due to physical debility that presents a taxing effort to leave the home, to mitigate high risk of hospital readmission and/or due to the limited availability of reliable or safe options for transportation to the point of access to the provider. Prior to treatment on this visit the chart was reviewed and patient verbal consent was obtained.      Chief Complaint: Transitional Care        History of Present Illness: Ms. Christina Leal is a 26 y.o. female who was recently admitted to the hospital.    10/19-10/28 hospital encounter presented to the Emergency Department for evaluation of SOB which onset 6 days PTA. Symptoms are constant and moderate in severity. No mitigating or exacerbating factors reported. Associated sxs include fever (Tnow 100.6), cough, intermittent wheezing. Patient denies any CP, n/v/d, lightheadedness, weakness, numbness, and all other sxs at this time. No prior Tx reported. No further complaints or concerns at this time.   ___________________________________________________________________    Today:    HPI:  Patient is being seen for a tcc visit following a hospital encounter 10/19-10/28.  See hospital course for details.  Upon visit patient is sitting on her couch in no acute distress, VSS, AAOx3.  KANWAL PICC line in place.  Currently on Vancomycin and Rocephin per ID, to complete on 11/25.  Patient reports returning to her baseline health.  No complaints at this time.  Compliant with all medications.          Review of Systems   Constitutional: Negative.    HENT: Negative.     Eyes:  Positive for visual disturbance (wears glasses).    Respiratory: Negative.     Cardiovascular: Negative.    Gastrointestinal: Negative.    Endocrine: Negative.    Musculoskeletal: Negative.    Skin: Negative.    Allergic/Immunologic: Negative.    Neurological: Negative.    Hematological: Negative.    Psychiatric/Behavioral: Negative.       Assessments:  Environmental: Patient lives in an apartment complex on the bottom floor.  Functional Status: Independent of ADL's.  Safety: General safety precautions.  Nutritional: Adequate food in the home.   Home Health/DME/Supplies: [facility]. DME: none    PAST HISTORY:     No past medical history on file.    Past Surgical History:   Procedure Laterality Date    EVACUATION OF EMPYEMA Left 10/21/2022    Procedure: EVACUATION, EMPYEMA;  Surgeon: Dusty Moyer MD;  Location: [facility] OR;  Service: Cardiothoracic;  Laterality: Left;  VATS, WITH EVACUATION OF EMPYEMA    INJECTION OF ANESTHETIC AGENT AROUND MULTIPLE INTERCOSTAL NERVES Left 10/21/2022    Procedure: BLOCK, NERVE, INTERCOSTAL, 2 OR MORE;  Surgeon: Dusty Moyer MD;  Location: [facility] OR;  Service: Cardiothoracic;  Laterality: Left;    THORACOSCOPIC DECORTICATION OF LUNG Left 10/21/2022    Procedure: VATS, WITH DECORTICATION, LUNG;  Surgeon: Dusty Moyer MD;  Location: [facility] OR;  Service: Cardiothoracic;  Laterality: Left;  AND EVACUATION OF EMPYEMA       Family History   Problem Relation Age of Onset    Esophageal cancer Father     Heart disease Neg Hx     Cancer Neg Hx        Social History     Socioeconomic History    Marital status: Single   Tobacco Use    Smoking status: Former     Types: Cigarettes    Smokeless tobacco: Never   Substance and Sexual Activity    Alcohol use: Yes     Comment: social    Drug use: No    Sexual activity: Yes     Partners: Male     Birth control/protection: Condom   Social History Narrative    She is a graduate of [employer] and will be attending [employer] in the fall. She is a non-smoker and doesn't drink alcohol. She plans  to study child psychology.      Social Determinants of Health     Financial Resource Strain: Low Risk     Difficulty of Paying Living Expenses: Not hard at all   Food Insecurity: No Food Insecurity    Worried About Running Out of Food in the Last Year: Never true    Ran Out of Food in the Last Year: Never true   Transportation Needs: No Transportation Needs    Lack of Transportation (Medical): No    Lack of Transportation (Non-Medical): No   Physical Activity: Insufficiently Active    Days of Exercise per Week: 2 days    Minutes of Exercise per Session: 20 min   Stress: No Stress Concern Present    Feeling of Stress : Not at all   Social Connections: Socially Isolated    Frequency of Communication with Friends and Family: More than three times a week    Attends Jehovah's witness Services: Never    Active Member of Clubs or Organizations: No    Attends Club or Organization Meetings: Never    Marital Status: Never    Housing Stability: Low Risk     Unable to Pay for Housing in the Last Year: No    Number of Places Lived in the Last Year: 1    Unstable Housing in the Last Year: No       MEDICATIONS & ALLERGIES:     Current Outpatient Medications on File Prior to Visit   Medication Sig Dispense Refill    acetaminophen (TYLENOL) 500 MG tablet Take 500-1,000 mg by mouth every 6 (six) hours as needed for Pain.      ceftriaxone sodium (ROCEPHIN INJ) Inject 2 Doses as directed.      ibuprofen (ADVIL,MOTRIN) 200 MG tablet Take 200-400 mg by mouth every 6 (six) hours as needed for Pain.      norgestimate-ethinyl estradioL (ORTHO TRI-CYCLEN,TRI-SPRINTEC) 0.18/0.215/0.25 mg-35 mcg (28) tablet Take 1 tablet by mouth once daily. (Patient not taking: Reported on 11/2/2022) 28 tablet 11    pantoprazole (PROTONIX) 40 MG tablet Take 1 tablet (40 mg total) by mouth once daily. 30 tablet 11    vancomycin HCl (VANCOMYCIN 1 G/250 ML D5W, READY TO MIX SYSTEM,) Inject 375 mLs (1,500 mg total) into the vein 2 (two) times a day.       No  current facility-administered medications on file prior to visit.        Review of patient's allergies indicates:  No Known Allergies    OBJECTIVE:     Vital Signs:  Vitals:    11/14/22 1131   BP: 128/78   Pulse: 100   Resp: 18   Temp: 98.2 °F (36.8 °C)     There is no height or weight on file to calculate BMI.     Physical Exam:  Physical Exam  Constitutional:       Appearance: Normal appearance. She is obese.   HENT:      Nose: Nose normal.      Mouth/Throat:      Mouth: Mucous membranes are moist.      Pharynx: Oropharynx is clear.   Eyes:      Pupils: Pupils are equal, round, and reactive to light.   Cardiovascular:      Rate and Rhythm: Normal rate and regular rhythm.      Pulses: Normal pulses.      Heart sounds: Normal heart sounds.   Pulmonary:      Effort: Pulmonary effort is normal.   Abdominal:      General: Bowel sounds are normal.      Palpations: Abdomen is soft.   Musculoskeletal:         General: Normal range of motion.      Cervical back: Normal range of motion.   Skin:     General: Skin is warm and dry.   Neurological:      General: No focal deficit present.      Mental Status: She is alert and oriented to person, place, and time. Mental status is at baseline.   Psychiatric:         Mood and Affect: Mood normal.         Behavior: Behavior normal.         Thought Content: Thought content normal.         Judgment: Judgment normal.       Laboratory  Lab Results   Component Value Date    WBC 1.75 (LL) 11/14/2022    HGB 8.8 (L) 11/14/2022    HCT 27.6 (L) 11/14/2022    MCV 82 11/14/2022     11/14/2022     No results found for: INR, PROTIME  No results found for: HGBA1C  No results for input(s): POCTGLUCOSE in the last 72 hours.    Diagnostic Results:      TRANSITION OF CARE:     [facility] Contact Note: 11/7    Family and/or Caretaker present at visit?  Yes.  Diagnostic tests reviewed/disposition: No diagnosic tests pending after this hospitalization.  Disease/illness education: Continue  current medications.   Home health/community services discussion/referrals: Patient has home health established at [facility] .   Establishment or re-establishment of referral orders for community resources: No other necessary community resources.   Discussion with other health care providers: No discussion with other health care providers necessary.     Transition of Care Visit:  I have reviewed and updated the history and problem list.  I have reconciled the medication list.  I have discussed the hospitalization and current medical issues, prognosis and plans with the patient/family.  I  spent more than 50% of time discussing the care with the patient/family.  Total Face-to-Face Encounter: 60 minutes.    Medications Reconciliation:   I have reconciled the patient's home medications and discharge medications with the patient/family. I have updated all changes.  Refer to After-Visit Medication List.    ASSESSMENT & PLAN:     HIGH RISK CONDITION(S):      1. Empyema of left pleural space  Comments:  Recent hospital visit due to empyema  No fever since hospital visit  On Rocephin and Vancomycin via AKNWAL PICC, to complete 11/25  ID following  Monitor       Were controlled substances prescribed?  No    Instructions for the patient:  Encounter for Medical Follow-Up and Medication Review  - [facility] at NP to schedule follow-up visit with patient PRN     Patient Instructions Given:  - Continue all medications, treatments and therapies as ordered.   - Follow all instructions, recommendations as discussed.  - Maintain Safety Precautions at all times.  - Attend all medical appointments as scheduled.  - For worsening symptoms: call Primary Care Physician or Nurse Practitioner.  - For emergencies, call 911 or immediately report to the nearest emergency room    Scheduled Follow-up :  Future Appointments   Date Time Provider Department Center   11/15/2022  1:00 PM Lucero Ferris PA-C [facility]    12/5/2022 10:00 AM Roberto Fine MD ON PULMSVC  Medical C   12/12/2022  8:20 AM Wqaas Stevens MD, [facility] ON INFDIS  Medical C   12/16/2022 11:45 AM Doreen Garrison NP [facility] [place]       After Visit Medication List :     Medication List            Accurate as of November 14, 2022  3:22 PM. If you have any questions, ask your nurse or doctor.                CONTINUE taking these medications      acetaminophen 500 MG tablet  Commonly known as: TYLENOL     ibuprofen 200 MG tablet  Commonly known as: ADVIL,MOTRIN     norgestimate-ethinyl estradioL 0.18/0.215/0.25 mg-35 mcg (28) tablet  Commonly known as: ORTHO TRI-CYCLEN,TRI-SPRINTEC  Take 1 tablet by mouth once daily.     pantoprazole 40 MG tablet  Commonly known as: PROTONIX  Take 1 tablet (40 mg total) by mouth once daily.     ROCEPHIN INJ     VANCOMYCIN 1 G/250 ML D5W (READY TO MIX SYSTEM)  Inject 375 mLs (1,500 mg total) into the vein 2 (two) times a day.              Signature: Janna Charles NP

## 2022-11-17 ENCOUNTER — LAB VISIT (OUTPATIENT)
Dept: LAB | Facility: HOSPITAL | Age: 27
End: 2022-11-17
Attending: INTERNAL MEDICINE
Payer: COMMERCIAL

## 2022-11-17 ENCOUNTER — TELEPHONE (OUTPATIENT)
Dept: INFECTIOUS DISEASES | Facility: CLINIC | Age: 27
End: 2022-11-17
Payer: COMMERCIAL

## 2022-11-17 DIAGNOSIS — Z51.81 ENCOUNTER FOR THERAPEUTIC DRUG MONITORING: Primary | ICD-10-CM

## 2022-11-17 DIAGNOSIS — D50.9 IRON DEFICIENCY ANEMIA, UNSPECIFIED: ICD-10-CM

## 2022-11-17 LAB
BASOPHILS # BLD AUTO: 0.02 K/UL (ref 0–0.2)
BASOPHILS NFR BLD: 0.8 % (ref 0–1.9)
DIFFERENTIAL METHOD: ABNORMAL
EOSINOPHIL # BLD AUTO: 0.2 K/UL (ref 0–0.5)
EOSINOPHIL NFR BLD: 5.9 % (ref 0–8)
ERYTHROCYTE [DISTWIDTH] IN BLOOD BY AUTOMATED COUNT: 14.1 % (ref 11.5–14.5)
HCT VFR BLD AUTO: 29.9 % (ref 37–48.5)
HGB BLD-MCNC: 9.3 G/DL (ref 12–16)
IMM GRANULOCYTES # BLD AUTO: 0.01 K/UL (ref 0–0.04)
IMM GRANULOCYTES NFR BLD AUTO: 0.4 % (ref 0–0.5)
LYMPHOCYTES # BLD AUTO: 0.8 K/UL (ref 1–4.8)
LYMPHOCYTES NFR BLD: 30 % (ref 18–48)
MCH RBC QN AUTO: 26.3 PG (ref 27–31)
MCHC RBC AUTO-ENTMCNC: 31.1 G/DL (ref 32–36)
MCV RBC AUTO: 85 FL (ref 82–98)
MONOCYTES # BLD AUTO: 0.5 K/UL (ref 0.3–1)
MONOCYTES NFR BLD: 21.3 % (ref 4–15)
NEUTROPHILS # BLD AUTO: 1.1 K/UL (ref 1.8–7.7)
NEUTROPHILS NFR BLD: 41.6 % (ref 38–73)
NRBC BLD-RTO: 0 /100 WBC
PLATELET # BLD AUTO: 283 K/UL (ref 150–450)
PMV BLD AUTO: 9.8 FL (ref 9.2–12.9)
RBC # BLD AUTO: 3.54 M/UL (ref 4–5.4)
WBC # BLD AUTO: 2.53 K/UL (ref 3.9–12.7)

## 2022-11-17 PROCEDURE — 85025 COMPLETE CBC W/AUTO DIFF WBC: CPT | Performed by: INTERNAL MEDICINE

## 2022-11-17 NOTE — TELEPHONE ENCOUNTER
----- Message from Waqas Stevens MD, MAGDALENA sent at 11/17/2022  5:56 AM CST -----  Contact: joao  I requested already that she should get repeat CBC done-let her do this today .  ----- Message -----  From: Marleen Metzger MA  Sent: 11/16/2022   1:36 PM CST  To: Waqas Stevens MD, MAGDALENA      ----- Message -----  From: Coral Dang  Sent: 11/16/2022   1:28 PM CST  To: Rodney Lyons    Joao from [facility] is requesting a callback in regards to the patient white blood cells were low sort of in a critical level and the patient is concerned whether she should start a new treatment.      Please call Joao at [phone]      Thanks

## 2022-11-18 ENCOUNTER — PATIENT MESSAGE (OUTPATIENT)
Dept: INFECTIOUS DISEASES | Facility: CLINIC | Age: 27
End: 2022-11-18
Payer: COMMERCIAL

## 2022-11-18 DIAGNOSIS — J86.9 EMPYEMA OF LEFT PLEURAL SPACE: Primary | ICD-10-CM

## 2022-11-21 ENCOUNTER — LAB VISIT (OUTPATIENT)
Dept: LAB | Facility: HOSPITAL | Age: 27
End: 2022-11-21
Payer: COMMERCIAL

## 2022-11-21 DIAGNOSIS — K76.9 PLEURAL EFFUSION ASSOCIATED WITH HEPATIC DISORDER: ICD-10-CM

## 2022-11-21 DIAGNOSIS — N17.9 ACUTE KIDNEY INJURY: ICD-10-CM

## 2022-11-21 DIAGNOSIS — E87.6 HYPOKALEMIA: ICD-10-CM

## 2022-11-21 DIAGNOSIS — E87.1 HYPONATREMIA: ICD-10-CM

## 2022-11-21 DIAGNOSIS — J18.9 UNRESOLVED PNEUMONIA: ICD-10-CM

## 2022-11-21 DIAGNOSIS — J18.9 UNRESOLVED PNEUMONIA: Primary | ICD-10-CM

## 2022-11-21 DIAGNOSIS — E87.1 SERUM SODIUM DECREASED: ICD-10-CM

## 2022-11-21 DIAGNOSIS — J91.8 PLEURAL EFFUSION ASSOCIATED WITH HEPATIC DISORDER: ICD-10-CM

## 2022-11-21 DIAGNOSIS — E87.8 ELECTROLYTE IMBALANCE: ICD-10-CM

## 2022-11-21 LAB
ANION GAP SERPL CALC-SCNC: 13 MMOL/L (ref 8–16)
BASOPHILS # BLD AUTO: 0.05 K/UL (ref 0–0.2)
BASOPHILS NFR BLD: 1.5 % (ref 0–1.9)
BUN SERPL-MCNC: 5 MG/DL (ref 6–20)
CALCIUM SERPL-MCNC: 9 MG/DL (ref 8.7–10.5)
CHLORIDE SERPL-SCNC: 102 MMOL/L (ref 95–110)
CO2 SERPL-SCNC: 25 MMOL/L (ref 23–29)
CREAT SERPL-MCNC: 1.7 MG/DL (ref 0.5–1.4)
CRP SERPL-MCNC: 2.9 MG/L (ref 0–8.2)
DIFFERENTIAL METHOD: ABNORMAL
EOSINOPHIL # BLD AUTO: 0.1 K/UL (ref 0–0.5)
EOSINOPHIL NFR BLD: 2.9 % (ref 0–8)
ERYTHROCYTE [DISTWIDTH] IN BLOOD BY AUTOMATED COUNT: 14.5 % (ref 11.5–14.5)
ERYTHROCYTE [SEDIMENTATION RATE] IN BLOOD BY PHOTOMETRIC METHOD: 66 MM/HR (ref 0–36)
EST. GFR  (NO RACE VARIABLE): 42 ML/MIN/1.73 M^2
GLUCOSE SERPL-MCNC: 99 MG/DL (ref 70–110)
HCT VFR BLD AUTO: 30.5 % (ref 37–48.5)
HGB BLD-MCNC: 9.6 G/DL (ref 12–16)
IMM GRANULOCYTES # BLD AUTO: 0.03 K/UL (ref 0–0.04)
IMM GRANULOCYTES NFR BLD AUTO: 0.9 % (ref 0–0.5)
LYMPHOCYTES # BLD AUTO: 0.9 K/UL (ref 1–4.8)
LYMPHOCYTES NFR BLD: 27.3 % (ref 18–48)
MCH RBC QN AUTO: 26.4 PG (ref 27–31)
MCHC RBC AUTO-ENTMCNC: 31.5 G/DL (ref 32–36)
MCV RBC AUTO: 84 FL (ref 82–98)
MONOCYTES # BLD AUTO: 0.6 K/UL (ref 0.3–1)
MONOCYTES NFR BLD: 17.2 % (ref 4–15)
NEUTROPHILS # BLD AUTO: 1.7 K/UL (ref 1.8–7.7)
NEUTROPHILS NFR BLD: 50.2 % (ref 38–73)
NRBC BLD-RTO: 0 /100 WBC
PLATELET # BLD AUTO: 361 K/UL (ref 150–450)
PMV BLD AUTO: 9.5 FL (ref 9.2–12.9)
POTASSIUM SERPL-SCNC: 3.7 MMOL/L (ref 3.5–5.1)
RBC # BLD AUTO: 3.64 M/UL (ref 4–5.4)
SODIUM SERPL-SCNC: 140 MMOL/L (ref 136–145)
VANCOMYCIN TROUGH SERPL-MCNC: 24.8 UG/ML (ref 10–22)
WBC # BLD AUTO: 3.44 K/UL (ref 3.9–12.7)

## 2022-11-21 PROCEDURE — 80202 ASSAY OF VANCOMYCIN: CPT | Performed by: INTERNAL MEDICINE

## 2022-11-21 PROCEDURE — 85652 RBC SED RATE AUTOMATED: CPT | Performed by: INTERNAL MEDICINE

## 2022-11-21 PROCEDURE — 86140 C-REACTIVE PROTEIN: CPT | Performed by: INTERNAL MEDICINE

## 2022-11-21 PROCEDURE — 80048 BASIC METABOLIC PNL TOTAL CA: CPT | Performed by: INTERNAL MEDICINE

## 2022-11-21 PROCEDURE — 85025 COMPLETE CBC W/AUTO DIFF WBC: CPT | Performed by: INTERNAL MEDICINE

## 2022-11-22 ENCOUNTER — TELEPHONE (OUTPATIENT)
Dept: OBSTETRICS AND GYNECOLOGY | Facility: CLINIC | Age: 27
End: 2022-11-22
Payer: COMMERCIAL

## 2022-11-22 LAB
FUNGUS SPEC CULT: NORMAL
FUNGUS SPEC CULT: NORMAL

## 2022-11-22 NOTE — TELEPHONE ENCOUNTER
Called pt to reschedule virtual appt, after confirming pt identifiers rescheduled pt's virtual appt on 12/16 hgvc to 12/16 onlc

## 2022-11-28 ENCOUNTER — LAB VISIT (OUTPATIENT)
Dept: LAB | Facility: HOSPITAL | Age: 27
End: 2022-11-28
Attending: NURSE PRACTITIONER
Payer: COMMERCIAL

## 2022-11-28 ENCOUNTER — TELEPHONE (OUTPATIENT)
Dept: PULMONOLOGY | Facility: CLINIC | Age: 27
End: 2022-11-28
Payer: COMMERCIAL

## 2022-11-28 ENCOUNTER — OFFICE VISIT (OUTPATIENT)
Dept: INTERNAL MEDICINE | Facility: CLINIC | Age: 27
End: 2022-11-28
Payer: COMMERCIAL

## 2022-11-28 VITALS
HEART RATE: 98 BPM | DIASTOLIC BLOOD PRESSURE: 88 MMHG | BODY MASS INDEX: 42.77 KG/M2 | WEIGHT: 282.19 LBS | HEIGHT: 68 IN | SYSTOLIC BLOOD PRESSURE: 136 MMHG | TEMPERATURE: 98 F | OXYGEN SATURATION: 99 %

## 2022-11-28 DIAGNOSIS — E66.01 CLASS 3 SEVERE OBESITY DUE TO EXCESS CALORIES WITHOUT SERIOUS COMORBIDITY WITH BODY MASS INDEX (BMI) OF 45.0 TO 49.9 IN ADULT: ICD-10-CM

## 2022-11-28 DIAGNOSIS — N17.9 ACUTE KIDNEY INJURY: ICD-10-CM

## 2022-11-28 DIAGNOSIS — Z23 NEEDS FLU SHOT: ICD-10-CM

## 2022-11-28 DIAGNOSIS — J86.9 EMPYEMA OF LEFT PLEURAL SPACE: ICD-10-CM

## 2022-11-28 DIAGNOSIS — J91.8 PARAPNEUMONIC EFFUSION: ICD-10-CM

## 2022-11-28 DIAGNOSIS — J18.9 PARAPNEUMONIC EFFUSION: ICD-10-CM

## 2022-11-28 DIAGNOSIS — Z09 HOSPITAL DISCHARGE FOLLOW-UP: Primary | ICD-10-CM

## 2022-11-28 LAB
BASOPHILS # BLD AUTO: 0.09 K/UL (ref 0–0.2)
BASOPHILS NFR BLD: 1.5 % (ref 0–1.9)
DIFFERENTIAL METHOD: ABNORMAL
EOSINOPHIL # BLD AUTO: 0 K/UL (ref 0–0.5)
EOSINOPHIL NFR BLD: 0.3 % (ref 0–8)
ERYTHROCYTE [DISTWIDTH] IN BLOOD BY AUTOMATED COUNT: 14.9 % (ref 11.5–14.5)
HCT VFR BLD AUTO: 34.5 % (ref 37–48.5)
HGB BLD-MCNC: 11 G/DL (ref 12–16)
IMM GRANULOCYTES # BLD AUTO: 0.02 K/UL (ref 0–0.04)
IMM GRANULOCYTES NFR BLD AUTO: 0.3 % (ref 0–0.5)
LYMPHOCYTES # BLD AUTO: 1.1 K/UL (ref 1–4.8)
LYMPHOCYTES NFR BLD: 18.1 % (ref 18–48)
MCH RBC QN AUTO: 26.6 PG (ref 27–31)
MCHC RBC AUTO-ENTMCNC: 31.9 G/DL (ref 32–36)
MCV RBC AUTO: 83 FL (ref 82–98)
MONOCYTES # BLD AUTO: 0.8 K/UL (ref 0.3–1)
MONOCYTES NFR BLD: 13.1 % (ref 4–15)
NEUTROPHILS # BLD AUTO: 4.1 K/UL (ref 1.8–7.7)
NEUTROPHILS NFR BLD: 66.7 % (ref 38–73)
NRBC BLD-RTO: 0 /100 WBC
PLATELET # BLD AUTO: 444 K/UL (ref 150–450)
PMV BLD AUTO: 9.9 FL (ref 9.2–12.9)
RBC # BLD AUTO: 4.14 M/UL (ref 4–5.4)
WBC # BLD AUTO: 6.18 K/UL (ref 3.9–12.7)

## 2022-11-28 PROCEDURE — 1159F PR MEDICATION LIST DOCUMENTED IN MEDICAL RECORD: ICD-10-PCS | Mod: CPTII,S$GLB,, | Performed by: NURSE PRACTITIONER

## 2022-11-28 PROCEDURE — 3075F PR MOST RECENT SYSTOLIC BLOOD PRESS GE 130-139MM HG: ICD-10-PCS | Mod: CPTII,S$GLB,, | Performed by: NURSE PRACTITIONER

## 2022-11-28 PROCEDURE — 3079F PR MOST RECENT DIASTOLIC BLOOD PRESSURE 80-89 MM HG: ICD-10-PCS | Mod: CPTII,S$GLB,, | Performed by: NURSE PRACTITIONER

## 2022-11-28 PROCEDURE — 3008F PR BODY MASS INDEX (BMI) DOCUMENTED: ICD-10-PCS | Mod: CPTII,S$GLB,, | Performed by: NURSE PRACTITIONER

## 2022-11-28 PROCEDURE — 99999 PR PBB SHADOW E&M-EST. PATIENT-LVL IV: ICD-10-PCS | Mod: PBBFAC,,, | Performed by: NURSE PRACTITIONER

## 2022-11-28 PROCEDURE — 36415 COLL VENOUS BLD VENIPUNCTURE: CPT | Performed by: NURSE PRACTITIONER

## 2022-11-28 PROCEDURE — 99215 OFFICE O/P EST HI 40 MIN: CPT | Mod: 25,S$GLB,, | Performed by: NURSE PRACTITIONER

## 2022-11-28 PROCEDURE — 85025 COMPLETE CBC W/AUTO DIFF WBC: CPT | Performed by: NURSE PRACTITIONER

## 2022-11-28 PROCEDURE — 99999 PR PBB SHADOW E&M-EST. PATIENT-LVL IV: CPT | Mod: PBBFAC,,, | Performed by: NURSE PRACTITIONER

## 2022-11-28 PROCEDURE — 90471 IMMUNIZATION ADMIN: CPT | Mod: S$GLB,,, | Performed by: NURSE PRACTITIONER

## 2022-11-28 PROCEDURE — 3079F DIAST BP 80-89 MM HG: CPT | Mod: CPTII,S$GLB,, | Performed by: NURSE PRACTITIONER

## 2022-11-28 PROCEDURE — 80048 BASIC METABOLIC PNL TOTAL CA: CPT | Performed by: NURSE PRACTITIONER

## 2022-11-28 PROCEDURE — 99215 PR OFFICE/OUTPT VISIT, EST, LEVL V, 40-54 MIN: ICD-10-PCS | Mod: 25,S$GLB,, | Performed by: NURSE PRACTITIONER

## 2022-11-28 PROCEDURE — 90686 FLU VACCINE (QUAD) GREATER THAN OR EQUAL TO 3YO PRESERVATIVE FREE IM: ICD-10-PCS | Mod: S$GLB,,, | Performed by: NURSE PRACTITIONER

## 2022-11-28 PROCEDURE — 90686 IIV4 VACC NO PRSV 0.5 ML IM: CPT | Mod: S$GLB,,, | Performed by: NURSE PRACTITIONER

## 2022-11-28 PROCEDURE — 1159F MED LIST DOCD IN RCRD: CPT | Mod: CPTII,S$GLB,, | Performed by: NURSE PRACTITIONER

## 2022-11-28 PROCEDURE — 3075F SYST BP GE 130 - 139MM HG: CPT | Mod: CPTII,S$GLB,, | Performed by: NURSE PRACTITIONER

## 2022-11-28 PROCEDURE — 90471 FLU VACCINE (QUAD) GREATER THAN OR EQUAL TO 3YO PRESERVATIVE FREE IM: ICD-10-PCS | Mod: S$GLB,,, | Performed by: NURSE PRACTITIONER

## 2022-11-28 PROCEDURE — 3008F BODY MASS INDEX DOCD: CPT | Mod: CPTII,S$GLB,, | Performed by: NURSE PRACTITIONER

## 2022-11-28 RX ORDER — PROPRANOLOL HYDROCHLORIDE 10 MG/1
10 TABLET ORAL 3 TIMES DAILY PRN
Qty: 30 TABLET | Refills: 0 | Status: SHIPPED | OUTPATIENT
Start: 2022-11-28 | End: 2023-05-30

## 2022-11-28 RX ORDER — ONDANSETRON HYDROCHLORIDE 4 MG/5ML
8 SOLUTION ORAL 2 TIMES DAILY
COMMUNITY
End: 2022-12-05

## 2022-11-28 NOTE — PROGRESS NOTES
Chief Complaint  Chief Complaint   Patient presents with    Follow-up         HPI     HPI  Christina Leal is a 26 y.o. female with medical diagnoses as listed in the medical history and problem list that presents for Hospital Discharge Follow-up. Pt is known to me with her last appointment 10/18/2022.      Hospital Discharge Follow-up: Hospitalized due to Empyema of left plural space and left parapneumonic effusion. Done with IV Vancomycin 7 days ago. PICC line has been removed. Labs drawn 7 days ago confirms acute kidney injury and decreased renal function likely due to acute illness and aggressive care with ABX. Creatinine increased. Blood count is trending towards acceptable range as of 7 days ago. She is currently being followed by Pulm and ID with appt 12/05 and 12/12.   Pertinent negatives are chest pain/palpitations/tightness/discomfort, SOB, GI upset, urinary/bowel changes, unexplained weight loss/gain, dizziness/headaches/syncope.       History     PAST MEDICAL HISTORY:  History reviewed. No pertinent past medical history.    PAST SURGICAL HISTORY:  Past Surgical History:   Procedure Laterality Date    EVACUATION OF EMPYEMA Left 10/21/2022    Procedure: EVACUATION, EMPYEMA;  Surgeon: Dusty Moyer MD;  Location: [facility] OR;  Service: Cardiothoracic;  Laterality: Left;  VATS, WITH EVACUATION OF EMPYEMA    INJECTION OF ANESTHETIC AGENT AROUND MULTIPLE INTERCOSTAL NERVES Left 10/21/2022    Procedure: BLOCK, NERVE, INTERCOSTAL, 2 OR MORE;  Surgeon: Dusty Moyer MD;  Location: [facility] OR;  Service: Cardiothoracic;  Laterality: Left;    THORACOSCOPIC DECORTICATION OF LUNG Left 10/21/2022    Procedure: VATS, WITH DECORTICATION, LUNG;  Surgeon: Dusty Moyer MD;  Location: [facility] OR;  Service: Cardiothoracic;  Laterality: Left;  AND EVACUATION OF EMPYEMA       SOCIAL HISTORY:  Social History     Socioeconomic History    Marital status: Single   Tobacco Use    Smoking status: Former     Types: Cigarettes     Smokeless tobacco: Never   Substance and Sexual Activity    Alcohol use: Yes     Comment: social    Drug use: No    Sexual activity: Yes     Partners: Male     Birth control/protection: Condom   Social History Narrative    She is a graduate of [employer] and will be attending [employer] in the fall. She is a non-smoker and doesn't drink alcohol. 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      Social Determinants of Health     Financial Resource Strain: Low Risk     Difficulty of Paying Living Expenses: Not hard at all   Food Insecurity: No Food Insecurity    Worried About Running Out of Food in the Last Year: Never true    Ran Out of Food in the Last Year: Never true   Transportation Needs: No Transportation Needs    Lack of Transportation (Medical): No    Lack of Transportation (Non-Medical): No   Physical Activity: Insufficiently Active    Days of Exercise per Week: 2 days    Minutes of Exercise per Session: 20 min   Stress: No Stress Concern Present    Feeling of Stress : Not at all   Social Connections: Socially Isolated    Frequency of Communication with Friends and Family: More than three times a week    Attends Mosque Services: Never    Active Member of Clubs or Organizations: No    Attends Club or Organization Meetings: Never    Marital Status: Never    Housing Stability: Low Risk     Unable to Pay for Housing in the Last Year: No    Number of Places Lived in the Last Year: 1    Unstable Housing in the Last Year: No       FAMILY HISTORY:  Family History   Problem Relation Age of Onset    Esophageal cancer Father     Heart disease Neg Hx     Cancer Neg Hx        ALLERGIES AND MEDICATIONS: updated and reviewed.  Review of patient's allergies indicates:  No Known Allergies  Current Outpatient Medications   Medication Sig Dispense Refill    ibuprofen (ADVIL,MOTRIN) 200 MG tablet Take 200-400 mg by mouth every 6 (six) hours as needed for Pain.      norgestimate-ethinyl estradioL (ORTHO  "TRI-CYCLEN,TRI-SPRINTEC) 0.18/0.215/0.25 mg-35 mcg (28) tablet Take 1 tablet by mouth once daily. 28 tablet 11    ondansetron (ZOFRAN) 4 mg/5 mL solution Take 8 mg by mouth 2 (two) times daily.      pantoprazole (PROTONIX) 40 MG tablet Take 1 tablet (40 mg total) by mouth once daily. 30 tablet 11    acetaminophen (TYLENOL) 500 MG tablet Take 500-1,000 mg by mouth every 6 (six) hours as needed for Pain.      ceftriaxone sodium (ROCEPHIN INJ) Inject 2 Doses as directed.      propranoloL (INDERAL) 10 MG tablet Take 1 tablet (10 mg total) by mouth 3 (three) times daily as needed. 30 tablet 0    vancomycin HCl (VANCOMYCIN 1 G/250 ML D5W, READY TO MIX SYSTEM,) Inject 375 mLs (1,500 mg total) into the vein 2 (two) times a day. (Patient not taking: Reported on 11/28/2022)       No current facility-administered medications for this visit.           Exam     ROS  Review of Systems   Constitutional:  Negative for appetite change, chills, fatigue and fever.   HENT:  Negative for congestion, ear pain, postnasal drip, rhinorrhea, sinus pressure, sneezing and sore throat.    Respiratory:  Positive for shortness of breath.    Cardiovascular:  Negative for chest pain and palpitations.   Gastrointestinal:  Negative for abdominal pain, constipation, diarrhea, nausea and vomiting.   Genitourinary:  Negative for dysuria.   Musculoskeletal:  Negative for arthralgias.   Neurological:  Negative for headaches.   Psychiatric/Behavioral:  Negative for sleep disturbance.          Physical Exam  Vitals:    11/28/22 1012   BP: 136/88   BP Location: Left arm   Patient Position: Sitting   BP Method: Large (Manual)   Pulse: 98   Temp: 97.9 °F (36.6 °C)   TempSrc: Tympanic   SpO2: 99%   Weight: 128 kg (282 lb 3 oz)   Height: 5' 8" (1.727 m)    Body mass index is 42.91 kg/m².  Weight: 128 kg (282 lb 3 oz)   Height: 5' 8" (172.7 cm)   Physical Exam  Constitutional:       General: She is not in acute distress.     Appearance: She is obese.   HENT:      " "Head: Normocephalic and atraumatic.      Right Ear: External ear normal.      Left Ear: External ear normal.      Nose: Nose normal.   Eyes:      Pupils: Pupils are equal, round, and reactive to light.   Cardiovascular:      Rate and Rhythm: Regular rhythm.   Pulmonary:      Effort: Pulmonary effort is normal. No respiratory distress.      Breath sounds: Normal breath sounds. No wheezing.   Abdominal:      General: Bowel sounds are normal.      Palpations: Abdomen is soft.   Musculoskeletal:      Cervical back: Neck supple.   Lymphadenopathy:      Cervical: No cervical adenopathy.   Skin:     General: Skin is warm and dry.      Capillary Refill: Capillary refill takes less than 2 seconds.   Neurological:      Mental Status: She is alert and oriented to person, place, and time.   Psychiatric:         Mood and Affect: Mood normal.           Health Maintenance         Date Due Completion Date    Lipid Panel Never done ---    Pneumococcal Vaccines (Age 0-64) (1 - PCV) Never done ---    TETANUS VACCINE 09/23/2019 9/23/2009    COVID-19 Vaccine (2 - Booster for Drake series) 05/08/2021 3/13/2021    Influenza Vaccine (1) 09/01/2022 11/22/2011    Pap Smear 09/23/2025 9/23/2022              Assessment & Plan     Assessment & Plan  Problem List Items Addressed This Visit          Pulmonary    Parapneumonic effusion, Left   -Continue follow-up with Pulm and ID    Empyema of left pleural space  -Continue follow-up with Pulm and ID       Endocrine    Class 3 severe obesity in adult  -Diet and exercise discussed with patient.   -We discussed a "heart-healthy" diet with lots of fruits and vegetables, fiber, and healthy fats (like those found in fish and certain oils). Limit sugar, red meats, unhealthy fats and late night snacking     Other Visit Diagnoses       Hospital discharge follow-up    -  Primary  - Inderal to treat tachycardia when visiting physical therapy  -Counseled on age appropriate medical preventative services " including age appropriate cancer screenings, age appropriate eye and dental exams, over all nutritional health, need for a consistent exercise regimen, and an over all push towards maintaining a vigorous and active lifestyle.  Counseled on age appropriate vaccines and discussed upcoming health care needs based on age/gender. Discussed good sleep hygiene and stress management.    Relevant Medications    propranoloL (INDERAL) 10 MG tablet    Other Relevant Orders    Ambulatory referral/consult to Physical/Occupational Therapy    Acute kidney injury    - Labs to screen for new baseline and positive trend to acceptable value      Relevant Orders    Basic Metabolic Panel (Completed)    CBC Auto Differential (Completed)    Needs flu shot      -As ordered     Relevant Orders    Influenza - Quadrivalent (PF) (Completed)              Health Maintenance reviewed: Deferred per patient    Follow-up: 6 months to Establish Care with Dr. Zaidi    Transitional Care Note    Family and/or Caretaker present at visit?  No.  Diagnostic tests reviewed/disposition: No diagnosic tests pending after this hospitalization.  Disease/illness education: Yes   Home health/community services discussion/referrals: Patient does not have home health established from hospital visit.  They do not need home health.  If needed, we will set up home health for the patient.   Establishment or re-establishment of referral orders for community resources: No other necessary community resources.   Discussion with other health care providers: No discussion with other health care providers necessary.

## 2022-11-28 NOTE — TELEPHONE ENCOUNTER
----- Message from Waqas Stevens MD, FIDSA sent at 11/23/2022 11:23 AM CST -----  CT scan should be done soon - will pull PICC line -CT scan this week please

## 2022-11-29 ENCOUNTER — TELEPHONE (OUTPATIENT)
Dept: INTERNAL MEDICINE | Facility: CLINIC | Age: 27
End: 2022-11-29
Payer: COMMERCIAL

## 2022-11-29 LAB
ANION GAP SERPL CALC-SCNC: 13 MMOL/L (ref 8–16)
BUN SERPL-MCNC: 11 MG/DL (ref 6–20)
CALCIUM SERPL-MCNC: 9.8 MG/DL (ref 8.7–10.5)
CHLORIDE SERPL-SCNC: 94 MMOL/L (ref 95–110)
CO2 SERPL-SCNC: 24 MMOL/L (ref 23–29)
CREAT SERPL-MCNC: 1.7 MG/DL (ref 0.5–1.4)
EST. GFR  (NO RACE VARIABLE): 41.9 ML/MIN/1.73 M^2
GLUCOSE SERPL-MCNC: 95 MG/DL (ref 70–110)
POTASSIUM SERPL-SCNC: 3.3 MMOL/L (ref 3.5–5.1)
SODIUM SERPL-SCNC: 131 MMOL/L (ref 136–145)

## 2022-11-29 NOTE — TELEPHONE ENCOUNTER
I discussed lab results that confirmed ongoing kidney injury with electrolyze imbalance.  2. We discussed a plan for her to visit with either [facility] or [facility] if internal Nephrology is too far out.  Mrs. Leal verbalized understanding of treatment plan.    Jimmy Castellanos NP

## 2022-12-01 ENCOUNTER — DOCUMENT SCAN (OUTPATIENT)
Dept: HOME HEALTH SERVICES | Facility: HOSPITAL | Age: 27
End: 2022-12-01
Payer: COMMERCIAL

## 2022-12-05 ENCOUNTER — TELEPHONE (OUTPATIENT)
Dept: SLEEP MEDICINE | Facility: CLINIC | Age: 27
End: 2022-12-05
Payer: COMMERCIAL

## 2022-12-05 ENCOUNTER — OFFICE VISIT (OUTPATIENT)
Dept: PULMONOLOGY | Facility: CLINIC | Age: 27
End: 2022-12-05
Payer: COMMERCIAL

## 2022-12-05 VITALS
RESPIRATION RATE: 16 BRPM | OXYGEN SATURATION: 99 % | HEIGHT: 68 IN | BODY MASS INDEX: 41.57 KG/M2 | HEART RATE: 102 BPM | SYSTOLIC BLOOD PRESSURE: 140 MMHG | WEIGHT: 274.25 LBS | DIASTOLIC BLOOD PRESSURE: 80 MMHG

## 2022-12-05 DIAGNOSIS — J86.9 EMPYEMA OF LEFT PLEURAL SPACE: Primary | ICD-10-CM

## 2022-12-05 DIAGNOSIS — Z23 NEED FOR PNEUMOCOCCAL VACCINATION: ICD-10-CM

## 2022-12-05 DIAGNOSIS — E66.01 CLASS 3 SEVERE OBESITY DUE TO EXCESS CALORIES WITH BODY MASS INDEX (BMI) OF 40.0 TO 44.9 IN ADULT, UNSPECIFIED WHETHER SERIOUS COMORBIDITY PRESENT: ICD-10-CM

## 2022-12-05 DIAGNOSIS — Z72.0 CURRENT OCCASIONAL SMOKER: ICD-10-CM

## 2022-12-05 DIAGNOSIS — J45.909 CHILDHOOD ASTHMA, UNSPECIFIED ASTHMA SEVERITY, UNSPECIFIED WHETHER COMPLICATED, UNSPECIFIED WHETHER PERSISTENT: ICD-10-CM

## 2022-12-05 DIAGNOSIS — R29.818 SUSPECTED SLEEP APNEA: ICD-10-CM

## 2022-12-05 DIAGNOSIS — R06.00 DYSPNEA, UNSPECIFIED TYPE: ICD-10-CM

## 2022-12-05 PROCEDURE — 3077F PR MOST RECENT SYSTOLIC BLOOD PRESSURE >= 140 MM HG: ICD-10-PCS | Mod: CPTII,S$GLB,, | Performed by: INTERNAL MEDICINE

## 2022-12-05 PROCEDURE — 3077F SYST BP >= 140 MM HG: CPT | Mod: CPTII,S$GLB,, | Performed by: INTERNAL MEDICINE

## 2022-12-05 PROCEDURE — 99214 PR OFFICE/OUTPT VISIT, EST, LEVL IV, 30-39 MIN: ICD-10-PCS | Mod: 25,S$GLB,, | Performed by: INTERNAL MEDICINE

## 2022-12-05 PROCEDURE — 90677 PCV20 VACCINE IM: CPT | Mod: S$GLB,,, | Performed by: INTERNAL MEDICINE

## 2022-12-05 PROCEDURE — 99214 OFFICE O/P EST MOD 30 MIN: CPT | Mod: 25,S$GLB,, | Performed by: INTERNAL MEDICINE

## 2022-12-05 PROCEDURE — 3079F DIAST BP 80-89 MM HG: CPT | Mod: CPTII,S$GLB,, | Performed by: INTERNAL MEDICINE

## 2022-12-05 PROCEDURE — 1159F PR MEDICATION LIST DOCUMENTED IN MEDICAL RECORD: ICD-10-PCS | Mod: CPTII,S$GLB,, | Performed by: INTERNAL MEDICINE

## 2022-12-05 PROCEDURE — 1159F MED LIST DOCD IN RCRD: CPT | Mod: CPTII,S$GLB,, | Performed by: INTERNAL MEDICINE

## 2022-12-05 PROCEDURE — 3079F PR MOST RECENT DIASTOLIC BLOOD PRESSURE 80-89 MM HG: ICD-10-PCS | Mod: CPTII,S$GLB,, | Performed by: INTERNAL MEDICINE

## 2022-12-05 PROCEDURE — 99999 PR PBB SHADOW E&M-EST. PATIENT-LVL IV: ICD-10-PCS | Mod: PBBFAC,,, | Performed by: INTERNAL MEDICINE

## 2022-12-05 PROCEDURE — 3008F BODY MASS INDEX DOCD: CPT | Mod: CPTII,S$GLB,, | Performed by: INTERNAL MEDICINE

## 2022-12-05 PROCEDURE — 99999 PR PBB SHADOW E&M-EST. PATIENT-LVL IV: CPT | Mod: PBBFAC,,, | Performed by: INTERNAL MEDICINE

## 2022-12-05 PROCEDURE — 90471 PNEUMOCOCCAL CONJUGATE VACCINE 20-VALENT: ICD-10-PCS | Mod: S$GLB,,, | Performed by: INTERNAL MEDICINE

## 2022-12-05 PROCEDURE — 3008F PR BODY MASS INDEX (BMI) DOCUMENTED: ICD-10-PCS | Mod: CPTII,S$GLB,, | Performed by: INTERNAL MEDICINE

## 2022-12-05 PROCEDURE — 90677 PNEUMOCOCCAL CONJUGATE VACCINE 20-VALENT: ICD-10-PCS | Mod: S$GLB,,, | Performed by: INTERNAL MEDICINE

## 2022-12-05 PROCEDURE — 90471 IMMUNIZATION ADMIN: CPT | Mod: S$GLB,,, | Performed by: INTERNAL MEDICINE

## 2022-12-05 NOTE — PATIENT INSTRUCTIONS
No eating / drinking for 3 hours before going to bed.  Elevate head of bed 30 - 45 %     CPAP HABITUATION PROCEDURE     Noel Cade, Ph.D., [facility] and Robin Floyd M.D.  [facility], [facility]     Some people have difficulty adjusting to CPAP/BiPAP/AutoCPAP.  This is not unusual or hard to understand: Breathing with CPAP is different from ordinary breathing, and this difference is aversive to some. The problem can be overcome, however, and the benefits CPAP confers are certainly worth the effort.  Below, you will find a simple and gradual way to get used to CPAP before you try to use it all night, every night.  The essence of this procedure is to relax and let breathing with CPAP become a habit.  It may take about 2 weeks, and involves the following:      CPAP while awake and comfortably seated, during the late evening.     CPAP in bed while attempting sleep at night.     If your discomfort is too great at any time, discontinue and attempt again later the same night, for the same amount of time.   You and your physician may alter the times and pressures if necessary.     If you find that it is very easy to get used to CPAP, you may start using it every night when you are comfortable enough to do so.  IMPORTANT REMINDER: If you have a cold or sinus congestion it is okay to miss a night or two of CPAP. Consider using antihistamines or decongestants to clear up your sinus congestion prior to sleeping.     DAYS  1-3   Start CPAP while awake and comfortably seated during the late evening, after having prepared for bed.  You may do this while watching television, listening to music or reading. Use for 1 hour, then take off CPAP and go directly to bed to sleep     DAYS  4-6     Start CPAP when you go to bed and use for 1 hour, or until you fall asleep.  If your discomfort is too great at any time, discontinue and attempt again later the same night, for the same designated  amount of time (1 hour).      DAYS  7-9     Increase time with CPAP to 2 hours a night.  If your discomfort is too great at any time, discontinue and attempt again later the same night, for the same designated amount of time (2 hours).      DAYS 10-12    Increase time with CPAP to 3 hours a night. If your discomfort is too great at any time, discontinue and attempt again later the same night, for the same designated amount of time (3 hours).      DAYS 13-15     Sleep the entire night with CPAP.      OPTIONAL: You may use Progressive Muscle Relaxation (PMR) to help put you at ease when using CPAP; do PMR twice each day, once in the morning or afternoon, and once in the evening just before using CPAP. You may do PMR prior to any attempt until you are comfortable with CPAP.        Continuous Positive Air Pressure (CPAP)  Continuous positive air pressure (CPAP) uses gentle air pressure to hold the airway open. CPAP is often the most effective treatment for sleep apnea and severe snoring. It works very well for many people. But keep in mind that it can take several adjustments before the setup is right for you.       How CPAP Works  CPAP is a small portable pump beside the bed. The pump sends air through a hose, which is held over your nose and/or mouth by a mask. Mild air pressure is gently pushed through your airway. The air pressure nudges sagging tissues aside. This widens the airway so you can breathe better. CPAP may be combined with other kinds of therapy for sleep apnea.       Types of Air Pressure Treatments  There are different types of CPAP. Your doctor or CPAP technician will help you decide which type is best for you:  Basic CPAP keeps the pressure constant all night long.  A bilevel device (BiPAP) provides more pressure when you breathe in and less when you breathe out. A BiPAP machine also may be set to provide automatic breaths to maintain breathing if you stop breathing while sleeping.  An autoCPAP  device automatically adjusts pressure throughout the night and in response to changes such as body position, sleep stage, and snoring.  © 7396-4657 [employer]. [address] [place] [address]. All rights reserved. This information is not intended as a substitute for professional medical care. Always follow your healthcare professional's instructions.        Snoring and Sleep Apnea: Notes for a Partner  Snoring and sleep apnea affect your life, as well as your partners. You can help in the treatment of the problem. Be supportive. Encourage your partner both to get treatment and to make the adjustments needed to follow through.       Adjusting to Changes  Your partners treatment may involve making changes to certain life habits. You can help your partner make and stick with these changes. For example:  Support and even join your partners exercise program.  Be supportive if your partner gets CPAP (continuous positive airway pressure). He or she may feel self-conscious at first. Remind your partner to expect adjustments to CPAP before it feels just right.  Consider joining a snoring and sleep apnea support group.  Go Along to See the Health Care Provider  You can give the health care provider the best account of your partners nighttime breathing and snoring patterns. Try to go along to health care providers appointments. If you cant go, write notes for your partner to give to the health care provider. Describe your partners snoring and sleep breathing patterns in detail.  Tips for Sleeping with a Snorer  Until treatment takes care of your partners snoring:  Try to go to bed first. It may help if youre already asleep when your partner starts to snore.  Wear earplugs to bed. A fan or other source of background noise may also help drown out snoring.   © 5523-5282 [employer]. [address] [place] [address]. All rights reserved. This information is not intended as a  substitute for professional medical care. Always follow your healthcare professional's instructions.        Continuous Positive Airway Pressure (CPAP)  Your health care provider has prescribed continuous positive airway pressure (CPAP) therapy for you. A CPAP device helps you breathe better at night. The device delivers air through your nose or mouth when you breathe in to keep your air passages open. CPAP is:  Used most often to treat sleep apnea and some other problems (Sleep apnea is a chronic condition with periods of sleep in which you briefly stop breathing.)  Safe and very effective, but it takes time to get used to the mask.   Your health care provider, nurse, or medical supplier will give you tips for wearing and caring for your CPAP device.  General guidelines  It's very important not to give up! It takes time to get used to wearing the mask at night.  Practice using your CPAP device during the day, especially whenever you take a nap.  Remember, there are several different types of masks. If you cant get used to your mask, ask your provider or medical supply company about trying another style.  If you have nasal stuffiness or dryness when using your CPAP device, talk with your provider or medical supply company. There are ways to lessen these problems. For example, your provider may recommend moistening nasal spray or the medical supply company may recommend a device with a humidifier.  The goal is to use your CPAP all night, every night, during all naps, and even when you travel.  Keep your mask clean. Wash it with soap and water. Be sure to rinse the mask and tubing well with water to remove any soap. Let them air-dry thoroughly before using.  Make yourself comfortable when sleeping with CPAP. Try using extra pillows.  Work with your medical supply company so that you know how to correctly use your CPAP. Their representative will be able to help you:  Use the CPAP correctly  Troubleshoot any problems  that come up  Learn to clean and maintain the device  Adjust to regular use of the CPAP  © 7550-3143 [employer]. [address] [place] [address], [place] [address]. All rights reserved. This information is not intended as a substitute for professional medical care. Always follow your healthcare professional's instructions.

## 2022-12-05 NOTE — PROGRESS NOTES
Pulmonary Outpatient Follow Up Visit     Subjective:       Patient ID: Christina Leal is a 26 y.o. female.    Chief Complaint: Pleural Effusion and Pneumonia      HPI        26-year-old female patient presenting for 1 month follow-up after hospital admission for left-sided empyema status post VATS and chest tube placement.      Microbiology showed Gram-positive cocci.      Discharged home on Rocephin and vancomycin.      PICC line removed about 10 days ago.      Has dyspnea.      Snoring, excessive daytime sleepiness [place] Sleepiness Scale score 8, nonrestorative sleep.      Works as     STOP - BANG Questionnaire:     1. Snoring : Do you snore loudly ?    Yes    2. Tired : Do you often feel tired, fatigued, or sleepy during daytime? Yes    3. Observed: Has anyone observed you stop breathing during your sleep?   No     4. Blood pressure : Do you have or are you being treated for high blood pressure?   No    5. BMI :BMI more than 35 kg/m2?   Yes    6. Age : Age over 50 yr old?   No    7. Neck circumference:   For male, is your shirt collar 17 inches / 43cm or larger?  For female, is your shirt collar 16 inches / 41cm or larger?    No    8. Gender: Gender male?   No    STOP BANG SCORE 3    High risk of TYRONE: Yes 5 - 8  Intermediate risk of TYRONE: Yes 3 - 4  Low risk of TYRONE: Yes 0 - 2      References:   STOP Questionnaire   A Tool to Screen Patients for Obstructive Sleep Apnea: JANELLE Calles.C.P.C., SEKOU Pittman.B.B.S., Radha Patiño M.D.,Shayla Ratliff, Ph.D., Meche Patel M.B.B.S.,_ SEKOU Johnson.Sc.,_ Nish Garibay M.D., Meño Brown, F.R.C.P.C.; Anesthesiology 2008; 108:812-21 Copyright © 2008, the American Society of Anesthesiologists, Inc. [employer]      Review of Systems   Constitutional:  Positive for fatigue.   HENT:  Negative for nosebleeds.    Eyes:  Negative for redness.   Respiratory:   "Positive for apnea, snoring, previous hospitalization due to pulmonary problems, dyspnea on extertion and somnolence. Negative for choking.    Cardiovascular:  Negative for chest pain.   Genitourinary:  Negative for hematuria.   Endocrine:  Negative for cold intolerance.    Musculoskeletal:  Negative for gait problem.   Neurological:  Negative for syncope.   Hematological:  Negative for adenopathy.   Psychiatric/Behavioral:  Positive for sleep disturbance. Negative for confusion.      Outpatient Encounter Medications as of 12/5/2022   Medication Sig Dispense Refill    acetaminophen (TYLENOL) 500 MG tablet Take 500-1,000 mg by mouth every 6 (six) hours as needed for Pain.      ibuprofen (ADVIL,MOTRIN) 200 MG tablet Take 200-400 mg by mouth every 6 (six) hours as needed for Pain.      norgestimate-ethinyl estradioL (ORTHO TRI-CYCLEN,TRI-SPRINTEC) 0.18/0.215/0.25 mg-35 mcg (28) tablet Take 1 tablet by mouth once daily. 28 tablet 11    pantoprazole (PROTONIX) 40 MG tablet Take 1 tablet (40 mg total) by mouth once daily. 30 tablet 11    propranoloL (INDERAL) 10 MG tablet Take 1 tablet (10 mg total) by mouth 3 (three) times daily as needed. 30 tablet 0    [DISCONTINUED] ceftriaxone sodium (ROCEPHIN INJ) Inject 2 Doses as directed.      [DISCONTINUED] ondansetron (ZOFRAN) 4 mg/5 mL solution Take 8 mg by mouth 2 (two) times daily.      [DISCONTINUED] vancomycin HCl (VANCOMYCIN 1 G/250 ML D5W, READY TO MIX SYSTEM,) Inject 375 mLs (1,500 mg total) into the vein 2 (two) times a day.       No facility-administered encounter medications on file as of 12/5/2022.       Objective:     Vital Signs (Most Recent)  Vital Signs  Pulse: 102  Resp: 16  SpO2: 99 %  BP: (!) 140/80  Height and Weight  Height: 5' 8" (172.7 cm)  Weight: 124.4 kg (274 lb 4 oz)  BSA (Calculated - sq m): 2.44 sq meters  BMI (Calculated): 41.7  Weight in (lb) to have BMI = 25: 164.1]  Wt Readings from Last 2 Encounters:   12/05/22 124.4 kg (274 lb 4 oz)   11/28/22 " 128 kg (282 lb 3 oz)       Physical Exam   Constitutional: She is oriented to person, place, and time. She appears well-developed and well-nourished. No distress. She is obese.   HENT:   Head: Normocephalic.   Cardiovascular: Normal rate and regular rhythm.   Pulmonary/Chest: Normal expansion and effort normal. No stridor. No respiratory distress. She has decreased breath sounds. She exhibits no tenderness.   Abdominal: She exhibits no distension.   Musculoskeletal:         General: No tenderness.      Cervical back: Neck supple.   Lymphadenopathy:     She has no cervical adenopathy.   Neurological: She is alert and oriented to person, place, and time. Gait normal.   Skin: Skin is warm.   Psychiatric: She has a normal mood and affect. Her behavior is normal. Judgment and thought content normal.   Nursing note and vitals reviewed.    Laboratory  Lab Results   Component Value Date    WBC 6.18 11/28/2022    RBC 4.14 11/28/2022    HGB 11.0 (L) 11/28/2022    HCT 34.5 (L) 11/28/2022    MCV 83 11/28/2022    MCH 26.6 (L) 11/28/2022    MCHC 31.9 (L) 11/28/2022    RDW 14.9 (H) 11/28/2022     11/28/2022    MPV 9.9 11/28/2022    GRAN 4.1 11/28/2022    GRAN 66.7 11/28/2022    LYMPH 1.1 11/28/2022    LYMPH 18.1 11/28/2022    MONO 0.8 11/28/2022    MONO 13.1 11/28/2022    EOS 0.0 11/28/2022    BASO 0.09 11/28/2022    EOSINOPHIL 0.3 11/28/2022    BASOPHIL 1.5 11/28/2022       BMP  Lab Results   Component Value Date     (L) 11/28/2022    K 3.3 (L) 11/28/2022    CL 94 (L) 11/28/2022    CO2 24 11/28/2022    BUN 11 11/28/2022    CREATININE 1.7 (H) 11/28/2022    CALCIUM 9.8 11/28/2022    ANIONGAP 13 11/28/2022    AST 31 11/14/2022    ALT 20 11/14/2022    PROT 8.1 11/14/2022       Lab Results   Component Value Date    BNP 17 10/20/2022       No results found for: TSH    Lab Results   Component Value Date    SEDRATE 66 (H) 11/21/2022       Lab Results   Component Value Date    CRP 2.9 11/21/2022     No results found for: IGE      No results found for: ASPERGILLUS  No results found for: AFUMIGATUSCL     No results found for: ACE     Diagnostic Results:  I have personally reviewed today the following studies:    Chest x-ray 11/02/2022     FINDINGS:  The trachea and cardiomediastinal silhouette remains stable.  There appears to be interval removal of left-sided chest tube.  When compared with the prior study, there is mild interval increase in size of left-sided pleural effusion along with pleuroparenchymal scarring and subsegmental atelectasis.  No definitive evidence for pneumothorax.  Right lung is grossly clear..           Assessment/Plan:   Empyema of left pleural space  -     Ambulatory referral/consult to Pulmonology    Dyspnea, unspecified type  -     Stress test, pulmonary; Future    Childhood asthma, unspecified asthma severity, unspecified whether complicated, unspecified whether persistent  -     Complete PFT with bronchodilator; Future; Expected date: 12/19/2022  -     Stress test, pulmonary; Future    Suspected sleep apnea  -     Home Sleep Study; Future    Need for pneumococcal vaccination  -     (In Office Administered) Pneumococcal Conjugate Vaccine (20 Valent) (IM)    Current occasional smoker    Class 3 severe obesity due to excess calories with body mass index (BMI) of 40.0 to 44.9 in adult, unspecified whether serious comorbidity present      CT chest scheduled for this week by Infectious Disease.      Will follow on results.      Check PFT and 6 minutes walking test.      Pneumococcal vaccine today.      Home sleep study.      CPAP recommendation to follow sleep study results.      General weight loss/lifestyle modification strategies discussed (elicit support from others; identify saboteurs; non-food rewards).  Diet interventions: low calorie (1000 kCal/d) deficit diet      Follow up in about 8 weeks (around 1/30/2023).    This note was prepared using voice recognition system and is likely to have sound alike errors  that may have been overlooked even after proof reading.  Please call me with any questions    Discussed diagnosis, its evaluation, treatment and usual course. All questions answered.      Roberto Fine MD

## 2022-12-07 ENCOUNTER — CLINICAL SUPPORT (OUTPATIENT)
Dept: REHABILITATION | Facility: HOSPITAL | Age: 27
End: 2022-12-07
Payer: COMMERCIAL

## 2022-12-07 ENCOUNTER — HOSPITAL ENCOUNTER (OUTPATIENT)
Dept: RADIOLOGY | Facility: HOSPITAL | Age: 27
Discharge: HOME | End: 2022-12-07
Attending: INTERNAL MEDICINE
Payer: COMMERCIAL

## 2022-12-07 DIAGNOSIS — Z74.09 DECREASED STRENGTH, ENDURANCE, AND MOBILITY: ICD-10-CM

## 2022-12-07 DIAGNOSIS — J86.9 EMPYEMA OF LEFT PLEURAL SPACE: ICD-10-CM

## 2022-12-07 DIAGNOSIS — Z09 HOSPITAL DISCHARGE FOLLOW-UP: ICD-10-CM

## 2022-12-07 DIAGNOSIS — Z74.09 DECREASED FUNCTIONAL MOBILITY AND ENDURANCE: ICD-10-CM

## 2022-12-07 DIAGNOSIS — R53.1 DECREASED STRENGTH, ENDURANCE, AND MOBILITY: ICD-10-CM

## 2022-12-07 DIAGNOSIS — R68.89 DECREASED STRENGTH, ENDURANCE, AND MOBILITY: ICD-10-CM

## 2022-12-07 PROCEDURE — 97110 THERAPEUTIC EXERCISES: CPT

## 2022-12-07 PROCEDURE — 97162 PT EVAL MOD COMPLEX 30 MIN: CPT

## 2022-12-07 PROCEDURE — 71250 CT THORAX DX C-: CPT | Mod: TC

## 2022-12-07 PROCEDURE — 71250 CT THORAX DX C-: CPT | Mod: 26,,, | Performed by: RADIOLOGY

## 2022-12-07 PROCEDURE — 71250 CT CHEST WITHOUT CONTRAST: ICD-10-PCS | Mod: 26,,, | Performed by: RADIOLOGY

## 2022-12-07 NOTE — PLAN OF CARE
[facility]   Physical Therapy Initial Evaluation   Date: 12/7/2022   Name: Christina Leal  Clinic Number: [id number]    Therapy Diagnosis:    Encounter Diagnoses   Name Primary?    Hospital discharge follow-up     Decreased functional mobility and endurance     Decreased strength, endurance, and mobility       Physician: Jimmy Castellanos, NP     Physician Orders: PT Eval and Treat  Medical Diagnosis from Referral: Z09 (ICD-10-CM) - Hospital discharge follow-up (lung training post lung infection)  Evaluation Date: 12/7/2022  Authorization Period Expiration: 11/28/2023  Plan of Care Expiration: 3/7/2023  Progress Note Due: 1/7/2023  Visit # / Visits authorized: 1/1   FOTO: 1/3 (last performed on 12/7/2022)    Precautions: Standard and recent hospitalization from 10/19-10/28 for empyema with infection still being treated    Time In: 0818  Time Out: 0903  Total Billable Time (timed & untimed codes): 45 minutes    SUBJECTIVE   Date of onset: 10/19/2022    History of current condition - Christina reports that she recently hospitalized for empyema which is still being treated and monitored at this point although was told that it is stable. Patient was admitted October 19th and discharged October 28th. Patient is wanting to improve her lung capacity and overall endurance. Patient has been walking around apartment complex for activity about 2 times per day. Patient transitioned back to work more recently and then began walking at work. Patient was able to get an accomodation and began working from home last Friday and has not been going on walks since then. Patient would say that the complex path is probably no more than 0.5 miles. Patient initially would have to take breaks when walking but is able to get through the full walk (~15-20 minutes) without breaks. Patient is not as exhausted after walks as she initially was.     Imaging: [x] Xray [] MRI [] CT: Performed on: Chest 11/2/2022    Pain:  Current  0/10, worst 0/10, best 0/10   Location: [] Right   [x] Left:  chest wall where chest tubes were previously  Description: Tight and pressure when lying on area  Aggravating Factors: Lying on left side directly on incision site or in right side lying with pulling sensation noted on left  Easing Factors: switching sides after lying on particular side for a while    Prior Therapy:   [] N/A    [x] Yes: inpatient Physical Therapy  Social History: Pt lives with their roommate on the first floor apartment.   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   Prior Level of Function: Independent and pain free with all ADL, IADL, community mobility and functional activities with good lung capacity and endurance.   Current Level of Function: Independent with all ADL, IADL, community mobility and functional activities with reports of need for increased time and frequent breaks. Patient has not attempted stair negotiation since hospitalization as she has been very cautious since then. Patient has been attempting to improve her overall endurance by slowly beginning walking program.     Dominant Extremity:    [x] Right    [] Left    Pts goals: Pt reported goals are to increase overall lung capacity and endurance in order to return to prior functional level.     Medical History:   No past medical history on file.    Surgical History:   Christina Leal  has a past surgical history that includes Injection of anesthetic agent around multiple intercostal nerves (Left, 10/21/2022); Thoracoscopic decortication of lung (Left, 10/21/2022); and Evacuation of empyema (Left, 10/21/2022).    Medications:   Christina has a current medication list which includes the following prescription(s): acetaminophen, ibuprofen, norgestimate-ethinyl estradiol, pantoprazole, and propranolol.    Allergies:   Review of patient's allergies indicates:  No Known Allergies     OBJECTIVE     Range of Motion:  Hip and Knee Range of Motion was screened and within  normal limits and pain free.     Strength:    L/E MMT Right  (spine) Left Pain/Dysfunction with Movement Goal   Hip Flexion  4-/5 4-/5 No pain 4+/5 B   Hip Extension  3+/5 3+/5 No pain 4+/5 B   Hip Abduction  3+/5 3+/5 No pain 4+/5 B   Knee Extension 4-/5 4-/5 No pain 5/5 B   Knee Flexion 3+/5 3+/5 No pain 5/5 B   Hip IR 3+/5 3+/5 No pain 4+/5 B   Hip ER 3+/5 3+/5 No pain 4+/5 B   Ankle DF 4-/5 4-/5 No pain 5/5 B   Ankle PF 4-/5 4-/5 No pain 5/5 B     Joint Mobility:     Joint Motion Right Mobility  (spine) Left Mobility Goal   Distal Femur AP [] Hypo     [] Normal     [x] Hyper [] Hypo     [] Normal     [x] Hyper Normal    Proximal Tibia AP [] Hypo     [] Normal     [x] Hyper [] Hypo     [] Normal     [x] Hyper Normal    Patellar Medial Glide  [] Hypo     [] Normal     [x] Hyper [] Hypo     [] Normal     [x] Hyper Normal    Patellar Lateral Glide  [] Hypo     [] Normal     [x] Hyper [] Hypo     [] Normal     [x] Hyper Normal    Patellar Superior Liscomb  [] Hypo     [] Normal     [x] Hyper [] Hypo     [] Normal     [x] Hyper Normal    Patellar Inferior Glide  [] Hypo     [] Normal     [x] Hyper [] Hypo     [] Normal     [x] Hyper Normal      Sensation:  [x] Intact to Light Touch   [] Impaired:    Palpation: Increased rigidity noted with palpation of chest tube incision sites with limitations in scar mobility noted.     Posture:  Pt presents with postural abnormalities which include:    [x] Forward Head   [] Increased Lumbar Lordosis   [x] Rounded Shoulder   [x] Genu Recurvatum   [] Increased Thoracic Kyphosis [x] Genu Valgus   [] Trunk Deviated    [] Pes Planus   [] Scapular Winging    [] Other:       PT Functional Testing:   Functional Movement  Analysis   Bed Mobility  []Functional  [x]Dysfunctional:  [x]Painful  []Non-Painful        Balance  Right   (seconds) Left  (seconds) Pain/dysfunction Noted Goal   Narrow Base of Support 60 --- Mild unsteadiness 60+ seconds   Tandem Stance 30 18 More unsteady with left foot  forward than right, LOB x2 on left 60+ seconds   Single Leg Stance 6 14 More focus required on left to maintain balance 60+ seconds     Functional Tests  Outcome Norms Goal   30 Second Time Sit to Stand 9 60s: <12  70s: <10  80s: <9 16       Function:     CMS Impairment/Limitation/Restriction for FOTO NOC-musculo-skeletal disorder Survey    Therapist reviewed FOTO scores for Christina on 12/7/2022.   FOTO documents entered into [employer] - see Media section.    Limitation Score: 42%         TREATMENT     Total Treatment time (time-based codes) separate from Evaluation: (10) minutes     Christina received the treatments listed below:     THERAPEUTIC EXERCISES to develop strength, endurance, ROM, flexibility, posture, and core stabilization for (10) minutes including:    Intervention Performed Today    Educated patient on proper way and frequency of performing scar mobilizations as restrictions are present along with sensitivity when lying on either side  x    Educated patient on the proper form and sequencing of all exercises provided in HEP today  x Including x5 reps performed bilaterally for each exercise to ensure proper form and tolerance to exercises (See Patient Instructions for details)     Plan for Next Visit: Continue to gradually progress strength and endurance as tolerated by the patient        PATIENT EDUCATION AND HOME EXERCISES     Education provided: included in billable  PURPOSE: Patient educated on the impairments noted above and the effects of physical therapy intervention to improve overall condition and QOL.   EXERCISE: Patient was educated on all the above exercise prior/during/after for proper posture, positioning, and execution for safe performance with home exercise program.   STRENGTH: Patient educated on the importance of improved core and extremity strength in order to improve alignment of the spine and extremities with static positions and dynamic movement.   GAIT & BALANCE: Patient educated on the  importance of strong core and lower extremity musculature in order to improve both static and dynamic balance, improve gait mechanics, reduce fall risk and improve household and community mobility.   SLEEPING POSITIONS: Patient educated on the use of pillows to aid in neutral alignment of spine and extremities when sleeping in supine or side lying.  TRANSFERS & TRANSITIONS: Patient educated on proper technique for bed mobility, transitions and transfers to improve body mechanics and decrease risk of injury.     Written Home Exercises Provided: yes.  Exercises were reviewed and Christina was able to demonstrate them prior to the end of the session.  Christina demonstrated good  understanding of the education provided. See EMR under Patient Instructions for exercises provided during therapy sessions.    ASSESSMENT     Christina is a 26 y.o. female referred to outpatient Physical Therapy with a medical diagnosis of (ICD-10-CM) - Hospital discharge follow-up (lung training post lung infection). Pt presents with impairments including: decreased strength, joint hypermobility , impaired balance, postural abnormalities, gait abnormalities, and decreased overall function and decreased functional endurance due to recent lung infection which limits her ability when performing any tasks that requires endurance or more strenuous efforts.     Pt prognosis is Good.   Pt will benefit from skilled outpatient Physical Therapy to address the deficits stated above and in the chart below, provide pt/family education, and to maximize pt's level of independence.     Plan of care discussed with patient: Yes  Pt's spiritual, cultural and educational needs considered and patient is agreeable to the plan of care and goals as stated below:     Anticipated Barriers for therapy: chronicity of condition, adherence to treatment plan, and work schedule    Medical Necessity is demonstrated by the following  History  Co-morbidities and personal factors that may  "impact the plan of care Co-morbidities:   Pneumonia, empyema, removal of chest tube/scarring, high BMI, anemia    Personal Factors:   []Age   []Education   []Coping style   [x]Social background   [x]Lifestyle    []Character   []Attitudes   []Other:   []No deficits      []Low   []Moderate  [x]High    Examination  Body Structures and Functions, activity limitations and participation restrictions that may impact the plan of care Body Regions:   []Head  []Neck   []Back   [x]Trunk  []Upper extremities   [x]Lower extremities   []Other:      Body Systems:    []Gross symmetry   []ROM   [x]Strength   [x]Gross coordinated movement   [x]Balance   [x]Gait [x]Transfers  [x]Transitions   [x]Motor control   [x]Motor learning   [x]Scar formation  []Other:       Participation Restrictions:   See above in "Current Level of Function"     Activity limitations:   Learning and applying knowledge  [x]No deficits       General Tasks and Commands  [x]No deficits    Communication  [x]No deficits    Mobility   []No deficits  []Fine hand use  [x]Walking   []Driving [x]Lifting/carrying objects  []Using Transportation   []Moving around using equipment  []Other:      Self care  []No deficits  []Washing oneself   [x]Caring for body parts   []Toileting   []Dressing  []Eating   []Drinking   []Looking after one's health  []Other:        Domestic Life  []No Deficits  [x]Shopping   [x]Cooking  [x]Doing housework  [x]Assisting others   []Other:      Interactions/Relationships  [x]No deficits    Life Areas  [x]No deficits    Community and Social Life  [x]Community life  [x]Recreation and leisure   []Yarsani and spirituality  []Human rights   []Political life / citizenship  []No deficits      []Low   [x]Moderate  []High    Clinical Presentation []Stable and uncomplicated   [x]Evolving presentation with changing characteristics  []Unstable presentation with unpredictable characteristics []Low   [x]Moderate  []High      Decision Making/ Complexity Score: " []Low   [x]Moderate  []High        Short Term Goals:  6 weeks Status  Date Met   PAIN: Pt will report worst pain of 0/10 when lying in side lying in order to progress toward max functional ability and improve quality of life. [x] Progressing  [] Met  [] Not Met    FUNCTION: Patient will demonstrate improved function as indicated by a functional limitation score of less than or equal to 35% on FOTO. [x] Progressing  [] Met  [] Not Met    STRENGTH: Patient will improve strength to 50% of stated goals, listed in objective measures above, in order to progress towards independence with functional activities. [x] Progressing  [] Met  [] Not Met    Patient will demonstrate improved endurance with the ability to perform 25+ minutes of endurance type activities without being winded or requiring rest breaks.  [x] Progressing  [] Met  [] Not Met    HEP: Patient will demonstrate independence with HEP in order to progress toward functional independence. [x] Progressing  [] Met  [] Not Met      Long Term Goals:  12 weeks Status Date Met   FUNCTION: Patient will demonstrate improved function as indicated by a functional limitation score of less than or equal to 26% on FOTO. [x] Progressing  [] Met  [] Not Met    STRENGTH: Patient will improve strength to stated goals, listed in objective measures above, in order to improve functional independence and quality of life. [x] Progressing  [] Met  [] Not Met    Patient will report not having any limitations with activities that require endurance for 2 weeks to note adequate lung and functional endurance to return to prior level of function.  [x] Progressing  [] Met  [] Not Met    Patient will return to normal ADL's, IADL's, community involvement, recreational activities, and work-related activities with less than or equal to 0/10 pain and maximal function.  [x] Progressing  [] Met  [] Not Met      PLAN   Plan of care Certification: 12/7/2022 to 3/7/2022.    Outpatient Physical Therapy 2  times weekly for 12 weeks to include any combination of the following interventions: virtual visits, Dry Needling Electrical Stimulation unattended/attended, Gait Training, Manual Therapy, Neuromuscular Re-ed, Patient Education, Self Care, Therapeutic Exercise, and Therapeutic Activites     Doreen Barber, PT, DPT      I CERTIFY THE NEED FOR THESE SERVICES FURNISHED UNDER THIS PLAN OF TREATMENT AND WHILE UNDER MY CARE   Physician's comments:     Physician's Signature: ___________________________________________________

## 2022-12-10 LAB
ACID FAST MOD KINY STN SPEC: NORMAL
ACID FAST MOD KINY STN SPEC: NORMAL
MYCOBACTERIUM SPEC QL CULT: NORMAL
MYCOBACTERIUM SPEC QL CULT: NORMAL

## 2022-12-12 ENCOUNTER — LAB VISIT (OUTPATIENT)
Dept: LAB | Facility: HOSPITAL | Age: 27
End: 2022-12-12
Attending: INTERNAL MEDICINE
Payer: COMMERCIAL

## 2022-12-12 ENCOUNTER — OFFICE VISIT (OUTPATIENT)
Dept: INFECTIOUS DISEASES | Facility: CLINIC | Age: 27
End: 2022-12-12
Payer: COMMERCIAL

## 2022-12-12 VITALS
SYSTOLIC BLOOD PRESSURE: 140 MMHG | BODY MASS INDEX: 41.57 KG/M2 | HEIGHT: 68 IN | WEIGHT: 274.25 LBS | HEART RATE: 102 BPM | DIASTOLIC BLOOD PRESSURE: 103 MMHG

## 2022-12-12 DIAGNOSIS — J86.9 EMPYEMA OF LEFT PLEURAL SPACE: ICD-10-CM

## 2022-12-12 DIAGNOSIS — R11.0 NAUSEA: ICD-10-CM

## 2022-12-12 DIAGNOSIS — N17.9 AKI (ACUTE KIDNEY INJURY): ICD-10-CM

## 2022-12-12 DIAGNOSIS — E66.01 CLASS 3 SEVERE OBESITY DUE TO EXCESS CALORIES WITHOUT SERIOUS COMORBIDITY WITH BODY MASS INDEX (BMI) OF 45.0 TO 49.9 IN ADULT: ICD-10-CM

## 2022-12-12 LAB
ANION GAP SERPL CALC-SCNC: 14 MMOL/L (ref 8–16)
BUN SERPL-MCNC: 5 MG/DL (ref 6–20)
CALCIUM SERPL-MCNC: 10.1 MG/DL (ref 8.7–10.5)
CHLORIDE SERPL-SCNC: 94 MMOL/L (ref 95–110)
CO2 SERPL-SCNC: 26 MMOL/L (ref 23–29)
CREAT SERPL-MCNC: 1.3 MG/DL (ref 0.5–1.4)
EST. GFR  (NO RACE VARIABLE): 57.8 ML/MIN/1.73 M^2
GLUCOSE SERPL-MCNC: 101 MG/DL (ref 70–110)
POTASSIUM SERPL-SCNC: 3.4 MMOL/L (ref 3.5–5.1)
SODIUM SERPL-SCNC: 134 MMOL/L (ref 136–145)

## 2022-12-12 PROCEDURE — 3008F PR BODY MASS INDEX (BMI) DOCUMENTED: ICD-10-PCS | Mod: CPTII,S$GLB,, | Performed by: INTERNAL MEDICINE

## 2022-12-12 PROCEDURE — 80048 BASIC METABOLIC PNL TOTAL CA: CPT | Performed by: INTERNAL MEDICINE

## 2022-12-12 PROCEDURE — 3080F PR MOST RECENT DIASTOLIC BLOOD PRESSURE >= 90 MM HG: ICD-10-PCS | Mod: CPTII,S$GLB,, | Performed by: INTERNAL MEDICINE

## 2022-12-12 PROCEDURE — 99214 OFFICE O/P EST MOD 30 MIN: CPT | Mod: S$GLB,,, | Performed by: INTERNAL MEDICINE

## 2022-12-12 PROCEDURE — 99999 PR PBB SHADOW E&M-EST. PATIENT-LVL III: CPT | Mod: PBBFAC,,, | Performed by: INTERNAL MEDICINE

## 2022-12-12 PROCEDURE — 1159F MED LIST DOCD IN RCRD: CPT | Mod: CPTII,S$GLB,, | Performed by: INTERNAL MEDICINE

## 2022-12-12 PROCEDURE — 3008F BODY MASS INDEX DOCD: CPT | Mod: CPTII,S$GLB,, | Performed by: INTERNAL MEDICINE

## 2022-12-12 PROCEDURE — 3077F PR MOST RECENT SYSTOLIC BLOOD PRESSURE >= 140 MM HG: ICD-10-PCS | Mod: CPTII,S$GLB,, | Performed by: INTERNAL MEDICINE

## 2022-12-12 PROCEDURE — 99999 PR PBB SHADOW E&M-EST. PATIENT-LVL III: ICD-10-PCS | Mod: PBBFAC,,, | Performed by: INTERNAL MEDICINE

## 2022-12-12 PROCEDURE — 3080F DIAST BP >= 90 MM HG: CPT | Mod: CPTII,S$GLB,, | Performed by: INTERNAL MEDICINE

## 2022-12-12 PROCEDURE — 36415 COLL VENOUS BLD VENIPUNCTURE: CPT | Performed by: INTERNAL MEDICINE

## 2022-12-12 PROCEDURE — 1159F PR MEDICATION LIST DOCUMENTED IN MEDICAL RECORD: ICD-10-PCS | Mod: CPTII,S$GLB,, | Performed by: INTERNAL MEDICINE

## 2022-12-12 PROCEDURE — 99214 PR OFFICE/OUTPT VISIT, EST, LEVL IV, 30-39 MIN: ICD-10-PCS | Mod: S$GLB,,, | Performed by: INTERNAL MEDICINE

## 2022-12-12 PROCEDURE — 3077F SYST BP >= 140 MM HG: CPT | Mod: CPTII,S$GLB,, | Performed by: INTERNAL MEDICINE

## 2022-12-12 RX ORDER — ONDANSETRON 4 MG/1
4 TABLET, FILM COATED ORAL EVERY 8 HOURS PRN
Qty: 20 TABLET | Refills: 1 | Status: SHIPPED | OUTPATIENT
Start: 2022-12-12 | End: 2023-01-11

## 2022-12-12 NOTE — ASSESSMENT & PLAN NOTE
S/p VATS  -10/21- and left chest tube  Repeat CT chest - 12/07Lungs/Pleura: Left basilar atelectasis with scarring dependently.  Pleural thickening is also noted at the left lung apex.  There is been interval resolution of fluid collections in the left upper lobe pleura.  Interval removal of left-sided chest tubes.  There is no pneumothorax.  The right lung field is clear  No further need for antibiotics

## 2022-12-12 NOTE — PROGRESS NOTES
Subjective:       Patient ID: Christina Leal is a 26 y.o. female.    Chief Complaint: hospital f/u;    HPI    Last ID note- 10/25/22  26 year old woman with history of   obesity, former tobacco abuse who was admitted with history of left rib cage pain . She was diagnosed with empyema nd had interval VATS with left chest tube placement (10/21/22). CT chest shows large loculated appearing left pleural effusion with associated lower lobe atelectasis as well as mild left upper lobe atelectasis.  Cultures- are negative till date .  Component      Latest Ref Rng & Units 10/25/2022 10/24/2022 10/23/2022 10/22/2022                    WBC      3.90 - 12.70 K/uL 19.78 (H) 22.72 (H) 28.66 (H) 22.64 (H)         She had VATS  -10/21- and left chest tube .  All cultures reviewed .     Negative cultures so far.       12/12-  She was discharged with 4 weeks of Vanco/rocephin  CT scan done 12/7   Lungs/Pleura: Left basilar atelectasis with scarring dependently.  Pleural thickening is also noted at the left lung apex.  There is been interval resolution of fluid collections in the left upper lobe pleura.  Interval removal of left-sided chest tubes.  There is no pneumothorax.  The right lung field is clear.     She developed EUGENIE on vanco .  Last BMP- 11/28- serum creatine -1.7  She has intermittent nausea  Review of Systems   Constitutional:  Negative for chills and fatigue.   HENT:  Negative for nasal congestion, ear pain, facial swelling, sinus pressure/congestion and sore throat.    Eyes:  Negative for pain.   Respiratory:  Negative for apnea, chest tightness, shortness of breath and stridor.    Cardiovascular:  Negative for chest pain, palpitations and leg swelling.   Gastrointestinal:  Negative for abdominal distention, abdominal pain, diarrhea and nausea.   Endocrine: Negative for polydipsia and polyphagia.   Genitourinary:  Negative for decreased urine volume, difficulty urinating, frequency and genital sores.    Musculoskeletal:  Negative for arthralgias and gait problem.   Neurological:  Negative for light-headedness and headaches.   Hematological:  Negative for adenopathy.   Psychiatric/Behavioral:  Negative for agitation, confusion and decreased concentration.        Objective:      Physical Exam  Vitals and nursing note reviewed.   Constitutional:       Appearance: She is well-developed.   HENT:      Head: Normocephalic and atraumatic.   Eyes:      Conjunctiva/sclera: Conjunctivae normal.      Pupils: Pupils are equal, round, and reactive to light.   Neck:      Thyroid: No thyroid mass or thyromegaly.   Cardiovascular:      Rate and Rhythm: Normal rate.      Heart sounds: Normal heart sounds.   Pulmonary:      Effort: Pulmonary effort is normal. No accessory muscle usage or respiratory distress.      Breath sounds: Normal breath sounds.   Abdominal:      General: Bowel sounds are normal.      Palpations: Abdomen is soft. There is no mass.      Tenderness: There is no abdominal tenderness.   Musculoskeletal:         General: Normal range of motion.      Cervical back: Normal range of motion and neck supple.   Skin:     Findings: No rash.   Neurological:      Mental Status: She is alert and oriented to person, place, and time.       Assessment:       1. Empyema of left pleural space  Ambulatory referral/consult to Infectious Disease    Basic Metabolic Panel      2. Class 3 severe obesity due to excess calories without serious comorbidity with body mass index (BMI) of 45.0 to 49.9 in adult        3. EUGENIE (acute kidney injury)        4. Nausea             Plan:       Problem List Items Addressed This Visit       Empyema of left pleural space     S/p VATS  -10/21- and left chest tube  Repeat CT chest - 12/07Lungs/Pleura: Left basilar atelectasis with scarring dependently.  Pleural thickening is also noted at the left lung apex.  There is been interval resolution of fluid collections in the left upper lobe pleura.  Interval  removal of left-sided chest tubes.  There is no pneumothorax.  The right lung field is clear  No further need for antibiotics         Relevant Orders    Basic Metabolic Panel    Class 3 severe obesity in adult     Out patient follow up with PCP          EUGENIE (acute kidney injury)     Will do BMP today -follow serum creatinine          Nausea     Will give Zofran

## 2022-12-16 ENCOUNTER — OFFICE VISIT (OUTPATIENT)
Dept: OBSTETRICS AND GYNECOLOGY | Facility: CLINIC | Age: 27
End: 2022-12-16
Payer: COMMERCIAL

## 2022-12-16 ENCOUNTER — CLINICAL SUPPORT (OUTPATIENT)
Dept: REHABILITATION | Facility: HOSPITAL | Age: 27
End: 2022-12-16
Payer: COMMERCIAL

## 2022-12-16 DIAGNOSIS — Z74.09 DECREASED STRENGTH, ENDURANCE, AND MOBILITY: ICD-10-CM

## 2022-12-16 DIAGNOSIS — R68.89 DECREASED STRENGTH, ENDURANCE, AND MOBILITY: ICD-10-CM

## 2022-12-16 DIAGNOSIS — R53.1 DECREASED STRENGTH, ENDURANCE, AND MOBILITY: ICD-10-CM

## 2022-12-16 DIAGNOSIS — Z30.41 SURVEILLANCE FOR BIRTH CONTROL, ORAL CONTRACEPTIVES: Primary | ICD-10-CM

## 2022-12-16 DIAGNOSIS — Z74.09 DECREASED FUNCTIONAL MOBILITY AND ENDURANCE: Primary | ICD-10-CM

## 2022-12-16 PROCEDURE — 1159F PR MEDICATION LIST DOCUMENTED IN MEDICAL RECORD: ICD-10-PCS | Mod: CPTII,95,,

## 2022-12-16 PROCEDURE — 97110 THERAPEUTIC EXERCISES: CPT

## 2022-12-16 PROCEDURE — 99214 OFFICE O/P EST MOD 30 MIN: CPT | Mod: 95,,,

## 2022-12-16 PROCEDURE — 1159F MED LIST DOCD IN RCRD: CPT | Mod: CPTII,95,,

## 2022-12-16 PROCEDURE — 97112 NEUROMUSCULAR REEDUCATION: CPT

## 2022-12-16 PROCEDURE — 99214 PR OFFICE/OUTPT VISIT, EST, LEVL IV, 30-39 MIN: ICD-10-PCS | Mod: 95,,,

## 2022-12-16 NOTE — PROGRESS NOTES
[facility]   Physical Therapy Treatment Note     Name: Christina Leal  Clinic Number: [id number]    Therapy Diagnosis:   Encounter Diagnoses   Name Primary?    Decreased functional mobility and endurance Yes    Decreased strength, endurance, and mobility      Physician: Jimmy Castellanos NP    Visit Date: 12/16/2022    Physician Orders: PT Eval and Treat  Medical Diagnosis from Referral: Z09 (ICD-10-CM) - Hospital discharge follow-up (lung training post lung infection)  Evaluation Date: 12/7/2022  Authorization Period Expiration: 11/28/2023  Plan of Care Expiration: 3/7/2023  Progress Note Due: 1/7/2023  Visit # / Visits authorized: 1/20 (+1 for evaluation)  FOTO: 1/3    PTA Visit #: 0/5     Progress Note Due on 1/7/2023    Time In: 0817  Time Out: 0902  Total Billable Time: 45 minutes    Precautions: Standard and recent hospitalization from 10/19-10/28 for empyema with infection still being treated    SUBJECTIVE     Pt reports: that she has been working on her exercises some but was more focused on caring for her boyfriend in the hospital. Patient reports that she has been more consistent with walking though.   She was compliant with home exercise program.  Response to previous treatment: No adverse reactions  Functional change: No noted functional change    Pain: 0/10  Location: chest wall where chest tubes were previously    OBJECTIVE     Objective Measures updated at progress report unless specified.     Treatment       Christina received the treatments listed below:     THERAPEUTIC EXERCISES to develop strength, endurance, ROM, flexibility, posture, and core stabilization for (25) minutes including:    Intervention Performed Today    Straight Leg Raise x 2x10 reps B   Prone Hip Extension x 3x10 reps B   Hip Adduction Ball x 3x10 reps    Long Arc Quads x 3x10 reps 3 lbs                   Recumbent Bike x 5 minutes level 2     Plan for Next Visit: Shuttle     NEUROMUSCULAR RE-EDUCATION  ACTIVITIES to improve Balance, Coordination, Kinesthetic, Sense, Proprioception, and Posture for (20) minutes.  The following were included:    Intervention Performed Today    Side Lying Clams x 3x10 reps B   Side Lying Hip Abduction x 2x8 reps B   Sit to Stands x 2x10 reps                               Plan for Next Visit: Alexandre     Patient Education and Home Exercises     Home Exercises Provided and Patient Education Provided     Education provided:   - PURPOSE: Patient educated on the impairments noted above and the effects of physical therapy intervention to improve overall condition and QOL.   EXERCISE: Patient was educated on all the above exercise prior/during/after for proper posture, positioning, and execution for safe performance with home exercise program.   STRENGTH: Patient educated on the importance of improved core and extremity strength in order to improve alignment of the spine and extremities with static positions and dynamic movement.     Written Home Exercises Provided: Patient instructed to cont prior HEP. Exercises were reviewed and Christina was able to demonstrate them prior to the end of the session.  Christina demonstrated good  understanding of the education provided. See EMR under Patient Instructions for exercises provided during therapy sessions      ASSESSMENT     Patient tolerated treatment well today incorporating high repetitions for all exercises to assist in improving overall endurance. Patient did require a fair amount of cueing for side lying hip abduction due to weaknesses present and secondary compensatory strategies. Overall no reports of pain and good oxygenation present throughout session.      Christina Is progressing well towards her goals.   Pt prognosis is Good.     Pt will continue to benefit from skilled outpatient physical therapy to address the deficits listed in the problem list box on initial evaluation, provide pt/family education and to maximize pt's level of independence in  the home and community environment.     Pt's spiritual, cultural and educational needs considered and pt agreeable to plan of care and goals.     Anticipated barriers to physical therapy: chronicity of condition, adherence to treatment plan, and work schedule    Goals:   Short Term Goals:  6 weeks Status  Date Met   PAIN: Pt will report worst pain of 0/10 when lying in side lying in order to progress toward max functional ability and improve quality of life. [x] Progressing  [] Met  [] Not Met     FUNCTION: Patient will demonstrate improved function as indicated by a functional limitation score of less than or equal to 35% on FOTO. [x] Progressing  [] Met  [] Not Met     STRENGTH: Patient will improve strength to 50% of stated goals, listed in objective measures above, in order to progress towards independence with functional activities. [x] Progressing  [] Met  [] Not Met     Patient will demonstrate improved endurance with the ability to perform 25+ minutes of endurance type activities without being winded or requiring rest breaks.  [x] Progressing  [] Met  [] Not Met     HEP: Patient will demonstrate independence with HEP in order to progress toward functional independence. [x] Progressing  [] Met  [] Not Met        Long Term Goals:  12 weeks Status Date Met   FUNCTION: Patient will demonstrate improved function as indicated by a functional limitation score of less than or equal to 26% on FOTO. [x] Progressing  [] Met  [] Not Met     STRENGTH: Patient will improve strength to stated goals, listed in objective measures above, in order to improve functional independence and quality of life. [x] Progressing  [] Met  [] Not Met     Patient will report not having any limitations with activities that require endurance for 2 weeks to note adequate lung and functional endurance to return to prior level of function.  [x] Progressing  [] Met  [] Not Met     Patient will return to normal ADL's, IADL's, community  involvement, recreational activities, and work-related activities with less than or equal to 0/10 pain and maximal function.  [x] Progressing  [] Met  [] Not Met          PLAN     Continue POC and frequency as planned. Continue to progress strength and conditioning to tolerance.      These services are reasonable and necessary for the conditions set forth above while under my care.    Doreen Barber, PT, DPT

## 2022-12-20 ENCOUNTER — CLINICAL SUPPORT (OUTPATIENT)
Dept: REHABILITATION | Facility: HOSPITAL | Age: 27
End: 2022-12-20
Payer: COMMERCIAL

## 2022-12-20 DIAGNOSIS — R68.89 DECREASED STRENGTH, ENDURANCE, AND MOBILITY: ICD-10-CM

## 2022-12-20 DIAGNOSIS — R53.1 DECREASED STRENGTH, ENDURANCE, AND MOBILITY: ICD-10-CM

## 2022-12-20 DIAGNOSIS — Z74.09 DECREASED STRENGTH, ENDURANCE, AND MOBILITY: ICD-10-CM

## 2022-12-20 DIAGNOSIS — Z74.09 DECREASED FUNCTIONAL MOBILITY AND ENDURANCE: Primary | ICD-10-CM

## 2022-12-20 PROCEDURE — 97110 THERAPEUTIC EXERCISES: CPT

## 2022-12-20 PROCEDURE — 97112 NEUROMUSCULAR REEDUCATION: CPT

## 2022-12-20 NOTE — PROGRESS NOTES
"[facility]   Physical Therapy Treatment Note     Name: Christina Leal  Clinic Number: [id number]    Therapy Diagnosis:   Encounter Diagnoses   Name Primary?    Decreased functional mobility and endurance Yes    Decreased strength, endurance, and mobility      Physician: Jimmy Castellanos NP    Visit Date: 12/20/2022    Physician Orders: PT Eval and Treat  Medical Diagnosis from Referral: Z09 (ICD-10-CM) - Hospital discharge follow-up (lung training post lung infection)  Evaluation Date: 12/7/2022  Authorization Period Expiration: 11/28/2023  Plan of Care Expiration: 3/7/2023  Progress Note Due: 1/7/2023  Visit # / Visits authorized: 2/20 (+1 for evaluation)  FOTO: 1/3    PTA Visit #: 0/5     Progress Note Due on 1/7/2023    Time In: 0943  Time Out: 0953  Total Billable Time: 10 minutes    Precautions: Standard and recent hospitalization from 10/19-10/28 for empyema with infection still being treated    SUBJECTIVE     Patient reports: she is doing "pretty good" today and does not have any pain. Patient states she feels like the exercises are getting easier.  She was compliant with home exercise program.  Response to previous treatment: No adverse reactions, mild soreness  Functional change: No noted functional change yet    Pain: 0/10  Location: chest wall where chest tubes were previously    OBJECTIVE     Objective Measures updated at progress report unless specified.     Treatment     Christian received the treatments listed below:     THERAPEUTIC EXERCISES to develop strength, endurance, ROM, flexibility, posture, and core stabilization for (10) minutes including:    Intervention Performed Today    Straight Leg Raise  2x10 reps B   Prone Hip Extension  3x10 reps B   Hip Adduction Ball  3x10 reps    Long Arc Quads  3x10 reps 3 lbs              Sit to stands x 2x10 in chair with bilateral upper extremity support   Recumbent Bike x 5 minutes level 2     Plan for Next Visit: Shuttle "     NEUROMUSCULAR RE-EDUCATION ACTIVITIES to improve Balance, Coordination, Kinesthetic, Sense, Proprioception, and Posture for (0) minutes.  The following were included:    Intervention Performed Today    Side Lying Clams  3x10 reps B   Side Lying Hip Abduction  2x8 reps B   Sit to Stands  2x10 reps                               Plan for Next Visit: Alexandre     Patient Education and Home Exercises     Home Exercises Provided and Patient Education Provided     Education provided:   - PURPOSE: Patient educated on the impairments noted above and the effects of physical therapy intervention to improve overall condition and QOL.   EXERCISE: Patient was educated on all the above exercise prior/during/after for proper posture, positioning, and execution for safe performance with home exercise program.   STRENGTH: Patient educated on the importance of improved core and extremity strength in order to improve alignment of the spine and extremities with static positions and dynamic movement.     Written Home Exercises Provided: Patient instructed to cont prior HEP. Exercises were reviewed and Christina was able to demonstrate them prior to the end of the session.  Christina demonstrated good  understanding of the education provided. See EMR under Patient Instructions for exercises provided during therapy sessions      ASSESSMENT     Patient tolerated two exercises performed at beginning of session with Imelda Lyons P.T., DPT well and did not require any rest breaks. The rest of the session was performed under supervision of Doreen Barber P.T., DPT (see second note for details).    Christina Is progressing well towards her goals.   Pt prognosis is Good.     Pt will continue to benefit from skilled outpatient physical therapy to address the deficits listed in the problem list box on initial evaluation, provide pt/family education and to maximize pt's level of independence in the home and community environment.     Pt's spiritual,  cultural and educational needs considered and pt agreeable to plan of care and goals.     Anticipated barriers to physical therapy: chronicity of condition, adherence to treatment plan, and work schedule    Goals:   Short Term Goals:  6 weeks Status  Date Met   PAIN: Pt will report worst pain of 0/10 when lying in side lying in order to progress toward max functional ability and improve quality of life. [x] Progressing  [] Met  [] Not Met     FUNCTION: Patient will demonstrate improved function as indicated by a functional limitation score of less than or equal to 35% on FOTO. [x] Progressing  [] Met  [] Not Met     STRENGTH: Patient will improve strength to 50% of stated goals, listed in objective measures above, in order to progress towards independence with functional activities. [x] Progressing  [] Met  [] Not Met     Patient will demonstrate improved endurance with the ability to perform 25+ minutes of endurance type activities without being winded or requiring rest breaks.  [x] Progressing  [] Met  [] Not Met     HEP: Patient will demonstrate independence with HEP in order to progress toward functional independence. [x] Progressing  [] Met  [] Not Met        Long Term Goals:  12 weeks Status Date Met   FUNCTION: Patient will demonstrate improved function as indicated by a functional limitation score of less than or equal to 26% on FOTO. [x] Progressing  [] Met  [] Not Met     STRENGTH: Patient will improve strength to stated goals, listed in objective measures above, in order to improve functional independence and quality of life. [x] Progressing  [] Met  [] Not Met     Patient will report not having any limitations with activities that require endurance for 2 weeks to note adequate lung and functional endurance to return to prior level of function.  [x] Progressing  [] Met  [] Not Met     Patient will return to normal ADL's, IADL's, community involvement, recreational activities, and work-related activities  with less than or equal to 0/10 pain and maximal function.  [x] Progressing  [] Met  [] Not Met          PLAN     Continue POC and frequency as planned. Continue to progress strength and conditioning to tolerance.      These services are reasonable and necessary for the conditions set forth above while under my care.    Imelda Lyons, PT, DPT

## 2022-12-20 NOTE — PROGRESS NOTES
[facility]   Physical Therapy Treatment Note     Name: Christina Leal  [facility] Number: [id number]    Therapy Diagnosis:   Encounter Diagnoses   Name Primary?    Decreased functional mobility and endurance Yes    Decreased strength, endurance, and mobility      Physician: Jimmy Castellanos NP    Visit Date: 12/20/2022    Physician Orders: PT Eval and Treat  Medical Diagnosis from Referral: Z09 (ICD-10-CM) - Hospital discharge follow-up (lung training post lung infection)  Evaluation Date: 12/7/2022  Authorization Period Expiration: 11/28/2023  Plan of Care Expiration: 3/7/2023  Progress Note Due: 1/7/2023  Visit # / Visits authorized: 2/20 (+1 for evaluation)  FOTO: 1/3    PTA Visit #: 0/5     Progress Note Due on 1/7/2023    Time In: 0953  Time Out: 1030  Total Billable Time: 37 minutes (took over remaining portion of treatment for today- see other note for full details of treatment session for today)    Precautions: Standard and recent hospitalization from 10/19-10/28 for empyema with infection still being treated    SUBJECTIVE     Pt reports: that she is feeling pretty good today. Patient reports that she was able to go to the mall and walk on Friday and Saturday as well as perform her regular daily walking and exercises. Patient is finding that she is less fatigued as compared to previous few weeks.     She was compliant with home exercise program.  Response to previous treatment: No adverse reactions  Functional change: Improving endurance with less fatigue    Pain: 0/10  Location: chest wall where chest tubes were previously    OBJECTIVE     Objective Measures updated at progress report unless specified.     Treatment     Christina received the treatments listed below:     THERAPEUTIC EXERCISES to develop strength, endurance, ROM, flexibility, posture, and core stabilization for (14) minutes including:    Intervention Performed Today    Straight Leg Raise  2x10 reps B   Prone Hip Extension   3x10 reps B   Hip Adduction Ball x 3x10 reps    Long Arc Quads x 3x10 reps 3 lbs    Seated Marches (added) x 3x10 reps 3 lbs    Shuttle (added) x Double Leg 4 bands 3x10 reps  Single Leg 3 bands 2x10 reps B        Recumbent Bike  5 minutes level 2     Plan for Next Visit: Shuttle     NEUROMUSCULAR RE-EDUCATION ACTIVITIES to improve Balance, Coordination, Kinesthetic, Sense, Proprioception, and Posture for (23) minutes.  The following were included:    Intervention Performed Today    Side Lying Clams  3x10 reps B   Side Lying Hip Abduction  2x8 reps B   Sit to Stands  2x10 reps    Standing Hip Series (added) x Flexion, abduction and extension 2x10 reps each direction B   Heel Raises (added) x 3x10 reps    Step Ups (added) x 2x10 reps B 6 inch step    Standing Marches (added) x 3x10 reps one upper extremity support   Lunges (added) x 2x10 reps bilateral, small ROM   Tandem Walking (added) x 5 laps back and forth in parallel bars      Plan for Next Visit: Bridges     Patient Education and Home Exercises     Home Exercises Provided and Patient Education Provided     Education provided:   - PURPOSE: Patient educated on the impairments noted above and the effects of physical therapy intervention to improve overall condition and QOL.   EXERCISE: Patient was educated on all the above exercise prior/during/after for proper posture, positioning, and execution for safe performance with home exercise program.   STRENGTH: Patient educated on the importance of improved core and extremity strength in order to improve alignment of the spine and extremities with static positions and dynamic movement.     Written Home Exercises Provided: Patient instructed to cont prior HEP. Exercises were reviewed and Christina was able to demonstrate them prior to the end of the session.  Christina demonstrated good  understanding of the education provided. See EMR under Patient Instructions for exercises provided during therapy sessions      ASSESSMENT      Patient tolerated treatment really well today, incorporating more standing activities to challenge balance and overall endurance. Patient noted with some upper leg muscular fatigue with new exercises incorporated but did not require any rest breaks noting improving endurance.    Christina Is progressing well towards her goals.   Pt prognosis is Good.     Pt will continue to benefit from skilled outpatient physical therapy to address the deficits listed in the problem list box on initial evaluation, provide pt/family education and to maximize pt's level of independence in the home and community environment.     Pt's spiritual, cultural and educational needs considered and pt agreeable to plan of care and goals.     Anticipated barriers to physical therapy: chronicity of condition, adherence to treatment plan, and work schedule    Goals:   Short Term Goals:  6 weeks Status  Date Met   PAIN: Pt will report worst pain of 0/10 when lying in side lying in order to progress toward max functional ability and improve quality of life. [x] Progressing  [] Met  [] Not Met     FUNCTION: Patient will demonstrate improved function as indicated by a functional limitation score of less than or equal to 35% on FOTO. [x] Progressing  [] Met  [] Not Met     STRENGTH: Patient will improve strength to 50% of stated goals, listed in objective measures above, in order to progress towards independence with functional activities. [x] Progressing  [] Met  [] Not Met     Patient will demonstrate improved endurance with the ability to perform 25+ minutes of endurance type activities without being winded or requiring rest breaks.  [x] Progressing  [] Met  [] Not Met     HEP: Patient will demonstrate independence with HEP in order to progress toward functional independence. [x] Progressing  [] Met  [] Not Met        Long Term Goals:  12 weeks Status Date Met   FUNCTION: Patient will demonstrate improved function as indicated by a functional  limitation score of less than or equal to 26% on FOTO. [x] Progressing  [] Met  [] Not Met     STRENGTH: Patient will improve strength to stated goals, listed in objective measures above, in order to improve functional independence and quality of life. [x] Progressing  [] Met  [] Not Met     Patient will report not having any limitations with activities that require endurance for 2 weeks to note adequate lung and functional endurance to return to prior level of function.  [x] Progressing  [] Met  [] Not Met     Patient will return to normal ADL's, IADL's, community involvement, recreational activities, and work-related activities with less than or equal to 0/10 pain and maximal function.  [x] Progressing  [] Met  [] Not Met          PLAN     Continue POC and frequency as planned. Continue to progress strength and conditioning to tolerance.      These services are reasonable and necessary for the conditions set forth above while under my care.    Doreen Barber, PT, DPT

## 2022-12-23 ENCOUNTER — CLINICAL SUPPORT (OUTPATIENT)
Dept: REHABILITATION | Facility: HOSPITAL | Age: 27
End: 2022-12-23
Payer: COMMERCIAL

## 2022-12-23 ENCOUNTER — DOCUMENTATION ONLY (OUTPATIENT)
Dept: REHABILITATION | Facility: HOSPITAL | Age: 27
End: 2022-12-23

## 2022-12-23 DIAGNOSIS — Z74.09 DECREASED FUNCTIONAL MOBILITY AND ENDURANCE: Primary | ICD-10-CM

## 2022-12-23 DIAGNOSIS — R68.89 DECREASED STRENGTH, ENDURANCE, AND MOBILITY: ICD-10-CM

## 2022-12-23 DIAGNOSIS — Z74.09 DECREASED STRENGTH, ENDURANCE, AND MOBILITY: ICD-10-CM

## 2022-12-23 DIAGNOSIS — R53.1 DECREASED STRENGTH, ENDURANCE, AND MOBILITY: ICD-10-CM

## 2022-12-23 PROCEDURE — 97110 THERAPEUTIC EXERCISES: CPT | Mod: CQ

## 2022-12-23 PROCEDURE — 97112 NEUROMUSCULAR REEDUCATION: CPT | Mod: CQ

## 2022-12-23 NOTE — PROGRESS NOTES
[facility]   Physical Therapy Treatment Note     Name: Christina Leal  Clinic Number: [id number]    Therapy Diagnosis:   Encounter Diagnoses   Name Primary?    Decreased functional mobility and endurance Yes    Decreased strength, endurance, and mobility      Physician: Jimmy Castellanos NP    Visit Date: 12/23/2022    Physician Orders: PT Eval and Treat  Medical Diagnosis from Referral: Z09 (ICD-10-CM) - Hospital discharge follow-up (lung training post lung infection)  Evaluation Date: 12/7/2022  Authorization Period Expiration: 11/28/2023  Plan of Care Expiration: 3/7/2023  Progress Note Due: 1/7/2023  Visit # / Visits authorized: 3/20 (+1 for evaluation)  FOTO: 1/3    PTA Visit #: 1/5     Progress Note Due on 1/7/2023    Time In: 0745  Time Out: 0815  Total Billable Time: 40 minutes     Precautions: Standard and recent hospitalization from 10/19-10/28 for empyema with infection still being treated    SUBJECTIVE     Pt reports: that she is feeling good today. No issues ascending/descending stairs     She was compliant with home exercise program.    Response to previous treatment: felt fine after last treatment    Functional change: Improving endurance with less fatigue    Pain: 0/10  Location: chest wall where chest tubes were previously    OBJECTIVE     Objective Measures updated at progress report unless specified.     Treatment     Christina received the treatments listed below:     THERAPEUTIC EXERCISES to develop strength, endurance, ROM, flexibility, posture, and core stabilization for (15) minutes including:    Intervention Performed Today    Straight Leg Raise  2x10 reps Bilateral    Prone Hip Extension  3x10 reps Bilateral    Hip Adduction Ball  3x10 reps    Long Arc Quads  3x10 reps 3 pounds    Seated Marches   3x10 reps 3 pounds   Shuttle  X  x Double Leg 4 bands 3x10 reps  Single Leg 3 bands 2x10 reps Bilateral         Recumbent Bike x 5 minutes level 1     Plan for Next Visit:  Shuttle     NEUROMUSCULAR RE-EDUCATION ACTIVITIES to improve Balance, Coordination, Kinesthetic, Sense, Proprioception, and Posture for (25) minutes.  The following were included:    Intervention Performed Today    Side Lying Clams  3x10 reps Bilateral    Side Lying Hip Abduction  2x8 reps Bilateral    Sit to Stands  2x10 reps    Standing Hip Series x Flexion, abduction and extension 2x10 reps each direction Bilateral    Heel Raises  x 3x10 reps    Step Ups  x 2x10 reps Bilateral  6 inch step no hands on turf   Standing Marches  x 3x10 reps one upper extremity support   Lunges  x 2x10 reps bilateral, small ROM   Tandem Walking   5 laps back and forth in parallel bars    Side steps (added) x 3 laps on turf along mirror    Unilateral standing (added) x 5 x  5 second hold               Plan for Next Visit: Bridges     Patient Education and Home Exercises     Home Exercises Provided and Patient Education Provided     Education provided:   - PURPOSE: Patient educated on the impairments noted above and the effects of physical therapy intervention to improve overall condition and QOL.   EXERCISE: Patient was educated on all the above exercise prior/during/after for proper posture, positioning, and execution for safe performance with home exercise program.   STRENGTH: Patient educated on the importance of improved core and extremity strength in order to improve alignment of the spine and extremities with static positions and dynamic movement.     Written Home Exercises Provided: Patient instructed to cont prior HEP. Exercises were reviewed and Christina was able to demonstrate them prior to the end of the session. Christina demonstrated good  understanding of the education provided. See EMR under Patient Instructions for exercises provided during therapy sessions      ASSESSMENT     Patient tolerated treatment really well today. Added unilateral standing on turf for progression in balance training with good response. Patient required  multiple cues for the to avoid standing with knees locked out.     Christina Is progressing well towards her goals.   Pt prognosis is Good.     Pt will continue to benefit from skilled outpatient physical therapy to address the deficits listed in the problem list box on initial evaluation, provide pt/family education and to maximize pt's level of independence in the home and community environment.     Pt's spiritual, cultural and educational needs considered and pt agreeable to plan of care and goals.     Anticipated barriers to physical therapy: chronicity of condition, adherence to treatment plan, and work schedule    Goals:   Short Term Goals:  6 weeks Status  Date Met   PAIN: Pt will report worst pain of 0/10 when lying in side lying in order to progress toward max functional ability and improve quality of life. [x] Progressing  [] Met  [] Not Met     FUNCTION: Patient will demonstrate improved function as indicated by a functional limitation score of less than or equal to 35% on FOTO. [x] Progressing  [] Met  [] Not Met     STRENGTH: Patient will improve strength to 50% of stated goals, listed in objective measures above, in order to progress towards independence with functional activities. [x] Progressing  [] Met  [] Not Met     Patient will demonstrate improved endurance with the ability to perform 25+ minutes of endurance type activities without being winded or requiring rest breaks.  [x] Progressing  [] Met  [] Not Met     HEP: Patient will demonstrate independence with HEP in order to progress toward functional independence. [x] Progressing  [] Met  [] Not Met        Long Term Goals:  12 weeks Status Date Met   FUNCTION: Patient will demonstrate improved function as indicated by a functional limitation score of less than or equal to 26% on FOTO. [x] Progressing  [] Met  [] Not Met     STRENGTH: Patient will improve strength to stated goals, listed in objective measures above, in order to improve functional  independence and quality of life. [x] Progressing  [] Met  [] Not Met     Patient will report not having any limitations with activities that require endurance for 2 weeks to note adequate lung and functional endurance to return to prior level of function.  [x] Progressing  [] Met  [] Not Met     Patient will return to normal ADL's, IADL's, community involvement, recreational activities, and work-related activities with less than or equal to 0/10 pain and maximal function.  [x] Progressing  [] Met  [] Not Met          PLAN     Continue POC and frequency as planned. Continue to progress strength and conditioning to tolerance.      These services are reasonable and necessary for the conditions set forth above while under my care.    Tyson Werner, PTA

## 2022-12-23 NOTE — PROGRESS NOTES
PT/PTA met face to face to discuss pt's treatment plan and progress towards established goals. Pt will be seen by a physical therapist minimally every 6th visit or every 30 days.        Tyson Werner PTA

## 2022-12-27 ENCOUNTER — HOSPITAL ENCOUNTER (OUTPATIENT)
Dept: SLEEP MEDICINE | Facility: HOSPITAL | Age: 27
Discharge: HOME | End: 2022-12-27
Attending: INTERNAL MEDICINE
Payer: COMMERCIAL

## 2022-12-27 ENCOUNTER — CLINICAL SUPPORT (OUTPATIENT)
Dept: PULMONOLOGY | Facility: CLINIC | Age: 27
End: 2022-12-27
Payer: COMMERCIAL

## 2022-12-27 VITALS — BODY MASS INDEX: 44.41 KG/M2 | WEIGHT: 293 LBS | HEIGHT: 68 IN

## 2022-12-27 DIAGNOSIS — J45.909 CHILDHOOD ASTHMA, UNSPECIFIED ASTHMA SEVERITY, UNSPECIFIED WHETHER COMPLICATED, UNSPECIFIED WHETHER PERSISTENT: ICD-10-CM

## 2022-12-27 DIAGNOSIS — R06.00 DYSPNEA, UNSPECIFIED TYPE: ICD-10-CM

## 2022-12-27 DIAGNOSIS — R29.818 SUSPECTED SLEEP APNEA: ICD-10-CM

## 2022-12-27 LAB
BRPFT: ABNORMAL
DLCO ADJ PRE: 16 ML/(MIN*MMHG) (ref 24.24–35.71)
DLCO SINGLE BREATH LLN: 24.24
DLCO SINGLE BREATH PRE REF: 49 %
DLCO SINGLE BREATH REF: 29.98
DLCOC SBVA LLN: 3.94
DLCOC SBVA PRE REF: 77.6 %
DLCOC SBVA REF: 5.33
DLCOC SINGLE BREATH LLN: 24.24
DLCOC SINGLE BREATH PRE REF: 53.4 %
DLCOC SINGLE BREATH REF: 29.98
DLCOVA LLN: 3.94
DLCOVA PRE REF: 71.2 %
DLCOVA PRE: 3.79 ML/(MIN*MMHG*L) (ref 3.94–6.71)
DLCOVA REF: 5.33
DLVAADJ PRE: 4.13 ML/(MIN*MMHG*L) (ref 3.94–6.71)
ERV LLN: -16448.68
ERV PRE REF: 104.8 %
ERV REF: 1.32
FEF 25 75 CHG: 13.3 %
FEF 25 75 LLN: 2.07
FEF 25 75 POST REF: 77.8 %
FEF 25 75 PRE REF: 68.6 %
FEF 25 75 REF: 3.53
FET100 CHG: 2 %
FEV1 CHG: 1.9 %
FEV1 FVC CHG: 5.8 %
FEV1 FVC LLN: 74
FEV1 FVC POST REF: 95.4 %
FEV1 FVC PRE REF: 90.1 %
FEV1 FVC REF: 85
FEV1 LLN: 2.49
FEV1 POST REF: 88.5 %
FEV1 PRE REF: 86.9 %
FEV1 REF: 3.19
FRCPLETH LLN: 2.08
FRCPLETH PREREF: 88.6 %
FRCPLETH REF: 2.9
FVC CHG: -3.7 %
FVC LLN: 2.94
FVC POST REF: 92.3 %
FVC PRE REF: 95.8 %
FVC REF: 3.75
IVC PRE: 3.18 L (ref 2.94–4.57)
IVC SINGLE BREATH LLN: 2.94
IVC SINGLE BREATH PRE REF: 84.8 %
IVC SINGLE BREATH REF: 3.75
MVV LLN: 120
MVV PRE REF: 74.3 %
MVV REF: 141
PEF CHG: 2 %
PEF LLN: 5.3
PEF POST REF: 70.6 %
PEF PRE REF: 69.2 %
PEF REF: 7.57
POST FEF 25 75: 2.75 L/S (ref 2.07–4.99)
POST FET 100: 7.06 SEC
POST FEV1 FVC: 81.51 % (ref 74.27–96.65)
POST FEV1: 2.82 L (ref 2.49–3.88)
POST FVC: 3.46 L (ref 2.94–4.57)
POST PEF: 5.35 L/S (ref 5.3–9.84)
PRE DLCO: 14.68 ML/(MIN*MMHG) (ref 24.24–35.71)
PRE ERV: 1.39 L (ref -16448.68–16451.32)
PRE FEF 25 75: 2.42 L/S (ref 2.07–4.99)
PRE FET 100: 6.92 SEC
PRE FEV1 FVC: 77.02 % (ref 74.27–96.65)
PRE FEV1: 2.77 L (ref 2.49–3.88)
PRE FRC PL: 2.57 L
PRE FVC: 3.6 L (ref 2.94–4.57)
PRE MVV: 105 L/MIN (ref 120.14–162.54)
PRE PEF: 5.24 L/S (ref 5.3–9.84)
PRE RV: 1.03 L (ref 1–2.16)
PRE TLC: 4.62 L (ref 4.64–6.62)
RAW LLN: 3.06
RAW PRE REF: 98.9 %
RAW PRE: 3.02 CMH2O*S/L (ref 3.06–3.06)
RAW REF: 3.06
RV LLN: 1
RV PRE REF: 65 %
RV REF: 1.58
RVTLC LLN: 19
RVTLC PRE REF: 78 %
RVTLC PRE: 22.22 % (ref 18.89–38.07)
RVTLC REF: 28
TLC LLN: 4.64
TLC PRE REF: 82.1 %
TLC REF: 5.63
VA PRE: 3.87 L (ref 5.48–5.48)
VA SINGLE BREATH LLN: 5.48
VA SINGLE BREATH PRE REF: 70.7 %
VA SINGLE BREATH REF: 5.48
VC LLN: 2.94
VC PRE REF: 95.8 %
VC PRE: 3.6 L (ref 2.94–4.57)
VC REF: 3.75
VTGRAWPRE: 2.64 L

## 2022-12-27 PROCEDURE — 94729 PR C02/MEMBANE DIFFUSE CAPACITY: ICD-10-PCS | Mod: S$GLB,,, | Performed by: INTERNAL MEDICINE

## 2022-12-27 PROCEDURE — 94618 PULMONARY STRESS TESTING: ICD-10-PCS | Mod: S$GLB,,, | Performed by: INTERNAL MEDICINE

## 2022-12-27 PROCEDURE — 95806 SLEEP STUDY UNATT&RESP EFFT: CPT | Performed by: INTERNAL MEDICINE

## 2022-12-27 PROCEDURE — 99999 PR PBB SHADOW E&M-EST. PATIENT-LVL II: ICD-10-PCS | Mod: PBBFAC,,,

## 2022-12-27 PROCEDURE — 94060 EVALUATION OF WHEEZING: CPT | Mod: S$GLB,,, | Performed by: INTERNAL MEDICINE

## 2022-12-27 PROCEDURE — 99999 PR PBB SHADOW E&M-EST. PATIENT-LVL I: CPT | Mod: PBBFAC,,,

## 2022-12-27 PROCEDURE — 95800 SLP STDY UNATTENDED: CPT | Mod: 26,,, | Performed by: INTERNAL MEDICINE

## 2022-12-27 PROCEDURE — 99999 PR PBB SHADOW E&M-EST. PATIENT-LVL II: CPT | Mod: PBBFAC,,,

## 2022-12-27 PROCEDURE — 94729 DIFFUSING CAPACITY: CPT | Mod: S$GLB,,, | Performed by: INTERNAL MEDICINE

## 2022-12-27 PROCEDURE — 99999 PR PBB SHADOW E&M-EST. PATIENT-LVL I: ICD-10-PCS | Mod: PBBFAC,,,

## 2022-12-27 PROCEDURE — 94726 PLETHYSMOGRAPHY LUNG VOLUMES: CPT | Mod: S$GLB,,, | Performed by: INTERNAL MEDICINE

## 2022-12-27 PROCEDURE — 94060 PR EVAL OF BRONCHOSPASM: ICD-10-PCS | Mod: S$GLB,,, | Performed by: INTERNAL MEDICINE

## 2022-12-27 PROCEDURE — 94726 PULM FUNCT TST PLETHYSMOGRAP: ICD-10-PCS | Mod: S$GLB,,, | Performed by: INTERNAL MEDICINE

## 2022-12-27 PROCEDURE — 94618 PULMONARY STRESS TESTING: CPT | Mod: S$GLB,,, | Performed by: INTERNAL MEDICINE

## 2022-12-27 PROCEDURE — 95800 PR SLEEP STUDY, UNATTENDED, RECORD HEART RATE/O2 SAT/RESP ANAL/SLEEP TIME: ICD-10-PCS | Mod: 26,,, | Performed by: INTERNAL MEDICINE

## 2022-12-27 NOTE — Clinical Note
PHYSICIAN INTERPRETATION AND COMMENTS: Findings are consistent with mild obstructive sleep apnea (TYRONE) only based on RDI criteria, however AHI was 4.0/hr. Consider additional testing to meet AHI criteria. CLINICAL HISTORY: 26 year old female presented with: 17.5 inch neck, BMI of 41.7, an Steedman sleepiness score of 12, history of daily use of supplemental oxygen and symptoms of nocturnal snoring and waking up choking. Based on the clinical history, the patient has a high pre-test probability of having Moderate TYRONE.

## 2022-12-27 NOTE — PROCEDURES
"[facility] - Pulmonary Function  Six Minute Walk     SUMMARY     Ordering Provider: Dr. Fine   Interpreting Provider: Dr. Fine  Performing nurse/tech/RT: Juliana CRT  Diagnosis:  (childhood asthma, dyspnea)  Height: 5' 8" (172.7 cm)  Weight: (!) 136.9 kg (301 lb 13 oz)  BMI (Calculated): 45.9   Patient Race:             Phase Oxygen Assessment Supplemental O2 Heart   Rate Blood Pressure Arabella Dyspnea Scale Rating   Resting 99 % Room Air 95 bpm 154/79 0   Exercise        Minute        1 100 % Room Air 117 bpm     2 98 % Room Air 132 bpm     3 97 % Room Air 136 bpm     4 97 % Room Air 138 bpm     5 97 % Room Air 137 bpm     6  97 % Room Air 140 bpm (!) 184/94   0   Recovery        Minute        1 99 % Room Air 114 bpm     2 99 % Room Air 102 bpm     3 99 % Room Air 93 bpm     4 99 % Room Air 96 bpm 153/82 0     Six Minute Walk Summary  6MWT Status: completed without stopping  Patient Reported: No complaints     Interpretation:  Did the patient stop or pause?: No                                         Total Time Walked (Calculated): 360 seconds  Final Partial Lap Distance (feet): 75 feet  Total Distance Meters (Calculated): 388.62 meters  Predicted Distance Meters (Calculated): 556.06 meters  Percentage of Predicted (Calculated): 69.89  Peak VO2 (Calculated): 15.64  Mets: 4.47  Has The Patient Had a Previous Six Minute Walk Test?: No       Previous 6MWT Results  Has The Patient Had a Previous Six Minute Walk Test?: No    "

## 2022-12-28 NOTE — PROCEDURES
PHYSICIAN INTERPRETATION AND COMMENTS: Findings are consistent with mild obstructive sleep apnea (TYRONE) only based  on RDI criteria, however AHI was 4.0/hr. Consider additional testing to meet AHI criteria.  CLINICAL HISTORY: 26 year old female presented with: 17.5 inch neck, BMI of 41.7, an Hathaway sleepiness score of 12, history  of daily use of supplemental oxygen and symptoms of nocturnal snoring and waking up choking. Based on the clinical  history, the patient has a high pre-test probability of having Moderate TYRONE.  SLEEP STUDY FINDINGS: Patient underwent a 1 night Home Sleep Test and by behavioral criteria, slept for approximately  5.88 hours, with a sleep latency of 6 minutes and a sleep efficiency of 86%. When considering more subtle measures of  sleep disordered breathing, the overall respiratory disturbance index is mild(RDI=13) based on a 1% hypopnea desaturation  criteria with confirmation by surrogate arousal indicators. The apneas/hypopneas are accompanied by minimal oxygen  desaturation (percent time below 90% SpO2: 0%, Min SpO2: 94%, Baseline SpO2: 98%). The average desaturation across all  sleep disordered breathing events is 1.3%. Snoring occurs for 14.2% (30 dB) of the study, 10.9% is very loud. The mean pulse  rate is 93.2 BPM, with very frequent pulse rate variability (71 events with >= 6 BPM increase/decrease per hour).  TREATMENT CONSIDERATIONS: For a patient with mild asymptomatic TYRONE, nasal continuous positive airway pressure  (CPAP/AutoPAP) therapy or alternative therapies for treatment should be considered, i.e., Mandibular advancement splint  (MAS), referral to an ENT surgeon for modification to the airway, and/or weight loss or behavioral therapy to reduce the  potential risk of daytime somnolence, hypertension, cardiovascular disease, stroke and diabetes.  DISEASE MANAGEMENT CONSIDERATIONS: Based on the SpO2, further evaluation by awake arterial blood gases or pulse  oximetry may be  indicated to confirm the need for supplemental oxygen.

## 2023-01-23 ENCOUNTER — OFFICE VISIT (OUTPATIENT)
Dept: PULMONOLOGY | Facility: CLINIC | Age: 28
End: 2023-01-23
Payer: COMMERCIAL

## 2023-01-23 VITALS
BODY MASS INDEX: 44.41 KG/M2 | OXYGEN SATURATION: 98 % | DIASTOLIC BLOOD PRESSURE: 74 MMHG | SYSTOLIC BLOOD PRESSURE: 128 MMHG | RESPIRATION RATE: 20 BRPM | WEIGHT: 293 LBS | HEART RATE: 68 BPM | HEIGHT: 68 IN

## 2023-01-23 DIAGNOSIS — R06.83 SNORING: ICD-10-CM

## 2023-01-23 DIAGNOSIS — R94.2 DECREASED DIFFUSION CAPACITY OF LUNG: ICD-10-CM

## 2023-01-23 DIAGNOSIS — Z87.09 HISTORY OF PLEURAL EMPYEMA: Primary | ICD-10-CM

## 2023-01-23 DIAGNOSIS — J98.11 ATELECTASIS: ICD-10-CM

## 2023-01-23 PROCEDURE — 3074F PR MOST RECENT SYSTOLIC BLOOD PRESSURE < 130 MM HG: ICD-10-PCS | Mod: CPTII,S$GLB,, | Performed by: INTERNAL MEDICINE

## 2023-01-23 PROCEDURE — 3078F DIAST BP <80 MM HG: CPT | Mod: CPTII,S$GLB,, | Performed by: INTERNAL MEDICINE

## 2023-01-23 PROCEDURE — 3074F SYST BP LT 130 MM HG: CPT | Mod: CPTII,S$GLB,, | Performed by: INTERNAL MEDICINE

## 2023-01-23 PROCEDURE — 99214 PR OFFICE/OUTPT VISIT, EST, LEVL IV, 30-39 MIN: ICD-10-PCS | Mod: S$GLB,,, | Performed by: INTERNAL MEDICINE

## 2023-01-23 PROCEDURE — 3078F PR MOST RECENT DIASTOLIC BLOOD PRESSURE < 80 MM HG: ICD-10-PCS | Mod: CPTII,S$GLB,, | Performed by: INTERNAL MEDICINE

## 2023-01-23 PROCEDURE — 3008F BODY MASS INDEX DOCD: CPT | Mod: CPTII,S$GLB,, | Performed by: INTERNAL MEDICINE

## 2023-01-23 PROCEDURE — 1159F MED LIST DOCD IN RCRD: CPT | Mod: CPTII,S$GLB,, | Performed by: INTERNAL MEDICINE

## 2023-01-23 PROCEDURE — 99999 PR PBB SHADOW E&M-EST. PATIENT-LVL III: ICD-10-PCS | Mod: PBBFAC,,, | Performed by: INTERNAL MEDICINE

## 2023-01-23 PROCEDURE — 99999 PR PBB SHADOW E&M-EST. PATIENT-LVL III: CPT | Mod: PBBFAC,,, | Performed by: INTERNAL MEDICINE

## 2023-01-23 PROCEDURE — 1159F PR MEDICATION LIST DOCUMENTED IN MEDICAL RECORD: ICD-10-PCS | Mod: CPTII,S$GLB,, | Performed by: INTERNAL MEDICINE

## 2023-01-23 PROCEDURE — 1160F PR REVIEW ALL MEDS BY PRESCRIBER/CLIN PHARMACIST DOCUMENTED: ICD-10-PCS | Mod: CPTII,S$GLB,, | Performed by: INTERNAL MEDICINE

## 2023-01-23 PROCEDURE — 99214 OFFICE O/P EST MOD 30 MIN: CPT | Mod: S$GLB,,, | Performed by: INTERNAL MEDICINE

## 2023-01-23 PROCEDURE — 1160F RVW MEDS BY RX/DR IN RCRD: CPT | Mod: CPTII,S$GLB,, | Performed by: INTERNAL MEDICINE

## 2023-01-23 PROCEDURE — 3008F PR BODY MASS INDEX (BMI) DOCUMENTED: ICD-10-PCS | Mod: CPTII,S$GLB,, | Performed by: INTERNAL MEDICINE

## 2023-01-23 NOTE — PROGRESS NOTES
Pulmonary Outpatient Follow Up Visit     Subjective:       Patient ID: Christina Leal is a 27 y.o. female.    Chief Complaint: Empyema of left pleural space        HPI        27-year-old female patient presenting for 2 months follow-up.      Initially evaluated in the clinic December 2022 after hospital admission for left-sided empyema status post VATS and chest tube placement.      Microbiology showed Gram-positive cocci.      Discharged home on Rocephin and vancomycin.      PICC line removed about 10 days ago.      Has dyspnea.      Snoring, excessive daytime sleepiness Holderness Sleepiness Scale score 8, nonrestorative sleep.      Works as academic counselor.     Overall stable.  PFT showed decreased diffusion capacity likely related to residual scarring atelectasis.      Home sleep study showed mild snoring no evidence of obstructive sleep apnea.    Review of Systems   Constitutional:  Positive for fatigue.   HENT:  Negative for nosebleeds.    Eyes:  Negative for redness.   Respiratory:  Positive for snoring, previous hospitalization due to pulmonary problems and somnolence. Negative for choking.    Cardiovascular:  Negative for chest pain.   Genitourinary:  Negative for hematuria.   Endocrine:  Negative for cold intolerance.    Musculoskeletal:  Negative for gait problem.   Neurological:  Negative for syncope.   Hematological:  Negative for adenopathy.   Psychiatric/Behavioral:  Positive for sleep disturbance. Negative for confusion.      Outpatient Encounter Medications as of 1/23/2023   Medication Sig Dispense Refill    acetaminophen (TYLENOL) 500 MG tablet Take 500-1,000 mg by mouth every 6 (six) hours as needed for Pain.      ibuprofen (ADVIL,MOTRIN) 200 MG tablet Take 200-400 mg by mouth every 6 (six) hours as needed for Pain.      norgestimate-ethinyl estradioL (ORTHO TRI-CYCLEN,TRI-SPRINTEC) 0.18/0.215/0.25 mg-35 mcg (28) tablet Take 1 tablet by mouth once daily.  "28 tablet 11    pantoprazole (PROTONIX) 40 MG tablet Take 1 tablet (40 mg total) by mouth once daily. 30 tablet 11    propranoloL (INDERAL) 10 MG tablet Take 1 tablet (10 mg total) by mouth 3 (three) times daily as needed. 30 tablet 0    [] ondansetron (ZOFRAN) 4 MG tablet Take 1 tablet (4 mg total) by mouth every 8 (eight) hours as needed for Nausea. (Patient not taking: Reported on 2022) 20 tablet 1     No facility-administered encounter medications on file as of 2023.       Objective:     Vital Signs (Most Recent)  Vital Signs  Pulse: 68  Resp: 20  SpO2: 98 %  BP: 128/74  Height and Weight  Height: 5' 8" (172.7 cm)  Weight: (!) 137 kg (301 lb 14.7 oz)  BSA (Calculated - sq m): 2.56 sq meters  BMI (Calculated): 45.9  Weight in (lb) to have BMI = 25: 164.1]  Wt Readings from Last 2 Encounters:   23 (!) 137 kg (301 lb 14.7 oz)   22 (!) 136.9 kg (301 lb 13 oz)       Physical Exam   Constitutional: She is oriented to person, place, and time. She appears well-developed and well-nourished. No distress. She is obese.   HENT:   Head: Normocephalic.   Cardiovascular: Normal rate and regular rhythm.   Pulmonary/Chest: Normal expansion and effort normal. No stridor. No respiratory distress. She has decreased breath sounds. She exhibits no tenderness.   Abdominal: She exhibits no distension.   Musculoskeletal:         General: No tenderness.      Cervical back: Neck supple.   Lymphadenopathy:     She has no cervical adenopathy.   Neurological: She is alert and oriented to person, place, and time. Gait normal.   Skin: Skin is warm.   Psychiatric: She has a normal mood and affect. Her behavior is normal. Judgment and thought content normal.   Nursing note and vitals reviewed.    Laboratory  Lab Results   Component Value Date    WBC 6.18 2022    RBC 4.14 2022    HGB 11.0 (L) 2022    HCT 34.5 (L) 2022    MCV 83 2022    MCH 26.6 (L) 2022    MCHC 31.9 (L) 2022 "    RDW 14.9 (H) 11/28/2022     11/28/2022    MPV 9.9 11/28/2022    GRAN 4.1 11/28/2022    GRAN 66.7 11/28/2022    LYMPH 1.1 11/28/2022    LYMPH 18.1 11/28/2022    MONO 0.8 11/28/2022    MONO 13.1 11/28/2022    EOS 0.0 11/28/2022    BASO 0.09 11/28/2022    EOSINOPHIL 0.3 11/28/2022    BASOPHIL 1.5 11/28/2022       BMP  Lab Results   Component Value Date     (L) 12/12/2022    K 3.4 (L) 12/12/2022    CL 94 (L) 12/12/2022    CO2 26 12/12/2022    BUN 5 (L) 12/12/2022    CREATININE 1.3 12/12/2022    CALCIUM 10.1 12/12/2022    ANIONGAP 14 12/12/2022    AST 31 11/14/2022    ALT 20 11/14/2022    PROT 8.1 11/14/2022       Lab Results   Component Value Date    BNP 17 10/20/2022       No results found for: TSH    Lab Results   Component Value Date    SEDRATE 66 (H) 11/21/2022       Lab Results   Component Value Date    CRP 2.9 11/21/2022     No results found for: IGE     No results found for: ASPERGILLUS  No results found for: AFUMIGATUSCL     No results found for: ACE     Diagnostic Results:  I have personally reviewed today the following studies:    Chest x-ray 11/02/2022     FINDINGS:  The trachea and cardiomediastinal silhouette remains stable.  There appears to be interval removal of left-sided chest tube.  When compared with the prior study, there is mild interval increase in size of left-sided pleural effusion along with pleuroparenchymal scarring and subsegmental atelectasis.  No definitive evidence for pneumothorax.  Right lung is grossly clear..         CT chest December 2022      Scarring and subsegmental atelectasis noted at the left lower lobe with minimal left apical scarring also present.  Resolution of empyema            6 minute walk mild reduction of exercise capacity no need for O2 on exertion  Assessment/Plan:   History of pleural empyema    Decreased diffusion capacity of lung    Atelectasis    Snoring        Favorable CT findings after empyema treatment.      No evidence of asthma on PFT.   Decreased diffusion capacity likely related to atelectasis and scarring    No evidence of obstructive sleep apnea.      Advised to seek evaluation with dental sleep specialist for snoring management.          Follow up if symptoms worsen or fail to improve.    This note was prepared using voice recognition system and is likely to have sound alike errors that may have been overlooked even after proof reading.  Please call me with any questions    Discussed diagnosis, its evaluation, treatment and usual course. All questions answered.      Roberto Fine MD

## 2023-01-23 NOTE — PATIENT INSTRUCTIONS
Sleep test did not show sleep apnea however there was a significant snoring    I recommend to to see       One of those dentists in town who make oral appliances to help with snoring.  Two options are as follows:     Dr Kurt Lejeune     Address: [address] [place] [address]   Phone: [phone]       Dr. Estuardo Recinos   Address: [address], [place] [address]   Phone: [phone]

## 2023-03-13 PROBLEM — N17.9 AKI (ACUTE KIDNEY INJURY): Status: RESOLVED | Noted: 2022-12-12 | Resolved: 2023-03-13

## 2023-04-24 DIAGNOSIS — N18.30 STAGE 3 CHRONIC KIDNEY DISEASE, UNSPECIFIED WHETHER STAGE 3A OR 3B CKD: Primary | ICD-10-CM

## 2023-05-25 NOTE — HPI
Christina Leal is 26 y.o.  Asked to see by ER Provider  Hung, SOB, large left pleural effusion with midline shift  Seen [place] yesterday  Labs wbcc elevated    Chest pain since last Thursday    Impression: Ocular hypertension, bilateral Plan: Good  IOP. Monitor.   RTC 6 months CEE with possible RNFL OCT

## 2023-05-30 ENCOUNTER — OFFICE VISIT (OUTPATIENT)
Dept: INTERNAL MEDICINE | Facility: CLINIC | Age: 28
End: 2023-05-30
Payer: COMMERCIAL

## 2023-05-30 ENCOUNTER — LAB VISIT (OUTPATIENT)
Dept: LAB | Facility: HOSPITAL | Age: 28
End: 2023-05-30
Attending: FAMILY MEDICINE
Payer: COMMERCIAL

## 2023-05-30 VITALS
HEART RATE: 82 BPM | SYSTOLIC BLOOD PRESSURE: 142 MMHG | HEIGHT: 68 IN | WEIGHT: 293 LBS | OXYGEN SATURATION: 99 % | BODY MASS INDEX: 44.41 KG/M2 | TEMPERATURE: 99 F | DIASTOLIC BLOOD PRESSURE: 100 MMHG

## 2023-05-30 DIAGNOSIS — Z00.00 ROUTINE HEALTH MAINTENANCE: ICD-10-CM

## 2023-05-30 DIAGNOSIS — E66.01 MORBID OBESITY: ICD-10-CM

## 2023-05-30 DIAGNOSIS — Z00.00 ROUTINE HEALTH MAINTENANCE: Primary | ICD-10-CM

## 2023-05-30 DIAGNOSIS — D64.9 ANEMIA, UNSPECIFIED TYPE: ICD-10-CM

## 2023-05-30 DIAGNOSIS — R03.0 TRANSIENT ELEVATED BLOOD PRESSURE: ICD-10-CM

## 2023-05-30 PROBLEM — R68.89 DECREASED STRENGTH, ENDURANCE, AND MOBILITY: Status: RESOLVED | Noted: 2022-12-07 | Resolved: 2023-05-30

## 2023-05-30 PROBLEM — R53.1 DECREASED STRENGTH, ENDURANCE, AND MOBILITY: Status: RESOLVED | Noted: 2022-12-07 | Resolved: 2023-05-30

## 2023-05-30 PROBLEM — Z74.09 DECREASED FUNCTIONAL MOBILITY AND ENDURANCE: Status: RESOLVED | Noted: 2022-12-07 | Resolved: 2023-05-30

## 2023-05-30 PROBLEM — Z74.09 DECREASED STRENGTH, ENDURANCE, AND MOBILITY: Status: RESOLVED | Noted: 2022-12-07 | Resolved: 2023-05-30

## 2023-05-30 PROBLEM — J18.9 PARAPNEUMONIC EFFUSION: Status: RESOLVED | Noted: 2022-10-19 | Resolved: 2023-05-30

## 2023-05-30 PROBLEM — J91.8 PARAPNEUMONIC EFFUSION: Status: RESOLVED | Noted: 2022-10-19 | Resolved: 2023-05-30

## 2023-05-30 PROBLEM — J86.9 EMPYEMA OF LEFT PLEURAL SPACE: Status: RESOLVED | Noted: 2022-10-20 | Resolved: 2023-05-30

## 2023-05-30 PROBLEM — R11.0 NAUSEA: Status: RESOLVED | Noted: 2022-12-12 | Resolved: 2023-05-30

## 2023-05-30 PROBLEM — Z93.8 S/P CHEST TUBE PLACEMENT: Status: RESOLVED | Noted: 2022-10-23 | Resolved: 2023-05-30

## 2023-05-30 LAB
ALBUMIN SERPL BCP-MCNC: 3.5 G/DL (ref 3.5–5.2)
ALP SERPL-CCNC: 37 U/L (ref 55–135)
ALT SERPL W/O P-5'-P-CCNC: 12 U/L (ref 10–44)
ANION GAP SERPL CALC-SCNC: 8 MMOL/L (ref 8–16)
AST SERPL-CCNC: 11 U/L (ref 10–40)
BASOPHILS # BLD AUTO: 0.07 K/UL (ref 0–0.2)
BASOPHILS NFR BLD: 1 % (ref 0–1.9)
BILIRUB SERPL-MCNC: 0.3 MG/DL (ref 0.1–1)
BUN SERPL-MCNC: 13 MG/DL (ref 6–20)
CALCIUM SERPL-MCNC: 9.9 MG/DL (ref 8.7–10.5)
CHLORIDE SERPL-SCNC: 105 MMOL/L (ref 95–110)
CHOLEST SERPL-MCNC: 148 MG/DL (ref 120–199)
CHOLEST/HDLC SERPL: 2.6 {RATIO} (ref 2–5)
CO2 SERPL-SCNC: 23 MMOL/L (ref 23–29)
CREAT SERPL-MCNC: 0.8 MG/DL (ref 0.5–1.4)
DIFFERENTIAL METHOD: ABNORMAL
EOSINOPHIL # BLD AUTO: 0.1 K/UL (ref 0–0.5)
EOSINOPHIL NFR BLD: 1.4 % (ref 0–8)
ERYTHROCYTE [DISTWIDTH] IN BLOOD BY AUTOMATED COUNT: 13.2 % (ref 11.5–14.5)
EST. GFR  (NO RACE VARIABLE): >60 ML/MIN/1.73 M^2
ESTIMATED AVG GLUCOSE: 111 MG/DL (ref 68–131)
FERRITIN SERPL-MCNC: 15 NG/ML (ref 20–300)
GLUCOSE SERPL-MCNC: 103 MG/DL (ref 70–110)
HBA1C MFR BLD: 5.5 % (ref 4–5.6)
HCT VFR BLD AUTO: 34.8 % (ref 37–48.5)
HDLC SERPL-MCNC: 56 MG/DL (ref 40–75)
HDLC SERPL: 37.8 % (ref 20–50)
HGB BLD-MCNC: 11 G/DL (ref 12–16)
IMM GRANULOCYTES # BLD AUTO: 0.02 K/UL (ref 0–0.04)
IMM GRANULOCYTES NFR BLD AUTO: 0.3 % (ref 0–0.5)
IRON SERPL-MCNC: 42 UG/DL (ref 30–160)
LDLC SERPL CALC-MCNC: 71.4 MG/DL (ref 63–159)
LYMPHOCYTES # BLD AUTO: 1.9 K/UL (ref 1–4.8)
LYMPHOCYTES NFR BLD: 27.5 % (ref 18–48)
MCH RBC QN AUTO: 27.1 PG (ref 27–31)
MCHC RBC AUTO-ENTMCNC: 31.6 G/DL (ref 32–36)
MCV RBC AUTO: 86 FL (ref 82–98)
MONOCYTES # BLD AUTO: 0.5 K/UL (ref 0.3–1)
MONOCYTES NFR BLD: 7.7 % (ref 4–15)
NEUTROPHILS # BLD AUTO: 4.3 K/UL (ref 1.8–7.7)
NEUTROPHILS NFR BLD: 62.1 % (ref 38–73)
NONHDLC SERPL-MCNC: 92 MG/DL
NRBC BLD-RTO: 0 /100 WBC
PLATELET # BLD AUTO: 350 K/UL (ref 150–450)
PMV BLD AUTO: 10 FL (ref 9.2–12.9)
POTASSIUM SERPL-SCNC: 4.5 MMOL/L (ref 3.5–5.1)
PROT SERPL-MCNC: 7.2 G/DL (ref 6–8.4)
RBC # BLD AUTO: 4.06 M/UL (ref 4–5.4)
SATURATED IRON: 8 % (ref 20–50)
SODIUM SERPL-SCNC: 136 MMOL/L (ref 136–145)
TOTAL IRON BINDING CAPACITY: 515 UG/DL (ref 250–450)
TRANSFERRIN SERPL-MCNC: 348 MG/DL (ref 200–375)
TRIGL SERPL-MCNC: 103 MG/DL (ref 30–150)
TSH SERPL DL<=0.005 MIU/L-ACNC: 2.57 UIU/ML (ref 0.4–4)
WBC # BLD AUTO: 6.99 K/UL (ref 3.9–12.7)

## 2023-05-30 PROCEDURE — 1159F MED LIST DOCD IN RCRD: CPT | Mod: CPTII,S$GLB,, | Performed by: FAMILY MEDICINE

## 2023-05-30 PROCEDURE — 83036 HEMOGLOBIN GLYCOSYLATED A1C: CPT | Performed by: FAMILY MEDICINE

## 2023-05-30 PROCEDURE — 84466 ASSAY OF TRANSFERRIN: CPT | Performed by: FAMILY MEDICINE

## 2023-05-30 PROCEDURE — 84443 ASSAY THYROID STIM HORMONE: CPT | Performed by: FAMILY MEDICINE

## 2023-05-30 PROCEDURE — 80061 LIPID PANEL: CPT | Performed by: FAMILY MEDICINE

## 2023-05-30 PROCEDURE — 3008F PR BODY MASS INDEX (BMI) DOCUMENTED: ICD-10-PCS | Mod: CPTII,S$GLB,, | Performed by: FAMILY MEDICINE

## 2023-05-30 PROCEDURE — 99395 PREV VISIT EST AGE 18-39: CPT | Mod: S$GLB,,, | Performed by: FAMILY MEDICINE

## 2023-05-30 PROCEDURE — 3080F DIAST BP >= 90 MM HG: CPT | Mod: CPTII,S$GLB,, | Performed by: FAMILY MEDICINE

## 2023-05-30 PROCEDURE — 99395 PR PREVENTIVE VISIT,EST,18-39: ICD-10-PCS | Mod: S$GLB,,, | Performed by: FAMILY MEDICINE

## 2023-05-30 PROCEDURE — 80053 COMPREHEN METABOLIC PANEL: CPT | Performed by: FAMILY MEDICINE

## 2023-05-30 PROCEDURE — 3077F PR MOST RECENT SYSTOLIC BLOOD PRESSURE >= 140 MM HG: ICD-10-PCS | Mod: CPTII,S$GLB,, | Performed by: FAMILY MEDICINE

## 2023-05-30 PROCEDURE — 3080F PR MOST RECENT DIASTOLIC BLOOD PRESSURE >= 90 MM HG: ICD-10-PCS | Mod: CPTII,S$GLB,, | Performed by: FAMILY MEDICINE

## 2023-05-30 PROCEDURE — 1159F PR MEDICATION LIST DOCUMENTED IN MEDICAL RECORD: ICD-10-PCS | Mod: CPTII,S$GLB,, | Performed by: FAMILY MEDICINE

## 2023-05-30 PROCEDURE — 85025 COMPLETE CBC W/AUTO DIFF WBC: CPT | Performed by: FAMILY MEDICINE

## 2023-05-30 PROCEDURE — 36415 COLL VENOUS BLD VENIPUNCTURE: CPT | Performed by: FAMILY MEDICINE

## 2023-05-30 PROCEDURE — 99999 PR PBB SHADOW E&M-EST. PATIENT-LVL III: CPT | Mod: PBBFAC,,, | Performed by: FAMILY MEDICINE

## 2023-05-30 PROCEDURE — 82728 ASSAY OF FERRITIN: CPT | Performed by: FAMILY MEDICINE

## 2023-05-30 PROCEDURE — 3008F BODY MASS INDEX DOCD: CPT | Mod: CPTII,S$GLB,, | Performed by: FAMILY MEDICINE

## 2023-05-30 PROCEDURE — 99999 PR PBB SHADOW E&M-EST. PATIENT-LVL III: ICD-10-PCS | Mod: PBBFAC,,, | Performed by: FAMILY MEDICINE

## 2023-05-30 PROCEDURE — 3077F SYST BP >= 140 MM HG: CPT | Mod: CPTII,S$GLB,, | Performed by: FAMILY MEDICINE

## 2023-05-30 NOTE — PROGRESS NOTES
Subjective:      Patient ID: Christina Lela is a 27 y.o. female.    Chief Complaint: here for physical examination and issues  below    HPI elevated blood pressure today history of elevated blood pressure with the initial visit and normalizes normal last several visits discussed obtaining home blood pressure machine for arm appropriate cuff size and home blood pressures    Morbid obesity discussed possible bariatric surgery option she had lost 60 lb when she was ill last  and she is gaining back to her baseline sounds like she did have sleep apnea evaluation that was negative per her report  Past Medical History:   Diagnosis Date    Empyema of left pleural space       Past Surgical History:   Procedure Laterality Date    EVACUATION OF EMPYEMA Left 10/21/2022    Procedure: EVACUATION, EMPYEMA;  Surgeon: Dusty Moyer MD;  Location: [facility] OR;  Service: Cardiothoracic;  Laterality: Left;  VATS, WITH EVACUATION OF EMPYEMA    INJECTION OF ANESTHETIC AGENT AROUND MULTIPLE INTERCOSTAL NERVES Left 10/21/2022    Procedure: BLOCK, NERVE, INTERCOSTAL, 2 OR MORE;  Surgeon: Dusty Moyer MD;  Location: [facility] OR;  Service: Cardiothoracic;  Laterality: Left;    THORACOSCOPIC DECORTICATION OF LUNG Left 10/21/2022    Procedure: VATS, WITH DECORTICATION, LUNG;  Surgeon: Dusty Moyer MD;  Location: [facility] OR;  Service: Cardiothoracic;  Laterality: Left;  AND EVACUATION OF EMPYEMA      Social History     Socioeconomic History    Marital status: Single   Tobacco Use    Smoking status: Former     Types: Cigarettes     Start date:      Quit date:      Years since quittin.4    Smokeless tobacco: Never   Substance and Sexual Activity    Alcohol use: Yes     Comment: social    Drug use: No    Sexual activity: Yes     Partners: Male     Birth control/protection: Condom, OCP   Social History Narrative    She is a graduate of [employer] and will be attending [employer] in the fall. She is a non-smoker and doesn't drink  alcohol. 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      Social Determinants of Health     Financial Resource Strain: Low Risk     Difficulty of Paying Living Expenses: Not hard at all   Food Insecurity: No Food Insecurity    Worried About Running Out of Food in the Last Year: Never true    Ran Out of Food in the Last Year: Never true   Transportation Needs: No Transportation Needs    Lack of Transportation (Medical): No    Lack of Transportation (Non-Medical): No   Physical Activity: Insufficiently Active    Days of Exercise per Week: 2 days    Minutes of Exercise per Session: 20 min   Stress: No Stress Concern Present    Feeling of Stress : Not at all   Social Connections: Socially Isolated    Frequency of Communication with Friends and Family: More than three times a week    Attends Druze Services: Never    Active Member of Clubs or Organizations: No    Attends Club or Organization Meetings: Never    Marital Status: Never    Housing Stability: Low Risk     Unable to Pay for Housing in the Last Year: No    Number of Places Lived in the Last Year: 1    Unstable Housing in the Last Year: No      Family History   Problem Relation Age of Onset    Cancer Father     Esophageal cancer Father     Heart disease Neg Hx       Review of Systems      Cardiovascular: no chest pain  Chest: no shortness of breath  Abd: no abd pain  Remainder review of systems negative    Objective:     Physical Exam  Vitals and nursing note reviewed.   Constitutional:       General: She is not in acute distress.     Appearance: She is well-developed.   HENT:      Head: Atraumatic.      Right Ear: External ear normal.      Left Ear: External ear normal.      Nose: Nose normal.      Mouth/Throat:      Pharynx: No oropharyngeal exudate.   Eyes:      General: No scleral icterus.     Conjunctiva/sclera: Conjunctivae normal.      Pupils: Pupils are equal, round, and reactive to light.   Neck:      Thyroid: No thyromegaly.   Cardiovascular:       Rate and Rhythm: Normal rate and regular rhythm.      Heart sounds: Normal heart sounds. No murmur heard.  Pulmonary:      Effort: Pulmonary effort is normal. No respiratory distress.      Breath sounds: Normal breath sounds. No wheezing or rales.   Abdominal:      General: Bowel sounds are normal. There is no distension.      Palpations: Abdomen is soft. There is no mass.      Tenderness: There is no abdominal tenderness. There is no guarding or rebound.   Musculoskeletal:         General: No tenderness. Normal range of motion.      Cervical back: Normal range of motion and neck supple.   Lymphadenopathy:      Cervical: No cervical adenopathy.   Skin:     General: Skin is warm.      Coloration: Skin is not pale.      Findings: No erythema or rash.   Neurological:      Mental Status: She is alert and oriented to person, place, and time.      Cranial Nerves: No cranial nerve deficit.      Motor: No abnormal muscle tone.      Coordination: Coordination normal.   Psychiatric:         Behavior: Behavior normal.         Thought Content: Thought content normal.         Judgment: Judgment normal.     Assessment:         ICD-10-CM ICD-9-CM   1. Routine health maintenance  Z00.00 V70.0   2. Transient elevated blood pressure  R03.0 796.2   3. Morbid obesity  E66.01 278.01   4. Anemia, unspecified type  D64.9 285.9      Plan:        1. Routine health maintenance  -     Hemoglobin A1C; Future; Expected date: 05/30/2023    2. Transient elevated blood pressure  -     Lipid Panel; Future; Expected date: 05/30/2023  -     Comprehensive Metabolic Panel; Future; Expected date: 05/30/2023  -     TSH; Future; Expected date: 05/30/2023    3. Morbid obesity    4. Anemia, unspecified type  -     CBC Auto Differential; Future; Expected date: 05/30/2023  -     Ferritin; Future; Expected date: 05/30/2023  -     Iron and TIBC; Future; Expected date: 05/30/2023       F/u [place] few weeks with home bps;bring machine    Fasting lab today

## 2023-07-07 ENCOUNTER — PATIENT MESSAGE (OUTPATIENT)
Dept: INFECTIOUS DISEASES | Facility: CLINIC | Age: 28
End: 2023-07-07
Payer: COMMERCIAL

## 2023-09-05 DIAGNOSIS — Z30.011 BCP (BIRTH CONTROL PILLS) INITIATION: ICD-10-CM

## 2023-09-06 NOTE — TELEPHONE ENCOUNTER
Pt last wwe was 09/22/22. Pogoseat message sent to get pt scheduled for annual exam. Pt would like rx refill. Please advise.

## 2023-09-07 RX ORDER — NORGESTIMATE AND ETHINYL ESTRADIOL 7DAYSX3 28
1 KIT ORAL
Qty: 28 TABLET | Refills: 0 | Status: SHIPPED | OUTPATIENT
Start: 2023-09-07 | End: 2023-10-03 | Stop reason: SDUPTHER

## 2023-10-03 ENCOUNTER — OFFICE VISIT (OUTPATIENT)
Dept: OBSTETRICS AND GYNECOLOGY | Facility: CLINIC | Age: 28
End: 2023-10-03
Payer: COMMERCIAL

## 2023-10-03 VITALS
DIASTOLIC BLOOD PRESSURE: 92 MMHG | BODY MASS INDEX: 44.41 KG/M2 | HEIGHT: 68 IN | WEIGHT: 293 LBS | SYSTOLIC BLOOD PRESSURE: 140 MMHG

## 2023-10-03 DIAGNOSIS — Z30.41 ENCOUNTER FOR SURVEILLANCE OF CONTRACEPTIVE PILLS: ICD-10-CM

## 2023-10-03 DIAGNOSIS — Z01.419 WELL WOMAN EXAM WITH ROUTINE GYNECOLOGICAL EXAM: Primary | ICD-10-CM

## 2023-10-03 DIAGNOSIS — Z11.3 SCREEN FOR STD (SEXUALLY TRANSMITTED DISEASE): ICD-10-CM

## 2023-10-03 DIAGNOSIS — R87.610 ASCUS OF CERVIX WITH NEGATIVE HIGH RISK HPV: ICD-10-CM

## 2023-10-03 PROCEDURE — 1159F MED LIST DOCD IN RCRD: CPT | Mod: CPTII,S$GLB,,

## 2023-10-03 PROCEDURE — 99395 PREV VISIT EST AGE 18-39: CPT | Mod: S$GLB,,,

## 2023-10-03 PROCEDURE — 3044F PR MOST RECENT HEMOGLOBIN A1C LEVEL <7.0%: ICD-10-PCS | Mod: CPTII,S$GLB,,

## 2023-10-03 PROCEDURE — 99999 PR PBB SHADOW E&M-EST. PATIENT-LVL III: CPT | Mod: PBBFAC,,,

## 2023-10-03 PROCEDURE — 3080F PR MOST RECENT DIASTOLIC BLOOD PRESSURE >= 90 MM HG: ICD-10-PCS | Mod: CPTII,S$GLB,,

## 2023-10-03 PROCEDURE — 87491 CHLMYD TRACH DNA AMP PROBE: CPT

## 2023-10-03 PROCEDURE — 88175 CYTOPATH C/V AUTO FLUID REDO: CPT

## 2023-10-03 PROCEDURE — 3008F PR BODY MASS INDEX (BMI) DOCUMENTED: ICD-10-PCS | Mod: CPTII,S$GLB,,

## 2023-10-03 PROCEDURE — 99999 PR PBB SHADOW E&M-EST. PATIENT-LVL III: ICD-10-PCS | Mod: PBBFAC,,,

## 2023-10-03 PROCEDURE — 1159F PR MEDICATION LIST DOCUMENTED IN MEDICAL RECORD: ICD-10-PCS | Mod: CPTII,S$GLB,,

## 2023-10-03 PROCEDURE — 3077F PR MOST RECENT SYSTOLIC BLOOD PRESSURE >= 140 MM HG: ICD-10-PCS | Mod: CPTII,S$GLB,,

## 2023-10-03 PROCEDURE — 3077F SYST BP >= 140 MM HG: CPT | Mod: CPTII,S$GLB,,

## 2023-10-03 PROCEDURE — 3080F DIAST BP >= 90 MM HG: CPT | Mod: CPTII,S$GLB,,

## 2023-10-03 PROCEDURE — 87591 N.GONORRHOEAE DNA AMP PROB: CPT

## 2023-10-03 PROCEDURE — 3008F BODY MASS INDEX DOCD: CPT | Mod: CPTII,S$GLB,,

## 2023-10-03 PROCEDURE — 3044F HG A1C LEVEL LT 7.0%: CPT | Mod: CPTII,S$GLB,,

## 2023-10-03 PROCEDURE — 99395 PR PREVENTIVE VISIT,EST,18-39: ICD-10-PCS | Mod: S$GLB,,,

## 2023-10-03 RX ORDER — NORGESTIMATE AND ETHINYL ESTRADIOL 7DAYSX3 28
1 KIT ORAL DAILY
Qty: 28 TABLET | Refills: 12 | Status: SHIPPED | OUTPATIENT
Start: 2023-10-03

## 2023-10-03 NOTE — PROGRESS NOTES
Subjective:      Patient ID: Christina Leal is a 27 y.o. female.    Chief Complaint:  Annual Exam      History of Present Illness  HPI  Annual Exam-Premenopausal  Patient presents for annual exam. The patient has no complaints today. The patient is sexually active. GYN screening history: last pap: approximate date 22 and was abnormal: rare ASCUS, repeat 1 year . The patient wears seatbelts: yes. The patient participates in regular exercise: not asked. Has the patient ever been transfused or tattooed?: yes. The patient reports that there is not domestic violence in her life.      Doing well on OCP, 4 day bleed at end of pack    Desires GC swab    GYN & OB History  Patient's last menstrual period was 2023.   Date of Last Pap: 10/3/2023    OB History    Para Term  AB Living   0 0 0 0 0 0   SAB IAB Ectopic Multiple Live Births   0 0 0 0 0       Review of Systems  Review of Systems   Constitutional:  Negative for activity change, appetite change, chills, fatigue and fever.   HENT:  Negative for nasal congestion and mouth sores.    Respiratory:  Negative for cough, shortness of breath and wheezing.    Cardiovascular:  Negative for chest pain.   Gastrointestinal:  Negative for abdominal pain, constipation, diarrhea, nausea and vomiting.   Endocrine: Negative for hair loss.   Genitourinary:  Negative for bladder incontinence, decreased libido, dysmenorrhea, dyspareunia, dysuria, frequency, genital sores, hematuria, menorrhagia, menstrual problem, pelvic pain, urgency, vaginal discharge, vaginal pain, urinary incontinence, postcoital bleeding, vaginal dryness and vaginal odor.   Musculoskeletal:  Negative for back pain.   Integumentary:  Negative for breast mass, nipple discharge, breast skin changes and breast tenderness.   Neurological:  Negative for headaches.   Hematological:  Negative for adenopathy.   Psychiatric/Behavioral:  Negative for depression and sleep disturbance. The patient is  not nervous/anxious.    All other systems reviewed and are negative.  Breast: Positive for breast self exam.Negative for lump, mass, mastodynia, nipple discharge, skin changes and tenderness         Objective:     Physical Exam:   Constitutional: She is oriented to person, place, and time. She appears well-developed and well-nourished. No distress.    HENT:   Head: Normocephalic and atraumatic.   Nose: Nose normal.    Eyes: Pupils are equal, round, and reactive to light. Conjunctivae and EOM are normal. Right eye exhibits no discharge. Left eye exhibits no discharge.     Cardiovascular:  Normal rate.      Exam reveals no clubbing, no cyanosis and no edema.        Pulmonary/Chest: Effort normal and breath sounds normal. No respiratory distress. She has no decreased breath sounds. She has no wheezes. She has no rhonchi. She has no rales. She exhibits no tenderness. Right breast exhibits no inverted nipple, no mass, no nipple discharge, no skin change, no tenderness, no bleeding and no swelling. Left breast exhibits no inverted nipple, no mass, no nipple discharge, no skin change, no tenderness, no bleeding and no swelling. Breasts are symmetrical.        Abdominal: Soft. Bowel sounds are normal. She exhibits no distension. There is no abdominal tenderness. There is no rebound and no guarding. Hernia confirmed negative in the right inguinal area and confirmed negative in the left inguinal area.     Genitourinary:    Inguinal canal, vagina, uterus, right adnexa and left adnexa normal.   Rectum:      No external hemorrhoid.      Pelvic exam was performed with patient supine.   The external female genitalia was normal.   No external genitalia lesions identified,Genitalia hair distrobution normal .   Labial bartholins normal.There is no rash, tenderness, lesion or injury on the right labia. There is no rash, tenderness, lesion or injury on the left labia. Cervix is normal. Right adnexum displays no mass, no tenderness and  no fullness. Left adnexum displays no mass, no tenderness and no fullness. No erythema,  no vaginal discharge, tenderness or bleeding in the vagina.    No foreign body in the vagina.      No signs of injury in the vagina.   Vagina was moist.Cervix exhibits no motion tenderness, no lesion, no discharge, no friability, no lesion, no tenderness and no polyp.    pap smear completedUerus contour normal  Uterus is not enlarged and not tender. Normal urethral meatus.Bladder findings: no bladder distention and no bladder tenderness          Musculoskeletal: Normal range of motion and moves all extremeties.      Lymphadenopathy: No inguinal adenopathy noted on the right or left side.    Neurological: She is alert and oriented to person, place, and time.    Skin: Skin is warm and dry. No rash noted. She is not diaphoretic. No cyanosis or erythema. No pallor. Nails show no clubbing.    Psychiatric: She has a normal mood and affect. Her speech is normal and behavior is normal. Judgment and thought content normal.         Assessment:     1. Well woman exam with routine gynecological exam    2. ASCUS of cervix with negative high risk HPV    3. Encounter for surveillance of contraceptive pills    4. Screen for STD (sexually transmitted disease)               Plan:     Well woman exam with routine gynecological exam  -     Liquid-Based Pap Smear, Screening  - Reviewed updated recommendations for pap smears (every 3 years) in low risk patients.  Recommend annual pelvic exams.  Reviewed recommendations for annual breast check.  Next pap due in 2026.   RTC 1 year or sooner prn.  To PCP for other health maintenance.      ASCUS of cervix with negative high risk HPV  -     Liquid-Based Pap Smear, Screening    Encounter for surveillance of contraceptive pills  -     norgestimate-ethinyl estradioL (ORTHO TRI-CYCLEN,TRI-SPRINTEC) 0.18/0.215/0.25 mg-35 mcg (28) tablet; Take 1 tablet by mouth once daily.  Dispense: 28 tablet; Refill:  12    Screen for STD (sexually transmitted disease)  -     C. trachomatis/N. gonorrhoeae by AMP DNA [facility]; Vagina              Follow up in about 1 year (around 10/3/2024) for annual exam.

## 2023-10-05 LAB
C TRACH DNA SPEC QL NAA+PROBE: NOT DETECTED
N GONORRHOEA DNA SPEC QL NAA+PROBE: NOT DETECTED

## 2024-11-04 ENCOUNTER — OFFICE VISIT (OUTPATIENT)
Dept: OBSTETRICS AND GYNECOLOGY | Facility: CLINIC | Age: 29
End: 2024-11-04
Payer: COMMERCIAL

## 2024-11-04 VITALS
HEIGHT: 68 IN | SYSTOLIC BLOOD PRESSURE: 142 MMHG | WEIGHT: 293 LBS | BODY MASS INDEX: 44.41 KG/M2 | DIASTOLIC BLOOD PRESSURE: 94 MMHG

## 2024-11-04 DIAGNOSIS — Z01.419 ENCOUNTER FOR GYNECOLOGICAL EXAMINATION WITHOUT ABNORMAL FINDING: Primary | ICD-10-CM

## 2024-11-04 DIAGNOSIS — Z71.85 HPV VACCINE COUNSELING: ICD-10-CM

## 2024-11-04 DIAGNOSIS — Z72.89 OTHER PROBLEMS RELATED TO LIFESTYLE: ICD-10-CM

## 2024-11-04 DIAGNOSIS — R03.0 ELEVATED BLOOD PRESSURE READING IN OFFICE WITHOUT DIAGNOSIS OF HYPERTENSION: ICD-10-CM

## 2024-11-04 DIAGNOSIS — Z11.3 ENCOUNTER FOR SCREENING FOR INFECTIONS WITH PREDOMINANTLY SEXUAL MODE OF TRANSMISSION: ICD-10-CM

## 2024-11-04 PROBLEM — D50.9 IRON DEFICIENCY ANEMIA, UNSPECIFIED: Status: ACTIVE | Noted: 2022-10-30

## 2024-11-04 PROCEDURE — 3077F SYST BP >= 140 MM HG: CPT | Mod: CPTII,S$GLB,, | Performed by: NURSE PRACTITIONER

## 2024-11-04 PROCEDURE — 3080F DIAST BP >= 90 MM HG: CPT | Mod: CPTII,S$GLB,, | Performed by: NURSE PRACTITIONER

## 2024-11-04 PROCEDURE — 99999 PR PBB SHADOW E&M-EST. PATIENT-LVL III: CPT | Mod: PBBFAC,,, | Performed by: NURSE PRACTITIONER

## 2024-11-04 PROCEDURE — 1160F RVW MEDS BY RX/DR IN RCRD: CPT | Mod: CPTII,S$GLB,, | Performed by: NURSE PRACTITIONER

## 2024-11-04 PROCEDURE — 1159F MED LIST DOCD IN RCRD: CPT | Mod: CPTII,S$GLB,, | Performed by: NURSE PRACTITIONER

## 2024-11-04 PROCEDURE — 3008F BODY MASS INDEX DOCD: CPT | Mod: CPTII,S$GLB,, | Performed by: NURSE PRACTITIONER

## 2024-11-04 PROCEDURE — 99395 PREV VISIT EST AGE 18-39: CPT | Mod: S$GLB,,, | Performed by: NURSE PRACTITIONER

## 2024-11-04 PROCEDURE — 87591 N.GONORRHOEAE DNA AMP PROB: CPT | Performed by: NURSE PRACTITIONER

## 2024-11-04 RX ORDER — LEVONORGESTREL/ETHIN.ESTRADIOL 0.1-0.02MG
1 TABLET ORAL DAILY
Qty: 84 TABLET | Refills: 0 | Status: SHIPPED | OUTPATIENT
Start: 2024-11-04 | End: 2025-11-04

## 2024-11-04 NOTE — PROGRESS NOTES
Subjective:       Patient ID: Christina Leal is a 28 y.o. female.    Chief Complaint:  Contraception      History of Present Illness    Annual Exam-Premenopausal  G0 presents for annual exam. The patient has no complaints today. The patient is sexually active; MM relationship x3-4 months.  Does desire STD testing. GYN screening history: last pap: approximate date 10/3/2023 and was normal.  Normal pap hx.  The patient wears seatbelts: yes. The patient participates in regular exercise: yes. 3x/wk walking about 30-40 minutes.  Has the patient ever been transfused or tattooed?: no. The patient reports that there is not domestic violence in her life.    Taking tri sprintec x2 years; monthly menses the last week x4-5d; currently menstruating today -- declines pelvic today; heavy x2-3d; pads-overnight changing q2-4h for cleanliness.  Occasional clots-quarter sized.  Has had cramping, but not current cycle. Elevated blood pressure; reports she has never been diagnosed with HTN.     No bladder/bowel issues.      GYN & OB History  Patient's last menstrual period was 2024.   Date of Last Pap: 10/9/2023    OB History    Para Term  AB Living   0 0 0 0 0 0   SAB IAB Ectopic Multiple Live Births   0 0 0 0 0       Review of Systems  Review of Systems   Constitutional:  Negative for activity change, appetite change, chills, fatigue and fever.   HENT:  Negative for nasal congestion and mouth sores.    Respiratory:  Negative for cough, shortness of breath and wheezing.    Cardiovascular:  Negative for chest pain.   Gastrointestinal:  Negative for abdominal pain, constipation, diarrhea, nausea and vomiting.   Endocrine: Negative for hair loss and hot flashes.   Genitourinary:  Positive for menorrhagia. Negative for bladder incontinence, decreased libido, dysmenorrhea, dyspareunia, dysuria, flank pain, frequency, genital sores, hematuria, hot flashes, menstrual problem, pelvic pain, urgency, vaginal discharge,  vaginal pain, urinary incontinence, postcoital bleeding, postmenopausal bleeding, vaginal dryness and vaginal odor.   Musculoskeletal:  Negative for back pain.   Integumentary:  Negative for rash, breast mass, nipple discharge, breast skin changes and breast tenderness.        +hirsutism   Neurological:  Negative for headaches.   Hematological:  Negative for adenopathy.   Psychiatric/Behavioral:  Negative for depression and sleep disturbance. The patient is not nervous/anxious.    All other systems reviewed and are negative.  Breast: Negative for breast self exam, lump, mass, mastodynia, nipple discharge, skin changes and tenderness          Objective:      Physical Exam:   Constitutional: She is oriented to person, place, and time. She appears well-developed and well-nourished. No distress.    HENT:   Head: Normocephalic and atraumatic.   Nose: Nose normal.    Eyes: Pupils are equal, round, and reactive to light. Conjunctivae and EOM are normal. Right eye exhibits no discharge. Left eye exhibits no discharge.     Cardiovascular:  Normal rate, regular rhythm and normal heart sounds.      Exam reveals no gallop, no friction rub, no clubbing, no cyanosis and no edema.       No murmur heard.   Pulmonary/Chest: Effort normal and breath sounds normal. No respiratory distress. She has no decreased breath sounds. She has no wheezes. She has no rhonchi. She has no rales. She exhibits no tenderness. Right breast exhibits no inverted nipple, no mass, no nipple discharge, no skin change, no tenderness, no bleeding and no swelling. Left breast exhibits no inverted nipple, no mass, no nipple discharge, no skin change, no tenderness, no bleeding and no swelling. Breasts are symmetrical.        Abdominal: Soft. Bowel sounds are normal. She exhibits no distension. There is no abdominal tenderness. There is no rebound and no guarding.             Musculoskeletal: Normal range of motion and moves all extremeties.       Neurological:  She is alert and oriented to person, place, and time.    Skin: Skin is warm and dry. No rash noted. She is not diaphoretic. No cyanosis or erythema. No pallor. Nails show no clubbing.   +hirsutism noted to chin    Psychiatric: She has a normal mood and affect. Her speech is normal and behavior is normal. Judgment and thought content normal.             Assessment:        1. Encounter for gynecological examination without abnormal finding    2. Encounter for screening for infections with predominantly sexual mode of transmission    3. Other problems related to lifestyle    4. HPV vaccine counseling    5. Elevated blood pressure reading in office without diagnosis of hypertension               Plan:   Discussed risks associated with elevated blood pressure and contraception    Discussed risks of DVT development with birth control use.  Pt denies history of blood clots, DVT, cardiac issues, HTN, CVA, gallbladder disease, liver disease, smoking, migraines with an aura, or adrenal disease.  Pt denies family hx of DVT.   Rx sent for aviane  Start Sunday  BP check in 1 month; if BPs improved can continue; if not progesterone only pills    Educated pt on HPV and Gardasil vaccine; declines today.  Safe sex practices discussed.     Reviewed updated recommendations for pap smears (every 3 years) in low risk patients.  Recommend annual pelvic exams.  Reviewed recommendations for annual CBE.  Next pap due in 2026.   RTC 1 year or sooner prn.  To PCP for other health maintenance.        Encounter for gynecological examination without abnormal finding  -     Treponema Pallidium Antibodies IgG, IgM; Future; Expected date: 11/04/2024  -     Hepatitis Panel, Acute; Future; Expected date: 11/04/2024  -     HIV 1/2 Ag/Ab (4th Gen); Future; Expected date: 11/04/2024  -     C. trachomatis/N. gonorrhoeae by AMP DNA    Encounter for screening for infections with predominantly sexual mode of transmission  -     Treponema Pallidium  Antibodies IgG, IgM; Future; Expected date: 11/04/2024  -     Hepatitis Panel, Acute; Future; Expected date: 11/04/2024  -     HIV 1/2 Ag/Ab (4th Gen); Future; Expected date: 11/04/2024  -     C. trachomatis/N. gonorrhoeae by AMP DNA    Other problems related to lifestyle  -     Treponema Pallidium Antibodies IgG, IgM; Future; Expected date: 11/04/2024  -     Hepatitis Panel, Acute; Future; Expected date: 11/04/2024  -     HIV 1/2 Ag/Ab (4th Gen); Future; Expected date: 11/04/2024  -     C. trachomatis/N. gonorrhoeae by AMP DNA    HPV vaccine counseling    Elevated blood pressure reading in office without diagnosis of hypertension  -     levonorgestrel-ethinyl estradiol 0.1-20 mg-mcg per tablet; Take 1 tablet by mouth once daily.  Dispense: 84 tablet; Refill: 0

## 2024-11-05 ENCOUNTER — OFFICE VISIT (OUTPATIENT)
Dept: INTERNAL MEDICINE | Facility: CLINIC | Age: 29
End: 2024-11-05
Payer: COMMERCIAL

## 2024-11-05 ENCOUNTER — LAB VISIT (OUTPATIENT)
Dept: LAB | Facility: HOSPITAL | Age: 29
End: 2024-11-05
Attending: NURSE PRACTITIONER
Payer: COMMERCIAL

## 2024-11-05 VITALS
SYSTOLIC BLOOD PRESSURE: 148 MMHG | OXYGEN SATURATION: 100 % | HEART RATE: 88 BPM | HEIGHT: 68 IN | BODY MASS INDEX: 44.41 KG/M2 | WEIGHT: 293 LBS | TEMPERATURE: 99 F | DIASTOLIC BLOOD PRESSURE: 98 MMHG

## 2024-11-05 DIAGNOSIS — Z13.1 SCREENING FOR DIABETES MELLITUS: ICD-10-CM

## 2024-11-05 DIAGNOSIS — Z11.3 ENCOUNTER FOR SCREENING FOR INFECTIONS WITH PREDOMINANTLY SEXUAL MODE OF TRANSMISSION: ICD-10-CM

## 2024-11-05 DIAGNOSIS — Z01.419 ENCOUNTER FOR GYNECOLOGICAL EXAMINATION WITHOUT ABNORMAL FINDING: ICD-10-CM

## 2024-11-05 DIAGNOSIS — I10 HYPERTENSION, UNSPECIFIED TYPE: ICD-10-CM

## 2024-11-05 DIAGNOSIS — Z72.89 OTHER PROBLEMS RELATED TO LIFESTYLE: ICD-10-CM

## 2024-11-05 DIAGNOSIS — Z23 NEED FOR VACCINATION: ICD-10-CM

## 2024-11-05 DIAGNOSIS — Z00.00 ROUTINE HEALTH MAINTENANCE: Primary | ICD-10-CM

## 2024-11-05 DIAGNOSIS — E66.01 MORBID OBESITY: ICD-10-CM

## 2024-11-05 LAB
ALBUMIN SERPL BCP-MCNC: 3.4 G/DL (ref 3.5–5.2)
ALP SERPL-CCNC: 39 U/L (ref 40–150)
ALT SERPL W/O P-5'-P-CCNC: 14 U/L (ref 10–44)
ANION GAP SERPL CALC-SCNC: 7 MMOL/L (ref 8–16)
AST SERPL-CCNC: 16 U/L (ref 10–40)
BILIRUB SERPL-MCNC: 0.3 MG/DL (ref 0.1–1)
BUN SERPL-MCNC: 12 MG/DL (ref 6–20)
C TRACH DNA SPEC QL NAA+PROBE: NOT DETECTED
CALCIUM SERPL-MCNC: 9 MG/DL (ref 8.7–10.5)
CHLORIDE SERPL-SCNC: 106 MMOL/L (ref 95–110)
CHOLEST SERPL-MCNC: 166 MG/DL (ref 120–199)
CHOLEST/HDLC SERPL: 3.4 {RATIO} (ref 2–5)
CO2 SERPL-SCNC: 24 MMOL/L (ref 23–29)
CREAT SERPL-MCNC: 0.9 MG/DL (ref 0.5–1.4)
EST. GFR  (NO RACE VARIABLE): >60 ML/MIN/1.73 M^2
ESTIMATED AVG GLUCOSE: 111 MG/DL (ref 68–131)
GLUCOSE SERPL-MCNC: 105 MG/DL (ref 70–110)
HAV IGM SERPL QL IA: NORMAL
HBA1C MFR BLD: 5.5 % (ref 4–5.6)
HBV CORE IGM SERPL QL IA: NORMAL
HBV SURFACE AG SERPL QL IA: NORMAL
HCV AB SERPL QL IA: NORMAL
HDLC SERPL-MCNC: 49 MG/DL (ref 40–75)
HDLC SERPL: 29.5 % (ref 20–50)
HIV 1+2 AB+HIV1 P24 AG SERPL QL IA: NORMAL
LDLC SERPL CALC-MCNC: 99.6 MG/DL (ref 63–159)
N GONORRHOEA DNA SPEC QL NAA+PROBE: NOT DETECTED
NONHDLC SERPL-MCNC: 117 MG/DL
POTASSIUM SERPL-SCNC: 4.4 MMOL/L (ref 3.5–5.1)
PROT SERPL-MCNC: 7.6 G/DL (ref 6–8.4)
SODIUM SERPL-SCNC: 137 MMOL/L (ref 136–145)
TREPONEMA PALLIDUM IGG+IGM AB [PRESENCE] IN SERUM OR PLASMA BY IMMUNOASSAY: NONREACTIVE
TRIGL SERPL-MCNC: 87 MG/DL (ref 30–150)

## 2024-11-05 PROCEDURE — 3008F BODY MASS INDEX DOCD: CPT | Mod: CPTII,S$GLB,, | Performed by: FAMILY MEDICINE

## 2024-11-05 PROCEDURE — 1159F MED LIST DOCD IN RCRD: CPT | Mod: CPTII,S$GLB,, | Performed by: FAMILY MEDICINE

## 2024-11-05 PROCEDURE — 99999 PR PBB SHADOW E&M-EST. PATIENT-LVL IV: CPT | Mod: PBBFAC,,, | Performed by: FAMILY MEDICINE

## 2024-11-05 PROCEDURE — 80074 ACUTE HEPATITIS PANEL: CPT | Performed by: NURSE PRACTITIONER

## 2024-11-05 PROCEDURE — 87389 HIV-1 AG W/HIV-1&-2 AB AG IA: CPT | Performed by: NURSE PRACTITIONER

## 2024-11-05 PROCEDURE — 86593 SYPHILIS TEST NON-TREP QUANT: CPT | Performed by: NURSE PRACTITIONER

## 2024-11-05 PROCEDURE — 90471 IMMUNIZATION ADMIN: CPT | Mod: S$GLB,,, | Performed by: FAMILY MEDICINE

## 2024-11-05 PROCEDURE — 80053 COMPREHEN METABOLIC PANEL: CPT | Performed by: FAMILY MEDICINE

## 2024-11-05 PROCEDURE — 3080F DIAST BP >= 90 MM HG: CPT | Mod: CPTII,S$GLB,, | Performed by: FAMILY MEDICINE

## 2024-11-05 PROCEDURE — 99395 PREV VISIT EST AGE 18-39: CPT | Mod: 25,S$GLB,, | Performed by: FAMILY MEDICINE

## 2024-11-05 PROCEDURE — 80061 LIPID PANEL: CPT | Performed by: FAMILY MEDICINE

## 2024-11-05 PROCEDURE — 90715 TDAP VACCINE 7 YRS/> IM: CPT | Mod: S$GLB,,, | Performed by: FAMILY MEDICINE

## 2024-11-05 PROCEDURE — 36415 COLL VENOUS BLD VENIPUNCTURE: CPT | Performed by: NURSE PRACTITIONER

## 2024-11-05 PROCEDURE — 83036 HEMOGLOBIN GLYCOSYLATED A1C: CPT | Performed by: FAMILY MEDICINE

## 2024-11-05 PROCEDURE — 3077F SYST BP >= 140 MM HG: CPT | Mod: CPTII,S$GLB,, | Performed by: FAMILY MEDICINE

## 2024-11-05 RX ORDER — SPIRONOLACTONE AND HYDROCHLOROTHIAZIDE 25; 25 MG/1; MG/1
1 TABLET ORAL DAILY
Qty: 30 TABLET | Refills: 11 | Status: SHIPPED | OUTPATIENT
Start: 2024-11-05 | End: 2025-11-05

## 2024-11-05 NOTE — PROGRESS NOTES
Chief Complaint: Hypertension    History of Present Illness    CHIEF COMPLAINT:  Ms. Leal presents today for follow-up on blood pressure.    HYPERTENSION:  She reports elevated blood pressure readings at home, consistently above normal but not exceeding 140/97-98. Her gynecologist noted elevated blood pressure during a recent annual exam and recommended follow-up. She denies any readings over 140/90.    GYNECOLOGICAL HISTORY:  She recently changed her birth control prescription to one containing less estrogen due to concerns about its potential to elevate blood pressure. She initially started birth control to address symptoms suggestive of polycystic ovary syndrome (PCOS), including facial hair growth and irregular menstrual cycles. Previous labs performed at age 17 were suggestive of PCOS, but she has never been formally diagnosed. Follow-up was not completed at that time due to lack of independence.    EXERCISE AND WEIGHT MANAGEMENT:  She exercises 3-4 times per week, including walking her dog for approximately one mile and using a treadmill at the gym for up to one hour when able to attend. She expresses concern about her weight and indicates interest in exploring various weight loss methods, including previous discussions about weight loss surgery options.    METABOLIC HEALTH:  She reports being told as a child that she could be prediabetic, based on discoloration on her neck, but is unsure if this was an official diagnosis. Her A1C level have been a source of concern.    SLEEP APNEA:  She reports a history of mild sleep apnea diagnosed during an evaluation conducted a few years ago. She indicates that during the testing, the results did not seem to strongly support a diagnosis of sleep apnea. She discussed the findings with a sleep specialist, who acknowledged the results.      ROS:  General: -fever, -chills, -fatigue, -weight gain, -weight loss  Eyes: -vision changes, -redness, -discharge  ENT: -ear pain,  -nasal congestion, -sore throat  Cardiovascular: -chest pain, -palpitations, -lower extremity edema  Respiratory: -cough, -shortness of breath  Gastrointestinal: -abdominal pain, -nausea, -vomiting, -diarrhea, -constipation, -blood in stool  Genitourinary: -dysuria, -hematuria, -frequency  Musculoskeletal: -joint pain, -muscle pain  Skin: -rash, -lesion  Neurological: -headache, -dizziness, -numbness, -tingling  Psychiatric: -anxiety, -depression, +sleep difficulty          Objective:   Physical Exam    Vitals: Reviewed. Nursing note reviewed.  Constitutional: Alert.  HENT: Normocephalic. Atraumatic. External ears normal. Nose normal. PERRL. Conjunctivae normal. Clear ears.  Neck: ROM normal. Supple.  Cardiovascular: Normal rate and regular rhythm. Normal heart sounds. Clear neck arteries.  Pulmonary: Normal breath sounds. Effort normal.  Abdominal: Bowel sounds are normal. Soft.  Musculoskeletal: ROM normal.  Skin: Warm. Dry.  Neurological: Oriented x3.  Psychiatric: Behavior normal. Thought content normal. Judgment normal.       Assessment:       1. Routine health maintenance    2. Hypertension, unspecified type    3. Morbid obesity    4. Screening for diabetes mellitus    5. Need for vaccination        Plan:   Assessment & Plan    Confirmed hypertension diagnosis based on home blood pressure readings and gynecologist visit  Assessed sleep apnea status, noting previous mild diagnosis with no current treatment needed  Evaluated tetanus booster need based on last administration in 2009  Considered prediabetes risk due to childhood history and current concerns  Decided on combination of spironolactone and hydrochlorothiazide for hypertension and potential PCOS symptoms    HYPERTENSION:  - Explained DASH diet principles for blood pressure management through increased fruit and vegetable intake, reduced sodium, and increased potassium.  - Recommend implementing DASH diet principles for blood pressure management.  - Ms.  Elvis to reduce salt intake.  - Ms. Leal to monitor blood pressure at home periodically (morning, midday, evening) to track medication effectiveness.  - Started combination of spironolactone and hydrochlorothiazide for blood pressure control and potential PCOS symptom management.  - Take half a tablet initially for the first few days to assess tolerance.  - Take medication in the morning.  - Ordered potassium and kidney function test for 2 weeks to monitor effects of new blood pressure medication.    EXERCISE MANAGEMENT:  - Ms. Leal to continue current exercise routine of walking or treadmill use 3-4 times per week.    TDAP VACCINATION:  - Discussed potential side effects of tetanus vaccine, primarily soreness at injection site.  - Administered tetanus, diphtheria, and pertussis (Tdap) vaccine.    LABS:  - Ordered CMP, including cholesterol, glucose, kidney and liver function tests.  - Ordered hemoglobin A1C for diabetes screening.    FOLLOW UP:  - Follow up with KODI in 2 weeks to review home blood pressure readings and medication effectiveness.  - Follow up in 2 weeks for potassium and kidney function test.          Routine health maintenance    Hypertension, unspecified type  -     Lipid Panel; Future; Expected date: 11/05/2024  -     Comprehensive Metabolic Panel; Future; Expected date: 11/05/2024  -     Basic Metabolic Panel; Future; Expected date: 11/19/2024    Morbid obesity    Screening for diabetes mellitus  -     Hemoglobin A1C; Future; Expected date: 11/05/2024    Need for vaccination  -     DIPH,PERTUSS(ACEL),TET VAC(PF)(ADULT)(ADACEL)(TDaP)    Other orders  -     spironolactone-hydrochlorothiazide 25-25mg (ALDACTAZIDE) 25-25 mg Tab; Take 1 tablet by mouth once daily.  Dispense: 30 tablet; Refill: 11

## 2024-11-13 ENCOUNTER — PATIENT OUTREACH (OUTPATIENT)
Dept: ADMINISTRATIVE | Facility: HOSPITAL | Age: 29
End: 2024-11-13
Payer: COMMERCIAL

## 2024-11-13 NOTE — PROGRESS NOTES
VBC OUTREACH: per chart review pt is overdue for BP check, pt already has a PCP appt scheduled 11.21.24

## 2024-11-19 ENCOUNTER — LAB VISIT (OUTPATIENT)
Dept: LAB | Facility: HOSPITAL | Age: 29
End: 2024-11-19
Attending: FAMILY MEDICINE
Payer: COMMERCIAL

## 2024-11-19 DIAGNOSIS — I10 HYPERTENSION, UNSPECIFIED TYPE: ICD-10-CM

## 2024-11-19 LAB
ANION GAP SERPL CALC-SCNC: 11 MMOL/L (ref 8–16)
BUN SERPL-MCNC: 13 MG/DL (ref 6–20)
CALCIUM SERPL-MCNC: 9.1 MG/DL (ref 8.7–10.5)
CHLORIDE SERPL-SCNC: 103 MMOL/L (ref 95–110)
CO2 SERPL-SCNC: 20 MMOL/L (ref 23–29)
CREAT SERPL-MCNC: 0.9 MG/DL (ref 0.5–1.4)
EST. GFR  (NO RACE VARIABLE): >60 ML/MIN/1.73 M^2
GLUCOSE SERPL-MCNC: 106 MG/DL (ref 70–110)
POTASSIUM SERPL-SCNC: 4.1 MMOL/L (ref 3.5–5.1)
SODIUM SERPL-SCNC: 134 MMOL/L (ref 136–145)

## 2024-11-19 PROCEDURE — 80048 BASIC METABOLIC PNL TOTAL CA: CPT | Performed by: FAMILY MEDICINE

## 2024-11-19 PROCEDURE — 36415 COLL VENOUS BLD VENIPUNCTURE: CPT | Performed by: FAMILY MEDICINE

## 2024-11-21 ENCOUNTER — OFFICE VISIT (OUTPATIENT)
Dept: INTERNAL MEDICINE | Facility: CLINIC | Age: 29
End: 2024-11-21
Payer: COMMERCIAL

## 2024-11-21 VITALS
OXYGEN SATURATION: 100 % | HEART RATE: 104 BPM | DIASTOLIC BLOOD PRESSURE: 78 MMHG | HEIGHT: 68 IN | BODY MASS INDEX: 44.41 KG/M2 | TEMPERATURE: 99 F | SYSTOLIC BLOOD PRESSURE: 118 MMHG | WEIGHT: 293 LBS

## 2024-11-21 DIAGNOSIS — I10 PRIMARY HYPERTENSION: ICD-10-CM

## 2024-11-21 DIAGNOSIS — E87.1 HYPONATREMIA: Primary | ICD-10-CM

## 2024-11-21 PROCEDURE — 3074F SYST BP LT 130 MM HG: CPT | Mod: CPTII,S$GLB,, | Performed by: PHYSICIAN ASSISTANT

## 2024-11-21 PROCEDURE — 1159F MED LIST DOCD IN RCRD: CPT | Mod: CPTII,S$GLB,, | Performed by: PHYSICIAN ASSISTANT

## 2024-11-21 PROCEDURE — 99214 OFFICE O/P EST MOD 30 MIN: CPT | Mod: S$GLB,,, | Performed by: PHYSICIAN ASSISTANT

## 2024-11-21 PROCEDURE — 99999 PR PBB SHADOW E&M-EST. PATIENT-LVL IV: CPT | Mod: PBBFAC,,, | Performed by: PHYSICIAN ASSISTANT

## 2024-11-21 PROCEDURE — 1160F RVW MEDS BY RX/DR IN RCRD: CPT | Mod: CPTII,S$GLB,, | Performed by: PHYSICIAN ASSISTANT

## 2024-11-21 PROCEDURE — 3008F BODY MASS INDEX DOCD: CPT | Mod: CPTII,S$GLB,, | Performed by: PHYSICIAN ASSISTANT

## 2024-11-21 PROCEDURE — 3078F DIAST BP <80 MM HG: CPT | Mod: CPTII,S$GLB,, | Performed by: PHYSICIAN ASSISTANT

## 2024-11-21 PROCEDURE — 3044F HG A1C LEVEL LT 7.0%: CPT | Mod: CPTII,S$GLB,, | Performed by: PHYSICIAN ASSISTANT

## 2024-11-21 NOTE — PROGRESS NOTES
Subjective:      Patient ID: Christina Leal is a 28 y.o. female.    Chief Complaint: Follow-up    HPI    History of Present Illness    CHIEF COMPLAINT:  Ms. Leal presents today for follow-up on blood pressure management.  Here today for a 2 week follow up after starting on spironolactone-hctz for BP control. Will also review her recent labs today (potassium/renal function).    HYPERTENSION MANAGEMENT:  She recently started spironolactone hydrochlorothiazide for blood pressure management and reports a positive difference. Home blood pressure readings vary throughout the day, with elevated readings in the morning before medication. After taking the medication, she reports systolic readings typically around 120 mmHg, though she is unable to recall the diastolic values. She denies experiencing any significant issues or excessive fluid loss from the medication.    MEDICATION SIDE EFFECTS:  She has experienced an increase in muscle cramps, particularly during sleep, since starting the new medication. She typically experiences muscle cramps while sleeping but has noticed a slight increase in frequency.    LAB RESULTS:  She reports slightly low sodium, which may be attributed to the hydrochlorothiazide. Her potassium levels are good. A1C results are good, with no indication of pre-diabetes. Kidney functions are reported as okay.    SLEEP:  She underwent a previous sleep study with results described as mild. Based on these findings, she was informed that she does not require CPAP therapy.    Medications were reviewed    Patient Active Problem List   Diagnosis    Class 3 severe obesity in adult    Anemia, hypochromic    Suspected sleep apnea    ASCUS of cervix with negative high risk HPV    Iron deficiency anemia, unspecified    Body mass index (BMI) 50.0-59.9, adult    HTN (hypertension)    Morbid obesity         Current Outpatient Medications:     levonorgestrel-ethinyl estradiol 0.1-20 mg-mcg per tablet, Take 1  "tablet by mouth once daily., Disp: 84 tablet, Rfl: 0    spironolactone-hydrochlorothiazide 25-25mg (ALDACTAZIDE) 25-25 mg Tab, Take 1 tablet by mouth once daily., Disp: 30 tablet, Rfl: 11    Review of Systems   Constitutional:  Negative for activity change, appetite change, chills, diaphoresis, fatigue, fever and unexpected weight change.   HENT: Negative.  Negative for congestion, hearing loss, postnasal drip, rhinorrhea, sore throat, trouble swallowing and voice change.    Eyes: Negative.  Negative for visual disturbance.   Respiratory: Negative.  Negative for cough, choking, chest tightness and shortness of breath.    Cardiovascular:  Negative for chest pain, palpitations and leg swelling.   Gastrointestinal:  Negative for abdominal distention, abdominal pain, blood in stool, constipation, diarrhea, nausea and vomiting.   Endocrine: Negative for cold intolerance, heat intolerance, polydipsia and polyuria.   Genitourinary: Negative.  Negative for difficulty urinating and frequency.   Musculoskeletal:  Negative for arthralgias, back pain, gait problem, joint swelling and myalgias.   Skin:  Negative for color change, pallor, rash and wound.   Neurological:  Negative for dizziness, tremors, weakness, light-headedness, numbness and headaches.   Hematological:  Negative for adenopathy.   Psychiatric/Behavioral:  Negative for behavioral problems, confusion, self-injury, sleep disturbance and suicidal ideas. The patient is not nervous/anxious.      Objective:   /78 (BP Location: Left arm, Patient Position: Sitting)   Pulse 104   Temp 98.5 °F (36.9 °C) (Tympanic)   Ht 5' 8" (1.727 m)   Wt (!) 150.4 kg (331 lb 9.2 oz)   LMP 11/01/2024 (Exact Date)   SpO2 100%   BMI 50.42 kg/m²     Physical Exam  Vitals reviewed.   Constitutional:       General: She is not in acute distress.     Appearance: Normal appearance. She is well-developed. She is not ill-appearing, toxic-appearing or diaphoretic.   HENT:      Head: " Normocephalic and atraumatic.      Right Ear: External ear normal.      Left Ear: External ear normal.      Nose: Nose normal.   Eyes:      Conjunctiva/sclera: Conjunctivae normal.      Pupils: Pupils are equal, round, and reactive to light.   Cardiovascular:      Rate and Rhythm: Normal rate and regular rhythm.      Heart sounds: Normal heart sounds. No murmur heard.     No friction rub. No gallop.   Pulmonary:      Effort: Pulmonary effort is normal. No respiratory distress.      Breath sounds: Normal breath sounds. No wheezing or rales.   Chest:      Chest wall: No tenderness.   Abdominal:      General: There is no distension.      Palpations: Abdomen is soft.      Tenderness: There is no abdominal tenderness.   Musculoskeletal:         General: Normal range of motion.      Cervical back: Normal range of motion and neck supple.   Lymphadenopathy:      Cervical: No cervical adenopathy.   Skin:     General: Skin is warm and dry.      Capillary Refill: Capillary refill takes less than 2 seconds.      Findings: No rash.   Neurological:      Mental Status: She is alert and oriented to person, place, and time.      Motor: No weakness.      Coordination: Coordination normal.      Gait: Gait normal.   Psychiatric:         Mood and Affect: Mood normal.         Behavior: Behavior normal.         Thought Content: Thought content normal.         Judgment: Judgment normal.       Lab Visit on 11/19/2024   Component Date Value Ref Range Status    Sodium 11/19/2024 134 (L)  136 - 145 mmol/L Final    Potassium 11/19/2024 4.1  3.5 - 5.1 mmol/L Final    Chloride 11/19/2024 103  95 - 110 mmol/L Final    CO2 11/19/2024 20 (L)  23 - 29 mmol/L Final    Glucose 11/19/2024 106  70 - 110 mg/dL Final    BUN 11/19/2024 13  6 - 20 mg/dL Final    Creatinine 11/19/2024 0.9  0.5 - 1.4 mg/dL Final    Calcium 11/19/2024 9.1  8.7 - 10.5 mg/dL Final    Anion Gap 11/19/2024 11  8 - 16 mmol/L Final    eGFR 11/19/2024 >60.0  >60 mL/min/1.73 m^2 Final      Lab Results   Component Value Date    HGBA1C 5.5 11/05/2024       Assessment:     1. Hyponatremia    2. Primary hypertension      Plan:   Hyponatremia  -     Comprehensive Metabolic Panel; Future; Expected date: 05/21/2025    Primary hypertension  -     Comprehensive Metabolic Panel; Future; Expected date: 05/21/2025    Assessment & Plan    Assessed patient's response to recently started spironolactone hydrochlorothiazide  Evaluated home blood pressure readings, noting improvement but occasional elevated readings before medication  Reviewed lab results: sodium slightly low (likely due to hydrochlorothiazide), potassium and A1C within normal range  Considered potential electrolyte imbalance due to reported muscle cramps  Determined current treatment plan is effective but requires monitoring for dehydration and electrolyte issues    ESSENTIAL HYPERTENSION:  - Explained the significance of maintaining blood pressure below 140/90.  - Continued spironolactone hydrochlorothiazide at current dose.    DEHYDRATION AND ELECTROLYTE IMBALANCE:  - Educated on the importance of staying hydrated while on diuretic medications, especially during summer.  - Explained the risk of dehydration and potential kidney injury associated with diuretic use.  - Discussed signs of electrolyte depletion or dehydration, including light-headedness and muscle cramps.  - Ms. Lael to increase fluid intake, especially during hot weather.  - Ms. Leal to monitor for signs of dehydration or electrolyte imbalance (light-headedness, muscle cramps).  - Recommend consuming low-sugar electrolyte-rich beverages (e.g., liquid IVs, low-sugar Gatorade/Powerade) at bedtime.  - Contact the office if experiencing persistent muscle cramps or other signs of electrolyte imbalance.    FOLLOW-UP:  - Labs ordered for 6-month follow-up.  - Follow up in 6 months.         Follow up in about 6 months (around 5/21/2025), or if symptoms worsen or fail to improve.

## 2024-12-02 ENCOUNTER — HOSPITAL ENCOUNTER (EMERGENCY)
Facility: HOSPITAL | Age: 29
Discharge: HOME | End: 2024-12-03
Attending: EMERGENCY MEDICINE
Payer: COMMERCIAL

## 2024-12-02 DIAGNOSIS — I16.0 HYPERTENSIVE URGENCY: ICD-10-CM

## 2024-12-02 DIAGNOSIS — R10.84 GENERALIZED ABDOMINAL PAIN: Primary | ICD-10-CM

## 2024-12-02 LAB
ALBUMIN SERPL BCP-MCNC: 3.9 G/DL (ref 3.5–5.2)
ALP SERPL-CCNC: 42 U/L (ref 40–150)
ALT SERPL W/O P-5'-P-CCNC: 14 U/L (ref 10–44)
ANION GAP SERPL CALC-SCNC: 14 MMOL/L (ref 8–16)
AST SERPL-CCNC: 17 U/L (ref 10–40)
B-HCG UR QL: NEGATIVE
BACTERIA #/AREA URNS HPF: NORMAL /HPF
BASOPHILS # BLD AUTO: 0.07 K/UL (ref 0–0.2)
BASOPHILS NFR BLD: 0.6 % (ref 0–1.9)
BILIRUB SERPL-MCNC: 0.6 MG/DL (ref 0.1–1)
BILIRUB UR QL STRIP: NEGATIVE
BUN SERPL-MCNC: 14 MG/DL (ref 6–20)
CALCIUM SERPL-MCNC: 9.4 MG/DL (ref 8.7–10.5)
CHLORIDE SERPL-SCNC: 101 MMOL/L (ref 95–110)
CLARITY UR: CLEAR
CO2 SERPL-SCNC: 20 MMOL/L (ref 23–29)
COLOR UR: YELLOW
CREAT SERPL-MCNC: 1 MG/DL (ref 0.5–1.4)
DIFFERENTIAL METHOD BLD: ABNORMAL
EOSINOPHIL # BLD AUTO: 0 K/UL (ref 0–0.5)
EOSINOPHIL NFR BLD: 0.1 % (ref 0–8)
ERYTHROCYTE [DISTWIDTH] IN BLOOD BY AUTOMATED COUNT: 13.1 % (ref 11.5–14.5)
EST. GFR  (NO RACE VARIABLE): >60 ML/MIN/1.73 M^2
GLUCOSE SERPL-MCNC: 115 MG/DL (ref 70–110)
GLUCOSE UR QL STRIP: NEGATIVE
HCT VFR BLD AUTO: 40.4 % (ref 37–48.5)
HGB BLD-MCNC: 13.8 G/DL (ref 12–16)
HGB UR QL STRIP: ABNORMAL
HYALINE CASTS #/AREA URNS LPF: 0 /LPF
IMM GRANULOCYTES # BLD AUTO: 0.03 K/UL (ref 0–0.04)
IMM GRANULOCYTES NFR BLD AUTO: 0.2 % (ref 0–0.5)
KETONES UR QL STRIP: ABNORMAL
LEUKOCYTE ESTERASE UR QL STRIP: NEGATIVE
LIPASE SERPL-CCNC: 15 U/L (ref 4–60)
LYMPHOCYTES # BLD AUTO: 2 K/UL (ref 1–4.8)
LYMPHOCYTES NFR BLD: 16.5 % (ref 18–48)
MCH RBC QN AUTO: 27.9 PG (ref 27–31)
MCHC RBC AUTO-ENTMCNC: 34.2 G/DL (ref 32–36)
MCV RBC AUTO: 82 FL (ref 82–98)
MICROSCOPIC COMMENT: NORMAL
MONOCYTES # BLD AUTO: 0.7 K/UL (ref 0.3–1)
MONOCYTES NFR BLD: 5.9 % (ref 4–15)
NEUTROPHILS # BLD AUTO: 9.5 K/UL (ref 1.8–7.7)
NEUTROPHILS NFR BLD: 76.7 % (ref 38–73)
NITRITE UR QL STRIP: NEGATIVE
NRBC BLD-RTO: 0 /100 WBC
PH UR STRIP: 6 [PH] (ref 5–8)
PLATELET # BLD AUTO: 448 K/UL (ref 150–450)
PMV BLD AUTO: 9.2 FL (ref 9.2–12.9)
POTASSIUM SERPL-SCNC: 3.6 MMOL/L (ref 3.5–5.1)
PROT SERPL-MCNC: 9.1 G/DL (ref 6–8.4)
PROT UR QL STRIP: ABNORMAL
RBC # BLD AUTO: 4.95 M/UL (ref 4–5.4)
RBC #/AREA URNS HPF: 3 /HPF (ref 0–4)
SODIUM SERPL-SCNC: 135 MMOL/L (ref 136–145)
SP GR UR STRIP: >1.03 (ref 1–1.03)
SQUAMOUS #/AREA URNS HPF: 13 /HPF
URN SPEC COLLECT METH UR: ABNORMAL
UROBILINOGEN UR STRIP-ACNC: NEGATIVE EU/DL
WBC # BLD AUTO: 12.34 K/UL (ref 3.9–12.7)
WBC #/AREA URNS HPF: 2 /HPF (ref 0–5)

## 2024-12-02 PROCEDURE — 83690 ASSAY OF LIPASE: CPT

## 2024-12-02 PROCEDURE — 96375 TX/PRO/DX INJ NEW DRUG ADDON: CPT

## 2024-12-02 PROCEDURE — 25000003 PHARM REV CODE 250: Performed by: EMERGENCY MEDICINE

## 2024-12-02 PROCEDURE — 80053 COMPREHEN METABOLIC PANEL: CPT

## 2024-12-02 PROCEDURE — 81025 URINE PREGNANCY TEST: CPT

## 2024-12-02 PROCEDURE — 63600175 PHARM REV CODE 636 W HCPCS: Performed by: EMERGENCY MEDICINE

## 2024-12-02 PROCEDURE — 99285 EMERGENCY DEPT VISIT HI MDM: CPT | Mod: 25

## 2024-12-02 PROCEDURE — 63600175 PHARM REV CODE 636 W HCPCS

## 2024-12-02 PROCEDURE — 85025 COMPLETE CBC W/AUTO DIFF WBC: CPT

## 2024-12-02 PROCEDURE — 93010 ELECTROCARDIOGRAM REPORT: CPT | Mod: ,,, | Performed by: INTERNAL MEDICINE

## 2024-12-02 PROCEDURE — 96376 TX/PRO/DX INJ SAME DRUG ADON: CPT

## 2024-12-02 PROCEDURE — 84484 ASSAY OF TROPONIN QUANT: CPT

## 2024-12-02 PROCEDURE — 93005 ELECTROCARDIOGRAM TRACING: CPT

## 2024-12-02 PROCEDURE — 81000 URINALYSIS NONAUTO W/SCOPE: CPT

## 2024-12-02 PROCEDURE — 96374 THER/PROPH/DIAG INJ IV PUSH: CPT

## 2024-12-02 RX ORDER — FAMOTIDINE 10 MG/ML
20 INJECTION INTRAVENOUS
Status: COMPLETED | OUTPATIENT
Start: 2024-12-02 | End: 2024-12-02

## 2024-12-02 RX ORDER — ONDANSETRON HYDROCHLORIDE 2 MG/ML
4 INJECTION, SOLUTION INTRAVENOUS
Status: COMPLETED | OUTPATIENT
Start: 2024-12-02 | End: 2024-12-02

## 2024-12-02 RX ORDER — HYDRALAZINE HYDROCHLORIDE 20 MG/ML
20 INJECTION INTRAMUSCULAR; INTRAVENOUS
Status: DISCONTINUED | OUTPATIENT
Start: 2024-12-02 | End: 2024-12-03 | Stop reason: HOSPADM

## 2024-12-02 RX ORDER — ALUMINUM HYDROXIDE, MAGNESIUM HYDROXIDE, AND SIMETHICONE 1200; 120; 1200 MG/30ML; MG/30ML; MG/30ML
15 SUSPENSION ORAL
Status: COMPLETED | OUTPATIENT
Start: 2024-12-02 | End: 2024-12-02

## 2024-12-02 RX ADMIN — ALUMINUM HYDROXIDE, MAGNESIUM HYDROXIDE, AND DIMETHICONE 15 ML: 200; 20; 200 SUSPENSION ORAL at 10:12

## 2024-12-02 RX ADMIN — FAMOTIDINE 20 MG: 10 INJECTION, SOLUTION INTRAVENOUS at 10:12

## 2024-12-02 RX ADMIN — ONDANSETRON 4 MG: 2 INJECTION INTRAMUSCULAR; INTRAVENOUS at 09:12

## 2024-12-02 RX ADMIN — ONDANSETRON 4 MG: 2 INJECTION INTRAMUSCULAR; INTRAVENOUS at 10:12

## 2024-12-03 ENCOUNTER — OFFICE VISIT (OUTPATIENT)
Dept: INTERNAL MEDICINE | Facility: CLINIC | Age: 29
End: 2024-12-03
Payer: COMMERCIAL

## 2024-12-03 VITALS
BODY MASS INDEX: 44.41 KG/M2 | TEMPERATURE: 99 F | RESPIRATION RATE: 18 BRPM | SYSTOLIC BLOOD PRESSURE: 120 MMHG | HEART RATE: 97 BPM | DIASTOLIC BLOOD PRESSURE: 80 MMHG | OXYGEN SATURATION: 100 % | WEIGHT: 293 LBS | HEIGHT: 68 IN

## 2024-12-03 VITALS
OXYGEN SATURATION: 99 % | RESPIRATION RATE: 16 BRPM | HEART RATE: 81 BPM | DIASTOLIC BLOOD PRESSURE: 56 MMHG | WEIGHT: 293 LBS | BODY MASS INDEX: 44.41 KG/M2 | SYSTOLIC BLOOD PRESSURE: 120 MMHG | TEMPERATURE: 98 F | HEIGHT: 68 IN

## 2024-12-03 DIAGNOSIS — K52.9 GASTROENTERITIS: ICD-10-CM

## 2024-12-03 DIAGNOSIS — R16.0 HEPATOMEGALY: Primary | ICD-10-CM

## 2024-12-03 DIAGNOSIS — I10 PRIMARY HYPERTENSION: ICD-10-CM

## 2024-12-03 LAB
OHS QRS DURATION: 94 MS
OHS QTC CALCULATION: 435 MS
TROPONIN I SERPL DL<=0.01 NG/ML-MCNC: <0.006 NG/ML (ref 0–0.03)

## 2024-12-03 PROCEDURE — 1159F MED LIST DOCD IN RCRD: CPT | Mod: CPTII,S$GLB,, | Performed by: PHYSICIAN ASSISTANT

## 2024-12-03 PROCEDURE — 3079F DIAST BP 80-89 MM HG: CPT | Mod: CPTII,S$GLB,, | Performed by: PHYSICIAN ASSISTANT

## 2024-12-03 PROCEDURE — 3008F BODY MASS INDEX DOCD: CPT | Mod: CPTII,S$GLB,, | Performed by: PHYSICIAN ASSISTANT

## 2024-12-03 PROCEDURE — 99999 PR PBB SHADOW E&M-EST. PATIENT-LVL V: CPT | Mod: PBBFAC,,, | Performed by: PHYSICIAN ASSISTANT

## 2024-12-03 PROCEDURE — 3074F SYST BP LT 130 MM HG: CPT | Mod: CPTII,S$GLB,, | Performed by: PHYSICIAN ASSISTANT

## 2024-12-03 PROCEDURE — 63600175 PHARM REV CODE 636 W HCPCS: Performed by: EMERGENCY MEDICINE

## 2024-12-03 PROCEDURE — 99214 OFFICE O/P EST MOD 30 MIN: CPT | Mod: S$GLB,,, | Performed by: PHYSICIAN ASSISTANT

## 2024-12-03 PROCEDURE — 3044F HG A1C LEVEL LT 7.0%: CPT | Mod: CPTII,S$GLB,, | Performed by: PHYSICIAN ASSISTANT

## 2024-12-03 RX ORDER — MORPHINE SULFATE 4 MG/ML
4 INJECTION, SOLUTION INTRAMUSCULAR; INTRAVENOUS
Status: COMPLETED | OUTPATIENT
Start: 2024-12-03 | End: 2024-12-03

## 2024-12-03 RX ORDER — HYDROCODONE BITARTRATE AND ACETAMINOPHEN 10; 325 MG/1; MG/1
1 TABLET ORAL EVERY 6 HOURS PRN
Qty: 12 TABLET | Refills: 0 | Status: ON HOLD | OUTPATIENT
Start: 2024-12-03 | End: 2024-12-11 | Stop reason: HOSPADM

## 2024-12-03 RX ORDER — ONDANSETRON HYDROCHLORIDE 2 MG/ML
4 INJECTION, SOLUTION INTRAVENOUS
Status: COMPLETED | OUTPATIENT
Start: 2024-12-03 | End: 2024-12-03

## 2024-12-03 RX ORDER — ONDANSETRON 4 MG/1
4 TABLET, FILM COATED ORAL EVERY 6 HOURS PRN
Qty: 12 TABLET | Refills: 0 | Status: SHIPPED | OUTPATIENT
Start: 2024-12-03

## 2024-12-03 RX ADMIN — MORPHINE SULFATE 4 MG: 4 INJECTION INTRAVENOUS at 01:12

## 2024-12-03 RX ADMIN — ONDANSETRON 4 MG: 2 INJECTION INTRAMUSCULAR; INTRAVENOUS at 12:12

## 2024-12-03 RX ADMIN — MORPHINE SULFATE 4 MG: 4 INJECTION INTRAVENOUS at 12:12

## 2024-12-03 NOTE — PROGRESS NOTES
Subjective:      Patient ID: Christina Leal is a 28 y.o. female.    Chief Complaint: Follow-up    HPI  History of Present Illness    CHIEF COMPLAINT:  Ms. Leal presents today for follow-up after recent hospitalization for abdominal pain and high blood pressure.    RECENT HOSPITALIZATION:  She reports recent hospitalization due to abdominal pain and high blood pressure. She experienced nausea and vomiting, which prevented her from taking her medications the day prior to admission. During her hospital stay, she received pain medication, Pepcid, and magnesium. She denies diarrhea but reports experiencing chills without fever. Her heart rate was slightly elevated, possibly due to dehydration. She had not eaten much in the days leading up to her hospitalization, with her last meal being on Sunday morning, after which she began experiencing progressively worsening pain.    MEDICATIONS:  She has been prescribed Zofran for nausea.    MEDICAL HISTORY:  She has fatty liver disease, which she was previously unaware of.    Medications were reviewed        BP Readings from Last 3 Encounters:   12/03/24 120/80   12/03/24 (!) 120/56   11/21/24 118/78      Patient Active Problem List   Diagnosis    Class 3 severe obesity in adult    Anemia, hypochromic    Suspected sleep apnea    ASCUS of cervix with negative high risk HPV    Iron deficiency anemia, unspecified    Body mass index (BMI) 50.0-59.9, adult    HTN (hypertension)    Morbid obesity         Current Outpatient Medications:     HYDROcodone-acetaminophen (NORCO)  mg per tablet, Take 1 tablet by mouth every 6 (six) hours as needed., Disp: 12 tablet, Rfl: 0    levonorgestrel-ethinyl estradiol 0.1-20 mg-mcg per tablet, Take 1 tablet by mouth once daily., Disp: 84 tablet, Rfl: 0    ondansetron (ZOFRAN) 4 MG tablet, Take 1 tablet (4 mg total) by mouth every 6 (six) hours as needed for Nausea., Disp: 12 tablet, Rfl: 0    spironolactone-hydrochlorothiazide 25-25mg  "(ALDACTAZIDE) 25-25 mg Tab, Take 1 tablet by mouth once daily., Disp: 30 tablet, Rfl: 11  No current facility-administered medications for this visit.    Review of Systems   Constitutional:  Negative for activity change, appetite change, chills, diaphoresis, fatigue, fever and unexpected weight change.   HENT: Negative.  Negative for congestion, hearing loss, postnasal drip, rhinorrhea, sore throat, trouble swallowing and voice change.    Eyes: Negative.  Negative for visual disturbance.   Respiratory: Negative.  Negative for cough, choking, chest tightness and shortness of breath.    Cardiovascular:  Negative for chest pain, palpitations and leg swelling.   Gastrointestinal:  Positive for abdominal pain and nausea. Negative for abdominal distention, blood in stool, constipation, diarrhea and vomiting.   Endocrine: Negative for cold intolerance, heat intolerance, polydipsia and polyuria.   Genitourinary: Negative.  Negative for difficulty urinating and frequency.   Musculoskeletal:  Negative for arthralgias, back pain, gait problem, joint swelling and myalgias.   Skin:  Negative for color change, pallor, rash and wound.   Neurological:  Negative for dizziness, tremors, weakness, light-headedness, numbness and headaches.   Hematological:  Negative for adenopathy.   Psychiatric/Behavioral:  Negative for behavioral problems, confusion, self-injury, sleep disturbance and suicidal ideas. The patient is not nervous/anxious.      Objective:   /80 (BP Location: Right arm, Patient Position: Sitting)   Pulse 97   Temp 98.8 °F (37.1 °C) (Tympanic)   Resp 18   Ht 5' 8" (1.727 m)   Wt (!) 148.2 kg (326 lb 11.6 oz)   LMP 11/01/2024 (Exact Date)   SpO2 100%   BMI 49.68 kg/m²     Physical Exam  Vitals and nursing note reviewed.   Constitutional:       General: She is not in acute distress.     Appearance: Normal appearance. She is well-developed. She is not ill-appearing, toxic-appearing or diaphoretic.   HENT:      " Head: Normocephalic and atraumatic.   Cardiovascular:      Rate and Rhythm: Normal rate and regular rhythm.      Heart sounds: Normal heart sounds. No murmur heard.     No friction rub. No gallop.   Pulmonary:      Effort: Pulmonary effort is normal. No respiratory distress.      Breath sounds: Normal breath sounds. No wheezing or rales.   Abdominal:      General: There is no distension.      Tenderness: There is no abdominal tenderness. There is no guarding.   Musculoskeletal:         General: Normal range of motion.   Skin:     General: Skin is warm.      Capillary Refill: Capillary refill takes less than 2 seconds.      Findings: No rash.   Neurological:      Mental Status: She is alert and oriented to person, place, and time.      Motor: No weakness.      Gait: Gait normal.   Psychiatric:         Mood and Affect: Mood normal.         Behavior: Behavior normal.         Thought Content: Thought content normal.         Judgment: Judgment normal.       Admission on 12/02/2024, Discharged on 12/03/2024   Component Date Value Ref Range Status    WBC 12/02/2024 12.34  3.90 - 12.70 K/uL Final    RBC 12/02/2024 4.95  4.00 - 5.40 M/uL Final    Hemoglobin 12/02/2024 13.8  12.0 - 16.0 g/dL Final    Hematocrit 12/02/2024 40.4  37.0 - 48.5 % Final    MCV 12/02/2024 82  82 - 98 fL Final    MCH 12/02/2024 27.9  27.0 - 31.0 pg Final    MCHC 12/02/2024 34.2  32.0 - 36.0 g/dL Final    RDW 12/02/2024 13.1  11.5 - 14.5 % Final    Platelets 12/02/2024 448  150 - 450 K/uL Final    MPV 12/02/2024 9.2  9.2 - 12.9 fL Final    Immature Granulocytes 12/02/2024 0.2  0.0 - 0.5 % Final    Gran # (ANC) 12/02/2024 9.5 (H)  1.8 - 7.7 K/uL Final    Immature Grans (Abs) 12/02/2024 0.03  0.00 - 0.04 K/uL Final    Comment: Mild elevation in immature granulocytes is non specific and   can be seen in a variety of conditions including stress response,   acute inflammation, trauma and pregnancy. Correlation with other   laboratory and clinical findings  is essential.      Lymph # 12/02/2024 2.0  1.0 - 4.8 K/uL Final    Mono # 12/02/2024 0.7  0.3 - 1.0 K/uL Final    Eos # 12/02/2024 0.0  0.0 - 0.5 K/uL Final    Baso # 12/02/2024 0.07  0.00 - 0.20 K/uL Final    nRBC 12/02/2024 0  0 /100 WBC Final    Gran % 12/02/2024 76.7 (H)  38.0 - 73.0 % Final    Lymph % 12/02/2024 16.5 (L)  18.0 - 48.0 % Final    Mono % 12/02/2024 5.9  4.0 - 15.0 % Final    Eosinophil % 12/02/2024 0.1  0.0 - 8.0 % Final    Basophil % 12/02/2024 0.6  0.0 - 1.9 % Final    Differential Method 12/02/2024 Automated   Final    Sodium 12/02/2024 135 (L)  136 - 145 mmol/L Final    Potassium 12/02/2024 3.6  3.5 - 5.1 mmol/L Final    Chloride 12/02/2024 101  95 - 110 mmol/L Final    CO2 12/02/2024 20 (L)  23 - 29 mmol/L Final    Glucose 12/02/2024 115 (H)  70 - 110 mg/dL Final    BUN 12/02/2024 14  6 - 20 mg/dL Final    Creatinine 12/02/2024 1.0  0.5 - 1.4 mg/dL Final    Calcium 12/02/2024 9.4  8.7 - 10.5 mg/dL Final    Total Protein 12/02/2024 9.1 (H)  6.0 - 8.4 g/dL Final    Albumin 12/02/2024 3.9  3.5 - 5.2 g/dL Final    Total Bilirubin 12/02/2024 0.6  0.1 - 1.0 mg/dL Final    Comment: For infants and newborns, interpretation of results should be based  on gestational age, weight and in agreement with clinical  observations.    Premature Infant recommended reference ranges:  Up to 24 hours.............<8.0 mg/dL  Up to 48 hours............<12.0 mg/dL  3-5 days..................<15.0 mg/dL  6-29 days.................<15.0 mg/dL      Alkaline Phosphatase 12/02/2024 42  40 - 150 U/L Final    AST 12/02/2024 17  10 - 40 U/L Final    ALT 12/02/2024 14  10 - 44 U/L Final    eGFR 12/02/2024 >60  >60 mL/min/1.73 m^2 Final    Anion Gap 12/02/2024 14  8 - 16 mmol/L Final    Lipase 12/02/2024 15  4 - 60 U/L Final    Specimen UA 12/02/2024 Urine, Clean Catch   Final    Color, UA 12/02/2024 Yellow  Yellow, Straw, Fariba Final    Appearance, UA 12/02/2024 Clear  Clear Final    pH, UA 12/02/2024 6.0  5.0 - 8.0 Final     Specific Gravity, UA 12/02/2024 >1.030 (A)  1.005 - 1.030 Final    Protein, UA 12/02/2024 1+ (A)  Negative Final    Comment: Recommend a 24 hour urine protein or a urine   protein/creatinine ratio if globulin induced proteinuria is  clinically suspected.      Glucose, UA 12/02/2024 Negative  Negative Final    Ketones, UA 12/02/2024 Trace (A)  Negative Final    Bilirubin (UA) 12/02/2024 Negative  Negative Final    Occult Blood UA 12/02/2024 1+ (A)  Negative Final    Nitrite, UA 12/02/2024 Negative  Negative Final    Urobilinogen, UA 12/02/2024 Negative  <2.0 EU/dL Final    Leukocytes, UA 12/02/2024 Negative  Negative Final    Preg Test, Ur 12/02/2024 Negative   Final    RBC, UA 12/02/2024 3  0 - 4 /hpf Final    WBC, UA 12/02/2024 2  0 - 5 /hpf Final    Bacteria 12/02/2024 Rare  None-Occ /hpf Final    Squam Epithel, UA 12/02/2024 13  /hpf Final    Hyaline Casts, UA 12/02/2024 0  0-1/lpf /lpf Final    Microscopic Comment 12/02/2024 SEE COMMENT   Final    Comment: Other formed elements not mentioned in the report are not   present in the microscopic examination.       QRS Duration 12/02/2024 94  ms In process    [facility] QTC Calculation 12/02/2024 435  ms In process    Troponin I 12/02/2024 <0.006  0.000 - 0.026 ng/mL Final    Comment: The reference interval for Troponin I represents the 99th percentile   cutoff   for our facility and is consistent with 3rd generation assay   performance.       CT Renal Stone Study ABD Pelvis WO  Narrative: EXAMINATION:  CT RENAL STONE STUDY ABD PELVIS WO    CLINICAL HISTORY:  Flank pain, kidney stone suspected;    TECHNIQUE:  Low dose axial images, sagittal and coronal reformations were obtained from the lung bases to the pubic symphysis.  Contrast was not administered.    COMPARISON:  None    FINDINGS:  Hepatomegaly.  Lung bases are clear.  Spleen pancreas adrenal glands gallbladder adrenal glands are grossly unremarkable.  No hydronephrosis.  No bowel obstruction or free fluid.  No  secondary signs of appendicitis.  Small fat containing umbilical hernia.  Uterus is grossly unremarkable..  No free fluid.    Normal appendix  Impression: Hepatomegaly.  No evidence for hydronephrosis.    All CT scans   are performed using dose optimization techniques including the following: automated exposure control; adjustment of the mA and/or kV; use of iterative reconstruction technique.  Dose modulation was employed for AMRIT by means of: Automated exposure control; adjustment of the mA and/or kV according to patient size (this includes techniques or standardized protocols for targeted exams where dose is matched to indication/reason for exam; i.e. extremities or head); and/or use of iterative reconstructive technique.    Electronically signed by: Farhat Earl  Date:    12/02/2024  Time:    23:11     Assessment:     1. Hepatomegaly    2. Gastroenteritis    3. Primary hypertension      Plan:   Assessed recent episode of abdominal pain and elevated blood pressure, which resolved after ED visit  Considered viral gastroenteritis as likely cause, noting current prevalence among patients  Evaluated hydration status, noting tachycardia indicative of dehydration  Reviewed CT results, noting incidental finding of hepatomegaly    Hepatomegaly  -     Ambulatory referral/consult to Hepatology; Future; Expected date: 12/10/2024    Gastroenteritis  - Discussed gradual reintroduction of food after gastroenteritis, emphasizing bland foods and avoiding fatty, fried, tomato-based, and citrus items.  - Recommend gradually reintroducing food, starting with bland items like soups.  - Ms. Leal to avoid fatty, fried, tomato-based, and citrus foods while recovering.  - Continued Zofran as needed for nausea (patient has 12 doses remaining from ED prescription).  DEHYDRATION:  - Ms. Leal to increase fluid intake to address dehydration.  -bland foods    Primary hypertension  -back to normal since back on her bp  medication        FOLLOW-UP:  - Contact office via [facility] if symptoms worsen or do not improve.         Follow up if symptoms worsen or fail to improve.

## 2024-12-03 NOTE — DISCHARGE INSTRUCTIONS
Your blood work and imaging do not suggested emergent cause of your abdominal pain.  This may be acid reflux or possibly occult gallbladder disease or other.  Use ibuprofen for pain Norco for breakthrough pain and Zofran for nausea.  Follow up with her doctor for re-evaluation in 1-2 days.  If he was symptoms worsen any way return immediately for re-evaluation

## 2024-12-03 NOTE — ED PROVIDER NOTES
"SCRIBE #1 NOTE: I, Zacarisa Quan, am scribing for, and in the presence of, Henri Gill Jr., MD. I have scribed the entire note.       History     Chief Complaint   Patient presents with    Abdominal Pain     Patient presents to the ED for complaints of abdominal pain, nausea, vomiting, and chills that started yesterday.  Patient denies any bloody emesis, chest pain, or shortness of breath.       Review of patient's allergies indicates:  No Known Allergies      History of Present Illness     HPI    12/2/2024, 10:35 PM  History obtained from the patient      History of Present Illness: Christina Leal is a 28 y.o. female patient with a PMHx of HTN, empyema, and morbid obesity who presents to the Emergency Department for evaluation of a "stabbing" epigastric abd pain which onset gradually yesterday. Pt presents hypertensive, and she reports she did not take her BP medication today. Symptoms are constant and moderate in severity. Pain is exacerbated with palpation. Associated sxs include n/v and chills. Patient denies any fever, diarrhea, SOB, CP, and all other sxs at this time. No prior Tx reported. Pt denies any alcohol or marijuana use. No further complaints or concerns at this time.       Arrival mode: Personal vehicle    PCP: Brett Zaidi MD        Past Medical History:  Past Medical History:   Diagnosis Date    Empyema of left pleural space     HTN (hypertension)     Morbid obesity        Past Surgical History:  Past Surgical History:   Procedure Laterality Date    EVACUATION OF EMPYEMA Left 10/21/2022    Procedure: EVACUATION, EMPYEMA;  Surgeon: Dusty Moyer MD;  Location: [facility];  Service: Cardiothoracic;  Laterality: Left;  VATS, WITH EVACUATION OF EMPYEMA    INJECTION OF ANESTHETIC AGENT AROUND MULTIPLE INTERCOSTAL NERVES Left 10/21/2022    Procedure: BLOCK, NERVE, INTERCOSTAL, 2 OR MORE;  Surgeon: Dusty Moyer MD;  Location: [facility];  Service: Cardiothoracic;  Laterality: Left;    " THORACOSCOPIC DECORTICATION OF LUNG Left 10/21/2022    Procedure: VATS, WITH DECORTICATION, LUNG;  Surgeon: Dusty Moyer MD;  Location: [facility];  Service: Cardiothoracic;  Laterality: Left;  AND EVACUATION OF EMPYEMA         Family History:  Family History   Problem Relation Name Age of Onset    Esophageal cancer Father      Heart disease Neg Hx      Breast cancer Neg Hx      Ovarian cancer Neg Hx      Colon cancer Neg Hx         Social History:  Social History     Tobacco Use    Smoking status: Former     Current packs/day: 0.00     Types: Cigarettes     Start date:      Quit date:      Years since quittin.9    Smokeless tobacco: Former   Substance and Sexual Activity    Alcohol use: Yes     Comment: social    Drug use: No    Sexual activity: Yes     Partners: Male     Birth control/protection: Condom, OCP        Review of Systems     Review of Systems   Constitutional:  Positive for chills. Negative for fever.   HENT:  Negative for sore throat.    Respiratory:  Negative for shortness of breath.    Cardiovascular:  Negative for chest pain.   Gastrointestinal:  Positive for abdominal pain (epigastric) and vomiting. Negative for diarrhea and nausea.   Genitourinary:  Negative for dysuria.   Musculoskeletal:  Negative for back pain.   Skin:  Negative for rash.   Neurological:  Negative for weakness.   Hematological:  Does not bruise/bleed easily.   All other systems reviewed and are negative.     Physical Exam     Initial Vitals [24 1845]   BP Pulse Resp Temp SpO2   (!) 216/92 80 20 98.3 °F (36.8 °C) 100 %      MAP       --          Physical Exam  Nursing Notes and Vital Signs Reviewed.  Constitutional: Patient is in no acute distress. Obese.  Head: Atraumatic. Normocephalic.  Eyes:  EOM intact.  No scleral icterus.  ENT: Mucous membranes are moist.  Nares clear   Neck:  Full ROM. No JVD.  Cardiovascular: Regular rate. Regular rhythm No murmurs, rubs, or gallops. Distal pulses are 2+ and  symmetric  Pulmonary/Chest: No respiratory distress. Clear to auscultation bilaterally. No wheezing or rales.  Equal chest wall rise bilaterally  Abdominal: Soft and non-distended.  Epigastric abd tenderness.  No rebound, guarding, or rigidity. Good bowel sounds.  Genitourinary: No CVA tenderness.  No suprapubic tenderness  Musculoskeletal: Moves all extremities. No obvious deformities.  5 x 5 strength in all extremities   Skin: Warm and dry.  Neurological:  Alert, awake, and appropriate.  Normal speech.  No acute focal neurological deficits are appreciated.  Two through 12 intact bilaterally.  Psychiatric: Normal affect. Good eye contact. Appropriate in content.     ED Course   Critical Care    Date/Time: 12/3/2024 12:51 AM    Performed by: Henri Gill Jr., MD  Authorized by: Henri Gill Jr., MD  Direct patient critical care time: 14 minutes  Additional history critical care time: 4 minutes  Ordering / reviewing critical care time: 7 minutes  Documentation critical care time: 11 minutes  Total critical care time (exclusive of procedural time) : 36 minutes  Critical care time was exclusive of separately billable procedures and treating other patients and teaching time.  Critical care was necessary to treat or prevent imminent or life-threatening deterioration of the following conditions: circulatory failure.  Critical care was time spent personally by me on the following activities: development of treatment plan with patient or surrogate, interpretation of cardiac output measurements, evaluation of patient's response to treatment, examination of patient, obtaining history from patient or surrogate, ordering and performing treatments and interventions, ordering and review of laboratory studies, ordering and review of radiographic studies, pulse oximetry, re-evaluation of patient's condition and review of old charts.        ED Vital Signs:  Vitals:    12/02/24 1845 12/02/24 2243 12/02/24 2245 12/02/24 2255  "  BP: (!) 216/92 (!) 179/91 (!) 164/85 (!) 164/85   Pulse: 80 68  71   Resp: 20 (!) 24  19   Temp: 98.3 °F (36.8 °C)      TempSrc: Oral      SpO2: 100% 99%  100%   Weight: (!) 147.8 kg (325 lb 13.4 oz)      Height: 5' 8" (1.727 m)       12/02/24 2300 12/03/24 0000 12/03/24 0022   BP: (!) 168/84 (!) 171/87    Pulse: 62 (!) 57    Resp: 18 (!) 23 20   Temp:      TempSrc:      SpO2: 100% 99%    Weight:      Height:          Abnormal Lab Results:  Labs Reviewed   CBC W/ AUTO DIFFERENTIAL - Abnormal       Result Value    WBC 12.34      RBC 4.95      Hemoglobin 13.8      Hematocrit 40.4      MCV 82      MCH 27.9      MCHC 34.2      RDW 13.1      Platelets 448      MPV 9.2      Immature Granulocytes 0.2      Gran # (ANC) 9.5 (*)     Immature Grans (Abs) 0.03      Lymph # 2.0      Mono # 0.7      Eos # 0.0      Baso # 0.07      nRBC 0      Gran % 76.7 (*)     Lymph % 16.5 (*)     Mono % 5.9      Eosinophil % 0.1      Basophil % 0.6      Differential Method Automated     COMPREHENSIVE METABOLIC PANEL - Abnormal    Sodium 135 (*)     Potassium 3.6      Chloride 101      CO2 20 (*)     Glucose 115 (*)     BUN 14      Creatinine 1.0      Calcium 9.4      Total Protein 9.1 (*)     Albumin 3.9      Total Bilirubin 0.6      Alkaline Phosphatase 42      AST 17      ALT 14      eGFR >60      Anion Gap 14     URINALYSIS, REFLEX TO URINE CULTURE - Abnormal    Specimen UA Urine, Clean Catch      Color, UA Yellow      Appearance, UA Clear      pH, UA 6.0      Specific Gravity, UA >1.030 (*)     Protein, UA 1+ (*)     Glucose, UA Negative      Ketones, UA Trace (*)     Bilirubin (UA) Negative      Occult Blood UA 1+ (*)     Nitrite, UA Negative      Urobilinogen, UA Negative      Leukocytes, UA Negative      Narrative:     Specimen Source->Urine   LIPASE    Lipase 15     PREGNANCY TEST, URINE RAPID    Preg Test, Ur Negative      Narrative:     Specimen Source->Urine   URINALYSIS MICROSCOPIC    RBC, UA 3      WBC, UA 2      Bacteria Rare   "    Squam Epithel, UA 13      Hyaline Casts, UA 0      Microscopic Comment SEE COMMENT      Narrative:     Specimen Source->Urine   TROPONIN I   TROPONIN I    Troponin I <0.006          All Lab Results:  Results for orders placed or performed during the hospital encounter of 12/02/24   Urinalysis, Reflex to Urine Culture Urine, Clean Catch    Collection Time: 12/02/24  8:02 PM    Specimen: Urine   Result Value Ref Range    Specimen UA Urine, Clean Catch     Color, UA Yellow Yellow, Straw, Fariba    Appearance, UA Clear Clear    pH, UA 6.0 5.0 - 8.0    Specific Gravity, UA >1.030 (A) 1.005 - 1.030    Protein, UA 1+ (A) Negative    Glucose, UA Negative Negative    Ketones, UA Trace (A) Negative    Bilirubin (UA) Negative Negative    Occult Blood UA 1+ (A) Negative    Nitrite, UA Negative Negative    Urobilinogen, UA Negative <2.0 EU/dL    Leukocytes, UA Negative Negative   Pregnancy, urine rapid (UPT)    Collection Time: 12/02/24  8:02 PM   Result Value Ref Range    Preg Test, Ur Negative    Urinalysis Microscopic    Collection Time: 12/02/24  8:02 PM   Result Value Ref Range    RBC, UA 3 0 - 4 /hpf    WBC, UA 2 0 - 5 /hpf    Bacteria Rare None-Occ /hpf    Squam Epithel, UA 13 /hpf    Hyaline Casts, UA 0 0-1/lpf /lpf    Microscopic Comment SEE COMMENT    CBC auto differential    Collection Time: 12/02/24  8:09 PM   Result Value Ref Range    WBC 12.34 3.90 - 12.70 K/uL    RBC 4.95 4.00 - 5.40 M/uL    Hemoglobin 13.8 12.0 - 16.0 g/dL    Hematocrit 40.4 37.0 - 48.5 %    MCV 82 82 - 98 fL    MCH 27.9 27.0 - 31.0 pg    MCHC 34.2 32.0 - 36.0 g/dL    RDW 13.1 11.5 - 14.5 %    Platelets 448 150 - 450 K/uL    MPV 9.2 9.2 - 12.9 fL    Immature Granulocytes 0.2 0.0 - 0.5 %    Gran # (ANC) 9.5 (H) 1.8 - 7.7 K/uL    Immature Grans (Abs) 0.03 0.00 - 0.04 K/uL    Lymph # 2.0 1.0 - 4.8 K/uL    Mono # 0.7 0.3 - 1.0 K/uL    Eos # 0.0 0.0 - 0.5 K/uL    Baso # 0.07 0.00 - 0.20 K/uL    nRBC 0 0 /100 WBC    Gran % 76.7 (H) 38.0 - 73.0 %     Lymph % 16.5 (L) 18.0 - 48.0 %    Mono % 5.9 4.0 - 15.0 %    Eosinophil % 0.1 0.0 - 8.0 %    Basophil % 0.6 0.0 - 1.9 %    Differential Method Automated    Comprehensive metabolic panel    Collection Time: 12/02/24  8:09 PM   Result Value Ref Range    Sodium 135 (L) 136 - 145 mmol/L    Potassium 3.6 3.5 - 5.1 mmol/L    Chloride 101 95 - 110 mmol/L    CO2 20 (L) 23 - 29 mmol/L    Glucose 115 (H) 70 - 110 mg/dL    BUN 14 6 - 20 mg/dL    Creatinine 1.0 0.5 - 1.4 mg/dL    Calcium 9.4 8.7 - 10.5 mg/dL    Total Protein 9.1 (H) 6.0 - 8.4 g/dL    Albumin 3.9 3.5 - 5.2 g/dL    Total Bilirubin 0.6 0.1 - 1.0 mg/dL    Alkaline Phosphatase 42 40 - 150 U/L    AST 17 10 - 40 U/L    ALT 14 10 - 44 U/L    eGFR >60 >60 mL/min/1.73 m^2    Anion Gap 14 8 - 16 mmol/L   Lipase    Collection Time: 12/02/24  8:09 PM   Result Value Ref Range    Lipase 15 4 - 60 U/L   Troponin I    Collection Time: 12/02/24  8:09 PM   Result Value Ref Range    Troponin I <0.006 0.000 - 0.026 ng/mL         Imaging Results:  Imaging Results              CT Renal Stone Study ABD Pelvis WO (Final result)  Result time 12/02/24 23:11:29      Final result by Farhat Earl MD (12/02/24 23:11:29)                   Impression:      Hepatomegaly.  No evidence for hydronephrosis.    All CT scans   are performed using dose optimization techniques including the following: automated exposure control; adjustment of the mA and/or kV; use of iterative reconstruction technique.  Dose modulation was employed for ALARA by means of: Automated exposure control; adjustment of the mA and/or kV according to patient size (this includes techniques or standardized protocols for targeted exams where dose is matched to indication/reason for exam; i.e. extremities or head); and/or use of iterative reconstructive technique.      Electronically signed by: Farhat Earl  Date:    12/02/2024  Time:    23:11               Narrative:    EXAMINATION:  CT RENAL STONE STUDY ABD PELVIS  WO    CLINICAL HISTORY:  Flank pain, kidney stone suspected;    TECHNIQUE:  Low dose axial images, sagittal and coronal reformations were obtained from the lung bases to the pubic symphysis.  Contrast was not administered.    COMPARISON:  None    FINDINGS:  Hepatomegaly.  Lung bases are clear.  Spleen pancreas adrenal glands gallbladder adrenal glands are grossly unremarkable.  No hydronephrosis.  No bowel obstruction or free fluid.  No secondary signs of appendicitis.  Small fat containing umbilical hernia.  Uterus is grossly unremarkable..  No free fluid.    Normal appendix                                       The EKG was ordered, reviewed, and independently interpreted by the ED provider.  Interpretation time: 22:39  Rate: 67 BPM  Rhythm: normal sinus rhythm  Interpretation: Nonspecific T wave abnormality. No STEMI.           The Emergency Provider reviewed the vital signs and test results, which are outlined above.     ED Discussion       12:40 AM: Reassessed pt at this time. Discussed with pt all pertinent ED information and results. Discussed pt dx and plan of tx. Gave pt all f/u and return to the ED instructions. All questions and concerns were addressed at this time. Pt expresses understanding of information and instructions, and is comfortable with plan to discharge. Pt is stable for discharge.    I discussed with patient and/or family/caretaker that evaluation in the ED does not suggest any emergent or life threatening medical conditions requiring immediate intervention beyond what was provided in the ED, and I believe patient is safe for discharge.  Regardless, an unremarkable evaluation in the ED does not preclude the development or presence of a serious of life threatening condition. As such, patient was instructed to return immediately for any worsening or change in current symptoms.         Medical Decision Making  Differential diagnosis:  Abdominal pain, gallstones, pancreatitis, acid reflux,  hypertensive urgency, hypertensive emergency    Patient was evaluated history physical examination.  She had markedly elevated blood pressure due to noncompliance with the medications from abdominal pain nausea and vomiting.  This was addressed with hydralazine however it was held as her blood pressure come down on its own.  Patient was had no end-organ damage noted in his blood work CT imaging was negative.  She was stable safe for discharge I have advised close follow up with the primary care provider for serial examinations.  She was return as needed for any worsening symptoms, problems, questions or concerns    Amount and/or Complexity of Data Reviewed  Labs: ordered. Decision-making details documented in ED Course.     Details: Patient was troponin is undetectable with a normal CBC.  Lipase is normal.  Normal LFTs.  He was otherwise benign.  UA is clear.  UPT is negative  Radiology: ordered. Decision-making details documented in ED Course.     Details: CT it was benign.  ECG/medicine tests: ordered and independent interpretation performed. Decision-making details documented in ED Course.     Details: No STEMI.    Risk  OTC drugs.  Prescription drug management.  Parenteral controlled substances.  Drug therapy requiring intensive monitoring for toxicity.  Decision regarding hospitalization.    Critical Care  Total time providing critical care: 36 minutes                ED Medication(s):  Medications   hydrALAZINE injection 20 mg (0 mg Intravenous Hold 12/2/24 2245)   morphine injection 4 mg (has no administration in time range)   ondansetron injection 4 mg (4 mg Intravenous Given 12/2/24 2141)   famotidine (PF) injection 20 mg (20 mg Intravenous Given 12/2/24 2256)   aluminum-magnesium hydroxide-simethicone 200-200-20 mg/5 mL suspension 15 mL (15 mLs Oral Given 12/2/24 2256)   ondansetron injection 4 mg (4 mg Intravenous Given 12/2/24 2255)   ondansetron injection 4 mg (4 mg Intravenous Given 12/3/24 0022)    morphine injection 4 mg (4 mg Intravenous Given 12/3/24 0022)       New Prescriptions    HYDROCODONE-ACETAMINOPHEN (NORCO)  MG PER TABLET    Take 1 tablet by mouth every 6 (six) hours as needed.    ONDANSETRON (ZOFRAN) 4 MG TABLET    Take 1 tablet (4 mg total) by mouth every 6 (six) hours as needed for Nausea.        Follow-up Information       Brett Zaidi MD.    Specialty: Family Medicine  Contact information:  [id number]  [place] [id number] [phone]                                 Scribe Attestation:   Scribe #1: I performed the above scribed service and the documentation accurately describes the services I performed. I attest to the accuracy of the note.     Attending:   Physician Attestation Statement for Scribe #1: I, Henri Gill Jr., MD, personally performed the services described in this documentation, as scribed by Zacarias Quan, in my presence, and it is both accurate and complete.           Clinical Impression       ICD-10-CM ICD-9-CM   1. Generalized abdominal pain  R10.84 789.07   2. Hypertensive urgency  I16.0 401.9       Disposition:   Disposition: Discharged  Condition: Stable         Henri Gill Jr., MD  12/03/24 0055       Henri Gill Jr., MD  12/03/24 0055

## 2024-12-03 NOTE — FIRST PROVIDER EVALUATION
"Medical screening examination initiated.  I have conducted a focused provider triage encounter, findings are as follows:    Brief history of present illness:  Generalized mid abdominal pain that began yesterday.  Reports nausea and vomiting.  Denies any diarrhea or fever.    Vitals:    12/02/24 1845   BP: (!) 216/92   BP Location: Right arm   Pulse: 80   Resp: 20   Temp: 98.3 °F (36.8 °C)   TempSrc: Oral   SpO2: 100%   Weight: (!) 147.8 kg (325 lb 13.4 oz)   Height: 5' 8" (1.727 m)       Pertinent physical exam:  Generalized abdominal pain to palpation.  Nausea and vomiting.    Brief workup plan:  Workup    Preliminary workup initiated; this workup will be continued and followed by the physician or advanced practice provider that is assigned to the patient when roomed.  "

## 2024-12-04 ENCOUNTER — OFFICE VISIT (OUTPATIENT)
Dept: OBSTETRICS AND GYNECOLOGY | Facility: CLINIC | Age: 29
End: 2024-12-04
Payer: COMMERCIAL

## 2024-12-04 VITALS
SYSTOLIC BLOOD PRESSURE: 126 MMHG | WEIGHT: 293 LBS | DIASTOLIC BLOOD PRESSURE: 82 MMHG | BODY MASS INDEX: 44.41 KG/M2 | HEIGHT: 68 IN

## 2024-12-04 DIAGNOSIS — I10 PRIMARY HYPERTENSION: ICD-10-CM

## 2024-12-04 DIAGNOSIS — Z30.41 SURVEILLANCE OF CONTRACEPTIVE PILL: Primary | ICD-10-CM

## 2024-12-04 PROCEDURE — 1160F RVW MEDS BY RX/DR IN RCRD: CPT | Mod: CPTII,S$GLB,, | Performed by: NURSE PRACTITIONER

## 2024-12-04 PROCEDURE — 3044F HG A1C LEVEL LT 7.0%: CPT | Mod: CPTII,S$GLB,, | Performed by: NURSE PRACTITIONER

## 2024-12-04 PROCEDURE — 99999 PR PBB SHADOW E&M-EST. PATIENT-LVL III: CPT | Mod: PBBFAC,,, | Performed by: NURSE PRACTITIONER

## 2024-12-04 PROCEDURE — 3079F DIAST BP 80-89 MM HG: CPT | Mod: CPTII,S$GLB,, | Performed by: NURSE PRACTITIONER

## 2024-12-04 PROCEDURE — 3074F SYST BP LT 130 MM HG: CPT | Mod: CPTII,S$GLB,, | Performed by: NURSE PRACTITIONER

## 2024-12-04 PROCEDURE — 99213 OFFICE O/P EST LOW 20 MIN: CPT | Mod: S$GLB,,, | Performed by: NURSE PRACTITIONER

## 2024-12-04 PROCEDURE — 1159F MED LIST DOCD IN RCRD: CPT | Mod: CPTII,S$GLB,, | Performed by: NURSE PRACTITIONER

## 2024-12-04 PROCEDURE — 3008F BODY MASS INDEX DOCD: CPT | Mod: CPTII,S$GLB,, | Performed by: NURSE PRACTITIONER

## 2024-12-04 NOTE — PROGRESS NOTES
Subjective:       Patient ID: Christina Leal is a 28 y.o. female.    Chief Complaint:  Blood Pressure Check      History of Present Illness    G0 present for BP check  Started aviane  Remembers to take regularly  Denies n/v, HA, mood swings  Denies any irregular bleeding    GYN & OB History  Patient's last menstrual period was 2024 (exact date).   Date of Last Pap: 10/9/2023    OB History    Para Term  AB Living   0 0 0 0 0 0   SAB IAB Ectopic Multiple Live Births   0 0 0 0 0       Review of Systems  Review of Systems   Gastrointestinal:  Negative for nausea and vomiting.   Genitourinary:  Negative for menstrual problem, pelvic pain and vaginal discharge.   Neurological:  Negative for headaches.           Objective:      Physical Exam:   Constitutional: She is oriented to person, place, and time. She appears well-developed and well-nourished. No distress.    HENT:   Head: Normocephalic and atraumatic.    Eyes: Pupils are equal, round, and reactive to light. Conjunctivae and EOM are normal.      Pulmonary/Chest: Effort normal.                  Musculoskeletal: Normal range of motion and moves all extremeties.       Neurological: She is alert and oriented to person, place, and time.    Skin: Skin is warm and dry. No rash noted. She is not diaphoretic. No erythema. No pallor.    Psychiatric: She has a normal mood and affect. Her behavior is normal. Judgment and thought content normal.             Assessment:        1. Surveillance of contraceptive pill    2. Primary hypertension               Plan:   Continue OCPs regularly  Aware she may experience irregular bleeding in first 3 months.    Continue annual well woman exam.    Surveillance of contraceptive pill    Primary hypertension              Admission Reconciliation is Not Complete  Discharge Reconciliation is Not Complete Admission Reconciliation is Completed  Discharge Reconciliation is Not Complete Admission Reconciliation is Completed  Discharge Reconciliation is Completed

## 2024-12-05 ENCOUNTER — HOSPITAL ENCOUNTER (EMERGENCY)
Facility: HOSPITAL | Age: 29
Discharge: HOME | End: 2024-12-05
Attending: EMERGENCY MEDICINE
Payer: COMMERCIAL

## 2024-12-05 ENCOUNTER — PATIENT MESSAGE (OUTPATIENT)
Dept: INTERNAL MEDICINE | Facility: CLINIC | Age: 29
End: 2024-12-05
Payer: COMMERCIAL

## 2024-12-05 VITALS
WEIGHT: 293 LBS | TEMPERATURE: 98 F | RESPIRATION RATE: 16 BRPM | BODY MASS INDEX: 49.87 KG/M2 | DIASTOLIC BLOOD PRESSURE: 93 MMHG | SYSTOLIC BLOOD PRESSURE: 179 MMHG | OXYGEN SATURATION: 100 % | HEART RATE: 79 BPM

## 2024-12-05 DIAGNOSIS — K80.50 BILIARY COLIC: Primary | ICD-10-CM

## 2024-12-05 DIAGNOSIS — K80.20 GALLSTONES: ICD-10-CM

## 2024-12-05 LAB
ALBUMIN SERPL BCP-MCNC: 3.9 G/DL (ref 3.5–5.2)
ALP SERPL-CCNC: 40 U/L (ref 40–150)
ALT SERPL W/O P-5'-P-CCNC: 26 U/L (ref 10–44)
ANION GAP SERPL CALC-SCNC: 14 MMOL/L (ref 8–16)
AST SERPL-CCNC: 20 U/L (ref 10–40)
BASOPHILS # BLD AUTO: 0.05 K/UL (ref 0–0.2)
BASOPHILS NFR BLD: 0.6 % (ref 0–1.9)
BILIRUB SERPL-MCNC: 0.9 MG/DL (ref 0.1–1)
BUN SERPL-MCNC: 12 MG/DL (ref 6–20)
CALCIUM SERPL-MCNC: 9.2 MG/DL (ref 8.7–10.5)
CHLORIDE SERPL-SCNC: 97 MMOL/L (ref 95–110)
CO2 SERPL-SCNC: 22 MMOL/L (ref 23–29)
CREAT SERPL-MCNC: 1.1 MG/DL (ref 0.5–1.4)
DIFFERENTIAL METHOD BLD: ABNORMAL
EOSINOPHIL # BLD AUTO: 0 K/UL (ref 0–0.5)
EOSINOPHIL NFR BLD: 0.2 % (ref 0–8)
ERYTHROCYTE [DISTWIDTH] IN BLOOD BY AUTOMATED COUNT: 12.8 % (ref 11.5–14.5)
EST. GFR  (NO RACE VARIABLE): >60 ML/MIN/1.73 M^2
GLUCOSE SERPL-MCNC: 113 MG/DL (ref 70–110)
HCT VFR BLD AUTO: 38.2 % (ref 37–48.5)
HGB BLD-MCNC: 12.9 G/DL (ref 12–16)
IMM GRANULOCYTES # BLD AUTO: 0.04 K/UL (ref 0–0.04)
IMM GRANULOCYTES NFR BLD AUTO: 0.5 % (ref 0–0.5)
LIPASE SERPL-CCNC: 21 U/L (ref 4–60)
LYMPHOCYTES # BLD AUTO: 1.7 K/UL (ref 1–4.8)
LYMPHOCYTES NFR BLD: 19.5 % (ref 18–48)
MCH RBC QN AUTO: 27.6 PG (ref 27–31)
MCHC RBC AUTO-ENTMCNC: 33.8 G/DL (ref 32–36)
MCV RBC AUTO: 82 FL (ref 82–98)
MONOCYTES # BLD AUTO: 0.7 K/UL (ref 0.3–1)
MONOCYTES NFR BLD: 7.9 % (ref 4–15)
NEUTROPHILS # BLD AUTO: 6.1 K/UL (ref 1.8–7.7)
NEUTROPHILS NFR BLD: 71.3 % (ref 38–73)
NRBC BLD-RTO: 0 /100 WBC
PLATELET # BLD AUTO: 396 K/UL (ref 150–450)
PMV BLD AUTO: 8.9 FL (ref 9.2–12.9)
POTASSIUM SERPL-SCNC: 3.3 MMOL/L (ref 3.5–5.1)
PROT SERPL-MCNC: 8.3 G/DL (ref 6–8.4)
RBC # BLD AUTO: 4.67 M/UL (ref 4–5.4)
SODIUM SERPL-SCNC: 133 MMOL/L (ref 136–145)
WBC # BLD AUTO: 8.57 K/UL (ref 3.9–12.7)

## 2024-12-05 PROCEDURE — 80053 COMPREHEN METABOLIC PANEL: CPT | Performed by: EMERGENCY MEDICINE

## 2024-12-05 PROCEDURE — 96375 TX/PRO/DX INJ NEW DRUG ADDON: CPT

## 2024-12-05 PROCEDURE — 96374 THER/PROPH/DIAG INJ IV PUSH: CPT

## 2024-12-05 PROCEDURE — 63600175 PHARM REV CODE 636 W HCPCS: Performed by: EMERGENCY MEDICINE

## 2024-12-05 PROCEDURE — 83690 ASSAY OF LIPASE: CPT | Performed by: EMERGENCY MEDICINE

## 2024-12-05 PROCEDURE — 85025 COMPLETE CBC W/AUTO DIFF WBC: CPT | Performed by: EMERGENCY MEDICINE

## 2024-12-05 PROCEDURE — 99285 EMERGENCY DEPT VISIT HI MDM: CPT | Mod: 25

## 2024-12-05 RX ORDER — ONDANSETRON 4 MG/1
4 TABLET, FILM COATED ORAL EVERY 6 HOURS PRN
Qty: 12 TABLET | Refills: 0 | Status: ON HOLD | OUTPATIENT
Start: 2024-12-05 | End: 2024-12-11

## 2024-12-05 RX ORDER — HYDROCODONE BITARTRATE AND ACETAMINOPHEN 10; 325 MG/1; MG/1
1 TABLET ORAL EVERY 6 HOURS PRN
Qty: 12 TABLET | Refills: 0 | Status: ON HOLD | OUTPATIENT
Start: 2024-12-05 | End: 2024-12-11 | Stop reason: HOSPADM

## 2024-12-05 RX ORDER — ONDANSETRON HYDROCHLORIDE 2 MG/ML
4 INJECTION, SOLUTION INTRAVENOUS
Status: COMPLETED | OUTPATIENT
Start: 2024-12-05 | End: 2024-12-05

## 2024-12-05 RX ORDER — MORPHINE SULFATE 4 MG/ML
4 INJECTION, SOLUTION INTRAMUSCULAR; INTRAVENOUS
Status: COMPLETED | OUTPATIENT
Start: 2024-12-05 | End: 2024-12-05

## 2024-12-05 RX ADMIN — MORPHINE SULFATE 4 MG: 4 INJECTION INTRAVENOUS at 01:12

## 2024-12-05 RX ADMIN — ONDANSETRON 4 MG: 2 INJECTION INTRAMUSCULAR; INTRAVENOUS at 01:12

## 2024-12-05 NOTE — ED PROVIDER NOTES
SCRIBE #1 NOTE: I, Kurt Payne, am scribing for, and in the presence of, Henri Gill Jr., MD. I have scribed the entire note.       History     Chief Complaint   Patient presents with    Abdominal Pain    Vomiting    Nausea     Upper abdominal pain with nausea, vomiting and diarrhea x 5 days, seen on Monday for same symptoms.      Review of patient's allergies indicates:  No Known Allergies      History of Present Illness     HPI    12/5/2024, 1:15 PM  History obtained from the patient      History of Present Illness: Christina Leal is a 28 y.o. female patient with a PMHx of HTN, empyema, and morbid obesity who presents to the Emergency Department for evaluation of upper abdominal pain which onset gradually since Sunday. Pt was seen here on Monday for the same sxs and states the sxs improved. Pt states the morphine helped during the last visit. However, pt states the sxs returned back Wednesday night. Symptoms are constant and moderate in severity. No mitigating or exacerbating factors reported. Associated sxs include nausea and vomiting. Patient denies any fever, diarrhea, SOB, CP, and all other sxs at this time. Prior Tx include an ED visit Monday for the same sxs, however, the sxs has returned. Pt denies any alcohol or marijuana use. No further complaints or concerns at this time.       Arrival mode: Personal Transportation    PCP: Brett Zaidi MD        Past Medical History:  Past Medical History:   Diagnosis Date    Empyema of left pleural space     HTN (hypertension)     Morbid obesity        Past Surgical History:  Past Surgical History:   Procedure Laterality Date    EVACUATION OF EMPYEMA Left 10/21/2022    Procedure: EVACUATION, EMPYEMA;  Surgeon: Dusty Moyer MD;  [facility];  Service: Cardiothoracic;  Laterality: Left;  VATS, WITH EVACUATION OF EMPYEMA    INJECTION OF ANESTHETIC AGENT AROUND MULTIPLE INTERCOSTAL NERVES Left 10/21/2022    Procedure: BLOCK, NERVE, INTERCOSTAL, 2 OR  MORE;  Surgeon: Dusty Moyer MD;  Location: [facility] OR;  Service: Cardiothoracic;  Laterality: Left;    THORACOSCOPIC DECORTICATION OF LUNG Left 10/21/2022    Procedure: VATS, WITH DECORTICATION, LUNG;  Surgeon: Dusty Moyer MD;  Location: [facility] OR;  Service: Cardiothoracic;  Laterality: Left;  AND EVACUATION OF EMPYEMA         Family History:  Family History   Problem Relation Name Age of Onset    Esophageal cancer Father      Heart disease Neg Hx      Breast cancer Neg Hx      Ovarian cancer Neg Hx      Colon cancer Neg Hx         Social History:  Social History     Tobacco Use    Smoking status: Former     Current packs/day: 0.00     Types: Cigarettes     Start date:      Quit date:      Years since quittin.9    Smokeless tobacco: Former   Substance and Sexual Activity    Alcohol use: Yes     Comment: social    Drug use: No    Sexual activity: Yes     Partners: Male     Birth control/protection: Condom, OCP        Review of Systems     Review of Systems   Constitutional:  Negative for chills and fever.   HENT:  Negative for sore throat.    Respiratory:  Negative for shortness of breath.    Cardiovascular:  Negative for chest pain.   Gastrointestinal:  Positive for abdominal pain (upper), nausea and vomiting. Negative for diarrhea.   Genitourinary:  Negative for dysuria.   Musculoskeletal:  Negative for back pain.   Skin:  Negative for rash.   Neurological:  Negative for weakness.   Hematological:  Does not bruise/bleed easily.   All other systems reviewed and are negative.     Physical Exam     Initial Vitals [24 1250]   BP Pulse Resp Temp SpO2   (!) 161/87 85 20 97.8 °F (36.6 °C) 99 %      MAP       --          Physical Exam  Nursing Notes and Vital Signs Reviewed.  Constitutional: Patient is in no acute distress. Well-developed and well-nourished.  Head: Atraumatic. Normocephalic.  Eyes:  EOM intact.  No scleral icterus.  ENT: Mucous membranes are moist.  Nares clear   Neck:  Full ROM. No  JVD.  Cardiovascular: Regular rate. Regular rhythm No murmurs, rubs, or gallops. Distal pulses are 2+ and symmetric  Pulmonary/Chest: No respiratory distress. Clear to auscultation bilaterally. No wheezing or rales.  Equal chest wall rise bilaterally  Abdominal: Soft and non-distended.  There is no tenderness.  No rebound, guarding, or rigidity. Good bowel sounds.  Negative Oneil's sign.  No right lower quadrant tenderness  Genitourinary: No CVA tenderness.  No suprapubic tenderness  Musculoskeletal: Moves all extremities. No obvious deformities.  5 x 5 strength in all extremities   Skin: Warm and dry.  Neurological:  Alert, awake, and appropriate.  Normal speech.  No acute focal neurological deficits are appreciated.  Two through 12 intact bilaterally.  Psychiatric: Normal affect. Good eye contact. Appropriate in content.     ED Course   Procedures  ED Vital Signs:  Vitals:    12/05/24 1250 12/05/24 1332 12/05/24 1339   BP: (!) 161/87  (!) 179/93   Pulse: 85  79   Resp: 20 16    Temp: 97.8 °F (36.6 °C)     TempSrc: Oral     SpO2: 99%  100%   Weight: (!) 148.8 kg (328 lb)         Abnormal Lab Results:  Labs Reviewed   CBC W/ AUTO DIFFERENTIAL - Abnormal       Result Value    WBC 8.57      RBC 4.67      Hemoglobin 12.9      Hematocrit 38.2      MCV 82      MCH 27.6      MCHC 33.8      RDW 12.8      Platelets 396      MPV 8.9 (*)     Immature Granulocytes 0.5      Gran # (ANC) 6.1      Immature Grans (Abs) 0.04      Lymph # 1.7      Mono # 0.7      Eos # 0.0      Baso # 0.05      nRBC 0      Gran % 71.3      Lymph % 19.5      Mono % 7.9      Eosinophil % 0.2      Basophil % 0.6      Differential Method Automated     COMPREHENSIVE METABOLIC PANEL - Abnormal    Sodium 133 (*)     Potassium 3.3 (*)     Chloride 97      CO2 22 (*)     Glucose 113 (*)     BUN 12      Creatinine 1.1      Calcium 9.2      Total Protein 8.3      Albumin 3.9      Total Bilirubin 0.9      Alkaline Phosphatase 40      AST 20      ALT 26       eGFR >60      Anion Gap 14     LIPASE    Lipase 21          All Lab Results:  Results for orders placed or performed during the hospital encounter of 12/05/24   CBC auto differential    Collection Time: 12/05/24  1:30 PM   Result Value Ref Range    WBC 8.57 3.90 - 12.70 K/uL    RBC 4.67 4.00 - 5.40 M/uL    Hemoglobin 12.9 12.0 - 16.0 g/dL    Hematocrit 38.2 37.0 - 48.5 %    MCV 82 82 - 98 fL    MCH 27.6 27.0 - 31.0 pg    MCHC 33.8 32.0 - 36.0 g/dL    RDW 12.8 11.5 - 14.5 %    Platelets 396 150 - 450 K/uL    MPV 8.9 (L) 9.2 - 12.9 fL    Immature Granulocytes 0.5 0.0 - 0.5 %    Gran # (ANC) 6.1 1.8 - 7.7 K/uL    Immature Grans (Abs) 0.04 0.00 - 0.04 K/uL    Lymph # 1.7 1.0 - 4.8 K/uL    Mono # 0.7 0.3 - 1.0 K/uL    Eos # 0.0 0.0 - 0.5 K/uL    Baso # 0.05 0.00 - 0.20 K/uL    nRBC 0 0 /100 WBC    Gran % 71.3 38.0 - 73.0 %    Lymph % 19.5 18.0 - 48.0 %    Mono % 7.9 4.0 - 15.0 %    Eosinophil % 0.2 0.0 - 8.0 %    Basophil % 0.6 0.0 - 1.9 %    Differential Method Automated    Comprehensive metabolic panel    Collection Time: 12/05/24  1:30 PM   Result Value Ref Range    Sodium 133 (L) 136 - 145 mmol/L    Potassium 3.3 (L) 3.5 - 5.1 mmol/L    Chloride 97 95 - 110 mmol/L    CO2 22 (L) 23 - 29 mmol/L    Glucose 113 (H) 70 - 110 mg/dL    BUN 12 6 - 20 mg/dL    Creatinine 1.1 0.5 - 1.4 mg/dL    Calcium 9.2 8.7 - 10.5 mg/dL    Total Protein 8.3 6.0 - 8.4 g/dL    Albumin 3.9 3.5 - 5.2 g/dL    Total Bilirubin 0.9 0.1 - 1.0 mg/dL    Alkaline Phosphatase 40 40 - 150 U/L    AST 20 10 - 40 U/L    ALT 26 10 - 44 U/L    eGFR >60 >60 mL/min/1.73 m^2    Anion Gap 14 8 - 16 mmol/L   Lipase    Collection Time: 12/05/24  1:30 PM   Result Value Ref Range    Lipase 21 4 - 60 U/L         Imaging Results:  Imaging Results              US Abdomen Limited (Final result)  Result time 12/05/24 15:00:10      Final result by Emanuel Oden MD (12/05/24 15:00:10)                   Impression:      Gallbladder filled with stones.  No findings to  specifically suggest acute cholecystitis but if high clinical concern remains additional evaluation should be considered.    Hepatomegaly and hepatic steatosis.      Electronically signed by: Emanuel Oden  Date:    12/05/2024  Time:    15:00               Narrative:    EXAMINATION:  US ABDOMEN LIMITED    CLINICAL HISTORY:  epigastric pain;    TECHNIQUE:  Limited ultrasound of the right upper quadrant of the abdomen (including pancreas, liver, gallbladder, common bile duct, and Right kidney) was performed.    COMPARISON:  None.    FINDINGS:  Pancreas: No acute sonographic abnormality.    Liver: Enlarged, measuring 17.6 cm. Fatty liver infiltration. No focal hepatic lesions.    Gallbladder: Multiple gallstones are seen.  Wall echo shadow sign, gallbladder likely filled or nearly completely filled with stones.  No sonographic Oneil's sign.    Biliary system: The common duct is not dilated, measuring 3 mm.  No intrahepatic ductal dilatation.    Right kidney: Normal in size and echotexture, measuring 9.7 cm.    Miscellaneous: No upper abdominal ascites.                                                The Emergency Provider reviewed the vital signs and test results, which are outlined above.     ED Discussion     3:12 PM: Reassessed pt at this time. Discussed with pt all pertinent ED information and results. Discussed pt dx and plan of tx. Gave pt all f/u and return to the ED instructions. All questions and concerns were addressed at this time. Pt expresses understanding of information and instructions, and is comfortable with plan to discharge. Pt is stable for discharge.    I discussed with patient and/or family/caretaker that evaluation in the ED does not suggest any emergent or life threatening medical conditions requiring immediate intervention beyond what was provided in the ED, and I believe patient is safe for discharge.  Regardless, an unremarkable evaluation in the ED does not preclude the development or  presence of a serious of life threatening condition. As such, patient was instructed to return immediately for any worsening or change in current symptoms.         Medical Decision Making  Differential diagnosis: GERD, acid reflux, abdominal pain, pancreatitis, gallbladder disease    Patient was evaluated history physical examination.  She was given IV morphine for pain control.  Workup suggest cholelithiasis as the source of her pain with biliary colic.  She was no evidence of infection.  I will refer to General surgery as an outpatient and treat with pain control and antiemetics in the interim.  I have discussed all findings with the patient was well as plan of care.  She verbalized agreement understanding with all instructions seems reliable.  She was stable safe for discharge in my opinion    Amount and/or Complexity of Data Reviewed  External Data Reviewed: labs, radiology and notes.     Details: I have reviewed all notes labs from prior visit  Labs: ordered. Decision-making details documented in ED Course.     Details: He was he was a patient was a potassium of 3.32 sodium 133 but is otherwise benign.  Lipase is normal.  Normal white count.  No shift  Radiology: ordered. Decision-making details documented in ED Course.     Details: Gallstones on ultrasound.  No cholecystitis    Risk  OTC drugs.  Prescription drug management.  Parenteral controlled substances.  Decision regarding hospitalization.  Elective major surgery with no identified risk factors.                ED Medication(s):  Medications   ondansetron injection 4 mg (4 mg Intravenous Given 12/5/24 1332)   morphine injection 4 mg (4 mg Intravenous Given 12/5/24 1332)       New Prescriptions    HYDROCODONE-ACETAMINOPHEN (NORCO)  MG PER TABLET    Take 1 tablet by mouth every 6 (six) hours as needed.    ONDANSETRON (ZOFRAN) 4 MG TABLET    Take 1 tablet (4 mg total) by mouth every 6 (six) hours as needed for Nausea.        Follow-up Information        Brett Zaidi MD.    Specialty: Family Medicine  Contact information:  [id number]  [place] [address] [phone]               Miguel Stone MD.    Specialty: General Surgery  Contact information:  [address] [facility] [address] [phone]                                 Scribe Attestation:   Scribe #1: I performed the above scribed service and the documentation accurately describes the services I performed. I attest to the accuracy of the note.     Attending:   Physician Attestation Statement for Scribe #1: I, Henri Gill Jr., MD, personally performed the services described in this documentation, as scribed by Kurt Payne, in my presence, and it is both accurate and complete.           Clinical Impression       ICD-10-CM ICD-9-CM   1. Biliary colic  K80.50 574.20   2. Gallstones  K80.20 574.20       Disposition:   Disposition: Discharged  Condition: Stable       Henri Gill Jr., MD  12/05/24 1605

## 2024-12-05 NOTE — DISCHARGE INSTRUCTIONS
You have gallstones causing her pain.  Please follow up with General surgery asap for re-evaluation.  I have provide Norco for breakthrough pain.  Ibuprofen may be of assistance as well but avoid Tylenol while taking Norco.  Use Zofran for nausea.  You have a fever your pain worsens becomes uncontrollable began to itch return yellow return immediately for re-evaluation

## 2024-12-05 NOTE — ED NOTES
Pt resting in ER stretcher, aaox4, rr e/u, NAD noted. Vvss noted. Bed low and locked, call light in reach, side rails up x2. Visitor at the bedside.  Pt verbalized understanding of status and POC; denies further needs. Will continue to monitor.

## 2024-12-09 ENCOUNTER — PATIENT MESSAGE (OUTPATIENT)
Dept: HEPATOLOGY | Facility: CLINIC | Age: 29
End: 2024-12-09
Payer: COMMERCIAL

## 2024-12-09 ENCOUNTER — PATIENT MESSAGE (OUTPATIENT)
Dept: PREADMISSION TESTING | Facility: HOSPITAL | Age: 29
End: 2024-12-09
Payer: COMMERCIAL

## 2024-12-09 ENCOUNTER — OFFICE VISIT (OUTPATIENT)
Dept: SURGERY | Facility: CLINIC | Age: 29
End: 2024-12-09
Payer: COMMERCIAL

## 2024-12-09 VITALS — TEMPERATURE: 98 F | WEIGHT: 293 LBS | BODY MASS INDEX: 48.27 KG/M2

## 2024-12-09 DIAGNOSIS — K80.20 GALLSTONES: Primary | ICD-10-CM

## 2024-12-09 DIAGNOSIS — K80.50 BILIARY COLIC: ICD-10-CM

## 2024-12-09 PROCEDURE — 3044F HG A1C LEVEL LT 7.0%: CPT | Mod: CPTII,S$GLB,,

## 2024-12-09 PROCEDURE — 3008F BODY MASS INDEX DOCD: CPT | Mod: CPTII,S$GLB,,

## 2024-12-09 PROCEDURE — 1160F RVW MEDS BY RX/DR IN RCRD: CPT | Mod: CPTII,S$GLB,,

## 2024-12-09 PROCEDURE — 1159F MED LIST DOCD IN RCRD: CPT | Mod: CPTII,S$GLB,,

## 2024-12-09 PROCEDURE — 99999 PR PBB SHADOW E&M-EST. PATIENT-LVL IV: CPT | Mod: PBBFAC,,,

## 2024-12-09 PROCEDURE — 99204 OFFICE O/P NEW MOD 45 MIN: CPT | Mod: S$GLB,,,

## 2024-12-09 RX ORDER — INDOCYANINE GREEN AND WATER 25 MG
2.5 KIT INJECTION
Status: CANCELLED | OUTPATIENT
Start: 2024-12-09

## 2024-12-09 RX ORDER — ONDANSETRON HYDROCHLORIDE 2 MG/ML
4 INJECTION, SOLUTION INTRAVENOUS EVERY 12 HOURS PRN
Status: CANCELLED | OUTPATIENT
Start: 2024-12-09

## 2024-12-09 RX ORDER — SODIUM CHLORIDE, SODIUM LACTATE, POTASSIUM CHLORIDE, CALCIUM CHLORIDE 600; 310; 30; 20 MG/100ML; MG/100ML; MG/100ML; MG/100ML
INJECTION, SOLUTION INTRAVENOUS CONTINUOUS
Status: CANCELLED | OUTPATIENT
Start: 2024-12-09

## 2024-12-09 NOTE — PROGRESS NOTES
History & Physical    Subjective     History of Present Illness:  Patient is a 28 y.o. female who presents for evaluation of upper abdominal pain associated with nausea and vomiting that has been ongoing for about the past week. She has had similar pain in the past, but it was never this severe and was always self limiting. She associates the onset of the pain with consuming dairy products. She was in the ED twice in the past week. She was given Pepcid without relief. She has had some relief from the Norco, but this is now causing constipation. She denies any changes in bowel function prior to this. She denies any past abdominal surgical history. She is still having mild pain and nausea, but not much vomiting. She also had associated chills but no fevers. She denies any sick contacts.     Chief Complaint   Patient presents with    Cholelithiasis     Symptomatic/ in the ED twice in the past week       Review of patient's allergies indicates:  No Known Allergies    Current Outpatient Medications   Medication Sig Dispense Refill    HYDROcodone-acetaminophen (NORCO)  mg per tablet Take 1 tablet by mouth every 6 (six) hours as needed. 12 tablet 0    HYDROcodone-acetaminophen (NORCO)  mg per tablet Take 1 tablet by mouth every 6 (six) hours as needed. 12 tablet 0    levonorgestrel-ethinyl estradiol 0.1-20 mg-mcg per tablet Take 1 tablet by mouth once daily. 84 tablet 0    ondansetron (ZOFRAN) 4 MG tablet Take 1 tablet (4 mg total) by mouth every 6 (six) hours as needed for Nausea. 12 tablet 0    ondansetron (ZOFRAN) 4 MG tablet Take 1 tablet (4 mg total) by mouth every 6 (six) hours as needed for Nausea. 12 tablet 0    spironolactone-hydrochlorothiazide 25-25mg (ALDACTAZIDE) 25-25 mg Tab Take 1 tablet by mouth once daily. 30 tablet 11     No current facility-administered medications for this visit.       Past Medical History:   Diagnosis Date    Empyema of left pleural space     HTN (hypertension)     Morbid  obesity      Past Surgical History:   Procedure Laterality Date    EVACUATION OF EMPYEMA Left 10/21/2022    Procedure: EVACUATION, EMPYEMA;  Surgeon: Dusty Moyer MD;  Location: [facility] OR;  Service: Cardiothoracic;  Laterality: Left;  VATS, WITH EVACUATION OF EMPYEMA    INJECTION OF ANESTHETIC AGENT AROUND MULTIPLE INTERCOSTAL NERVES Left 10/21/2022    Procedure: BLOCK, NERVE, INTERCOSTAL, 2 OR MORE;  Surgeon: Dusty Moyer MD;  Location: [facility] OR;  Service: Cardiothoracic;  Laterality: Left;    THORACOSCOPIC DECORTICATION OF LUNG Left 10/21/2022    Procedure: VATS, WITH DECORTICATION, LUNG;  Surgeon: Dusty Moyer MD;  Location: [facility] OR;  Service: Cardiothoracic;  Laterality: Left;  AND EVACUATION OF EMPYEMA     Family History   Problem Relation Name Age of Onset    Esophageal cancer Father      Heart disease Neg Hx      Breast cancer Neg Hx      Ovarian cancer Neg Hx      Colon cancer Neg Hx       Social History     Tobacco Use    Smoking status: Former     Current packs/day: 0.00     Types: Cigarettes     Start date:      Quit date:      Years since quittin.9    Smokeless tobacco: Former   Substance Use Topics    Alcohol use: Yes     Comment: social    Drug use: No        Review of Systems:  Review of Systems   Constitutional:  Positive for appetite change, chills and fatigue. Negative for fever and unexpected weight change.   Respiratory:  Negative for cough, shortness of breath, wheezing and stridor.    Cardiovascular:  Negative for chest pain, palpitations and leg swelling.   Gastrointestinal:  Positive for abdominal pain, constipation, nausea and vomiting. Negative for abdominal distention and diarrhea.   Genitourinary:  Negative for difficulty urinating, dysuria, frequency, hematuria and urgency.   Skin:  Negative for color change, pallor, rash and wound.   Hematological:  Does not bruise/bleed easily.          Objective     Vital Signs (Most Recent)  Temp: 98.3 °F (36.8 °C) (24  0902)     (!) 144 kg (317 lb 7.4 oz)     Physical Exam:  Physical Exam  Vitals reviewed.   Constitutional:       General: She is not in acute distress.     Appearance: She is well-developed. She is not toxic-appearing.   HENT:      Head: Normocephalic and atraumatic.      Right Ear: External ear normal.      Left Ear: External ear normal.      Nose: Nose normal.      Mouth/Throat:      Mouth: Mucous membranes are moist.   Eyes:      Extraocular Movements: Extraocular movements intact.      Conjunctiva/sclera: Conjunctivae normal.   Cardiovascular:      Rate and Rhythm: Normal rate.   Pulmonary:      Effort: Pulmonary effort is normal. No respiratory distress.   Abdominal:      Comments: Abdomen soft and non-distended with +TTP of the RUQ with deep palpation. No obvious previous surgical sites   Musculoskeletal:      Cervical back: Neck supple.   Skin:     General: Skin is warm and dry.   Neurological:      Mental Status: She is alert and oriented to person, place, and time.   Psychiatric:         Behavior: Behavior normal.         Laboratory  Lab Results   Component Value Date    WBC 8.57 12/05/2024    HGB 12.9 12/05/2024    HCT 38.2 12/05/2024    MCV 82 12/05/2024     12/05/2024       CMP  Sodium   Date Value Ref Range Status   12/05/2024 133 (L) 136 - 145 mmol/L Final     Potassium   Date Value Ref Range Status   12/05/2024 3.3 (L) 3.5 - 5.1 mmol/L Final     Chloride   Date Value Ref Range Status   12/05/2024 97 95 - 110 mmol/L Final     CO2   Date Value Ref Range Status   12/05/2024 22 (L) 23 - 29 mmol/L Final     Glucose   Date Value Ref Range Status   12/05/2024 113 (H) 70 - 110 mg/dL Final     BUN   Date Value Ref Range Status   12/05/2024 12 6 - 20 mg/dL Final     Creatinine   Date Value Ref Range Status   12/05/2024 1.1 0.5 - 1.4 mg/dL Final     Calcium   Date Value Ref Range Status   12/05/2024 9.2 8.7 - 10.5 mg/dL Final     Total Protein   Date Value Ref Range Status   12/05/2024 8.3 6.0 - 8.4 g/dL  Final     Albumin   Date Value Ref Range Status   12/05/2024 3.9 3.5 - 5.2 g/dL Final     Total Bilirubin   Date Value Ref Range Status   12/05/2024 0.9 0.1 - 1.0 mg/dL Final     Comment:     For infants and newborns, interpretation of results should be based  on gestational age, weight and in agreement with clinical  observations.    Premature Infant recommended reference ranges:  Up to 24 hours.............<8.0 mg/dL  Up to 48 hours............<12.0 mg/dL  3-5 days..................<15.0 mg/dL  6-29 days.................<15.0 mg/dL       Alkaline Phosphatase   Date Value Ref Range Status   12/05/2024 40 40 - 150 U/L Final     AST   Date Value Ref Range Status   12/05/2024 20 10 - 40 U/L Final     ALT   Date Value Ref Range Status   12/05/2024 26 10 - 44 U/L Final     Anion Gap   Date Value Ref Range Status   12/05/2024 14 8 - 16 mmol/L Final     eGFR   Date Value Ref Range Status   12/05/2024 >60 >60 mL/min/1.73 m^2 Final     Lab Results   Component Value Date    LIPASE 21 12/05/2024         Diagnostic Results:  U/S abdomen with cholelithiasis without evidence for acute cholecystitis    CT abdomen pelvis with hepatomegaly        Assessment and Plan   27 y/o female with symptomatic cholelithiasis.    - Discussed anatomy and pathophysiology of the biliary system and biliary disease.  - Discussed signs and symptoms of acute cholecystitis and choledocholithiasis and gave ED precautions. The patient voiced understanding.  - Discussed risks and benefits of robotic cholecystectomy including but not limited to pain, bleeding, infection, damage to surrounding structures including major intra-abdominal organs and blood vessels in the common bile duct which may require further procedures to address, retained stone leading to choledocholithiasis, bile leak, and need for open surgery.  The patient voiced understanding.  The operative surgeon obtained informed consent.  -discussed postoperative recovery and  expectations.  -return to clinic postoperatively.      Sherice Sanches PA-C  [facility]        I spent a total of 45 minutes of the day on this visit.  This includes face to face time and non-face to face time preparing to see the patient (eg, review of tests), obtaining and/or reviewing separately obtained history, documenting clinical information in the electronic or other health record, independently interpreting results and communicating results to the patient/family/caregiver, or care coordinator.

## 2024-12-09 NOTE — H&P (VIEW-ONLY)
History & Physical    Subjective     History of Present Illness:  Patient is a 28 y.o. female who presents for evaluation of upper abdominal pain associated with nausea and vomiting that has been ongoing for about the past week. She has had similar pain in the past, but it was never this severe and was always self limiting. She associates the onset of the pain with consuming dairy products. She was in the ED twice in the past week. She was given Pepcid without relief. She has had some relief from the Norco, but this is now causing constipation. She denies any changes in bowel function prior to this. She denies any past abdominal surgical history. She is still having mild pain and nausea, but not much vomiting. She also had associated chills but no fevers. She denies any sick contacts.     Chief Complaint   Patient presents with    Cholelithiasis     Symptomatic/ in the ED twice in the past week       Review of patient's allergies indicates:  No Known Allergies    Current Outpatient Medications   Medication Sig Dispense Refill    HYDROcodone-acetaminophen (NORCO)  mg per tablet Take 1 tablet by mouth every 6 (six) hours as needed. 12 tablet 0    HYDROcodone-acetaminophen (NORCO)  mg per tablet Take 1 tablet by mouth every 6 (six) hours as needed. 12 tablet 0    levonorgestrel-ethinyl estradiol 0.1-20 mg-mcg per tablet Take 1 tablet by mouth once daily. 84 tablet 0    ondansetron (ZOFRAN) 4 MG tablet Take 1 tablet (4 mg total) by mouth every 6 (six) hours as needed for Nausea. 12 tablet 0    ondansetron (ZOFRAN) 4 MG tablet Take 1 tablet (4 mg total) by mouth every 6 (six) hours as needed for Nausea. 12 tablet 0    spironolactone-hydrochlorothiazide 25-25mg (ALDACTAZIDE) 25-25 mg Tab Take 1 tablet by mouth once daily. 30 tablet 11     No current facility-administered medications for this visit.       Past Medical History:   Diagnosis Date    Empyema of left pleural space     HTN (hypertension)     Morbid  obesity      Past Surgical History:   Procedure Laterality Date    EVACUATION OF EMPYEMA Left 10/21/2022    Procedure: EVACUATION, EMPYEMA;  Surgeon: Dusty Moyer MD;  Location: [facility] OR;  Service: Cardiothoracic;  Laterality: Left;  VATS, WITH EVACUATION OF EMPYEMA    INJECTION OF ANESTHETIC AGENT AROUND MULTIPLE INTERCOSTAL NERVES Left 10/21/2022    Procedure: BLOCK, NERVE, INTERCOSTAL, 2 OR MORE;  Surgeon: Dusty Moyer MD;  Location: [facility] OR;  Service: Cardiothoracic;  Laterality: Left;    THORACOSCOPIC DECORTICATION OF LUNG Left 10/21/2022    Procedure: VATS, WITH DECORTICATION, LUNG;  Surgeon: Dusty Moyer MD;  Location: [facility] OR;  Service: Cardiothoracic;  Laterality: Left;  AND EVACUATION OF EMPYEMA     Family History   Problem Relation Name Age of Onset    Esophageal cancer Father      Heart disease Neg Hx      Breast cancer Neg Hx      Ovarian cancer Neg Hx      Colon cancer Neg Hx       Social History     Tobacco Use    Smoking status: Former     Current packs/day: 0.00     Types: Cigarettes     Start date:      Quit date:      Years since quittin.9    Smokeless tobacco: Former   Substance Use Topics    Alcohol use: Yes     Comment: social    Drug use: No        Review of Systems:  Review of Systems   Constitutional:  Positive for appetite change, chills and fatigue. Negative for fever and unexpected weight change.   Respiratory:  Negative for cough, shortness of breath, wheezing and stridor.    Cardiovascular:  Negative for chest pain, palpitations and leg swelling.   Gastrointestinal:  Positive for abdominal pain, constipation, nausea and vomiting. Negative for abdominal distention and diarrhea.   Genitourinary:  Negative for difficulty urinating, dysuria, frequency, hematuria and urgency.   Skin:  Negative for color change, pallor, rash and wound.   Hematological:  Does not bruise/bleed easily.          Objective     Vital Signs (Most Recent)  Temp: 98.3 °F (36.8 °C) (24  0902)     (!) 144 kg (317 lb 7.4 oz)     Physical Exam:  Physical Exam  Vitals reviewed.   Constitutional:       General: She is not in acute distress.     Appearance: She is well-developed. She is not toxic-appearing.   HENT:      Head: Normocephalic and atraumatic.      Right Ear: External ear normal.      Left Ear: External ear normal.      Nose: Nose normal.      Mouth/Throat:      Mouth: Mucous membranes are moist.   Eyes:      Extraocular Movements: Extraocular movements intact.      Conjunctiva/sclera: Conjunctivae normal.   Cardiovascular:      Rate and Rhythm: Normal rate.   Pulmonary:      Effort: Pulmonary effort is normal. No respiratory distress.   Abdominal:      Comments: Abdomen soft and non-distended with +TTP of the RUQ with deep palpation. No obvious previous surgical sites   Musculoskeletal:      Cervical back: Neck supple.   Skin:     General: Skin is warm and dry.   Neurological:      Mental Status: She is alert and oriented to person, place, and time.   Psychiatric:         Behavior: Behavior normal.         Laboratory  Lab Results   Component Value Date    WBC 8.57 12/05/2024    HGB 12.9 12/05/2024    HCT 38.2 12/05/2024    MCV 82 12/05/2024     12/05/2024       CMP  Sodium   Date Value Ref Range Status   12/05/2024 133 (L) 136 - 145 mmol/L Final     Potassium   Date Value Ref Range Status   12/05/2024 3.3 (L) 3.5 - 5.1 mmol/L Final     Chloride   Date Value Ref Range Status   12/05/2024 97 95 - 110 mmol/L Final     CO2   Date Value Ref Range Status   12/05/2024 22 (L) 23 - 29 mmol/L Final     Glucose   Date Value Ref Range Status   12/05/2024 113 (H) 70 - 110 mg/dL Final     BUN   Date Value Ref Range Status   12/05/2024 12 6 - 20 mg/dL Final     Creatinine   Date Value Ref Range Status   12/05/2024 1.1 0.5 - 1.4 mg/dL Final     Calcium   Date Value Ref Range Status   12/05/2024 9.2 8.7 - 10.5 mg/dL Final     Total Protein   Date Value Ref Range Status   12/05/2024 8.3 6.0 - 8.4 g/dL  Final     Albumin   Date Value Ref Range Status   12/05/2024 3.9 3.5 - 5.2 g/dL Final     Total Bilirubin   Date Value Ref Range Status   12/05/2024 0.9 0.1 - 1.0 mg/dL Final     Comment:     For infants and newborns, interpretation of results should be based  on gestational age, weight and in agreement with clinical  observations.    Premature Infant recommended reference ranges:  Up to 24 hours.............<8.0 mg/dL  Up to 48 hours............<12.0 mg/dL  3-5 days..................<15.0 mg/dL  6-29 days.................<15.0 mg/dL       Alkaline Phosphatase   Date Value Ref Range Status   12/05/2024 40 40 - 150 U/L Final     AST   Date Value Ref Range Status   12/05/2024 20 10 - 40 U/L Final     ALT   Date Value Ref Range Status   12/05/2024 26 10 - 44 U/L Final     Anion Gap   Date Value Ref Range Status   12/05/2024 14 8 - 16 mmol/L Final     eGFR   Date Value Ref Range Status   12/05/2024 >60 >60 mL/min/1.73 m^2 Final     Lab Results   Component Value Date    LIPASE 21 12/05/2024         Diagnostic Results:  U/S abdomen with cholelithiasis without evidence for acute cholecystitis    CT abdomen pelvis with hepatomegaly        Assessment and Plan   27 y/o female with symptomatic cholelithiasis.    - Discussed anatomy and pathophysiology of the biliary system and biliary disease.  - Discussed signs and symptoms of acute cholecystitis and choledocholithiasis and gave ED precautions. The patient voiced understanding.  - Discussed risks and benefits of robotic cholecystectomy including but not limited to pain, bleeding, infection, damage to surrounding structures including major intra-abdominal organs and blood vessels in the common bile duct which may require further procedures to address, retained stone leading to choledocholithiasis, bile leak, and need for open surgery.  The patient voiced understanding.  The operative surgeon obtained informed consent.  -discussed postoperative recovery and  expectations.  -return to clinic postoperatively.      Sherice Sanches PA-C  [facility]        I spent a total of 45 minutes of the day on this visit.  This includes face to face time and non-face to face time preparing to see the patient (eg, review of tests), obtaining and/or reviewing separately obtained history, documenting clinical information in the electronic or other health record, independently interpreting results and communicating results to the patient/family/caregiver, or care coordinator.

## 2024-12-09 NOTE — PRE-PROCEDURE INSTRUCTIONS
Pre op instructions reviewed with pt over telephone, verbalized understanding.    Procedure Date: 12/11/24  Arrival Time:  TBD; We will call you after 2pm the day before your procedure with your arrival time.    Address:   [facility] (Off [facility], 2nd Building on the left)  [id number] [place] [address] [place]. [address]  >>>Please enter through front entrance Lobby of 1st floor to Registration desk<<<        !!!INSTRUCTIONS IMPORTANT!!!  NO FOOD or tobacco products after midnight the night before surgery! You may have clear liquids up to 3 hrs before your arrival to the Hospital  Clear liquids include Gatorade, water, soda, black coffee or tea (no milk or creamer), and clear juices.  Clear liquids do NOT include anything with pulp or food particles (Chicken broth, ice cream, yogurt, Jello, etc.)    >>>MEDICATION INSTRUCTIONS<<<: Morning of Surgery, take small sip of water with ONLY these medications:  None     *Diabetic/ Prediabetic Patients: !!!If you take diabetic or weight loss medication, Do NOT take morning of surgery unless instructed by Doctor!!!  Metformin to be stopped 24 hrs prior to surgery.   Long Acting Insulin Instructions: HOLD the night before surgery unless instructed differently by Provider!  Ozempic/ Mounjaro/ Wegovy/ Trulicity/ Semaglutide injections or weight loss medication to be stopped 7 days prior to surgery.    !!!STOP ALL Aspirins, NSAIDS, WEIGHT LOSS INJECTIONS/PILLS, Herbal supplements, & Vitamins 7 DAYS BEFORE SURGERY!!!    ____  Avoid Alcoholic beverages 3 days prior to surgery, as it can thin the blood.  ____  NO Acrylic/fake nails or nail polish worn day of surgery (specifically hand/arm & foot surgeries).  ____  NO powder, lotions, deodorants, oils or cream on body.  ____  Remove all jewelry & piercings & foreign objects before arrival & leave at home.  ____  Remove Dentures, Hearing Aids & Contact Lens prior to surgery.  ____  Bring photo ID and insurance information to  hospital (Leave Valuables at Home).  ____  If going home the same day, arrange for a ride home. You will not be able to drive for 24 hrs if Anesthesia was used.   ____  Females (ages 11-60): may need to give a urine sample the morning of surgery; please see Pre op Nurse prior to using the restroom.  ____  Males: Stop ED medications (Viagra, Cialis) 24 hrs prior to surgery.  ____  Wear clean, loose fitting clothing to allow for dressings/ bandages.      Bathing Instructions:    -Shower with anti-bacterial Soap (Hibiclens or Dial) the night before surgery and the morning of.   -Do not use Hibiclens on your face or genitals.   -Apply clean clothes after shower.  -Do not shave your face or body 2 days prior to surgery unless instructed otherwise by your Surgeon.  -Do not shave pubic hair 7 days prior to surgery (gyn pt's).    [facility] Vis[facility]/Ride Policy:  Only 2 adults allowed in pre op/recovery area during your procedure. You MUST HAVE A RIDE HOME from a responsible adult that you know and trust. Medical Transport, Uber or Lyft can ONLY be used if patient has a responsible adult to accompany them during ride home.       *Signs and symptoms of Infection Before or After Surgery:               !!!If you experience any fever, chills, nausea/ vomiting, foul odor/ excessive drainage from surgical site, flu-like symptoms, new wounds or cuts, PLEASE CALL THE SURGEON OFFICE at [phone] or SEND MESSAGE THROUGH [facility] PORTAL!!!     *If you are running late the morning of surgery, please call the Hospital Surgery Dept @ [phone].     *Billing questions:  [phone]     Thank you,  -[facility] Pre Admit Dept.  [phone] or [phone]  M-F 7:30 am-4:00 pm (Closed Major Holidays)

## 2024-12-10 ENCOUNTER — PATIENT MESSAGE (OUTPATIENT)
Dept: PREADMISSION TESTING | Facility: HOSPITAL | Age: 29
End: 2024-12-10
Payer: COMMERCIAL

## 2024-12-10 NOTE — PRE-PROCEDURE INSTRUCTIONS
Called and spoke with pt about the following:     Please arrive to [facility] ([facility] at 9am on 12/11/2024 for your scheduled procedure.  Address: [address] [place] [address] [place]. [address] (2nd Building on left, 1st Floor Lobby)    !!!NO FOOD after midnight! You may have clear liquids up to 3 hrs before your arrival to the Hospital!!!  Clear liquids include Gatorade, water, soda, black coffee or tea (no milk or creamer), and clear juices.  Clear liquids do NOT include anything with pulp or food particles (Chicken broth, ice cream, yogurt, Jello, etc.)    Thank you,  -[facility] Pre Admit Dept.  Mon-Fri 7:30 am - 4 pm [phone]

## 2024-12-11 ENCOUNTER — ANESTHESIA (OUTPATIENT)
Dept: SURGERY | Facility: HOSPITAL | Age: 29
End: 2024-12-11
Payer: COMMERCIAL

## 2024-12-11 ENCOUNTER — HOSPITAL ENCOUNTER (OUTPATIENT)
Facility: HOSPITAL | Age: 29
Discharge: HOME | End: 2024-12-11
Attending: SURGERY | Admitting: SURGERY
Payer: COMMERCIAL

## 2024-12-11 ENCOUNTER — ANESTHESIA EVENT (OUTPATIENT)
Dept: SURGERY | Facility: HOSPITAL | Age: 29
End: 2024-12-11
Payer: COMMERCIAL

## 2024-12-11 VITALS
BODY MASS INDEX: 44.41 KG/M2 | TEMPERATURE: 97 F | HEIGHT: 68 IN | WEIGHT: 293 LBS | DIASTOLIC BLOOD PRESSURE: 86 MMHG | HEART RATE: 86 BPM | OXYGEN SATURATION: 98 % | RESPIRATION RATE: 16 BRPM | SYSTOLIC BLOOD PRESSURE: 158 MMHG

## 2024-12-11 DIAGNOSIS — K80.20 GALLSTONES: ICD-10-CM

## 2024-12-11 DIAGNOSIS — I10 PRIMARY HYPERTENSION: ICD-10-CM

## 2024-12-11 DIAGNOSIS — E66.01 MORBID OBESITY: Primary | ICD-10-CM

## 2024-12-11 DIAGNOSIS — K80.50 BILIARY COLIC: ICD-10-CM

## 2024-12-11 LAB
B-HCG UR QL: NEGATIVE
CTP QC/QA: YES

## 2024-12-11 PROCEDURE — 36000710: Performed by: SURGERY

## 2024-12-11 PROCEDURE — 63600175 PHARM REV CODE 636 W HCPCS: Mod: JZ,JG | Performed by: ANESTHESIOLOGY

## 2024-12-11 PROCEDURE — 71000015 HC POSTOP RECOV 1ST HR: Performed by: SURGERY

## 2024-12-11 PROCEDURE — 71000033 HC RECOVERY, INTIAL HOUR: Performed by: SURGERY

## 2024-12-11 PROCEDURE — 37000008 HC ANESTHESIA 1ST 15 MINUTES: Performed by: SURGERY

## 2024-12-11 PROCEDURE — 63600175 PHARM REV CODE 636 W HCPCS: Mod: JZ,JG | Performed by: SURGERY

## 2024-12-11 PROCEDURE — 88304 TISSUE EXAM BY PATHOLOGIST: CPT | Mod: 26,,, | Performed by: PATHOLOGY

## 2024-12-11 PROCEDURE — 36000711: Performed by: SURGERY

## 2024-12-11 PROCEDURE — 25000003 PHARM REV CODE 250

## 2024-12-11 PROCEDURE — 81025 URINE PREGNANCY TEST: CPT | Performed by: SURGERY

## 2024-12-11 PROCEDURE — 47562 LAPAROSCOPIC CHOLECYSTECTOMY: CPT | Mod: ,,, | Performed by: SURGERY

## 2024-12-11 PROCEDURE — 63600175 PHARM REV CODE 636 W HCPCS: Performed by: ANESTHESIOLOGY

## 2024-12-11 PROCEDURE — 63600175 PHARM REV CODE 636 W HCPCS: Performed by: NURSE ANESTHETIST, CERTIFIED REGISTERED

## 2024-12-11 PROCEDURE — 25000003 PHARM REV CODE 250: Performed by: NURSE ANESTHETIST, CERTIFIED REGISTERED

## 2024-12-11 PROCEDURE — 27201423 OPTIME MED/SURG SUP & DEVICES STERILE SUPPLY: Performed by: SURGERY

## 2024-12-11 PROCEDURE — 37000009 HC ANESTHESIA EA ADD 15 MINS: Performed by: SURGERY

## 2024-12-11 PROCEDURE — 88304 TISSUE EXAM BY PATHOLOGIST: CPT | Performed by: PATHOLOGY

## 2024-12-11 RX ORDER — CEFAZOLIN SODIUM 1 G/3ML
INJECTION, POWDER, FOR SOLUTION INTRAMUSCULAR; INTRAVENOUS
Status: DISCONTINUED | OUTPATIENT
Start: 2024-12-11 | End: 2024-12-11

## 2024-12-11 RX ORDER — DEXMEDETOMIDINE HYDROCHLORIDE 100 UG/ML
INJECTION, SOLUTION INTRAVENOUS
Status: DISCONTINUED | OUTPATIENT
Start: 2024-12-11 | End: 2024-12-11

## 2024-12-11 RX ORDER — ONDANSETRON HYDROCHLORIDE 2 MG/ML
INJECTION, SOLUTION INTRAVENOUS
Status: DISCONTINUED | OUTPATIENT
Start: 2024-12-11 | End: 2024-12-11

## 2024-12-11 RX ORDER — HYDROCODONE BITARTRATE AND ACETAMINOPHEN 7.5; 325 MG/1; MG/1
1 TABLET ORAL EVERY 6 HOURS PRN
Status: DISCONTINUED | OUTPATIENT
Start: 2024-12-11 | End: 2024-12-11 | Stop reason: HOSPADM

## 2024-12-11 RX ORDER — MIDAZOLAM HYDROCHLORIDE 1 MG/ML
INJECTION, SOLUTION INTRAMUSCULAR; INTRAVENOUS
Status: DISCONTINUED | OUTPATIENT
Start: 2024-12-11 | End: 2024-12-11

## 2024-12-11 RX ORDER — OXYCODONE AND ACETAMINOPHEN 5; 325 MG/1; MG/1
1 TABLET ORAL
Status: DISCONTINUED | OUTPATIENT
Start: 2024-12-11 | End: 2024-12-11 | Stop reason: HOSPADM

## 2024-12-11 RX ORDER — HYDROCODONE BITARTRATE AND ACETAMINOPHEN 10; 325 MG/1; MG/1
1 TABLET ORAL EVERY 6 HOURS PRN
Status: DISCONTINUED | OUTPATIENT
Start: 2024-12-11 | End: 2024-12-11 | Stop reason: HOSPADM

## 2024-12-11 RX ORDER — ONDANSETRON HYDROCHLORIDE 2 MG/ML
4 INJECTION, SOLUTION INTRAVENOUS EVERY 12 HOURS PRN
Status: DISCONTINUED | OUTPATIENT
Start: 2024-12-11 | End: 2024-12-11 | Stop reason: HOSPADM

## 2024-12-11 RX ORDER — ONDANSETRON 4 MG/1
4 TABLET, FILM COATED ORAL EVERY 6 HOURS PRN
Qty: 20 TABLET | Refills: 0 | Status: SHIPPED | OUTPATIENT
Start: 2024-12-11

## 2024-12-11 RX ORDER — SODIUM CHLORIDE, SODIUM LACTATE, POTASSIUM CHLORIDE, CALCIUM CHLORIDE 600; 310; 30; 20 MG/100ML; MG/100ML; MG/100ML; MG/100ML
INJECTION, SOLUTION INTRAVENOUS CONTINUOUS
Status: DISCONTINUED | OUTPATIENT
Start: 2024-12-11 | End: 2024-12-11 | Stop reason: HOSPADM

## 2024-12-11 RX ORDER — ROCURONIUM BROMIDE 10 MG/ML
INJECTION, SOLUTION INTRAVENOUS
Status: DISCONTINUED | OUTPATIENT
Start: 2024-12-11 | End: 2024-12-11

## 2024-12-11 RX ORDER — ONDANSETRON HYDROCHLORIDE 2 MG/ML
4 INJECTION, SOLUTION INTRAVENOUS ONCE AS NEEDED
Status: DISCONTINUED | OUTPATIENT
Start: 2024-12-11 | End: 2024-12-11 | Stop reason: HOSPADM

## 2024-12-11 RX ORDER — FENTANYL CITRATE 50 UG/ML
INJECTION, SOLUTION INTRAMUSCULAR; INTRAVENOUS
Status: DISCONTINUED | OUTPATIENT
Start: 2024-12-11 | End: 2024-12-11

## 2024-12-11 RX ORDER — ACETAMINOPHEN 10 MG/ML
1000 INJECTION, SOLUTION INTRAVENOUS ONCE
Status: COMPLETED | OUTPATIENT
Start: 2024-12-11 | End: 2024-12-11

## 2024-12-11 RX ORDER — MEPERIDINE HYDROCHLORIDE 25 MG/ML
12.5 INJECTION INTRAMUSCULAR; INTRAVENOUS; SUBCUTANEOUS ONCE AS NEEDED
Status: DISCONTINUED | OUTPATIENT
Start: 2024-12-11 | End: 2024-12-11 | Stop reason: HOSPADM

## 2024-12-11 RX ORDER — BUPIVACAINE HYDROCHLORIDE 2.5 MG/ML
INJECTION, SOLUTION EPIDURAL; INFILTRATION; INTRACAUDAL
Status: DISCONTINUED | OUTPATIENT
Start: 2024-12-11 | End: 2024-12-11 | Stop reason: HOSPADM

## 2024-12-11 RX ORDER — DEXAMETHASONE SODIUM PHOSPHATE 4 MG/ML
INJECTION, SOLUTION INTRA-ARTICULAR; INTRALESIONAL; INTRAMUSCULAR; INTRAVENOUS; SOFT TISSUE
Status: DISCONTINUED | OUTPATIENT
Start: 2024-12-11 | End: 2024-12-11

## 2024-12-11 RX ORDER — KETOROLAC TROMETHAMINE 30 MG/ML
INJECTION, SOLUTION INTRAMUSCULAR; INTRAVENOUS
Status: DISCONTINUED | OUTPATIENT
Start: 2024-12-11 | End: 2024-12-11

## 2024-12-11 RX ORDER — HYDROCODONE BITARTRATE AND ACETAMINOPHEN 7.5; 325 MG/1; MG/1
1 TABLET ORAL EVERY 6 HOURS PRN
Qty: 20 TABLET | Refills: 0 | Status: SHIPPED | OUTPATIENT
Start: 2024-12-11 | End: 2024-12-21

## 2024-12-11 RX ORDER — SUCCINYLCHOLINE CHLORIDE 20 MG/ML
INJECTION INTRAMUSCULAR; INTRAVENOUS
Status: DISCONTINUED | OUTPATIENT
Start: 2024-12-11 | End: 2024-12-11

## 2024-12-11 RX ORDER — INDOCYANINE GREEN AND WATER 25 MG
2.5 KIT INJECTION
Status: COMPLETED | OUTPATIENT
Start: 2024-12-11 | End: 2024-12-11

## 2024-12-11 RX ORDER — LABETALOL HYDROCHLORIDE 5 MG/ML
INJECTION, SOLUTION INTRAVENOUS
Status: DISCONTINUED | OUTPATIENT
Start: 2024-12-11 | End: 2024-12-11

## 2024-12-11 RX ORDER — HYDROMORPHONE HYDROCHLORIDE 1 MG/ML
1 INJECTION, SOLUTION INTRAMUSCULAR; INTRAVENOUS; SUBCUTANEOUS EVERY 4 HOURS PRN
Status: CANCELLED | OUTPATIENT
Start: 2024-12-11

## 2024-12-11 RX ORDER — PROPOFOL 10 MG/ML
VIAL (ML) INTRAVENOUS
Status: DISCONTINUED | OUTPATIENT
Start: 2024-12-11 | End: 2024-12-11

## 2024-12-11 RX ORDER — ONDANSETRON HYDROCHLORIDE 2 MG/ML
4 INJECTION, SOLUTION INTRAVENOUS DAILY PRN
Status: DISCONTINUED | OUTPATIENT
Start: 2024-12-11 | End: 2024-12-11 | Stop reason: HOSPADM

## 2024-12-11 RX ORDER — HYDROMORPHONE HYDROCHLORIDE 1 MG/ML
0.2 INJECTION, SOLUTION INTRAMUSCULAR; INTRAVENOUS; SUBCUTANEOUS EVERY 5 MIN PRN
Status: DISCONTINUED | OUTPATIENT
Start: 2024-12-11 | End: 2024-12-11 | Stop reason: HOSPADM

## 2024-12-11 RX ORDER — FENTANYL CITRATE 50 UG/ML
25 INJECTION, SOLUTION INTRAMUSCULAR; INTRAVENOUS EVERY 5 MIN PRN
Status: DISCONTINUED | OUTPATIENT
Start: 2024-12-11 | End: 2024-12-11 | Stop reason: HOSPADM

## 2024-12-11 RX ORDER — ONDANSETRON 8 MG/1
8 TABLET, ORALLY DISINTEGRATING ORAL EVERY 8 HOURS PRN
Status: CANCELLED | OUTPATIENT
Start: 2024-12-11

## 2024-12-11 RX ORDER — LIDOCAINE HYDROCHLORIDE 20 MG/ML
INJECTION, SOLUTION EPIDURAL; INFILTRATION; INTRACAUDAL; PERINEURAL
Status: DISCONTINUED | OUTPATIENT
Start: 2024-12-11 | End: 2024-12-11

## 2024-12-11 RX ADMIN — PROPOFOL 20 MG: 10 INJECTION, EMULSION INTRAVENOUS at 01:12

## 2024-12-11 RX ADMIN — ROCURONIUM BROMIDE 45 MG: 10 INJECTION, SOLUTION INTRAVENOUS at 12:12

## 2024-12-11 RX ADMIN — FENTANYL CITRATE 25 MCG: 50 INJECTION INTRAMUSCULAR; INTRAVENOUS at 02:12

## 2024-12-11 RX ADMIN — ACETAMINOPHEN 1000 MG: 10 INJECTION INTRAVENOUS at 02:12

## 2024-12-11 RX ADMIN — LIDOCAINE HYDROCHLORIDE 100 MG: 20 INJECTION, SOLUTION EPIDURAL; INFILTRATION; INTRACAUDAL; PERINEURAL at 01:12

## 2024-12-11 RX ADMIN — ROCURONIUM BROMIDE 5 MG: 10 INJECTION, SOLUTION INTRAVENOUS at 12:12

## 2024-12-11 RX ADMIN — SUCCINYLCHOLINE CHLORIDE 160 MG: 20 INJECTION, SOLUTION INTRAMUSCULAR; INTRAVENOUS; PARENTERAL at 12:12

## 2024-12-11 RX ADMIN — SODIUM CHLORIDE, SODIUM LACTATE, POTASSIUM CHLORIDE, AND CALCIUM CHLORIDE: .6; .31; .03; .02 INJECTION, SOLUTION INTRAVENOUS at 12:12

## 2024-12-11 RX ADMIN — PROPOFOL 200 MG: 10 INJECTION, EMULSION INTRAVENOUS at 12:12

## 2024-12-11 RX ADMIN — ONDANSETRON 4 MG: 2 INJECTION INTRAMUSCULAR; INTRAVENOUS at 12:12

## 2024-12-11 RX ADMIN — DEXMEDETOMIDINE 8 MCG: 200 INJECTION, SOLUTION INTRAVENOUS at 12:12

## 2024-12-11 RX ADMIN — LABETALOL HYDROCHLORIDE 15 MG: 5 INJECTION, SOLUTION INTRAVENOUS at 01:12

## 2024-12-11 RX ADMIN — INDOCYANINE GREEN AND WATER 5 MG: KIT at 12:12

## 2024-12-11 RX ADMIN — DEXMEDETOMIDINE 6 MCG: 200 INJECTION, SOLUTION INTRAVENOUS at 12:12

## 2024-12-11 RX ADMIN — MIDAZOLAM HYDROCHLORIDE 2 MG: 1 INJECTION, SOLUTION INTRAMUSCULAR; INTRAVENOUS at 12:12

## 2024-12-11 RX ADMIN — FENTANYL CITRATE 100 MCG: 50 INJECTION, SOLUTION INTRAMUSCULAR; INTRAVENOUS at 12:12

## 2024-12-11 RX ADMIN — ROCURONIUM BROMIDE 30 MG: 10 INJECTION, SOLUTION INTRAVENOUS at 12:12

## 2024-12-11 RX ADMIN — LIDOCAINE HYDROCHLORIDE 100 MG: 20 INJECTION, SOLUTION EPIDURAL; INFILTRATION; INTRACAUDAL; PERINEURAL at 12:12

## 2024-12-11 RX ADMIN — SUGAMMADEX 200 MG: 100 INJECTION, SOLUTION INTRAVENOUS at 01:12

## 2024-12-11 RX ADMIN — CEFAZOLIN 3 G: 330 INJECTION, POWDER, FOR SOLUTION INTRAMUSCULAR; INTRAVENOUS at 12:12

## 2024-12-11 RX ADMIN — DEXAMETHASONE SODIUM PHOSPHATE 8 MG: 4 INJECTION, SOLUTION INTRA-ARTICULAR; INTRALESIONAL; INTRAMUSCULAR; INTRAVENOUS; SOFT TISSUE at 12:12

## 2024-12-11 RX ADMIN — KETOROLAC TROMETHAMINE 30 MG: 30 INJECTION, SOLUTION INTRAMUSCULAR; INTRAVENOUS at 01:12

## 2024-12-11 NOTE — ANESTHESIA PREPROCEDURE EVALUATION
12/11/2024  Christina Leal is a 28 y.o., female.      Pre-op Assessment    I have reviewed the Patient Summary Reports.     I have reviewed the Nursing Notes. I have reviewed the NPO Status.      Review of Systems  Anesthesia Hx:  No problems with previous Anesthesia              Personal Hx of Anesthesia complications (Had a panic attack, trouble breathing in PACU)                    Hematology/Oncology:  Hematology Normal   Oncology Normal                                   Cardiovascular:     Hypertension                                          Renal/:  Renal/ Normal                 Hepatic/GI:  Hepatic/GI Normal                    Neurological:  Neurology Normal                                      Endocrine:  Endocrine Normal            Dermatological:  Skin Normal    Psych:  Psychiatric Normal                    Physical Exam  General: Well nourished, Cooperative, Alert and Oriented    Airway:  Mallampati: II / II  Mouth Opening: Normal  TM Distance: Normal  Tongue: Normal  Neck ROM: Normal ROM    Dental:  Intact      Patient Active Problem List   Diagnosis    Class 3 severe obesity in adult    Anemia, hypochromic    Suspected sleep apnea    ASCUS of cervix with negative high risk HPV    Iron deficiency anemia, unspecified    Body mass index (BMI) 50.0-59.9, adult    HTN (hypertension)    Morbid obesity         Anesthesia Plan  Type of Anesthesia, risks & benefits discussed:    Anesthesia Type: Gen ETT  Intra-op Monitoring Plan: Standard ASA Monitors  Post Op Pain Control Plan: multimodal analgesia  Induction:  IV  Airway Plan: Direct  Informed Consent: Informed consent signed with the Patient and all parties understand the risks and agree with anesthesia plan.  All questions answered.   ASA Score: 2  Day of Surgery Review of History & Physical: H&P Update referred to the surgeon/provider.I have  interviewed and examined the patient. I have reviewed the patient's H&P dated: There are no significant changes. H&P completed by Anesthesiologist.    Ready For Surgery From Anesthesia Perspective.     .

## 2024-12-11 NOTE — OP NOTE
[facility] (Hospital)  Operative Note      Date of Procedure: 12/11/2024     Procedure: Procedure(s) (LRB):  DV5 ROBOTIC CHOLECYSTECTOMY (N/A)     Surgeons and Role:     * Miguel Stone MD - Primary    Assisting Surgeon: None    Pre-Operative Diagnosis: Biliary colic [K80.50]  Gallstones [K80.20]    Post-Operative Diagnosis: Post-Op Diagnosis Codes:     * Biliary colic [K80.50]     * Gallstones [K80.20]    Anesthesia: General    Operative Findings (including complications, if any):     Mildly inflamed stone filled gallbladder    Description of Technical Procedures:     The patient was brought into the operating room and placed on the operating table in the supine position.  General endotracheal anesthesia was induced.  IV antibiotics were administered.  Pneumatic compression devices were placed in the lower extremities.  Arms were tucked at the side.  The abdomen was clipped, prepped and draped in the standard manner.   Indocyanine green was administered    A timeout was performed.    A small transverse incision was made just above the umbilicus.  The fascia was identified and elevated with [place] clamps.  A varies needle was inserted and its position confirmed by saline drop test.  Pneumoperitoneum was established to 15 mmHg.  A 8 mm robotic trocar was inserted.  The laparoscope was inserted.  There was no evidence of a vascular or enteric injury.  Marcaine was infiltrated at the lateral trocar site    Additional trochars were placed as follows an 8 mm robotic trocar was placed in the midclavicular line in the left midabdomen.  An 8 mm trocar was placed in the midclavicular line of the right midabdomen.  An 8 mm trocar was placed in the anterior axillary line of the right midabdomen.  Marcaine was infiltrated at the umbilical trocar site The patient was placed in reverse Trendelenburg position and rolled to the left.  The patient was docked to the da Frieda robot.    Adhesions of the omentum to the  falciform ligament were taken down with the hook cautery    The fundus of the gallbladder was retracted cephalad.  The infundibulum was retracted laterally.  The fire fly technology of the robot was activated in the course of the common bile duct was elucidated.    The cystic duct was also visualized and dye could be seen entering the gallbladder    Peritoneum over the neck of the gallbladder was taken down with the hook cautery and the cystic duct was dissected circumferentially.  The cystic artery was then dissected circumferentially and the neck of the gallbladder was then dissected off the gallbladder bed.  This cleared Leandro triangle of all loose areolar tissue and the critical view was obtained.    Using the firefly technique course of the cystic duct, common bile duct in the lower edge of the cystic artery was seen going through Calot's 50 was obtained    The cystic duct was clipped with 2 clips proximally 1 clip distally and transected.  The cystic artery was clipped with 2 clips proximally 1 clip distally and transected.  The robotic scissors with cauteryy was then used to remove the gallbladder from the gallbladder bed.  A posterior cystic artery was clipped proximally and then divided     The gallbladder was nearly excised from the gallbladder bed, the gallbladder bed was inspected and hemostasis was ensured using electrocautery.  Removal of the gallbladder was then completed.    The right lateral operating arm of the robot was then removed and an Endo Catch bag was inserted.  The gallbladder was placed in Endo Catch bag.  The patient was then undocked from the da Frieda operating robot.    The gallbladder was extracted through the right lateral incision.  The gallbladder was partially extracted.  The gallbladder was opened and too numerous to count stones were removed.  The gallbladder was completely extracted.  The extraction site was closed with 0 Vicryl suture x2 using a suture Passer.  The trochars  were removed under direct vision and no bleeding was noted.  The abdomen was then desufflated.  All incisions were closed with 4-0 Monocryl in a subcuticular manner.  Dermabond was placed     Significant Surgical Tasks Conducted by the Assistant(s), if Applicable:  None    Estimated Blood Loss (EBL):  15 to 20 mL   IV fluids 800 mL   Marcaine 0.25% *           Implants: * No implants in log *    Specimens:   Specimen (24h ago, onward)       Start     Ordered    12/11/24 1317  Specimen to Pathology, Surgery General Surgery  Once        Comments: Pre-op Diagnosis: Biliary colic [K80.50]Gallstones [K80.20]Procedure(s):DV5 ROBOTIC CHOLECYSTECTOMY Number of specimens: 1Name of specimens: 1) gallbladder with stones --perm     References:    Click here for ordering Quick Tip   Question Answer Comment   Procedure Type: General Surgery    Specimen Class: Routine/Screening    Release to patient Immediate        12/11/24 1320                            Condition: Stable    Disposition: PACU - hemodynamically stable.    Attestation: I performed the procedure.    Discharge Note    OUTCOME: Patient tolerated treatment/procedure well without complication and is now ready for discharge.    Robotic assisted cholecystectomy    DISPOSITION: Home or Self Care    FINAL DIAGNOSIS:  Cholelithiasis with chronic cholecystitis    FOLLOWUP: In clinic    DISCHARGE INSTRUCTIONS:    Discharge Procedure Orders   Diet general     Call MD for:  temperature >100.4     Call MD for:  persistent nausea and vomiting     Call MD for:  severe uncontrolled pain     Call MD for:  difficulty breathing, headache or visual disturbances     Call MD for:  redness, tenderness, or signs of infection (pain, swelling, redness, odor or green/yellow discharge around incision site)     Lifting restrictions   Order Comments: No lifting More than 20 lb 2 weeks     No dressing needed        Clinical Reference Documents Added to Patient Instructions         Document     CHOLECYSTECTOMY DISCHARGE INSTRUCTIONS (ENGLISH)    HYDROCODONE AND ACETAMINOPHEN, ADULT (ENGLISH)    ONDANSETRON, ADULT (ENGLISH)

## 2024-12-11 NOTE — INTERVAL H&P NOTE
The patient has been examined and the H&P has been reviewed:    I concur with the findings and no changes have occurred since H&P was written.    Surgery risks, benefits and alternative options discussed and understood by patient/family.      Surgery was discussed.      Labs reviewed    The risks, benefits and complications of robotic/laparoscopic cholecystectomy were discussed.  These included infection, bleeding, abscess, retained stone and bile leak.  The need for open conversion was dicussed.  The rare possibility of a common bile duct injury and the need for additional procedures and/or surgery was reviewed.  All question were answered.  The patient has agreed to proceed.  Consent was obtained.     There are no hospital problems to display for this patient.

## 2024-12-11 NOTE — DISCHARGE INSTRUCTIONS
Please call for any fever, increase in pain, nausea or vomiting or redness or drainage from incision(s).    No lifting more than 20 pounds for 2 weeks    No driving if taking the pain medicine    May shower     Removed the glue when it becomes loose, this usually takes 10-14 days.      You may want to take a stool softener or Glycolax or MiraLax while taking pain medicine to avoid constipation    If you become constipated from the pain medication you can use a double dose of Miralax or Glycolax, or milk of magnesia for severe constipation.    Our office phone numbers are  [phone] and     Our office fax  number is #[phone]

## 2024-12-11 NOTE — ANESTHESIA POSTPROCEDURE EVALUATION
Anesthesia Post Evaluation    Patient: Christina Leal    Procedure(s) Performed: Procedure(s) (LRB):  DV5 ROBOTIC CHOLECYSTECTOMY (N/A)    Final Anesthesia Type: general      Patient location during evaluation: PACU  Patient participation: Yes- Able to Participate  Level of consciousness: awake and alert  Post-procedure vital signs: reviewed and stable  Pain management: adequate  Airway patency: patent  TYRONE mitigation strategies: Verification of full reversal of neuromuscular block  PONV status at discharge: No PONV  Anesthetic complications: no      Cardiovascular status: hemodynamically stable  Respiratory status: spontaneous ventilation  Hydration status: euvolemic  Follow-up not needed.              Vitals Value Taken Time   /86 12/11/24 1445   Temp 36.3 °C (97.4 °F) 12/11/24 1445   Pulse 86 12/11/24 1445   Resp 16 12/11/24 1445   SpO2 98 % 12/11/24 1445         Event Time   Out of Recovery 14:46:00         Pain/Zoe Score: Pain Rating Prior to Med Admin: 5 (12/11/2024  2:37 PM)  Zoe Score: 10 (12/11/2024  2:47 PM)

## 2024-12-12 ENCOUNTER — PATIENT MESSAGE (OUTPATIENT)
Dept: SURGERY | Facility: CLINIC | Age: 29
End: 2024-12-12
Payer: COMMERCIAL

## 2024-12-16 LAB
FINAL PATHOLOGIC DIAGNOSIS: NORMAL
GROSS: NORMAL
Lab: NORMAL

## 2024-12-26 ENCOUNTER — OFFICE VISIT (OUTPATIENT)
Dept: SURGERY | Facility: CLINIC | Age: 29
End: 2024-12-26
Payer: COMMERCIAL

## 2024-12-26 VITALS
DIASTOLIC BLOOD PRESSURE: 78 MMHG | SYSTOLIC BLOOD PRESSURE: 126 MMHG | HEART RATE: 92 BPM | BODY MASS INDEX: 44.41 KG/M2 | HEIGHT: 68 IN | WEIGHT: 293 LBS

## 2024-12-26 DIAGNOSIS — K80.20 GALLSTONES: Primary | ICD-10-CM

## 2024-12-26 DIAGNOSIS — K80.50 BILIARY COLIC: ICD-10-CM

## 2024-12-26 PROCEDURE — 1160F RVW MEDS BY RX/DR IN RCRD: CPT | Mod: CPTII,S$GLB,,

## 2024-12-26 PROCEDURE — 1159F MED LIST DOCD IN RCRD: CPT | Mod: CPTII,S$GLB,,

## 2024-12-26 PROCEDURE — 3044F HG A1C LEVEL LT 7.0%: CPT | Mod: CPTII,S$GLB,,

## 2024-12-26 PROCEDURE — 3078F DIAST BP <80 MM HG: CPT | Mod: CPTII,S$GLB,,

## 2024-12-26 PROCEDURE — 3074F SYST BP LT 130 MM HG: CPT | Mod: CPTII,S$GLB,,

## 2024-12-26 PROCEDURE — 99999 PR PBB SHADOW E&M-EST. PATIENT-LVL III: CPT | Mod: PBBFAC,,,

## 2024-12-26 PROCEDURE — 99024 POSTOP FOLLOW-UP VISIT: CPT | Mod: S$GLB,,,

## 2024-12-26 NOTE — PROGRESS NOTES
History & Physical    Subjective     History of Present Illness:  Patient is a 28 y.o. female who presents for evaluation of upper abdominal pain associated with nausea and vomiting that has been ongoing for about the past week. She has had similar pain in the past, but it was never this severe and was always self limiting. She associates the onset of the pain with consuming dairy products. She was in the ED twice in the past week. She was given Pepcid without relief. She has had some relief from the Norco, but this is now causing constipation. She denies any changes in bowel function prior to this. She denies any past abdominal surgical history. She is still having mild pain and nausea, but not much vomiting. She also had associated chills but no fevers. She denies any sick contacts.     Interval History:  Since the last clinic visit, the patient underwent robotic cholecystectomy. She initially had some nausea and vomiting, but this resolved around 1 day post-op. She is tolerating a regular diet and having normal bowel movements. Her abdominal soreness is nearly resolved. She denies fevers and chills.     Chief Complaint   Patient presents with    Post-op Evaluation     S/p cholecystectomy        Review of patient's allergies indicates:  No Known Allergies    Current Outpatient Medications   Medication Sig Dispense Refill    levonorgestrel-ethinyl estradiol 0.1-20 mg-mcg per tablet Take 1 tablet by mouth once daily. 84 tablet 0    spironolactone-hydrochlorothiazide 25-25mg (ALDACTAZIDE) 25-25 mg Tab Take 1 tablet by mouth once daily. 30 tablet 11     No current facility-administered medications for this visit.       Past Medical History:   Diagnosis Date    Empyema of left pleural space     HTN (hypertension)     Morbid obesity      Past Surgical History:   Procedure Laterality Date    DV5 ROBOTIC CHOLECYSTECTOMY N/A 12/11/2024    Procedure: DV5 ROBOTIC CHOLECYSTECTOMY;  Surgeon: Miguel Stone MD;  Location:  "[facility] OR;  Service: General;  Laterality: N/A;    EVACUATION OF EMPYEMA Left 10/21/2022    Procedure: EVACUATION, EMPYEMA;  Surgeon: Dusty Moyer MD;  Location: [facility] OR;  Service: Cardiothoracic;  Laterality: Left;  VATS, WITH EVACUATION OF EMPYEMA    INJECTION OF ANESTHETIC AGENT AROUND MULTIPLE INTERCOSTAL NERVES Left 10/21/2022    Procedure: BLOCK, NERVE, INTERCOSTAL, 2 OR MORE;  Surgeon: Dusty Moyer MD;  Location: [facility] OR;  Service: Cardiothoracic;  Laterality: Left;    THORACOSCOPIC DECORTICATION OF LUNG Left 10/21/2022    Procedure: VATS, WITH DECORTICATION, LUNG;  Surgeon: Dusty Moyer MD;  Location: [facility] OR;  Service: Cardiothoracic;  Laterality: Left;  AND EVACUATION OF EMPYEMA     Family History   Problem Relation Name Age of Onset    Esophageal cancer Father      Heart disease Neg Hx      Breast cancer Neg Hx      Ovarian cancer Neg Hx      Colon cancer Neg Hx       Social History     Tobacco Use    Smoking status: Former     Current packs/day: 0.00     Types: Cigarettes     Start date:      Quit date:      Years since quittin.9    Smokeless tobacco: Former   Substance Use Topics    Alcohol use: Yes     Comment: social    Drug use: No        Review of Systems:  Review of Systems   Constitutional:  Negative for chills, fatigue, fever and unexpected weight change.   Respiratory:  Negative for cough, shortness of breath, wheezing and stridor.    Cardiovascular:  Negative for chest pain, palpitations and leg swelling.   Gastrointestinal:  Negative for abdominal distention, abdominal pain, constipation, diarrhea, nausea and vomiting.   Genitourinary:  Negative for difficulty urinating, dysuria, frequency, hematuria and urgency.   Skin:  Negative for color change, pallor, rash and wound.   Hematological:  Does not bruise/bleed easily.          Objective     Vital Signs (Most Recent)  Pulse: 92 (24 0934)  BP: 126/78 (24 0934)  5' 8" (1.727 m)  (!) 140.8 kg (310 lb 6.5 oz) "     Physical Exam:  Physical Exam  Vitals reviewed.   Constitutional:       General: She is not in acute distress.     Appearance: She is well-developed. She is not toxic-appearing.   HENT:      Head: Normocephalic and atraumatic.      Right Ear: External ear normal.      Left Ear: External ear normal.      Nose: Nose normal.      Mouth/Throat:      Mouth: Mucous membranes are moist.   Eyes:      Extraocular Movements: Extraocular movements intact.      Conjunctiva/sclera: Conjunctivae normal.   Cardiovascular:      Rate and Rhythm: Normal rate.   Pulmonary:      Effort: Pulmonary effort is normal. No respiratory distress.   Abdominal:      Comments: Abdomen soft and non-distended, and non-tender. Incisions CDI, no SOI. Healing well   Musculoskeletal:      Cervical back: Neck supple.   Skin:     General: Skin is warm and dry.   Neurological:      Mental Status: She is alert and oriented to person, place, and time.   Psychiatric:         Behavior: Behavior normal.         Laboratory  Lab Results   Component Value Date    WBC 8.57 12/05/2024    HGB 12.9 12/05/2024    HCT 38.2 12/05/2024    MCV 82 12/05/2024     12/05/2024       CMP  Sodium   Date Value Ref Range Status   12/05/2024 133 (L) 136 - 145 mmol/L Final     Potassium   Date Value Ref Range Status   12/05/2024 3.3 (L) 3.5 - 5.1 mmol/L Final     Chloride   Date Value Ref Range Status   12/05/2024 97 95 - 110 mmol/L Final     CO2   Date Value Ref Range Status   12/05/2024 22 (L) 23 - 29 mmol/L Final     Glucose   Date Value Ref Range Status   12/05/2024 113 (H) 70 - 110 mg/dL Final     BUN   Date Value Ref Range Status   12/05/2024 12 6 - 20 mg/dL Final     Creatinine   Date Value Ref Range Status   12/05/2024 1.1 0.5 - 1.4 mg/dL Final     Calcium   Date Value Ref Range Status   12/05/2024 9.2 8.7 - 10.5 mg/dL Final     Total Protein   Date Value Ref Range Status   12/05/2024 8.3 6.0 - 8.4 g/dL Final     Albumin   Date Value Ref Range Status   12/05/2024  3.9 3.5 - 5.2 g/dL Final     Total Bilirubin   Date Value Ref Range Status   12/05/2024 0.9 0.1 - 1.0 mg/dL Final     Comment:     For infants and newborns, interpretation of results should be based  on gestational age, weight and in agreement with clinical  observations.    Premature Infant recommended reference ranges:  Up to 24 hours.............<8.0 mg/dL  Up to 48 hours............<12.0 mg/dL  3-5 days..................<15.0 mg/dL  6-29 days.................<15.0 mg/dL       Alkaline Phosphatase   Date Value Ref Range Status   12/05/2024 40 40 - 150 U/L Final     AST   Date Value Ref Range Status   12/05/2024 20 10 - 40 U/L Final     ALT   Date Value Ref Range Status   12/05/2024 26 10 - 44 U/L Final     Anion Gap   Date Value Ref Range Status   12/05/2024 14 8 - 16 mmol/L Final     eGFR   Date Value Ref Range Status   12/05/2024 >60 >60 mL/min/1.73 m^2 Final     Lab Results   Component Value Date    LIPASE 21 12/05/2024         Diagnostic Results:  U/S abdomen with cholelithiasis without evidence for acute cholecystitis    CT abdomen pelvis with hepatomegaly     Pathology:      Component 2 wk ago   Final Pathologic Diagnosis GALLBLADDER, CHOLECYSTECTOMY:  Chronic cholecystitis with cholelithiasis             Assessment and Plan   27 y/o female with symptomatic cholelithiasis now s/p robotic cholecystectomy who is recovering as expected.    - Pathology reviewed as above, benign  - No further interventions or restrictions  - RTC as needed or if any issues arise      Sherice Sanches PA-C  [facility]

## 2025-01-26 DIAGNOSIS — R03.0 ELEVATED BLOOD PRESSURE READING IN OFFICE WITHOUT DIAGNOSIS OF HYPERTENSION: ICD-10-CM

## 2025-01-27 RX ORDER — LEVONORGESTREL/ETHIN.ESTRADIOL 0.1-0.02MG
1 TABLET ORAL DAILY
Qty: 84 TABLET | Refills: 3 | Status: SHIPPED | OUTPATIENT
Start: 2025-01-27 | End: 2025-12-01

## 2025-02-12 ENCOUNTER — PATIENT MESSAGE (OUTPATIENT)
Dept: SURGERY | Facility: HOSPITAL | Age: 30
End: 2025-02-12
Payer: COMMERCIAL

## 2025-02-13 ENCOUNTER — PATIENT OUTREACH (OUTPATIENT)
Dept: ADMINISTRATIVE | Facility: HOSPITAL | Age: 30
End: 2025-02-13
Payer: COMMERCIAL

## 2025-02-19 ENCOUNTER — PATIENT MESSAGE (OUTPATIENT)
Dept: SURGERY | Facility: HOSPITAL | Age: 30
End: 2025-02-19
Payer: COMMERCIAL

## 2025-05-15 ENCOUNTER — LAB VISIT (OUTPATIENT)
Dept: LAB | Facility: HOSPITAL | Age: 30
End: 2025-05-15
Attending: PHYSICIAN ASSISTANT
Payer: COMMERCIAL

## 2025-05-15 DIAGNOSIS — E87.1 HYPONATREMIA: ICD-10-CM

## 2025-05-15 DIAGNOSIS — I10 PRIMARY HYPERTENSION: ICD-10-CM

## 2025-05-15 LAB
ALBUMIN SERPL BCP-MCNC: 3.4 G/DL (ref 3.5–5.2)
ALP SERPL-CCNC: 45 UNIT/L (ref 40–150)
ALT SERPL W/O P-5'-P-CCNC: 12 UNIT/L (ref 10–44)
ANION GAP (OHS): 8 MMOL/L (ref 8–16)
AST SERPL-CCNC: 12 UNIT/L (ref 11–45)
BILIRUB SERPL-MCNC: 0.9 MG/DL (ref 0.1–1)
BUN SERPL-MCNC: 12 MG/DL (ref 6–20)
CALCIUM SERPL-MCNC: 9 MG/DL (ref 8.7–10.5)
CHLORIDE SERPL-SCNC: 106 MMOL/L (ref 95–110)
CO2 SERPL-SCNC: 23 MMOL/L (ref 23–29)
CREAT SERPL-MCNC: 0.7 MG/DL (ref 0.5–1.4)
GFR SERPLBLD CREATININE-BSD FMLA CKD-EPI: >60 ML/MIN/1.73/M2
GLUCOSE SERPL-MCNC: 100 MG/DL (ref 70–110)
POTASSIUM SERPL-SCNC: 4.4 MMOL/L (ref 3.5–5.1)
PROT SERPL-MCNC: 7.3 GM/DL (ref 6–8.4)
SODIUM SERPL-SCNC: 137 MMOL/L (ref 136–145)

## 2025-05-15 PROCEDURE — 80053 COMPREHEN METABOLIC PANEL: CPT

## 2025-05-15 PROCEDURE — 36415 COLL VENOUS BLD VENIPUNCTURE: CPT

## 2025-05-22 ENCOUNTER — E-CONSULT (OUTPATIENT)
Dept: HEPATOLOGY | Facility: CLINIC | Age: 30
End: 2025-05-22
Payer: COMMERCIAL

## 2025-05-22 ENCOUNTER — OFFICE VISIT (OUTPATIENT)
Dept: INTERNAL MEDICINE | Facility: CLINIC | Age: 30
End: 2025-05-22
Payer: COMMERCIAL

## 2025-05-22 VITALS
SYSTOLIC BLOOD PRESSURE: 138 MMHG | WEIGHT: 293 LBS | OXYGEN SATURATION: 100 % | BODY MASS INDEX: 44.41 KG/M2 | HEART RATE: 88 BPM | DIASTOLIC BLOOD PRESSURE: 80 MMHG | HEIGHT: 68 IN | TEMPERATURE: 98 F

## 2025-05-22 DIAGNOSIS — I10 PRIMARY HYPERTENSION: Primary | ICD-10-CM

## 2025-05-22 DIAGNOSIS — K76.9 HEPATIC DISEASE: Primary | ICD-10-CM

## 2025-05-22 DIAGNOSIS — E66.01 MORBID OBESITY: ICD-10-CM

## 2025-05-22 DIAGNOSIS — K76.0 FATTY LIVER: ICD-10-CM

## 2025-05-22 PROCEDURE — 3075F SYST BP GE 130 - 139MM HG: CPT | Mod: CPTII,S$GLB,, | Performed by: FAMILY MEDICINE

## 2025-05-22 PROCEDURE — 1159F MED LIST DOCD IN RCRD: CPT | Mod: CPTII,S$GLB,, | Performed by: FAMILY MEDICINE

## 2025-05-22 PROCEDURE — G2211 COMPLEX E/M VISIT ADD ON: HCPCS | Mod: S$GLB,,, | Performed by: FAMILY MEDICINE

## 2025-05-22 PROCEDURE — 99214 OFFICE O/P EST MOD 30 MIN: CPT | Mod: S$GLB,,, | Performed by: FAMILY MEDICINE

## 2025-05-22 PROCEDURE — 3008F BODY MASS INDEX DOCD: CPT | Mod: CPTII,S$GLB,, | Performed by: FAMILY MEDICINE

## 2025-05-22 PROCEDURE — 99999 PR PBB SHADOW E&M-EST. PATIENT-LVL III: CPT | Mod: PBBFAC,,, | Performed by: FAMILY MEDICINE

## 2025-05-22 PROCEDURE — 3079F DIAST BP 80-89 MM HG: CPT | Mod: CPTII,S$GLB,, | Performed by: FAMILY MEDICINE

## 2025-05-22 NOTE — CONSULTS
[facility]  Response for E-Consult     Patient Name: Christina Leal  MRN: [id number]  Primary Care Provider: Brett Zaidi MD   Requesting Provider: Brett Zaidi MD  E-Consult to Hepatology Outpatient  Consult performed by: Martin Mckeon MD  Consult ordered by: Brett Zaidi MD          Recommendation: Patient has metabolic risk factors henace favour MASLD/NAFLD    Suggest fibro scan       FIB-4 Calculation: 0.29 at 12/5/2024  1:30 PM  Calculated from:  SGOT/AST: 20 U/L at 12/5/2024  1:30 PM  SGPT/ALT: 26 U/L at 12/5/2024  1:30 PM  Platelets: 396 K/uL at 12/5/2024  1:30 PM  Age: 28 years     FIB-4 below 1.30 is considered as low-risk for advanced fibrosis  FIB-4 over 2.67 is considered as high-risk for advanced fibrosis  FIB-4 values between 1.30 and 2.67 are considered as intermediate-risk of advanced fibrosis for ages 36-64.     For ages > 64 the cut-off for low-risk goes to < 2.  This is a screening tool and clinical judgement should be used in the interpretation of these results.    MAFLD Management  Recommended a more pragmatic approach to medical problems which would include the following:  - life-style modifications: weight loss, daily exercise (30 mins of HIIT or cardio), low carb/low fat diet with goal of losing >3-5% to improve steatosis   - control of diabetes or insulin resistance   - control of high cholesterol, if needed with statin drugs or other cholesterol-lowering agents  - coffee consumption (low caloric): 2-3 cups per day may reduce liver fat   -Reduction of risk factors for Cardiovascular Disease  --Monitor labs and Fib4 or fibro scan 1-2 year     Additional future steps to consider: 5    Total time of Consultation: 10 minute    I did not speak to the requesting provider verbally about this.     *This eConsult is based on the clinical data available to me and is furnished without benefit of a physical examination. The eConsult will need to be interpreted in light  of any clinical issues or changes in patient status not available to me at the time of filing this eConsults. Significant changes in patient condition or level of acuity should result in immediate formal consultation and reevaluation. Please alert me if you have further questions.    Thank you for this eConsult referral.     Martin Mckeon MD  [facility]

## 2025-05-22 NOTE — PROGRESS NOTES
Chief Complaint: Follow-up    History of Present Illness    CHIEF COMPLAINT:  Ms. Leal presents today for follow up    RECENT SURGICAL HISTORY:  She recently underwent outpatient cholecystectomy following two ED visits. Initial x-ray was unremarkable, but subsequent ultrasound revealed innumerable gallstones.    LIVER:  She has fatty liver on imaging and has a pending hepatology referral that was not completed due to concurrent gallbladder procedures.    HYPERTENSION:  She reports blood pressure is well controlled when taking medication consistently, but notes less medication compliance during the period surrounding her cholecystectomy.          Morbid obesity : we discussed need for  weight loss via health diet/portion control and if able then  exercise; defers medication management or surgical options wants to work on daily exercise yoga which has helped in the past      Objective:   Physical Exam    Vitals: Reviewed. Nursing note reviewed.  Constitutional: Alert.  HENT: Normocephalic. Atraumatic. External ears normal. Nose normal. PERRL. Conjunctivae normal.  Neck: ROM normal. Supple.  Cardiovascular: Normal rate and regular rhythm. Normal heart sounds.  Pulmonary: Normal breath sounds. Effort normal.  Abdominal: Bowel sounds are normal. Soft.  Musculoskeletal: ROM normal.  Skin: Warm. Dry.  Neurological: Oriented x3.  Psychiatric: Behavior normal. Thought content normal. Judgment normal.       Assessment:       1. Primary hypertension    2. Fatty liver    3. Morbid obesity        Plan:   Assessment & Plan    Reviewed recent outpatient gallbladder removal procedure.  BP management improved with consistent medication use.  A1C results from fall did not indicate prediabetes.  Fatty liver diagnosis from imaging is common and generally benign, but warrants monitoring.    MORBID OBESITY:  Monitored the patient's exercise routine, noting previous engagement in 30 minutes of walking daily, Pilates, yoga, and strength  training earlier in the year.  Ms. Leal acknowledged discontinuing exercise due to work schedule but plans to resume as summer approaches.  Ms. Leal prefers to attempt weight loss through diet and exercise before considering medication or surgical interventions.  Recommend this approach of lifestyle modifications first, with potential future consideration of pharmacological or surgical options if needed.    FATTY LIVER DISEASE:  Reviewed imaging results confirming fatty liver disease.  Discussed with patient that this condition is common and generally benign, typically not associated with liver function impairment, though a small percentage may develop complications.  Ordered e-consult with hepatology and referred patient to liver clinic for comprehensive evaluation and management.  Will continue monitoring for long-term follow-up, including potential future treatments if scarring develops.    HYPERTENSION:  Blood pressure is elevated without medication but well-controlled with consistent medication adherence.  Will continue current management.    GALLSTONES:  Ultrasound results show numerous gallstones, too many to count.  Will monitor for symptoms.    GENERAL HEALTH MANAGEMENT AND FOLLOW-UP:  Complete lab work before next appointment.  Follow up in 6 months.        Primary hypertension  -     Comprehensive Metabolic Panel; Future; Expected date: 11/18/2025  -     Lipid Panel; Future; Expected date: 11/18/2025    Fatty liver  -     E-Consult to Hepatology Outpatient    Morbid obesity  -     Hemoglobin A1C; Future; Expected date: 11/18/2025     Lab and follow up after in 6 months [place]

## (undated) DEVICE — SYR 30CC LUER LOCK

## (undated) DEVICE — KIT ANTIFOG W/SPONG & FLUID

## (undated) DEVICE — SEE MEDLINE ITEM 146420

## (undated) DEVICE — SUT MONOCRYL 4.0 PS2 CP496G

## (undated) DEVICE — SUT VICRYL 3-0 27 SH

## (undated) DEVICE — DRESSING ANTIMICROBIAL 1 INCH

## (undated) DEVICE — COVER LIGHT HANDLE 80/CA

## (undated) DEVICE — DRESSING TRANS 2X2 TEGADERM

## (undated) DEVICE — CAUTERY TIP POLISHER

## (undated) DEVICE — CATH THORACIC 36FR ST

## (undated) DEVICE — PACK BASIC SETUP SC BR

## (undated) DEVICE — DRESSING ADHESIVE ISLAND 3 X 6

## (undated) DEVICE — DRAPE ARM DAVINCI XI

## (undated) DEVICE — ELECTRODE REM PLYHSV RETURN 9

## (undated) DEVICE — SUT PERMA HAND SILK BLK 0-0

## (undated) DEVICE — SYR LUER LOCK STERILE 10ML

## (undated) DEVICE — DRAPE THREE-QTR REINF 53X77IN

## (undated) DEVICE — SOL NORMAL USPCA 0.9%

## (undated) DEVICE — SUT MONOCRYL 4-0 PS-1 UND

## (undated) DEVICE — APPLICATOR CHLORAPREP ORN 26ML

## (undated) DEVICE — NDL ECLIPSE SAF REG 25GX1.5IN

## (undated) DEVICE — COVER TIP CURVED SCISSORS XI

## (undated) DEVICE — MANIFOLD 4 PORT

## (undated) DEVICE — COVER OVERHEAD SURG LT BLUE

## (undated) DEVICE — TUBING MEDI-VAC 20FT .25IN

## (undated) DEVICE — NDL PNEUMO INSUFFLATI 120MM

## (undated) DEVICE — SET TUBE DAVINCI 5 HI FLOW INS

## (undated) DEVICE — SET PNEUMOCLEAR HEAT HUM SE HF

## (undated) DEVICE — DRAPE INCISE IOBAN 2 23X33IN

## (undated) DEVICE — HEADREST ROUND DISP FOAM 9IN

## (undated) DEVICE — SPONGE LAP 18X18 PREWASHED

## (undated) DEVICE — CONNECTOR 3/8X3/8 STERILE

## (undated) DEVICE — TIP SUCTION YANKAUER

## (undated) DEVICE — DRAIN CHEST DRY SUCTION

## (undated) DEVICE — GRASPER TIP RENEW LAP FENEST

## (undated) DEVICE — OBTURATOR BLADELESS 8MM XI CLR

## (undated) DEVICE — SOL IRR NACL .9% 3000ML

## (undated) DEVICE — DRAPE COLUMN DAVINCI XI

## (undated) DEVICE — ADHESIVE DERMABOND ADVANCED

## (undated) DEVICE — GOWN POLY REINF BRTH SLV XL

## (undated) DEVICE — TUBE LUKI ASP COLL 6 1/4

## (undated) DEVICE — SOL STRL WATER INJ 1000ML BG

## (undated) DEVICE — TAPE SILK 3IN

## (undated) DEVICE — BAG TISSUE RETRIEVAL 5MM

## (undated) DEVICE — IRRIGATOR ENDOSCOPY DISP.

## (undated) DEVICE — CONNECTOR Y 3/8X3/8X1/2 STRL

## (undated) DEVICE — DRAPE LAPSCP CHOLE 122X102X78

## (undated) DEVICE — DRESSING TRANS 4X4 TEGADERM

## (undated) DEVICE — DRESSING MEPORE ISLAND 31/2X4

## (undated) DEVICE — CLIP HEMO-LOK ML

## (undated) DEVICE — NDL SAFETY 22G X 1.5 ECLIPSE

## (undated) DEVICE — CONTAINER SPECIMEN OR STER 4OZ

## (undated) DEVICE — CONNECTOR 1/2X3/8 STERILE

## (undated) DEVICE — ELECTRODE BLADE INSULATED 1 IN

## (undated) DEVICE — GLOVE SIGNATURE ESSNTL LTX 8

## (undated) DEVICE — SYR 50ML CATH TIP

## (undated) DEVICE — NDL SPINAL SPINOCAN 22GX3.5

## (undated) DEVICE — SEAL CANN UNIVERSAL 5-12MM

## (undated) DEVICE — ELECTRODE BLD EXT 6.50 ST DISP

## (undated) DEVICE — SEE L#153236

## (undated) DEVICE — SYR ONLY LUER LOCK 20CC

## (undated) DEVICE — GAUZE SPONGE 4X4 12PLY

## (undated) DEVICE — SOL NS 1000CC

## (undated) DEVICE — GLOVE SURG BIOGEL LATEX SZ 7.5

## (undated) DEVICE — TROCAR ENDOPATH XCEL 12X100MM

## (undated) DEVICE — DRAPE LAP T SHT W/ INSTR PAD

## (undated) DEVICE — TOWEL OR DISP STRL BLUE 4/PK

## (undated) DEVICE — SOL CLEARIFY VISUALIZATION LAP

## (undated) DEVICE — DEVICE CLOSURE DISP 14G

## (undated) DEVICE — CATH 36FR THORACIC RT ANGL

## (undated) DEVICE — DRESSING TELFA STRL 4X3 LF